# Patient Record
Sex: FEMALE | Race: WHITE | Employment: OTHER
[De-identification: names, ages, dates, MRNs, and addresses within clinical notes are randomized per-mention and may not be internally consistent; named-entity substitution may affect disease eponyms.]

---

## 2017-01-11 ENCOUNTER — OFFICE VISIT (OUTPATIENT)
Dept: OBGYN | Facility: CLINIC | Age: 62
End: 2017-01-11

## 2017-01-11 VITALS
HEIGHT: 64 IN | HEART RATE: 58 BPM | TEMPERATURE: 97.5 F | SYSTOLIC BLOOD PRESSURE: 92 MMHG | BODY MASS INDEX: 37.56 KG/M2 | WEIGHT: 220 LBS | DIASTOLIC BLOOD PRESSURE: 51 MMHG

## 2017-01-11 DIAGNOSIS — L90.0 LICHEN SCLEROSUS: ICD-10-CM

## 2017-01-11 DIAGNOSIS — L82.1 SEBORRHEIC KERATOSIS: Chronic | ICD-10-CM

## 2017-01-11 DIAGNOSIS — Z71.2 ENCOUNTER TO DISCUSS TEST RESULTS: Primary | ICD-10-CM

## 2017-01-11 PROCEDURE — 99212 OFFICE O/P EST SF 10 MIN: CPT | Performed by: OBSTETRICS & GYNECOLOGY

## 2017-01-11 RX ORDER — CLOBETASOL PROPIONATE 0.5 MG/G
OINTMENT TOPICAL
Qty: 15 G | Refills: 3 | Status: SHIPPED | OUTPATIENT
Start: 2017-01-11 | End: 2017-06-22 | Stop reason: SDUPTHER

## 2017-02-01 ENCOUNTER — TELEPHONE (OUTPATIENT)
Dept: INTERNAL MEDICINE | Facility: CLINIC | Age: 62
End: 2017-02-01

## 2017-02-02 DIAGNOSIS — R70.0 ESR RAISED: Primary | ICD-10-CM

## 2017-02-02 DIAGNOSIS — E27.8 ADRENAL MASS, LEFT (HCC): ICD-10-CM

## 2017-02-10 ENCOUNTER — HOSPITAL ENCOUNTER (OUTPATIENT)
Dept: MRI IMAGING | Age: 62
Discharge: HOME | End: 2017-02-10
Payer: COMMERCIAL

## 2017-02-10 DIAGNOSIS — E27.8 ADRENAL MASS, LEFT (HCC): ICD-10-CM

## 2017-02-10 PROCEDURE — A9579 GAD-BASE MR CONTRAST NOS,1ML: HCPCS | Performed by: INTERNAL MEDICINE

## 2017-02-10 PROCEDURE — 6360000004 HC RX CONTRAST MEDICATION: Performed by: INTERNAL MEDICINE

## 2017-02-10 PROCEDURE — 74181 MRI ABDOMEN W/O CONTRAST: CPT | Performed by: RADIOLOGY

## 2017-02-10 PROCEDURE — 74181 MRI ABDOMEN W/O CONTRAST: CPT

## 2017-02-10 RX ADMIN — GADOPENTETATE DIMEGLUMINE 20 ML: 469.01 INJECTION INTRAVENOUS at 09:26

## 2017-02-28 DIAGNOSIS — I10 ESSENTIAL HYPERTENSION: ICD-10-CM

## 2017-03-01 RX ORDER — METOPROLOL TARTRATE 50 MG/1
TABLET, FILM COATED ORAL
Qty: 180 TABLET | Refills: 3 | Status: SHIPPED | OUTPATIENT
Start: 2017-03-01 | End: 2017-07-25 | Stop reason: DRUGHIGH

## 2017-03-20 RX ORDER — LOSARTAN POTASSIUM 100 MG/1
TABLET ORAL
Qty: 90 TABLET | Refills: 3 | Status: SHIPPED | OUTPATIENT
Start: 2017-03-20 | End: 2017-03-24 | Stop reason: SDUPTHER

## 2017-03-20 RX ORDER — HYDROCHLOROTHIAZIDE 25 MG/1
TABLET ORAL
Qty: 90 TABLET | Refills: 3 | Status: SHIPPED | OUTPATIENT
Start: 2017-03-20 | End: 2018-04-06 | Stop reason: SDUPTHER

## 2017-03-24 ENCOUNTER — OFFICE VISIT (OUTPATIENT)
Dept: INTERNAL MEDICINE | Age: 62
End: 2017-03-24
Payer: COMMERCIAL

## 2017-03-24 VITALS
DIASTOLIC BLOOD PRESSURE: 68 MMHG | WEIGHT: 229 LBS | SYSTOLIC BLOOD PRESSURE: 156 MMHG | HEIGHT: 63 IN | BODY MASS INDEX: 40.57 KG/M2 | HEART RATE: 51 BPM

## 2017-03-24 DIAGNOSIS — E78.5 SERUM LIPIDS HIGH: ICD-10-CM

## 2017-03-24 DIAGNOSIS — I10 ESSENTIAL HYPERTENSION: Primary | ICD-10-CM

## 2017-03-24 PROCEDURE — 99213 OFFICE O/P EST LOW 20 MIN: CPT | Performed by: INTERNAL MEDICINE

## 2017-03-24 RX ORDER — PHENOL 1.4 %
1 AEROSOL, SPRAY (ML) MUCOUS MEMBRANE DAILY
COMMUNITY
End: 2018-02-07 | Stop reason: ALTCHOICE

## 2017-03-24 RX ORDER — TRAMADOL HYDROCHLORIDE 50 MG/1
TABLET ORAL
Qty: 40 TABLET | Refills: 2 | Status: SHIPPED | OUTPATIENT
Start: 2017-03-24 | End: 2018-02-07 | Stop reason: ALTCHOICE

## 2017-03-24 RX ORDER — LOSARTAN POTASSIUM 100 MG/1
TABLET ORAL
Qty: 90 TABLET | Refills: 3 | Status: SHIPPED | OUTPATIENT
Start: 2017-03-24 | End: 2018-04-17 | Stop reason: SDUPTHER

## 2017-03-24 ASSESSMENT — ENCOUNTER SYMPTOMS
EYES NEGATIVE: 1
GASTROINTESTINAL NEGATIVE: 1
RESPIRATORY NEGATIVE: 1
ALLERGIC/IMMUNOLOGIC NEGATIVE: 1

## 2017-04-12 DIAGNOSIS — B35.4 TINEA CORPORIS: ICD-10-CM

## 2017-04-12 RX ORDER — CLOTRIMAZOLE AND BETAMETHASONE DIPROPIONATE 10; .64 MG/G; MG/G
CREAM TOPICAL
Qty: 1 TUBE | Refills: 2 | Status: SHIPPED | OUTPATIENT
Start: 2017-04-12 | End: 2017-06-22 | Stop reason: SDUPTHER

## 2017-06-12 RX ORDER — OMEPRAZOLE 40 MG/1
CAPSULE, DELAYED RELEASE ORAL
Qty: 30 CAPSULE | Refills: 5 | Status: SHIPPED | OUTPATIENT
Start: 2017-06-12 | End: 2018-01-04 | Stop reason: SDUPTHER

## 2017-06-22 ENCOUNTER — OFFICE VISIT (OUTPATIENT)
Dept: INTERNAL MEDICINE | Age: 62
End: 2017-06-22
Payer: COMMERCIAL

## 2017-06-22 VITALS
SYSTOLIC BLOOD PRESSURE: 112 MMHG | OXYGEN SATURATION: 91 % | BODY MASS INDEX: 41.1 KG/M2 | WEIGHT: 232 LBS | HEART RATE: 78 BPM | DIASTOLIC BLOOD PRESSURE: 86 MMHG | RESPIRATION RATE: 18 BRPM

## 2017-06-22 DIAGNOSIS — B35.4 TINEA CORPORIS: ICD-10-CM

## 2017-06-22 DIAGNOSIS — E78.5 SERUM LIPIDS HIGH: ICD-10-CM

## 2017-06-22 DIAGNOSIS — I25.10 CORONARY ARTERY DISEASE INVOLVING NATIVE CORONARY ARTERY OF NATIVE HEART WITHOUT ANGINA PECTORIS: ICD-10-CM

## 2017-06-22 DIAGNOSIS — R09.02 HYPOXEMIA: ICD-10-CM

## 2017-06-22 DIAGNOSIS — I10 ESSENTIAL HYPERTENSION: Primary | ICD-10-CM

## 2017-06-22 DIAGNOSIS — L90.0 LICHEN SCLEROSUS: ICD-10-CM

## 2017-06-22 PROCEDURE — 99213 OFFICE O/P EST LOW 20 MIN: CPT | Performed by: INTERNAL MEDICINE

## 2017-06-22 RX ORDER — CLOTRIMAZOLE AND BETAMETHASONE DIPROPIONATE 10; .64 MG/G; MG/G
CREAM TOPICAL
Qty: 1 TUBE | Refills: 2 | Status: SHIPPED | OUTPATIENT
Start: 2017-06-22 | End: 2018-04-17 | Stop reason: SDUPTHER

## 2017-06-22 RX ORDER — CLOBETASOL PROPIONATE 0.5 MG/G
OINTMENT TOPICAL
Qty: 15 G | Refills: 3 | Status: SHIPPED | OUTPATIENT
Start: 2017-06-22 | End: 2018-07-03 | Stop reason: SDUPTHER

## 2017-06-22 ASSESSMENT — ENCOUNTER SYMPTOMS
GASTROINTESTINAL NEGATIVE: 1
ALLERGIC/IMMUNOLOGIC NEGATIVE: 1
RESPIRATORY NEGATIVE: 1
EYES NEGATIVE: 1

## 2017-06-22 ASSESSMENT — PATIENT HEALTH QUESTIONNAIRE - PHQ9
8. MOVING OR SPEAKING SO SLOWLY THAT OTHER PEOPLE COULD HAVE NOTICED. OR THE OPPOSITE, BEING SO FIGETY OR RESTLESS THAT YOU HAVE BEEN MOVING AROUND A LOT MORE THAN USUAL: 0
4. FEELING TIRED OR HAVING LITTLE ENERGY: 3
9. THOUGHTS THAT YOU WOULD BE BETTER OFF DEAD, OR OF HURTING YOURSELF: 0
SUM OF ALL RESPONSES TO PHQ9 QUESTIONS 1 & 2: 4
10. IF YOU CHECKED OFF ANY PROBLEMS, HOW DIFFICULT HAVE THESE PROBLEMS MADE IT FOR YOU TO DO YOUR WORK, TAKE CARE OF THINGS AT HOME, OR GET ALONG WITH OTHER PEOPLE: 1
7. TROUBLE CONCENTRATING ON THINGS, SUCH AS READING THE NEWSPAPER OR WATCHING TELEVISION: 3
SUM OF ALL RESPONSES TO PHQ QUESTIONS 1-9: 19
2. FEELING DOWN, DEPRESSED OR HOPELESS: 3
6. FEELING BAD ABOUT YOURSELF - OR THAT YOU ARE A FAILURE OR HAVE LET YOURSELF OR YOUR FAMILY DOWN: 3
1. LITTLE INTEREST OR PLEASURE IN DOING THINGS: 1
5. POOR APPETITE OR OVEREATING: 3
3. TROUBLE FALLING OR STAYING ASLEEP: 3

## 2017-07-11 ENCOUNTER — HOSPITAL ENCOUNTER (OUTPATIENT)
Dept: SLEEP CENTER | Age: 62
Discharge: HOME | End: 2017-07-11
Payer: COMMERCIAL

## 2017-07-11 VITALS
BODY MASS INDEX: 41.46 KG/M2 | WEIGHT: 234 LBS | DIASTOLIC BLOOD PRESSURE: 81 MMHG | HEIGHT: 63 IN | SYSTOLIC BLOOD PRESSURE: 130 MMHG

## 2017-07-11 DIAGNOSIS — G47.33 OSA (OBSTRUCTIVE SLEEP APNEA): Primary | ICD-10-CM

## 2017-07-11 PROCEDURE — 95810 POLYSOM 6/> YRS 4/> PARAM: CPT

## 2017-07-11 ASSESSMENT — SLEEP AND FATIGUE QUESTIONNAIRES
HOW LIKELY ARE YOU TO NOD OFF OR FALL ASLEEP WHILE SITTING INACTIVE IN A PUBLIC PLACE: 0
ESS TOTAL SCORE: 5
NECK CIRCUMFERENCE (INCHES): 50
HOW LIKELY ARE YOU TO NOD OFF OR FALL ASLEEP WHILE SITTING AND READING: 1
HOW LIKELY ARE YOU TO NOD OFF OR FALL ASLEEP IN A CAR, WHILE STOPPED FOR A FEW MINUTES IN TRAFFIC: 0
HOW LIKELY ARE YOU TO NOD OFF OR FALL ASLEEP WHILE LYING DOWN TO REST IN THE AFTERNOON WHEN CIRCUMSTANCES PERMIT: 2
HOW LIKELY ARE YOU TO NOD OFF OR FALL ASLEEP WHILE SITTING QUIETLY AFTER LUNCH WITHOUT ALCOHOL: 0
HOW LIKELY ARE YOU TO NOD OFF OR FALL ASLEEP WHILE SITTING AND TALKING TO SOMEONE: 0
HOW LIKELY ARE YOU TO NOD OFF OR FALL ASLEEP WHEN YOU ARE A PASSENGER IN A CAR FOR AN HOUR WITHOUT A BREAK: 1
HOW LIKELY ARE YOU TO NOD OFF OR FALL ASLEEP WHILE WATCHING TV: 1

## 2017-07-24 ENCOUNTER — HOSPITAL ENCOUNTER (OUTPATIENT)
Age: 62
Discharge: HOME | End: 2017-07-24
Payer: COMMERCIAL

## 2017-07-24 DIAGNOSIS — R09.02 HYPOXEMIA: ICD-10-CM

## 2017-07-24 DIAGNOSIS — E78.5 SERUM LIPIDS HIGH: ICD-10-CM

## 2017-07-24 DIAGNOSIS — I10 ESSENTIAL HYPERTENSION: ICD-10-CM

## 2017-07-24 DIAGNOSIS — I25.10 CORONARY ARTERY DISEASE INVOLVING NATIVE CORONARY ARTERY OF NATIVE HEART WITHOUT ANGINA PECTORIS: ICD-10-CM

## 2017-07-24 LAB
CHOLESTEROL/HDL RATIO: 4.8
CHOLESTEROL: 219 MG/DL
HDLC SERPL-MCNC: 46 MG/DL
LDL CHOLESTEROL: 132 MG/DL (ref 0–130)
TRIGL SERPL-MCNC: 203 MG/DL
VLDLC SERPL CALC-MCNC: ABNORMAL MG/DL (ref 1–30)

## 2017-07-24 PROCEDURE — 80061 LIPID PANEL: CPT

## 2017-07-24 PROCEDURE — 36415 COLL VENOUS BLD VENIPUNCTURE: CPT

## 2017-07-24 RX ORDER — ATORVASTATIN CALCIUM 80 MG/1
80 TABLET, FILM COATED ORAL DAILY
Qty: 90 TABLET | Refills: 3 | Status: SHIPPED | OUTPATIENT
Start: 2017-07-24 | End: 2017-07-27 | Stop reason: SDUPTHER

## 2017-07-25 ENCOUNTER — HOSPITAL ENCOUNTER (OUTPATIENT)
Dept: SLEEP CENTER | Age: 62
Discharge: HOME | End: 2017-07-25
Payer: COMMERCIAL

## 2017-07-25 ENCOUNTER — OFFICE VISIT (OUTPATIENT)
Dept: INTERNAL MEDICINE | Age: 62
End: 2017-07-25
Payer: COMMERCIAL

## 2017-07-25 VITALS
HEART RATE: 56 BPM | OXYGEN SATURATION: 96 % | BODY MASS INDEX: 40.75 KG/M2 | WEIGHT: 230 LBS | HEIGHT: 63 IN | SYSTOLIC BLOOD PRESSURE: 160 MMHG | DIASTOLIC BLOOD PRESSURE: 89 MMHG

## 2017-07-25 VITALS
DIASTOLIC BLOOD PRESSURE: 81 MMHG | SYSTOLIC BLOOD PRESSURE: 130 MMHG | WEIGHT: 230 LBS | BODY MASS INDEX: 40.75 KG/M2 | HEIGHT: 63 IN

## 2017-07-25 DIAGNOSIS — G47.33 OBSTRUCTIVE SLEEP APNEA HYPOPNEA, MILD: Primary | ICD-10-CM

## 2017-07-25 DIAGNOSIS — I10 ESSENTIAL HYPERTENSION: ICD-10-CM

## 2017-07-25 DIAGNOSIS — G47.33 OSA (OBSTRUCTIVE SLEEP APNEA): Primary | ICD-10-CM

## 2017-07-25 DIAGNOSIS — F17.200 SMOKER: ICD-10-CM

## 2017-07-25 DIAGNOSIS — E78.5 SERUM LIPIDS HIGH: ICD-10-CM

## 2017-07-25 PROCEDURE — 99214 OFFICE O/P EST MOD 30 MIN: CPT | Performed by: STUDENT IN AN ORGANIZED HEALTH CARE EDUCATION/TRAINING PROGRAM

## 2017-07-25 PROCEDURE — 95811 POLYSOM 6/>YRS CPAP 4/> PARM: CPT

## 2017-07-25 RX ORDER — METOPROLOL SUCCINATE 100 MG/1
75 TABLET, EXTENDED RELEASE ORAL DAILY
COMMUNITY
End: 2018-04-17 | Stop reason: DRUGHIGH

## 2017-07-27 RX ORDER — ATORVASTATIN CALCIUM 40 MG/1
40 TABLET, FILM COATED ORAL DAILY
Qty: 30 TABLET | Refills: 2 | Status: SHIPPED | OUTPATIENT
Start: 2017-07-27 | End: 2018-01-11 | Stop reason: SDUPTHER

## 2017-09-11 ENCOUNTER — HOSPITAL ENCOUNTER (OUTPATIENT)
Dept: CT IMAGING | Age: 62
Discharge: HOME | End: 2017-09-11
Payer: COMMERCIAL

## 2017-09-11 LAB
BUN BLDV-MCNC: 15 MG/DL (ref 8–23)
CREAT SERPL-MCNC: 0.72 MG/DL (ref 0.5–0.9)
GFR AFRICAN AMERICAN: >60 ML/MIN
GFR NON-AFRICAN AMERICAN: >60 ML/MIN
GFR SERPL CREATININE-BSD FRML MDRD: NORMAL ML/MIN/{1.73_M2}
GFR SERPL CREATININE-BSD FRML MDRD: NORMAL ML/MIN/{1.73_M2}

## 2017-09-11 PROCEDURE — 82565 ASSAY OF CREATININE: CPT

## 2017-09-11 PROCEDURE — 84520 ASSAY OF UREA NITROGEN: CPT

## 2017-09-11 PROCEDURE — 36415 COLL VENOUS BLD VENIPUNCTURE: CPT

## 2017-09-18 ENCOUNTER — HOSPITAL ENCOUNTER (OUTPATIENT)
Dept: CT IMAGING | Age: 62
Discharge: HOME | End: 2017-09-18
Payer: COMMERCIAL

## 2017-09-18 VITALS
RESPIRATION RATE: 15 BRPM | SYSTOLIC BLOOD PRESSURE: 173 MMHG | HEART RATE: 54 BPM | OXYGEN SATURATION: 91 % | DIASTOLIC BLOOD PRESSURE: 66 MMHG

## 2017-09-18 DIAGNOSIS — R07.89 OTHER CHEST PAIN: ICD-10-CM

## 2017-09-18 LAB
BUN BLDV-MCNC: 27 MG/DL (ref 8–23)
CREAT SERPL-MCNC: 0.85 MG/DL (ref 0.5–0.9)
GFR AFRICAN AMERICAN: >60 ML/MIN
GFR NON-AFRICAN AMERICAN: >60 ML/MIN
GFR SERPL CREATININE-BSD FRML MDRD: ABNORMAL ML/MIN/{1.73_M2}
GFR SERPL CREATININE-BSD FRML MDRD: ABNORMAL ML/MIN/{1.73_M2}

## 2017-09-18 PROCEDURE — 6360000004 HC RX CONTRAST MEDICATION: Performed by: INTERNAL MEDICINE

## 2017-09-18 PROCEDURE — 2580000003 HC RX 258: Performed by: INTERNAL MEDICINE

## 2017-09-18 PROCEDURE — 75574 CT ANGIO HRT W/3D IMAGE: CPT

## 2017-09-18 PROCEDURE — 36415 COLL VENOUS BLD VENIPUNCTURE: CPT

## 2017-09-18 PROCEDURE — 84520 ASSAY OF UREA NITROGEN: CPT

## 2017-09-18 PROCEDURE — 6370000000 HC RX 637 (ALT 250 FOR IP): Performed by: RADIOLOGY

## 2017-09-18 PROCEDURE — 82565 ASSAY OF CREATININE: CPT

## 2017-09-18 RX ORDER — SODIUM CHLORIDE 0.9 % (FLUSH) 0.9 %
10 SYRINGE (ML) INJECTION PRN
Status: DISCONTINUED | OUTPATIENT
Start: 2017-09-18 | End: 2017-09-21 | Stop reason: HOSPADM

## 2017-09-18 RX ORDER — 0.9 % SODIUM CHLORIDE 0.9 %
100 INTRAVENOUS SOLUTION INTRAVENOUS ONCE
Status: COMPLETED | OUTPATIENT
Start: 2017-09-18 | End: 2017-09-18

## 2017-09-18 RX ORDER — NITROGLYCERIN 0.4 MG/1
0.4 TABLET SUBLINGUAL ONCE
Status: COMPLETED | OUTPATIENT
Start: 2017-09-18 | End: 2017-09-18

## 2017-09-18 RX ADMIN — NITROGLYCERIN 0.4 MG: 0.4 TABLET SUBLINGUAL at 10:15

## 2017-09-18 RX ADMIN — SODIUM CHLORIDE 100 ML: 9 INJECTION, SOLUTION INTRAVENOUS at 10:27

## 2017-09-18 RX ADMIN — IOPAMIDOL 100 ML: 755 INJECTION, SOLUTION INTRAVENOUS at 10:27

## 2017-09-18 RX ADMIN — Medication 10 ML: at 10:26

## 2017-09-28 ENCOUNTER — TELEPHONE (OUTPATIENT)
Dept: CASE MANAGEMENT | Age: 62
End: 2017-09-28

## 2017-09-28 ENCOUNTER — OFFICE VISIT (OUTPATIENT)
Dept: INTERNAL MEDICINE | Age: 62
End: 2017-09-28
Payer: COMMERCIAL

## 2017-09-28 VITALS
WEIGHT: 233.8 LBS | BODY MASS INDEX: 41.43 KG/M2 | DIASTOLIC BLOOD PRESSURE: 84 MMHG | OXYGEN SATURATION: 95 % | SYSTOLIC BLOOD PRESSURE: 143 MMHG | HEIGHT: 63 IN | HEART RATE: 78 BPM

## 2017-09-28 DIAGNOSIS — I10 ESSENTIAL HYPERTENSION: Primary | ICD-10-CM

## 2017-09-28 DIAGNOSIS — J98.01 BRONCHOSPASM: ICD-10-CM

## 2017-09-28 DIAGNOSIS — F17.219 NICOTINE DEPENDENCE, CIGARETTES, WITH UNSPECIFIED NICOTINE-INDUCED DISORDERS: ICD-10-CM

## 2017-09-28 PROCEDURE — 99213 OFFICE O/P EST LOW 20 MIN: CPT | Performed by: INTERNAL MEDICINE

## 2017-09-28 PROCEDURE — G0296 VISIT TO DETERM LDCT ELIG: HCPCS | Performed by: INTERNAL MEDICINE

## 2017-09-28 RX ORDER — ALBUTEROL SULFATE 90 UG/1
2 AEROSOL, METERED RESPIRATORY (INHALATION) EVERY 6 HOURS PRN
Qty: 1 INHALER | Refills: 3 | Status: SHIPPED | OUTPATIENT
Start: 2017-09-28 | End: 2020-01-29 | Stop reason: SDUPTHER

## 2017-09-28 ASSESSMENT — ENCOUNTER SYMPTOMS
WHEEZING: 1
GASTROINTESTINAL NEGATIVE: 1
EYES NEGATIVE: 1
ALLERGIC/IMMUNOLOGIC NEGATIVE: 1
COUGH: 1

## 2017-10-09 ENCOUNTER — TELEPHONE (OUTPATIENT)
Dept: CASE MANAGEMENT | Age: 62
End: 2017-10-09

## 2017-10-17 ENCOUNTER — HOSPITAL ENCOUNTER (OUTPATIENT)
Dept: CT IMAGING | Age: 62
Discharge: HOME | End: 2017-10-17
Payer: COMMERCIAL

## 2017-10-17 DIAGNOSIS — F17.219 NICOTINE DEPENDENCE, CIGARETTES, WITH UNSPECIFIED NICOTINE-INDUCED DISORDERS: ICD-10-CM

## 2017-10-17 PROCEDURE — G0297 LDCT FOR LUNG CA SCREEN: HCPCS

## 2017-10-18 ENCOUNTER — TELEPHONE (OUTPATIENT)
Dept: CASE MANAGEMENT | Age: 62
End: 2017-10-18

## 2017-10-18 NOTE — TELEPHONE ENCOUNTER
Ct lung screening complete. Recommend continued annual screening.   Letter to patient cc to provider

## 2017-11-20 ENCOUNTER — HOSPITAL ENCOUNTER (OUTPATIENT)
Dept: CARDIAC CATH/INVASIVE PROCEDURES | Age: 62
Discharge: HOME | End: 2017-11-20
Payer: COMMERCIAL

## 2017-11-20 VITALS
OXYGEN SATURATION: 94 % | RESPIRATION RATE: 14 BRPM | TEMPERATURE: 98.6 F | WEIGHT: 225 LBS | SYSTOLIC BLOOD PRESSURE: 126 MMHG | DIASTOLIC BLOOD PRESSURE: 37 MMHG | HEIGHT: 63 IN | HEART RATE: 54 BPM | BODY MASS INDEX: 39.87 KG/M2

## 2017-11-20 LAB
GFR NON-AFRICAN AMERICAN: ABNORMAL ML/MIN
GFR SERPL CREATININE-BSD FRML MDRD: ABNORMAL ML/MIN
GFR SERPL CREATININE-BSD FRML MDRD: ABNORMAL ML/MIN/{1.73_M2}
GLUCOSE BLD-MCNC: 153 MG/DL (ref 74–100)
PLATELET # BLD: 272 K/UL (ref 138–453)
POC CHLORIDE: 102 MMOL/L (ref 98–107)
POC CREATININE: <0.3 MG/DL (ref 0.51–1.19)
POC HEMATOCRIT: 41 % (ref 36–46)
POC HEMOGLOBIN: 13.8 G/DL (ref 12–16)
POC POTASSIUM: 4 MMOL/L (ref 3.5–4.5)
POC SODIUM: 140 MMOL/L (ref 138–146)

## 2017-11-20 PROCEDURE — C1725 CATH, TRANSLUMIN NON-LASER: HCPCS

## 2017-11-20 PROCEDURE — 7100000011 HC PHASE II RECOVERY - ADDTL 15 MIN

## 2017-11-20 PROCEDURE — 6360000002 HC RX W HCPCS

## 2017-11-20 PROCEDURE — 93458 L HRT ARTERY/VENTRICLE ANGIO: CPT | Performed by: INTERNAL MEDICINE

## 2017-11-20 PROCEDURE — 84132 ASSAY OF SERUM POTASSIUM: CPT

## 2017-11-20 PROCEDURE — 84295 ASSAY OF SERUM SODIUM: CPT

## 2017-11-20 PROCEDURE — 82435 ASSAY OF BLOOD CHLORIDE: CPT

## 2017-11-20 PROCEDURE — 7100000010 HC PHASE II RECOVERY - FIRST 15 MIN

## 2017-11-20 PROCEDURE — 82565 ASSAY OF CREATININE: CPT

## 2017-11-20 PROCEDURE — 2709999900 HC NON-CHARGEABLE SUPPLY

## 2017-11-20 PROCEDURE — 85049 AUTOMATED PLATELET COUNT: CPT

## 2017-11-20 PROCEDURE — C1769 GUIDE WIRE: HCPCS

## 2017-11-20 PROCEDURE — C1894 INTRO/SHEATH, NON-LASER: HCPCS

## 2017-11-20 PROCEDURE — 85014 HEMATOCRIT: CPT

## 2017-11-20 PROCEDURE — 2500000003 HC RX 250 WO HCPCS

## 2017-11-20 PROCEDURE — 82947 ASSAY GLUCOSE BLOOD QUANT: CPT

## 2017-11-20 RX ORDER — SODIUM CHLORIDE 9 MG/ML
INJECTION, SOLUTION INTRAVENOUS CONTINUOUS
Status: DISCONTINUED | OUTPATIENT
Start: 2017-11-20 | End: 2017-11-21 | Stop reason: HOSPADM

## 2017-11-20 RX ORDER — SODIUM CHLORIDE 9 MG/ML
INJECTION, SOLUTION INTRAVENOUS CONTINUOUS
Status: CANCELLED | OUTPATIENT
Start: 2017-11-20 | End: 2017-11-23

## 2017-11-20 RX ORDER — SODIUM CHLORIDE 0.9 % (FLUSH) 0.9 %
10 SYRINGE (ML) INJECTION EVERY 12 HOURS SCHEDULED
Status: CANCELLED | OUTPATIENT
Start: 2017-11-20

## 2017-11-20 RX ORDER — ONDANSETRON 2 MG/ML
4 INJECTION INTRAMUSCULAR; INTRAVENOUS EVERY 6 HOURS PRN
Status: CANCELLED | OUTPATIENT
Start: 2017-11-20

## 2017-11-20 RX ORDER — SODIUM CHLORIDE 0.9 % (FLUSH) 0.9 %
10 SYRINGE (ML) INJECTION PRN
Status: CANCELLED | OUTPATIENT
Start: 2017-11-20

## 2017-11-20 RX ORDER — ACETAMINOPHEN 325 MG/1
650 TABLET ORAL EVERY 4 HOURS PRN
Status: CANCELLED | OUTPATIENT
Start: 2017-11-20

## 2017-11-20 RX ADMIN — SODIUM CHLORIDE: 9 INJECTION, SOLUTION INTRAVENOUS at 14:22

## 2017-11-20 NOTE — PROGRESS NOTES
Patient returned to room. Post cath recovery iniitiated. Family at bedside. Patient denies any pain at this time. Right wrist with TR band intact. No hematoma or drainage noted. Hand and fingers are warm, pink with good capillary refill. Cardiac monitor shows NSR without ectopy. Hand elevated on pillow .

## 2017-11-20 NOTE — H&P
[facility]  H and P note                  Date:   2017  Patient name: Farhat Maharaj  Date of admission:  2017  1:04 PM  MRN:   [id number]  YOB: 1955    Reason for Admission: elective heart cath     CHIEF COMPLAINT:   Dyspnea, abnormal coronary CTA     History Obtained From:  Patient and chart review     HISTORY OF PRESENT ILLNESS:    57 yr old female with previous hx of CAD s/p PCI-RCA and LCX evaluated in office for dyspnea and decline in fucntional capacity due to fatigue is here for left  Heart cath after CTA coronary showed moderate obstructive disease. She also reports intermittent substernal chest pain which is sharp and lasts for few seconds. She reports feeling much better with improvement in her Dyspnea and fatigue after she has started using CPAP two weeks ago. Past Medical History:   has a past medical history of Angina pectoris ([address] Arthritis; Bipolar disorder ([address] CAD (coronary artery disease); Carotid stenosis, asymptomatic; Chronic back pain; Depression; Family history of colon cancer; Family history of liver cancer; Family history of ovarian cancer; GERD (gastroesophageal reflux disease); History of colon polyps; Hyperlipidemia; Hypertension; Obesity; Peripheral vascular disease ([address] Pre-diabetes; and Snoring. Past Surgical History:   has a past surgical history that includes  section; Nose surgery; Tonsillectomy; Ankle surgery; Induced ; Coronary angioplasty with stent (); Cardiac catheterization (10/11/2013); Colonoscopy; and Endoscopy, colon, diagnostic. Home Medications:    Prior to Admission medications    Medication Sig Start Date End Date Taking?  Authorizing Provider   sertraline (ZOLOFT) 50 MG tablet take 1 tablet by mouth once daily 10/27/17   Dee Dee Sierra MD   albuterol sulfate HFA (PROVENTIL HFA) 108 (90 Base) MCG/ACT inhaler Inhale 2 puffs into the lungs every 6 hours as needed for Wheezing 17 11/20/2017 at 1:17 PM.  Fellow, [facility], [address] [facility] Cardiology Physician:      Attending Physician Statement:    I have discussed the care of  Alissa Vázquez , including pertinent history and exam findings, with the Cardiology fellow/resident. I have seen and examined the patient and the key elements of all parts of the encounter have been performed by me. I agree with the assessment, plan and orders as documented by the fellow/resident, after I modified exam findings and plan of treatments, and the final version is my approved version of the assessment.      Additional Comments:       Attending Name:

## 2017-12-15 ENCOUNTER — TELEPHONE (OUTPATIENT)
Dept: INTERNAL MEDICINE | Age: 62
End: 2017-12-15

## 2017-12-15 DIAGNOSIS — G47.33 OSA ON CPAP: Primary | ICD-10-CM

## 2017-12-15 DIAGNOSIS — Z99.89 OSA ON CPAP: Primary | ICD-10-CM

## 2017-12-15 NOTE — TELEPHONE ENCOUNTER
Pt called about sleep lab and needing her machine to be downloaded, she said she got her sleep study done at [facility]. Pt states also orders needs to be faxed to [employer] at [phone] and for oxygen to be faxed to [phone]. Numbers were given to pt by the pharmacy counter.

## 2017-12-28 NOTE — TELEPHONE ENCOUNTER
Writer able to get a hold to [employer] study. They said either PCP or pulmonologist will be in charge of downloading machine. PC to pt she does not see a pulmonologist only a cardiologist who is the one that recommend her to get a sleep study done then Dr Fredo Leary approved it. She says it is all very confusing to her on what needs to be done. Writer advise pt that she will probably be referred to a pulmonologist but unsure if she will do so before her appt on 1/11/18. Pt also states that her mask is only fitting 95% of the time as she bumps it in the night and is woken up with the air blowing on her face. Pt just wants to make sure that is okay, she says she is trying to learn how to sleep with it on still. DO you want to refer pt to pulmonologist now or wait till appt? Health Maintenance   Topic Date Due    Hepatitis C screen  1955    HIV screen  01/20/1968    DTaP/Tdap/Td vaccine (1 - Tdap) 01/20/1972    Flu vaccine (1) 09/01/2017    Cervical cancer screen  05/29/2018    Smoker: low dose lung CT screening  10/17/2018    Breast cancer screen  01/31/2019    Diabetes screen  06/11/2019    Lipid screen  07/24/2022    Colon cancer screen colonoscopy  07/29/2024    Zostavax vaccine  Addressed             (applicable per patient's age: Cancer Screenings, Depression Screening, Fall Risk Screening, Immunizations)    Hemoglobin A1C (%)   Date Value   06/11/2016 5.9   09/02/2015 6.5 (H)   08/04/2014 6.1 (H)     Microalb/Crt.  Ratio (mcg/mg creat)   Date Value   06/11/2016 12     LDL Cholesterol (mg/dL)   Date Value   07/24/2017 132 (H)     ALT (U/L)   Date Value   07/12/2013 21     BUN (mg/dL)   Date Value   09/18/2017 27 (H)      (goal A1C is < 7)   (goal LDL is <100) need 30-50% reduction from baseline     BP Readings from Last 3 Encounters:   11/20/17 (!) 126/37   09/28/17 (!) 143/84   09/18/17 (!) 173/66    (goal /80)      All Future Testing planned in [facility]:  Lab Frequency Next Occurrence   Home O2 eval (desaturation screen) Once 06/29/2017       Next Visit Date:  Future Appointments  Date Time Provider Zohreh Hopper   1/11/2018 11:00 AM Donovan Mitchell MD [id number]            Patient Active Problem List:     Smoker     Angina pectoris ([facility]     HTN (hypertension)     CAD/Stents drug-eluting      Serum lipids high     Subtalar Joint Arthritis     Prediabetes     Carotid stenosis, asymptomatic     Claudication in peripheral vascular disease ([facility]

## 2018-01-04 RX ORDER — OMEPRAZOLE 40 MG/1
CAPSULE, DELAYED RELEASE ORAL
Qty: 30 CAPSULE | Refills: 5 | Status: SHIPPED | OUTPATIENT
Start: 2018-01-04 | End: 2018-07-07 | Stop reason: SDUPTHER

## 2018-01-11 ENCOUNTER — OFFICE VISIT (OUTPATIENT)
Dept: INTERNAL MEDICINE | Age: 63
End: 2018-01-11
Payer: MEDICARE

## 2018-01-11 VITALS
HEIGHT: 64 IN | SYSTOLIC BLOOD PRESSURE: 114 MMHG | BODY MASS INDEX: 39.09 KG/M2 | HEART RATE: 49 BPM | DIASTOLIC BLOOD PRESSURE: 80 MMHG | WEIGHT: 229 LBS | OXYGEN SATURATION: 90 %

## 2018-01-11 DIAGNOSIS — I10 ESSENTIAL HYPERTENSION: ICD-10-CM

## 2018-01-11 DIAGNOSIS — E66.9 OBESITY (BMI 30-39.9): ICD-10-CM

## 2018-01-11 DIAGNOSIS — Z99.89 OSA ON CPAP: ICD-10-CM

## 2018-01-11 DIAGNOSIS — E78.5 DYSLIPIDEMIA: ICD-10-CM

## 2018-01-11 DIAGNOSIS — M54.50 CHRONIC BILATERAL LOW BACK PAIN WITHOUT SCIATICA: ICD-10-CM

## 2018-01-11 DIAGNOSIS — Z87.891 EX-SMOKER: ICD-10-CM

## 2018-01-11 DIAGNOSIS — G47.34 NOCTURNAL HYPOXIA: ICD-10-CM

## 2018-01-11 DIAGNOSIS — I65.23 ASYMPTOMATIC BILATERAL CAROTID ARTERY STENOSIS: ICD-10-CM

## 2018-01-11 DIAGNOSIS — G47.33 OSA ON CPAP: ICD-10-CM

## 2018-01-11 DIAGNOSIS — G89.29 CHRONIC BILATERAL LOW BACK PAIN WITHOUT SCIATICA: ICD-10-CM

## 2018-01-11 DIAGNOSIS — E11.9 NEW ONSET TYPE 2 DIABETES MELLITUS (HCC): ICD-10-CM

## 2018-01-11 DIAGNOSIS — I25.10 CORONARY ARTERY DISEASE INVOLVING NATIVE CORONARY ARTERY OF NATIVE HEART WITHOUT ANGINA PECTORIS: ICD-10-CM

## 2018-01-11 LAB — HBA1C MFR BLD: 6.6 %

## 2018-01-11 PROCEDURE — 3017F COLORECTAL CA SCREEN DOC REV: CPT | Performed by: INTERNAL MEDICINE

## 2018-01-11 PROCEDURE — G8484 FLU IMMUNIZE NO ADMIN: HCPCS | Performed by: INTERNAL MEDICINE

## 2018-01-11 PROCEDURE — 3014F SCREEN MAMMO DOC REV: CPT | Performed by: INTERNAL MEDICINE

## 2018-01-11 PROCEDURE — 1036F TOBACCO NON-USER: CPT | Performed by: INTERNAL MEDICINE

## 2018-01-11 PROCEDURE — G8417 CALC BMI ABV UP PARAM F/U: HCPCS | Performed by: INTERNAL MEDICINE

## 2018-01-11 PROCEDURE — G8598 ASA/ANTIPLAT THER USED: HCPCS | Performed by: INTERNAL MEDICINE

## 2018-01-11 PROCEDURE — 99213 OFFICE O/P EST LOW 20 MIN: CPT

## 2018-01-11 PROCEDURE — 83036 HEMOGLOBIN GLYCOSYLATED A1C: CPT | Performed by: INTERNAL MEDICINE

## 2018-01-11 PROCEDURE — 99214 OFFICE O/P EST MOD 30 MIN: CPT | Performed by: INTERNAL MEDICINE

## 2018-01-11 PROCEDURE — G8427 DOCREV CUR MEDS BY ELIG CLIN: HCPCS | Performed by: INTERNAL MEDICINE

## 2018-01-11 PROCEDURE — 3044F HG A1C LEVEL LT 7.0%: CPT | Performed by: INTERNAL MEDICINE

## 2018-01-11 RX ORDER — ATORVASTATIN CALCIUM 40 MG/1
40 TABLET, FILM COATED ORAL DAILY
Qty: 30 TABLET | Refills: 2 | Status: SHIPPED | OUTPATIENT
Start: 2018-01-11 | End: 2018-09-06 | Stop reason: SDUPTHER

## 2018-01-11 NOTE — PROGRESS NOTES
Visit Information    Have you changed or started any medications since your last visit including any over-the-counter medicines, vitamins, or herbal medicines? no   Are you having any side effects from any of your medications? -  no  Have you stopped taking any of your medications? Is so, why? -  no    Have you seen any other physician or provider since your last visit? Yes - Records Obtained  Have you had any other diagnostic tests since your last visit? Yes - Records Obtained  Have you been seen in the emergency room and/or had an admission to a hospital since we last saw you? No  Have you had your routine dental cleaning in the past 6 months? no    Have you activated your [employer] account? If not, what are your barriers?  Yes     Patient Care Team:  Jared Carter MD as PCP - General (Internal Medicine)  Candida Frey RN as Nurse Navigator    Medical History Review  Past Medical, Family, and Social History reviewed and does contribute to the patient presenting condition    Health Maintenance   Topic Date Due    Hepatitis C screen  1955    HIV screen  01/20/1968    DTaP/Tdap/Td vaccine (1 - Tdap) 01/20/1972    A1C test (Diabetic or Prediabetic)  06/11/2017    Flu vaccine (1) 09/01/2017    Cervical cancer screen  05/29/2018    Smoker: low dose lung CT screening  10/17/2018    Potassium monitoring  11/20/2018    Creatinine monitoring  11/20/2018    Breast cancer screen  01/31/2019    Lipid screen  07/24/2022    Colon cancer screen colonoscopy  07/29/2024    Zostavax vaccine  Addressed

## 2018-01-11 NOTE — PATIENT INSTRUCTIONS
You have been given a lab order no need to schedule please fast 12 hours     You have been given an order for a x-ray. Please have x-ray performed at [facility], there is no need to schedule test.    Your referral for pulmonology was sent to Dr Link Re you will be contacted with an appt if you do not hear from them within 2 weeks call [phone]    You were given a referral for physical therapy please call [phone] within 48 hours to schedule     Your medications for this visit were escribed to your preferred pharmacy. Avs was given and reviewed appt card given with next appt.  MM

## 2018-01-11 NOTE — PROGRESS NOTES
[facility]    Progress Note    Date of patient's visit: 1/11/2018    Patient's Name:  Dhruv Novoa    YOB: 1955            Patient Care Team:  Janis Angulo MD as PCP - General (Internal Medicine)  Brielle Carrizales, RN as Nurse Navigator    REASON FOR VISIT: Routine outpatient follow     Chief Complaint   Patient presents with    Hypertension     pt states no concerns    Discuss Labs     pt wants to talk about CT from [address] Maintenance     pt refused all vaccines,pt refuse HIV screen     Discuss Labs     pt states shes not taking tramadol because she dont think its working, not taking calcium carbonate         HISTORY OF PRESENT ILLNESS:    History was obtained from the patient. Dhruv Novoa is a 57 y.o. is here for Follow-up. She was seeing  in the past.  She has had a history of hypertension, CAD and morbid obesity. Recently she had another coronary angiogram which revealed patent stents. She's been having dyspnea on exertion. She had a sleep study done which did reveal severe sleep apnea. She is on CPAP and requiring 2 L of oxygen at night. She feels better since she has started using the CPAP. She has chronic low back pain. She wants to lose weight. She would like referral to physical therapy. She has no radiculopathy. She quit smoking 2 years ago but still using e-cigarette. She is trying to decrease her vaping habit. She had a recent CAT scan done for lung screening as she has history of smoking. A small 5 mm nodule was seen. She was referred to pulmonologist by her PCP who months ago. She has a follow-up. She complains of shortness of breath on exertion. She has not had PFTs done. She has a history of impaired glucose tolerance. It seems to be worsening. Today her A1c is 6.6. She is obese and is advised to lose weight.     Results for POC orders placed in visit on 01/11/18   POCT change from previous record. Sejal Santana  reports that she quit smoking about 2 years ago. Her smoking use included Cigarettes. She has a 60.00 pack-year smoking history. She has never used smokeless tobacco.    FAMILY HISTORY:    Reviewed and No change from previous visit    HEALTH MAINTENANCE DUE:      Health Maintenance Due   Topic Date Due    Hepatitis C screen  1955    HIV screen  01/20/1968    DTaP/Tdap/Td vaccine (1 - Tdap) 01/20/1972    A1C test (Diabetic or Prediabetic)  06/11/2017    Flu vaccine (1) 09/01/2017       REVIEW OF SYSTEMS:    12 point review of symptoms completed and found to be normal except noted in the HPI    Review of Systems   Constitutional: Positive for malaise/fatigue. Negative for fever and weight loss. Eyes: Negative for blurred vision and redness. Respiratory: Positive for cough and shortness of breath. Negative for sputum production. Cardiovascular: Negative for chest pain, palpitations and leg swelling. Gastrointestinal: Negative for abdominal pain, constipation and nausea. Musculoskeletal: Positive for back pain. Skin: Negative for itching and rash. Neurological: Negative for dizziness, sensory change and loss of consciousness. Endo/Heme/Allergies: Negative for polydipsia. Does not bruise/bleed easily. Psychiatric/Behavioral: Negative for depression and memory loss. The patient does not have insomnia. PHYSICAL EXAM:      Vitals:    01/11/18 1119   BP: 114/80   Site: Right Arm   Position: Sitting   Cuff Size: Large Adult   Pulse: (!) 49   SpO2: 90%   Weight: 229 lb (103.9 kg)   Height: 5' 3.5\" (1.613 m)     Body mass index is 39.93 kg/m².     BP Readings from Last 3 Encounters:   01/11/18 114/80   11/20/17 (!) 126/37   09/28/17 (!) 143/84        Wt Readings from Last 3 Encounters:   01/11/18 229 lb (103.9 kg)   11/20/17 225 lb (102.1 kg)   09/28/17 233 lb 12.8 oz (106.1 kg)       Physical Exam      HENT:  Normocephalic, Atraumatic, Bilateral external medication compliance addressed. All patient questions answered. Pt voiced understanding.        Rell Pziarro  Attending Physician, [address] [place] [address], Internal Medicine Residency Program  [address] [place] [address]  1/11/2018, 11:49 AM

## 2018-01-14 ASSESSMENT — ENCOUNTER SYMPTOMS
BLURRED VISION: 0
SPUTUM PRODUCTION: 0
CONSTIPATION: 0
NAUSEA: 0
BACK PAIN: 1
ABDOMINAL PAIN: 0
COUGH: 1
SHORTNESS OF BREATH: 1
EYE REDNESS: 0

## 2018-02-07 ENCOUNTER — OFFICE VISIT (OUTPATIENT)
Dept: PULMONOLOGY | Age: 63
End: 2018-02-07
Payer: MEDICARE

## 2018-02-07 VITALS
HEIGHT: 64 IN | WEIGHT: 232 LBS | BODY MASS INDEX: 39.61 KG/M2 | HEART RATE: 48 BPM | OXYGEN SATURATION: 92 % | TEMPERATURE: 97.4 F | SYSTOLIC BLOOD PRESSURE: 158 MMHG | DIASTOLIC BLOOD PRESSURE: 80 MMHG | RESPIRATION RATE: 14 BRPM

## 2018-02-07 VITALS — WEIGHT: 232 LBS | OXYGEN SATURATION: 97 % | HEART RATE: 48 BPM | HEIGHT: 64 IN | BODY MASS INDEX: 39.61 KG/M2

## 2018-02-07 DIAGNOSIS — R91.1 LUNG NODULE: ICD-10-CM

## 2018-02-07 DIAGNOSIS — Z99.89 OSA ON CPAP: Primary | ICD-10-CM

## 2018-02-07 DIAGNOSIS — G47.33 OSA ON CPAP: ICD-10-CM

## 2018-02-07 DIAGNOSIS — R91.1 LUNG NODULE: Primary | ICD-10-CM

## 2018-02-07 DIAGNOSIS — Z99.89 OSA ON CPAP: ICD-10-CM

## 2018-02-07 DIAGNOSIS — R06.02 SHORTNESS OF BREATH: ICD-10-CM

## 2018-02-07 DIAGNOSIS — G47.34 NOCTURNAL HYPOXIA: ICD-10-CM

## 2018-02-07 DIAGNOSIS — G47.33 OSA ON CPAP: Primary | ICD-10-CM

## 2018-02-07 DIAGNOSIS — J44.9 CHRONIC OBSTRUCTIVE PULMONARY DISEASE, UNSPECIFIED COPD TYPE (HCC): ICD-10-CM

## 2018-02-07 PROCEDURE — 3017F COLORECTAL CA SCREEN DOC REV: CPT | Performed by: INTERNAL MEDICINE

## 2018-02-07 PROCEDURE — 94726 PLETHYSMOGRAPHY LUNG VOLUMES: CPT | Performed by: INTERNAL MEDICINE

## 2018-02-07 PROCEDURE — G8484 FLU IMMUNIZE NO ADMIN: HCPCS | Performed by: INTERNAL MEDICINE

## 2018-02-07 PROCEDURE — G8417 CALC BMI ABV UP PARAM F/U: HCPCS | Performed by: INTERNAL MEDICINE

## 2018-02-07 PROCEDURE — 99205 OFFICE O/P NEW HI 60 MIN: CPT | Performed by: INTERNAL MEDICINE

## 2018-02-07 PROCEDURE — 3023F SPIROM DOC REV: CPT | Performed by: INTERNAL MEDICINE

## 2018-02-07 PROCEDURE — G8598 ASA/ANTIPLAT THER USED: HCPCS | Performed by: INTERNAL MEDICINE

## 2018-02-07 PROCEDURE — 1036F TOBACCO NON-USER: CPT | Performed by: INTERNAL MEDICINE

## 2018-02-07 PROCEDURE — 1123F ACP DISCUSS/DSCN MKR DOCD: CPT | Performed by: INTERNAL MEDICINE

## 2018-02-07 PROCEDURE — 1090F PRES/ABSN URINE INCON ASSESS: CPT | Performed by: INTERNAL MEDICINE

## 2018-02-07 PROCEDURE — 94729 DIFFUSING CAPACITY: CPT | Performed by: INTERNAL MEDICINE

## 2018-02-07 PROCEDURE — G8428 CUR MEDS NOT DOCUMENT: HCPCS | Performed by: INTERNAL MEDICINE

## 2018-02-07 PROCEDURE — 94060 EVALUATION OF WHEEZING: CPT | Performed by: INTERNAL MEDICINE

## 2018-02-07 PROCEDURE — 4040F PNEUMOC VAC/ADMIN/RCVD: CPT | Performed by: INTERNAL MEDICINE

## 2018-02-07 PROCEDURE — 3014F SCREEN MAMMO DOC REV: CPT | Performed by: INTERNAL MEDICINE

## 2018-02-07 PROCEDURE — G8926 SPIRO NO PERF OR DOC: HCPCS | Performed by: INTERNAL MEDICINE

## 2018-02-07 PROCEDURE — G8399 PT W/DXA RESULTS DOCUMENT: HCPCS | Performed by: INTERNAL MEDICINE

## 2018-02-07 ASSESSMENT — SLEEP AND FATIGUE QUESTIONNAIRES
HOW LIKELY ARE YOU TO NOD OFF OR FALL ASLEEP WHEN YOU ARE A PASSENGER IN A CAR FOR AN HOUR WITHOUT A BREAK: 0
HOW LIKELY ARE YOU TO NOD OFF OR FALL ASLEEP WHILE LYING DOWN TO REST IN THE AFTERNOON WHEN CIRCUMSTANCES PERMIT: 3
HOW LIKELY ARE YOU TO NOD OFF OR FALL ASLEEP WHILE SITTING AND TALKING TO SOMEONE: 0
HOW LIKELY ARE YOU TO NOD OFF OR FALL ASLEEP WHILE SITTING QUIETLY AFTER LUNCH WITHOUT ALCOHOL: 0
ESS TOTAL SCORE: 3
HOW LIKELY ARE YOU TO NOD OFF OR FALL ASLEEP IN A CAR, WHILE STOPPED FOR A FEW MINUTES IN TRAFFIC: 0
HOW LIKELY ARE YOU TO NOD OFF OR FALL ASLEEP WHILE WATCHING TV: 0
HOW LIKELY ARE YOU TO NOD OFF OR FALL ASLEEP WHILE SITTING AND READING: 0
HOW LIKELY ARE YOU TO NOD OFF OR FALL ASLEEP WHILE SITTING INACTIVE IN A PUBLIC PLACE: 0

## 2018-02-07 NOTE — PROGRESS NOTES
OUTPATIENT PULMONARY CONSULT NOTE      Patient:  Jaky Hannon  MRN: [id number]    Consulting Physician: Dr. Rowan Coreas  Reason for Consult: COPD, lung nodule, dyspnea, obstructive sleep apnea  [facility] Physician: Donovan Mitchell MD    HISTORY OF PRESENT ILLNESS:   The patient is a 70 y.o. female   She is a very pleasant female who is referred here for the above issues, she has long history of his smoking, she was told in the past that she has COPD and she had a spirometry done before, she is using albuterol as needed only and not on medication on a regular basis, she does have history of shortness of breath on exertion and sometimes on regular activities, and she had limited her activities because of her shortness of breath especially last few years, she denies chronic or persistent cough but she does have occasional cough with sputum production as sometimes a whitish sputum, she denies nocturnal symptoms of wheezing chest tightness orthopnea or proximal nocturnal dyspnea. She usually take albuterol mostly at night, she also had wheezing intermittently and denies persistent or daily wheezing. She also has a CT chest done for screening which showed 5 mm left upper lobe lung nodule    She does have weight gain for about 30 pounds in last few years and she denies weight loss or fever night sweats or loss of appetite. She was diagnosed with sleep apnea for about a year ago when she had a sleep study done and she was also placed on oxygen at night with CPAP at 2 L which she is using it, she is very compliant with the CPAP she claimed that before she started using the CPAP she was tired and fatigued she did not have good sleep and sleep was not refreshing and she will was taking naps almost every day since she started CPAP she is feeling much better she's more restful and her sleep is refreshing and she feels more energetic during the daytime, she states take naps at time when she doesn't sleep good at night.   She denies postnasal dripping nasal congestion or epistaxis. She does have history of GERD and she is on medication to control the symptoms    Sleep  + dry mouth upon awakening. No Fatigue and tiredness during the day. No history of sleep apnea. Goes to sleep at 3 AM, wakes up 11 am. It takes 1 hour to fall asleep. Wakes up one times at night to go to bathroom. Takes sometime nap during the day ( 1.5 hours). + headache in am. No car wrecks or near wrecks because of the sleepiness. No nodding off while driving. No weight gain. No forgetfulness or decreased concentration. No nasal congestion or obstruction at night. Using BIAPA/CPAP 6-8 hr/night. + leg jerks during sleep. No restless feelings in legs at night. No numbness or burning in leg or feet. No leg aches or cramps .        Sleep Medicine 2/7/2018 7/11/2017   Sitting and reading 0 1   Watching TV 0 1   Sitting, inactive in a public place (e.g. a theatre or a meeting) 0 0   As a passenger in a car for an hour without a break 0 1   Lying down to rest in the afternoon when circumstances permit 3 2   Sitting and talking to someone 0 0   Sitting quietly after a lunch without alcohol 0 0   In a car, while stopped for a few minutes in traffic 0 0   Total score 3 5   Neck circumference - 50     Past Medical History:        Diagnosis Date    Angina p[facility]is ([facility]     Arthritis     Bipolar disorder ([facility]     CAD (coronary artery disease)     stents x2    Carotid stenosis, asymptomatic 2/26/2015    Chronic back pain     Depression     Family history of colon cancer     Family history of liver cancer     Family history of ovarian cancer     GERD (gastroesophageal reflux disease)     History of colon polyps     Hyperlipidemia     Hypertension     New onset type 2 diabetes mellitus ([address] 1/11/2018    Obesity     Peripheral vascular disease ([facility]     Pre-diabetes     Snoring        Past Surgical History:        Procedure Laterality Date    ANKLE SURGERY      CARDIAC CATHETERIZATION  10/11/2013     SECTION      x2    COLONOSCOPY      CORONARY ANGIOPLASTY WITH STENT PLACEMENT  2011    x2    ENDOSCOPY, COLON, DIAGNOSTIC      INDUCED       NOSE SURGERY      as a child    TONSILLECTOMY         Allergies: Allergies   Allergen Reactions    Contrast [Iodides] Itching     Mild itchiness experienced with administration of IV contrast media. Home Meds:   Outpatient Encounter Prescriptions as of 2018   Medication Sig Dispense Refill    atorvastatin (LIPITOR) 40 MG tablet Take 1 tablet by mouth daily 30 tablet 2    metFORMIN (GLUCOPHAGE) 500 MG tablet Take 1 tablet by mouth 2 times daily (with meals) 60 tablet 5    omeprazole (PRILOSEC) 40 MG delayed release capsule take 1 capsule by mouth once daily 30 capsule 5    sertraline (ZOLOFT) 50 MG tablet take 1 tablet by mouth once daily 30 tablet 3    albuterol sulfate HFA (PROVENTIL HFA) 108 (90 Base) MCG/ACT inhaler Inhale 2 puffs into the lungs every 6 hours as needed for Wheezing 1 Inhaler 3    metoprolol succinate (TOPROL XL) 100 MG extended release tablet Take 75 mg by mouth daily      clotrimazole-betamethasone (LOTRISONE) 1-0.05 % cream Apply to affected area bid externally x 4 days then daily for 3 days 1 Tube 2    clobetasol (TEMOVATE) 0.05 % ointment Apply topically 2 times weekly. 15 g 3    losartan (COZAAR) 100 MG tablet take 1 tablet by mouth once daily 90 tablet 3    hydrochlorothiazide (HYDRODIURIL) 25 MG tablet take 1 tablet by mouth once daily 90 tablet 3    Multiple Vitamin (MULTI-VITAMIN PO) Take 1 tablet by mouth daily.  aspirin 325 MG tablet Take 325 mg by mouth daily.         [DISCONTINUED] calcium carbonate 600 MG TABS tablet Take 1 tablet by mouth daily      [DISCONTINUED] traMADol (ULTRAM) 50 MG tablet take 1 tablet by mouth every 8 hours if needed for pain MAY CAUSE DROWSINESS 40 tablet 2    [DISCONTINUED] diphenhydrAMINE-APAP  MG TABS Take 1

## 2018-02-08 ENCOUNTER — TELEPHONE (OUTPATIENT)
Dept: PULMONOLOGY | Age: 63
End: 2018-02-08

## 2018-02-12 ENCOUNTER — HOSPITAL ENCOUNTER (OUTPATIENT)
Dept: PHYSICAL THERAPY | Facility: CLINIC | Age: 63
Setting detail: THERAPIES SERIES
Discharge: HOME | End: 2018-02-12
Payer: MEDICARE

## 2018-02-12 PROCEDURE — 97110 THERAPEUTIC EXERCISES: CPT

## 2018-02-12 PROCEDURE — G8978 MOBILITY CURRENT STATUS: HCPCS

## 2018-02-12 PROCEDURE — 97161 PT EVAL LOW COMPLEX 20 MIN: CPT

## 2018-02-12 PROCEDURE — G0283 ELEC STIM OTHER THAN WOUND: HCPCS

## 2018-02-12 PROCEDURE — G8979 MOBILITY GOAL STATUS: HCPCS

## 2018-02-12 NOTE — CONSULTS
treatments)  1. ? Pain: 3/10  2. ? ROM: Improve Lumbar ROM  3. ? Strength: LE grossly 5/5  4. ? Function: Able to perform all basic ADL's without difficulty  5. Independent with Home Exercise Programs  LTG: (to be met in 12 treatments)  1. <1/10 pain  2. Oswestry score improved to <10% disability    Patient goals: To relieve the pain and have more flexibility. G-CODE  Functional Limitation: Mobility  Functional Assessment Used: Oswestry  Current Status Modifier: CJ  Goal Status Modifier: CI    Rehab Potential:  [x] Good  [] Fair  [] Poor   Suggested Professional Referral:  [x] No  [] Yes:  Barriers to Goal Achievement[de-identified]  [x] No  [] Yes:  Domestic Concerns:  [x] No  [] Yes:    Pt. Education:  [x] Plans/Goals, Risks/Benefits discussed  [] Home exercise program  Method of Education: [x] Verbal  [] Demo  [x] Written(aquatic forms)  Comprehension of Education:  [x] Verbalizes understanding. [x] Demonstrates understanding. [] Needs Review. [] Demonstrates/verbalizes understanding of HEP/Ed previously given.     Treatment Plan:  [x] Therapeutic Exercise    [x] Modalities:  [] Therapeutic Activity    [] Ultrasound  [x] Electrical Stimulation  [] Gait Training     [] Massage       [] Lumbar/Cervical Traction  [] Neuromuscular Re-education [x] Cold/hotpack [] Iontophoresis: 4 mg/mL  [x] Instruction in HEP             Dexamethasone Sodium  [] Manual Therapy             Phosphate 40-80 mAmin  [x] Aquatic Therapy    [] Dry Needling [] Other:     Frequency:  3 x/week for 12 visits    Todays Treatment:  Modalities:  Treatment Location  Left      Right                          Position   lumbar [x]          [x]  [x] Supine    [] Prone   [] Side lying  [] Sitting          Treatment Modality   1 Hot Pack:    [x] Large   [] Medium    [] Cervical     __20___ min    Cold Pack   [] Large   [] Medium    [] Cervical     _____ min    Vasocompression    __° temp    ____pressure     _____ min   1 Electrical Stim:    [x] IFC     [] MFAC

## 2018-02-14 ENCOUNTER — HOSPITAL ENCOUNTER (OUTPATIENT)
Dept: PHYSICAL THERAPY | Facility: CLINIC | Age: 63
Setting detail: THERAPIES SERIES
Discharge: HOME | End: 2018-02-14
Payer: MEDICARE

## 2018-02-14 PROCEDURE — 97110 THERAPEUTIC EXERCISES: CPT

## 2018-02-14 PROCEDURE — G0283 ELEC STIM OTHER THAN WOUND: HCPCS

## 2018-02-14 PROCEDURE — 97113 AQUATIC THERAPY/EXERCISES: CPT

## 2018-02-14 NOTE — PRE-CERTIFICATION NOTE
Medicare Cap   [x] Physical Therapy  [] Speech Therapy  [] Occupational therapy  *PT and Speech caps combine      $2010 Cap limit < kx modifier needed < $3629 requires pre-cert        Patient Name: Karol Flores  YOB: 1955     [facility] Name # units/ charge $$$ charge Daily Total Charge Ongoing Total $$$   2/12/18 [place], [place] 18.89+8.70+51.35  111.52 111.52   2/14 Ex+Es 31.19+9.95

## 2018-02-14 NOTE — FLOWSHEET NOTE
shrugs/rolls    Hip Circles/Fig 8  Scap squeezes    Ankle ROM  Diagonals 1/2    Lunges   Elbow flex/ext      Pron/Sup    Functional Exercise  Wrist AROM    Step      Wall Push-ups  Deep H20/    SLS  Bike 3m   Breast Stroke on Noodle  Hip abd/add    Noodle Twist  Hip flex/ext    Noodle Push down  Hip IR/ER    Kickboard push/pull  Knee flex/ext      Push/pull on [employer] 5m   Other:    Specific Instructions for next treatment:dynamic ex, stretching calf/HS    Treatment Charges: Mins Units   []  Modalities     []  Ther Exercise     []  Manual Therapy     []  Ther Activities     [x]  Aquatics 30 2   []  Other       Assessment: [x] Progressing toward goals. Initiated light aquatic ex as pt reports increased pain today. Pt feels less LBP during and after ex, but notes her LE are sore and feels a cramp in her foot with deep water biking. Plan to progress ex as nevaeh and add stretching next session. [] No change. [] Other:  STG/LTG    Pt. Education:  [x] Yes  [] No  [] Reviewed Prior HEP/Ed  Method of Education: [x] Verbal  [] Demo  [] Written  Comprehension of Education:  [x] Verbalizes understanding. [] Demonstrates understanding. [] Needs review. [] Demonstrates/verbalizes HEP/Ed previously given. Plan: [x] Continue per plan of care.    [] Other:      Time In:1540            Time Out: 1335    Electronically signed by:  Hugh Barron PTA

## 2018-02-14 NOTE — PRE-CERTIFICATION NOTE
Medicare Cap   [x] Physical Therapy  [] Speech Therapy  [] Occupational therapy  *PT and Speech caps combine      $2010 Cap limit < kx modifier needed < $2997 requires pre-cert        Patient Name: Usama Hardy  YOB: 1955     Date of [facility] Name # units/ charge $$$ charge Daily Total Charge Ongoing Total $$$   2/12/18 [place], [place] 17.60+3.18+50.93  111.52 111.52   2/14 Ex+Es+2 aqua 23.89+9.95+40.85+34.33  108.99 220.51

## 2018-02-14 NOTE — FLOWSHEET NOTE
[x] Ciara. [facility] [facility] [employer] Promotion  [address] [place] [address]   Phone: [phone]   Fax:  [phone]     Physical Therapy Daily Treatment Note    Date:  2018  Patient Name:  Jaky Hannon    :  1955  MRN: [id number]  Physician: Dr. 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      Objective:  Modalities:  Treatment Location  Left      Right                          Position   lumbar [x]          [x]  [x] Supine    [] Prone   [] Side lying  [] Sitting                                            Treatment Modality   1 Hot Pack:    [x] Large   [] Medium    [] Cervical     __20___ min     Cold Pack   [] Large   [] Medium    [] Cervical     _____ min     Vasocompression    __° temp    ____pressure     _____ min   1 Electrical Stim:    [x] IFC     [] MFAC      [] HVPC                          Protocol:_analgesic_____  _____X__20___min                          #Channels:  [] 1        [] 2       [] Other:     Ultrasound: ______W/cm2 x  ______min              [] 1MHz   [] 3Mhz                      Duty Factor: [] 100%  [] 50%   [] 20%                  Add: [] Lidex   [] Stim    [] Gel pad        Massage:    [] Soft Tissue   [] Cross Friction                      [] Ice ____min   2 Other:  Aquatic Pt, EX, stretches                                Exercises:  Exercise Reps/ Time Weight/ Level Comments   SKTC 10 10 sec     Supine PRC ex 20x       bridges 10x        Clam shell 20x

## 2018-02-16 ENCOUNTER — HOSPITAL ENCOUNTER (OUTPATIENT)
Dept: PHYSICAL THERAPY | Facility: CLINIC | Age: 63
Setting detail: THERAPIES SERIES
Discharge: HOME | End: 2018-02-16
Payer: MEDICARE

## 2018-02-16 PROCEDURE — G0283 ELEC STIM OTHER THAN WOUND: HCPCS

## 2018-02-16 PROCEDURE — 97113 AQUATIC THERAPY/EXERCISES: CPT

## 2018-02-16 PROCEDURE — 97110 THERAPEUTIC EXERCISES: CPT

## 2018-02-16 NOTE — FLOWSHEET NOTE
[] [facility]       Physical Therapy MOB2       [facility] [address]        [address]       Phone: [phone]       Fax: [phone] [] [facility]       Promotion at [address]       Phone: [phone]       Fax: [phone] [] Ciara. [facility] Health Promotion  [address] [place] [address]   Phone: [phone]   Fax:  [phone]     Physical Therapy Daily  Aquatic Treatment Note    Date:  2018  Patient Name:  Robinson Bazzi    :  1955  MRN: [id number]  Physician: Dr. Washington Penaloza: Medicare/medicaid (odes visit 10)  Medical Diagnosis: Chronic bilateral low back pain without sciatica                        Rehab Codes: M54.5, M62.81  Onset Date: 18-script                                  Next 's appt: 2018    Visit# / total visits: 3/ 12 Cancels/No Shows:     Subjective:    Pain:  [x] Yes  [] No Location: LB Pain Rating: (0-10 scale) 4/10   Pain altered Tx:  [x] No  [] Yes  Action:  Comments:Pt reports feeling sore today post LE and UE from ex on land prior to aquatics. Less LBP from doing less amb today.     Objective:     KEY  B = Belt G = Gloves N = Noodle   C = Cuffs K = Kickboard P = Paddles   CC = Cervical Collar L = Laps T = Theratube   DB = Dumbells M = Minutes W = Weights     Exercises/Activities  Warm-up/Amb  Dynamic Exercises    Forward 3L 3L March  3L   Sideways 3L 3L Squat     Backwards 3L 3L Retro HS curls        Retro SLR     Stretches   Braiding     Gastroc/Soleus  20\"x3 Heel to Toe amb     Hamstring   Toe amb     Hip flexor   Heel amb     Piriformis        SKTC        Pec Stretch        Post Deltoid   Static Exercises UE        Shoulder flex/ext 10 15   Static Exercises LE   Shoulder abd/add 10 15   Heel/toe raises 10 15 Shoulder H.  abd/add 10 15   Marches 10 15 Shoulder IR/ER     Mini-squats 10 15 Rowing 10 15

## 2018-02-16 NOTE — PRE-CERTIFICATION NOTE
Medicare Cap   [x] Physical Therapy  [] Speech Therapy  [] Occupational therapy  *PT and Speech caps combine      $2010 Cap limit < kx modifier needed < $1743 requires pre-cert        Patient Name: Hector Wooten  YOB: 1955     Date of [employer] [address] Name # units/ charge $$$ charge Daily Total Charge Ongoing Total $$$   2/12/18 [place], [place] 04.62+4.52+45.73  111.52 111.52   2/14 Ex+Es+2 aqua 23.89+9.95+40.85+34.33  108.99 220.51   2/16 Ex+ES 23.89+9.95

## 2018-02-19 ENCOUNTER — HOSPITAL ENCOUNTER (OUTPATIENT)
Dept: PHYSICAL THERAPY | Facility: CLINIC | Age: 63
Setting detail: THERAPIES SERIES
Discharge: HOME | End: 2018-02-19
Payer: MEDICARE

## 2018-02-19 PROCEDURE — G0283 ELEC STIM OTHER THAN WOUND: HCPCS

## 2018-02-19 PROCEDURE — 97113 AQUATIC THERAPY/EXERCISES: CPT

## 2018-02-19 NOTE — PRE-CERTIFICATION NOTE
Medicare Cap   [x] Physical Therapy  [] Speech Therapy  [] Occupational therapy  *PT and Speech caps combine      $2010 Cap limit < kx modifier needed < $3100 requires pre-cert        Patient Name: Bekah Orozco  YOB: 1955     [facility] Name # units/ charge $$$ charge Daily Total Charge Ongoing Total $$$   2/12/18 [place], [place] 26.60+8.17+86.92  111.52 111.52   2/14 Ex+Es+2 aqua 23.89+9.95+40.85+34.33  108.99 220.51   2/16 Ex+ES+2aqua 23.89+9.95+40.85+34.33  108.99 329.50   2/19 ES+2aqua 40.85+34.33+9.95  85.13 414.63

## 2018-02-21 ENCOUNTER — APPOINTMENT (OUTPATIENT)
Dept: PHYSICAL THERAPY | Facility: CLINIC | Age: 63
End: 2018-02-21
Payer: MEDICARE

## 2018-02-21 ENCOUNTER — HOSPITAL ENCOUNTER (OUTPATIENT)
Dept: PHYSICAL THERAPY | Facility: CLINIC | Age: 63
Setting detail: THERAPIES SERIES
Discharge: HOME | End: 2018-02-21
Payer: MEDICARE

## 2018-02-22 ENCOUNTER — HOSPITAL ENCOUNTER (OUTPATIENT)
Dept: PHYSICAL THERAPY | Facility: CLINIC | Age: 63
Setting detail: THERAPIES SERIES
Discharge: HOME | End: 2018-02-22
Payer: MEDICARE

## 2018-02-22 PROCEDURE — G0283 ELEC STIM OTHER THAN WOUND: HCPCS

## 2018-02-22 PROCEDURE — 97113 AQUATIC THERAPY/EXERCISES: CPT

## 2018-02-22 NOTE — PRE-CERTIFICATION NOTE
Medicare Cap   [x] Physical Therapy  [] Speech Therapy  [] Occupational therapy  *PT and Speech caps combine      $2010 Cap limit < kx modifier needed < $1630 requires pre-cert        Patient Name: Hector Wooten  YOB: 1955     Date of [facility] [address] Name # units/ charge $$$ charge Daily Total Charge Ongoing Total $$$   2/12/18 [place], [place] 90.27+7.33+36.09  111.52 111.52   2/14 Ex+Es+2 aqua 23.89+9.95+40.85+34.33  108.99 220.51   2/16 Ex+ES+2aqua 23.89+9.95+40.85+34.33  108.99 329.50   2/19 ES+2aqua 40.85+34.33+9.95  85.13 414.63   2/22 Es+aqua 40.85+34.33+9.95  85.13 499.76

## 2018-02-22 NOTE — FLOWSHEET NOTE
[] Tana Stone Outpt       Physical Therapy MOB2       [facility] [address]        [address]       Phone: [phone]       Fax: [phone] [] [facility]       Promotion at [address]       Phone: [phone]       Fax: [phone] [x] [facility]. [address] [place] [address] Promotion  [address] [facility]   Phone: [phone]   Fax:  [phone]     Physical Therapy Daily  Aquatic Treatment Note    Date:  2018  Patient Name:  Jesika Hernandez    :  1955  MRN: [id number]  Physician: Dr. Adria Mccoy: Medicare/medicaid (odes visit 10)  Medical Diagnosis: Chronic bilateral low back pain without sciatica                      Rehab Codes: M54.5, M62.81  Onset Date: 18-script                                  Next 's appt: 2018    Visit# / total visits:  Cancels/No Shows:     Subjective:    Pain:  [x] Yes  [] No Location: LB Pain Rating: (0-10 scale) 2-3/10   Pain altered Tx:  [x] No  [] Yes  Action:  Comments:Pt reports mild pain in LB and states that she felt the deep water traction really helped decrease the pain last visit. Pt arrived to aquatic therapy 15 minutes late-coming from land therapy.      Objective:     KEY  B = Belt G = Gloves N = Noodle   C = Cuffs K = Kickboard P = Paddles   CC = Cervical Collar L = Laps T = Theratube   DB = Dumbells M = Minutes W = Weights     Exercises/Activities  Warm-up/Amb    Dynamic Exercises      Forward 3L   March 3L     Sideways 3L   Squat 3L     Backwards 3L   Retro HS curls          Retro SLR      Stretches    Braiding      Gastroc/Soleus 3x30\"   Heel to Toe amb      Hamstring    Toe amb      Hip flexor    Heel amb      Piriformis          SKTC          Pec Stretch          Post Deltoid    Static Exercises UE          Shoulder flex/ext 15     Static Exercises LE    Shoulder abd/add 15     Heel/toe raises 15 Shoulder H.  abd/add 15     Marches 15   Shoulder IR/ER      Mini-squats 15   Rowing 15     3-way hip  15   Arm Circles      Hamstring curls 15   UT shrugs/rolls      Hip Circles/Fig 8    Scap squeezes      Ankle ROM    Diagonals 1/2      Lunges     Elbow flex/ext          Pron/Sup      Functional Exercise    Wrist AROM      Step          Wall Push-ups    Deep H20/      SLS    Bike 3m     Breast Stroke on Noodle    Hip abd/add 3m     Noodle Twist    Hip flex/ext      Noodle Push down    Hip IR/ER 20     Kickboard push/pull    Knee flex/ext          Push/pull on [employer] 5m LW     Other:    Specific Instructions for next treatment: Increase reps, step-ups, HS stretch    Treatment Charges: Mins Units   []  Modalities     []  Ther Exercise     []  Manual Therapy     []  Ther Activities     [x]  Aquatics 30 2   []  Other       Assessment: [x] Progressing toward goals. [] No change. [x] Other: Continued with current stretches and exercises per log with good pt tolerance. Min v/c for increased core engagement throughout exercises and to avoid trunk flexion and trunk lateral flexion for compensation during 3-way hip with good carryover to pt. Min v/c and demonstration required for deep water exercises with pt able to demonstrate understanding after cueing Will monitor symptoms and plan to progress as noted above as pt tolerates. STG: (to be met in 6 treatments)  1. ? Pain: 3/10  2. ? ROM: Improve Lumbar ROM  3. ? Strength: LE grossly 5/5  4. ? Function: Able to perform all basic ADL's without difficulty  5. Independent with Home Exercise Programs  LTG: (to be met in 12 treatments)  1. <1/10 pain  2. Oswestry score improved to <10% disability     Patient goals: To relieve the pain and have more flexibility. Pt. Education:  [x] Yes  [] No  [] Reviewed Prior HEP/Ed  Method of Education: [x] Verbal  [x] Demo  [] Written  Comprehension of Education:   [x] Verbalizes understanding.   [x] Demonstrates

## 2018-02-22 NOTE — PRE-CERTIFICATION NOTE
Medicare Cap   [x] Physical Therapy  [] Speech Therapy  [] Occupational therapy  *PT and Speech caps combine      $2010 Cap limit < kx modifier needed < $4156 requires pre-cert        Patient Name: Usama Hardy  YOB: 1955     Date of [facility] 63 Name # units/ charge $$$ charge Daily Total Charge Ongoing Total $$$   2/12/18 [place]qu[place], [place] 68.05+8.89+10.43  111.52 111.52   2/14 Ex+Es+2 aqua 23.89+9.95+40.85+34.33  108.99 220.51   2/16 Ex+ES+2aqua 23.89+9.95+40.85+34.33  108.99 329.50   2/19 ES+2aqua 40.85+34.33+9.95  85.13 414.63   2/22 Es+2aqua 37.36+27.45+10.40  75.21 489.84

## 2018-02-23 ENCOUNTER — APPOINTMENT (OUTPATIENT)
Dept: PHYSICAL THERAPY | Facility: CLINIC | Age: 63
End: 2018-02-23
Payer: MEDICARE

## 2018-02-26 ENCOUNTER — HOSPITAL ENCOUNTER (OUTPATIENT)
Dept: PHYSICAL THERAPY | Facility: CLINIC | Age: 63
Setting detail: THERAPIES SERIES
Discharge: HOME | End: 2018-02-26
Payer: MEDICARE

## 2018-02-26 PROCEDURE — G0283 ELEC STIM OTHER THAN WOUND: HCPCS

## 2018-02-26 PROCEDURE — 97113 AQUATIC THERAPY/EXERCISES: CPT

## 2018-02-26 NOTE — FLOWSHEET NOTE
[] [facility]       Physical Therapy MOB2       [place] [address]        [address]       Phone: [phone]       Fax: [phone] [] [facility]       Promotion at [address] [facility]       Phone: [phone]       Fax: [phone] [x] Ciara. [facility] Promotion  [address] [facility]   Phone: [phone]   Fax:  [phone]     Physical Therapy Daily  Aquatic Treatment Note    Date:  2018  Patient Name:  Xiomara Levy    :  1955  MRN: [id number]  Physician: Dr. Betty Vega: Medicare/medicaid (odes visit 10)  Medical Diagnosis: Chronic bilateral low back pain without sciatica                      Rehab Codes: M54.5, M62.81  Onset Date: 18-script                                  Next 's appt: 2018    Visit# / total visits:  Cancels/No Shows:     Subjective:    Pain:  [x] Yes  [] No Location: LB Pain Rating: (0-10 scale) 3/10   Pain altered Tx:  [x] No  [] Yes  Action:  Comments:Pt reports she feels slight improvement in pain and able to nevaeh more activity over the weekend with less pain.     Objective:     KEY  B = Belt G = Gloves N = Noodle   C = Cuffs K = Kickboard P = Paddles   CC = Cervical Collar L = Laps T = Theratube   DB = Dumbells M = Minutes W = Weights     Exercises/Activities  Warm-up/Amb   Dynamic Exercises     Forward 3L 3L  March 3L 3L    Sideways 3L 3L  Squat 3L 3L    Backwards 3L 3L  Retro HS curls          Retro SLR      Stretches    Braiding      Gastroc/Soleus 3x30\" 3x30\"  Heel to Toe amb      Hamstring  3x30\"  Toe amb      Hip flexor    Heel amb      Piriformis          SKTC          Pec Stretch          Post Deltoid    Static Exercises UE          Shoulder flex/ext 15 20    Static Exercises LE    Shoulder abd/add 15 20    Heel/toe raises 15 20  Shoulder H.  abd/add 15 20    Marches 15 20  Shoulder

## 2018-02-26 NOTE — FLOWSHEET NOTE
[x] Ciara. 15[address] [facility] [employer]  [address] [place] [address]   Phone: [phone]   Fax:  [phone]     Physical Therapy Daily Treatment Note    Date:  2018  Patient Name:  Alfonzo Will    :  1955  MRN: [id number]  Physician: Dr. Shaq Bro: Medicare/medicaid  Medical Diagnosis: Chronic bilateral low back pain without sciatica                        Rehab Codes: M54.5, M62.81  Onset Date: 18-script                                  Next 's appt: 2018    Visit# / total visits:   Cancels/No Shows:     Subjective:    Pain:  [x] Yes  [] No Location: LB Pain Rating: (0-10 scale) 3/10  Pain altered Tx:  [x] No  [] Yes  Action:  Comments: Pt states her LB pain is improving. Pn was worse when standing for 15-20 minutes, but has improved since therapy.       Objective:  Modalities:  Treatment Location  Left      Right                          Position   lumbar [x]          [x]  [x] Supine    [] Prone   [] Side lying  [] Sitting                                            Treatment Modality   1 Hot Pack:    [x] Large   [] Medium    [] Cervical     __20___ min     Cold Pack   [] Large   [] Medium    [] Cervical     _____ min     Vasocompression    __° temp    ____pressure     _____ min   1 Electrical Stim:    [x] IFC     [] MFAC      [] HVPC                          Protocol:_analgesic_____  _____X__20___min                          #Channels:  [] 1        [] 2       [] Other:     Ultrasound: ______W/cm2 x  ______min              [] 1MHz   [] 3Mhz                      Duty Factor: [] 100%  [] 50%   [] 20%                  Add: [] Lidex   [] Stim    [] Gel pad        Massage:    [] Soft Tissue   [] Cross Friction                      [] Ice ____min   2 Other:  Aquatic Pt, EX, stretches                             Exercises:  Exercise Reps/ Time Weight/ Level Comments   SKTC 10 10 sec     Supine PRC ex 20x       bridges 10x        Clam

## 2018-02-28 ENCOUNTER — HOSPITAL ENCOUNTER (OUTPATIENT)
Dept: PHYSICAL THERAPY | Facility: CLINIC | Age: 63
Setting detail: THERAPIES SERIES
Discharge: HOME | End: 2018-02-28
Payer: MEDICARE

## 2018-02-28 PROCEDURE — 97113 AQUATIC THERAPY/EXERCISES: CPT

## 2018-02-28 PROCEDURE — 97110 THERAPEUTIC EXERCISES: CPT

## 2018-02-28 PROCEDURE — G0283 ELEC STIM OTHER THAN WOUND: HCPCS

## 2018-02-28 NOTE — PRE-CERTIFICATION NOTE
Medicare Cap   [x] Physical Therapy  [] Speech Therapy  [] Occupational therapy  *PT and Speech caps combine      $2010 Cap limit < kx modifier needed < $3405 requires pre-cert        Patient Name: Anu Wagner  YOB: 1955     [facility] Name # units/ charge $$$ charge Daily Total Charge Ongoing Total $$$   2/12/18 [place], [place] 53.05+8.79+32.90  111.52 111.52   2/14 Ex+Es+2 aqua 23.89+9.95+40.85+34.33  108.99 220.51   2/16 Ex+ES+2aqua 23.89+9.95+40.85+34.33  108.99 329.50   2/19 ES+2aqua 40.85+34.33+9.95  85.13 414.63   2/22 Es+2aqua 37.36+27.45+10.40  75.21 489.84   2/26 Es+2aqua 37.36+27.45+10.40  75.21 565.05   2/28 EX+ES 23.89+9.95

## 2018-03-02 ENCOUNTER — HOSPITAL ENCOUNTER (OUTPATIENT)
Dept: PHYSICAL THERAPY | Facility: CLINIC | Age: 63
Setting detail: THERAPIES SERIES
Discharge: HOME | End: 2018-03-02
Payer: MEDICARE

## 2018-03-02 PROCEDURE — G0283 ELEC STIM OTHER THAN WOUND: HCPCS

## 2018-03-02 PROCEDURE — 97113 AQUATIC THERAPY/EXERCISES: CPT

## 2018-03-02 NOTE — FLOWSHEET NOTE
[] [facility]       Physical Therapy MOB2       [facility] [address]        [address]       Phone: [phone]       Fax: [phone] [] [facility]       Promotion at [address] [facility]       Phone: [phone]       Fax: [phone] [x] [facility] Promotion  [address]   Phone: [phone]   Fax:  [phone]     Physical Therapy Daily  Aquatic Treatment Note    Date:  3/2/2018  Patient Name:  Ammie Boxer    :  1955  MRN: [id number]  Physician: Dr. Sherwin Clemente: Medicare/medicaid (odes visit 10)  Medical Diagnosis: Chronic bilateral low back pain without sciatica                      Rehab Codes: M54.5, M62.81  Onset Date: 18-script                                  Next 's appt: 2018    Visit# / total visits:  Cancels/No Shows:     Subjective:    Pain:  [x] Yes  [] No Location: LB Pain Rating: (0-10 scale) 3/10   Pain altered Tx:  [x] No  [] Yes  Action:  Comments:Pt reports she was sore after last session and feels from increased ex.     Objective:     KEY  B = Belt G = Gloves N = Noodle   C = Cuffs K = Kickboard P = Paddles   CC = Cervical Collar L = Laps T = Theratube   DB = Dumbells M = Minutes W = Weights     Exercises/Activities  Warm-up/Amb 2/28 3/2 Dynamic Exercises 2/28 3/2   Forward 3L 3L March 3L 3L   Sideways 3L 3L Squat 3L 3L   Backwards 3L 3L Retro HS curls        Retro SLR     Stretches   Braiding     Gastroc/Soleus 3x30\" 3x30\" Heel to Toe amb     Hamstring 3x30\" 3x30\" Toe amb     Hip flexor   Heel amb     Piriformis        SKTC        Pec Stretch        Post Deltoid   Static Exercises UE  G      Shoulder flex/ext 20 20   Static Exercises LE   Shoulder abd/add 20 20   Heel/toe raises 20 30 Shoulder H.  abd/add 20 20    20 30 Shoulder IR/ER     Mini-squats 20 30 Rowing 20 20   3-way hip  20 30 Arm Circles Hamstring curls 20 30 UT shrugs/rolls     Hip Circles/Fig 8   Scap squeezes     Ankle ROM   Diagonals 1/2     Lunges    Elbow flex/ext        Pron/Sup     Functional Exercise   Wrist AROM     Step        Wall Push-ups   Deep H20/     SLS   Bike 4m 4m   Breast Stroke on Noodle   Hip abd/add 4m 4m   Noodle Twist   Hip flex/ext     Noodle Push down   Hip IR/ER 20 20   Kickboard push/pull   Knee flex/ext        Push/pull on Rail        Hang 5m LW 5mLW   Other:    Specific Instructions for next treatment:  step-ups    Treatment Charges: Mins Units   []  Modalities     []  Ther Exercise     []  Manual Therapy     []  Ther Activities     [x]  Aquatics 30 2   []  Other       Assessment: [x] Progressing toward goals. [] No change. [x] Other: Continued with aquatic ex and pt increased reps and added gloves for UE ex. Pt feels good after aquatic ex with less pain. STG: (to be met in 6 treatments)  1. ? Pain: 3/10  2. ? ROM: Improve Lumbar ROM  3. ? Strength: LE grossly 5/5  4. ? Function: Able to perform all basic ADL's without difficulty  5. Independent with Home Exercise Programs  LTG: (to be met in 12 treatments)  1. <1/10 pain  2. Oswestry score improved to <10% disability     Patient goals: To relieve the pain and have more flexibility. Pt. Education:  [x] Yes  [] No  [] Reviewed Prior HEP/Ed  Method of Education: [x] Verbal  [x] Demo  [] Written  Comprehension of Education:   [x] Verbalizes understanding. [x] Demonstrates understanding. [] Needs review. [] Demonstrates/verbalizes HEP/Ed previously given. Plan: [x] Continue per plan of care. [x] Other:Pt will need G-codes week of 3/5/18.       Time In: 4053           Time Out: 9797    Electronically signed by:  Seth Poe PTA

## 2018-03-02 NOTE — FLOWSHEET NOTE
Other:     Specific Instructions for next treatment:    Treatment Charges: Mins Units   [x]  Modalities HP/ES 20 1   [x]  Ther Exercise held    []  Manual Therapy     []  Ther Activities     []  Aquatics     []  Vasocompression     []  Other     Total Treatment time 30 2       Assessment: [x] Progressing toward goals. [] No change. [x] Other: Estim/HP were provided only this date d/t time restraint. Pt had a slight  in tension post modalities and is now off to aquatics for further ex. STG: (to be met in 6 treatments)  1. ? Pain: 3/10  2. ? ROM: Improve Lumbar ROM  3. ? Strength: LE grossly   4. ? Function: Able to perform all basic ADL's without difficulty  5. Independent with Home Exercise Programs  LTG: (to be met in 12 treatments)  1. <1/10 pain  2. Oswestry score improved to <10% disability      Patient goals: To relieve the pain and have more flexibility. Pt. Education:  [x] Yes  [] No  [x] Reviewed Prior HEP/Ed  Method of Education: [x] Verbal  [] Demo  [] Written  Comprehension of Education:  [x] Verbalizes understanding. [] Demonstrates understanding. [] Needs review. [] Demonstrates/verbalizes HEP/Ed previously given. Plan: [x] Continue per plan of care.    [] Other:      Time In: 2:05           Time Out: 2:35    Electronically signed by:  Marjorie Tapia PTA

## 2018-03-06 ENCOUNTER — HOSPITAL ENCOUNTER (OUTPATIENT)
Dept: PHYSICAL THERAPY | Facility: CLINIC | Age: 63
Setting detail: THERAPIES SERIES
Discharge: HOME | End: 2018-03-06
Payer: MEDICARE

## 2018-03-06 ENCOUNTER — APPOINTMENT (OUTPATIENT)
Dept: PHYSICAL THERAPY | Facility: CLINIC | Age: 63
End: 2018-03-06
Payer: MEDICARE

## 2018-03-06 NOTE — FLOWSHEET NOTE
[] Lima Anderson        Outpatient Physical                Therapy       [address]       Phone: [phone]       Fax: [phone] [] [facility] at [address] [facility]       Phone: [phone]       Fax: [phone] [x] [facility].  [address] [place] [address]      Phone: [phone]      Fax:  [phone]     Physical Therapy Cancel/No Show note    Date: 3/6/2018  Patient: Jaky Hannon  : 1955  MRN: [id number]    Cancels/No Shows to date:     For today's appointment patient:  [x]  Cancelled  []  Rescheduled appointment  []  No-show     Reason given by patient:  []  Patient ill  []  Conflicting appointment  []  No transportation    []  Conflict with work  []  No reason given  []  Weather related  [x]  Other:   Family emergency   Comments:      []  Next appointment was confirmed    Electronically signed by: Lorin Coleman PTA

## 2018-03-08 ENCOUNTER — HOSPITAL ENCOUNTER (OUTPATIENT)
Dept: PHYSICAL THERAPY | Facility: CLINIC | Age: 63
Setting detail: THERAPIES SERIES
Discharge: HOME | End: 2018-03-08
Payer: MEDICARE

## 2018-03-08 PROCEDURE — 97113 AQUATIC THERAPY/EXERCISES: CPT

## 2018-03-08 PROCEDURE — G0283 ELEC STIM OTHER THAN WOUND: HCPCS

## 2018-03-08 NOTE — FLOWSHEET NOTE
[x] Ciara. [facility] [facility] [employer] Promotion  [address] [place] [address]   Phone: [phone]   Fax:  [phone]     Physical Therapy Daily Treatment Note    Date:  3/8/2018  Patient Name:  Ventura Keyes    :  1955  MRN: [id number]  Physician: Dr. Tim Garcia: Medicare/medicaid  Medical Diagnosis: Chronic bilateral low back pain without sciatica                        Rehab Codes: M54.5, M62.81  Onset Date: 18-script                                  Next 's appt: 2018    Visit# / total visits:   Cancels/No Shows:     Subjective:    Pain:  [x] Yes  [] No Location: LB Pain Rating: (0-10 scale) 3/10  Pain altered Tx:  [x] No  [] Yes  Action:  Comments: pt reporting today's pn is a little worse than usual. Stating LB keeps bothering her during normal things she does throughout the day. Dishes, cleaning house, making bed, ect causing increase in pn.  Sitting and heat only way to decrease pn.     Objective:  Modalities:  Treatment Location  Left      Right                          Position   lumbar [x]          [x]  [x] Supine    [] Prone   [] Side lying  [] Sitting                                            Treatment Modality   1 Hot Pack:    [x] Large   [] Medium    [] Cervical     __20___ min     Cold Pack   [] Large   [] Medium    [] Cervical     _____ min     Vasocompression    __° temp    ____pressure     _____ min   1 Electrical Stim:    [x] IFC     [] MFAC      [] HVPC                          Protocol:_analgesic_____  _____X__20___min                          #Channels:  [] 1        [] 2       [] Other:     Ultrasound: ______W/cm2 x  ______min              [] 1MHz   [] 3Mhz                      Duty Factor: [] 100%  [] 50%   [] 20%                  Add: [] Lidex   [] Stim    [] Gel pad        Massage:    [] Soft Tissue   [] Cross Friction                      [] Ice ____min   2 Other:  Aquatic Pt, EX, stretches Exercises:  Exercise Reps/ Time Weight/ Level Comments   SKTC 10 10 sec     Supine PRC ex 20x       bridges 2x10        Clam shell 20x red                Other:     Specific Instructions for next treatment:    Treatment Charges: Mins Units   [x]  Modalities HP/ES 20 1   [x]  Ther Exercise held    []  Manual Therapy     []  Ther Activities     []  Aquatics     []  Vasocompression     []  Other     Total Treatment time 20 1       Assessment: [x] Progressing toward goals. [] No change. [x] Other: Estim and heat applied this treatment, with pt stating pn 1/10 after. Exercises and stretches held due to pt needing to be done at a certain time. STG: (to be met in 6 treatments)  1. ? Pain: 3/10  2. ? ROM: Improve Lumbar ROM  3. ? Strength: LE grossly 5/5  4. ? Function: Able to perform all basic ADL's without difficulty  5. Independent with Home Exercise Programs  LTG: (to be met in 12 treatments)  1. <1/10 pain  2. Oswestry score improved to <10% disability      Patient goals: To relieve the pain and have more flexibility. Pt. Education:  [x] Yes  [] No  [x] Reviewed Prior HEP/Ed  Method of Education: [x] Verbal  [] Demo  [] Written  Comprehension of Education:  [x] Verbalizes understanding. [] Demonstrates understanding. [] Needs review. [] Demonstrates/verbalizes HEP/Ed previously given. Plan: [x] Continue per plan of care.    [] Other:      Time In: 1:55           Time Out: 2:25    Electronically signed by:  Theodora Alanis PTA

## 2018-03-08 NOTE — PRE-CERTIFICATION NOTE
Medicare Cap   [x] Physical Therapy  [] Speech Therapy  [] Occupational therapy  *PT and Speech caps combine      $2010 Cap limit < kx modifier needed < $7490 requires pre-cert        Patient Name: Mary Zurita  YOB: 1955     Date of [address] Name # units/ charge $$$ charge Daily Total Charge Ongoing Total $$$   2/12/18 [place], [place] 33.89+9.41+98.27  111.52 111.52   2/14 Ex+Es+2 aqua 23.89+9.95+40.85+34.33  108.99 220.51   2/16 Ex+ES+2aqua 23.89+9.95+40.85+34.33  108.99 329.50   2/19 ES+2aqua 40.85+34.33+9.95  85.13 414.63   2/22 Es+2aqua 37.36+27.45+10.40  75.21 489.84   2/26 Es+2aqua 37.36+27.45+10.40  75.21 565.05   2/28 EX+ES+  22.99+10.40+37.36+27.45  98.20 663.25   3/2 ES+ 2 aqua 40.85+34.33+9.95  85.13 748.38   3/8 ES 9.95

## 2018-03-08 NOTE — FLOWSHEET NOTE
of Education:   [x] Verbalizes understanding. [x] Demonstrates understanding. [] Needs review. [] Demonstrates/verbalizes HEP/Ed previously given. Plan: [x] Continue per plan of care. [x] Other:Pt will need G-codes week of 3/5/18.       Time In: 1435          Time Out: 1550    Electronically signed by:  Hattie Goodell, PTA

## 2018-03-08 NOTE — PRE-CERTIFICATION NOTE
Medicare Cap   [x] Physical Therapy  [] Speech Therapy  [] Occupational therapy  *PT and Speech caps combine      $2010 Cap limit < kx modifier needed < $1943 requires pre-cert        Patient Name: Alfonzo Will  YOB: 1955     Date of M[address] Name # units/ charge $$$ charge Daily Total Charge Ongoing Total $$$   2/12/18 [place], [place] 80.43+3.65+17.03  111.52 111.52   2/14 Ex+Es+2 aqua 23.89+9.95+40.85+34.33  108.99 220.51   2/16 Ex+ES+2aqua 23.89+9.95+40.85+34.33  108.99 329.50   2/19 ES+2aqua 40.85+34.33+9.95  85.13 414.63   2/22 Es+2aqua 37.36+27.45+10.40  75.21 489.84   2/26 Es+2aqua 37.36+27.45+10.40  75.21 565.05   2/28 EX+ES+  22.99+10.40+37.36+27.45  98.20 663.25   3/2 ES+ 2 aqua 40.85+34.33+9.95  85.13 748.38   3/8 ES 40.85+34.33+9.95  85.13 833.51

## 2018-03-23 ENCOUNTER — HOSPITAL ENCOUNTER (OUTPATIENT)
Dept: PHYSICAL THERAPY | Facility: CLINIC | Age: 63
Setting detail: THERAPIES SERIES
Discharge: HOME | End: 2018-03-23
Payer: MEDICARE

## 2018-03-23 NOTE — FLOWSHEET NOTE
[] [facility]        Outpatient Physical                Therapy       [address]       Phone: [phone]       Fax: [phone] [] [facility]       Promotion at [address] [place] [address]       Phone: [phone]       Fax: [phone] [x] Jonah Hansen      for Health Promotion     [address] [facility]      Phone: [phone]      Fax:  [phone]     Physical Therapy Cancel/No Show note    Date: 3/23/2018  Patient: Tayla Lopez  : 1955  MRN: [id number]    Cancels/No Shows to date:     For today's appointment patient:  [x]  Cancelled  []  Rescheduled appointment  []  No-show     Reason given by patient:  []  Patient ill  []  Conflicting appointment  []  No transportation    []  Conflict with work  []  No reason given  []  Weather related  [x]  Other:      Comments:      [x]  Next appointment was confirmed    Electronically signed by: Yesy Gonzalez PTA

## 2018-03-26 ENCOUNTER — HOSPITAL ENCOUNTER (OUTPATIENT)
Dept: PHYSICAL THERAPY | Facility: CLINIC | Age: 63
Setting detail: THERAPIES SERIES
Discharge: HOME | End: 2018-03-26
Payer: MEDICARE

## 2018-03-26 ENCOUNTER — APPOINTMENT (OUTPATIENT)
Dept: PHYSICAL THERAPY | Facility: CLINIC | Age: 63
End: 2018-03-26
Payer: MEDICARE

## 2018-03-28 ENCOUNTER — HOSPITAL ENCOUNTER (OUTPATIENT)
Dept: PHYSICAL THERAPY | Facility: CLINIC | Age: 63
Setting detail: THERAPIES SERIES
Discharge: HOME | End: 2018-03-28
Payer: MEDICARE

## 2018-04-04 ENCOUNTER — HOSPITAL ENCOUNTER (OUTPATIENT)
Dept: PHYSICAL THERAPY | Facility: CLINIC | Age: 63
Setting detail: THERAPIES SERIES
Discharge: HOME | End: 2018-04-04
Payer: MEDICARE

## 2018-04-04 PROCEDURE — 97530 THERAPEUTIC ACTIVITIES: CPT

## 2018-04-04 PROCEDURE — G8979 MOBILITY GOAL STATUS: HCPCS

## 2018-04-04 PROCEDURE — G8978 MOBILITY CURRENT STATUS: HCPCS

## 2018-04-04 PROCEDURE — 97113 AQUATIC THERAPY/EXERCISES: CPT

## 2018-04-06 ENCOUNTER — HOSPITAL ENCOUNTER (OUTPATIENT)
Age: 63
Discharge: HOME | End: 2018-04-08
Payer: MEDICARE

## 2018-04-06 ENCOUNTER — HOSPITAL ENCOUNTER (OUTPATIENT)
Age: 63
Discharge: HOME | End: 2018-04-06
Payer: MEDICARE

## 2018-04-06 ENCOUNTER — HOSPITAL ENCOUNTER (OUTPATIENT)
Dept: GENERAL RADIOLOGY | Age: 63
Discharge: HOME | End: 2018-04-08
Payer: MEDICARE

## 2018-04-06 DIAGNOSIS — M54.50 CHRONIC BILATERAL LOW BACK PAIN WITHOUT SCIATICA: ICD-10-CM

## 2018-04-06 DIAGNOSIS — I25.10 CORONARY ARTERY DISEASE INVOLVING NATIVE CORONARY ARTERY OF NATIVE HEART WITHOUT ANGINA PECTORIS: ICD-10-CM

## 2018-04-06 DIAGNOSIS — E11.9 NEW ONSET TYPE 2 DIABETES MELLITUS (HCC): ICD-10-CM

## 2018-04-06 DIAGNOSIS — G89.29 CHRONIC BILATERAL LOW BACK PAIN WITHOUT SCIATICA: ICD-10-CM

## 2018-04-06 DIAGNOSIS — E78.5 DYSLIPIDEMIA: ICD-10-CM

## 2018-04-06 DIAGNOSIS — E66.9 OBESITY (BMI 30-39.9): ICD-10-CM

## 2018-04-06 DIAGNOSIS — I10 ESSENTIAL HYPERTENSION: ICD-10-CM

## 2018-04-06 LAB
ALBUMIN SERPL-MCNC: 3.8 G/DL (ref 3.5–5.2)
ALBUMIN/GLOBULIN RATIO: 0.9 (ref 1–2.5)
ALP BLD-CCNC: 79 U/L (ref 35–104)
ALT SERPL-CCNC: 20 U/L (ref 5–33)
ANION GAP SERPL CALCULATED.3IONS-SCNC: 10 MMOL/L (ref 9–17)
AST SERPL-CCNC: 21 U/L
BILIRUB SERPL-MCNC: 0.42 MG/DL (ref 0.3–1.2)
BILIRUBIN DIRECT: 0.1 MG/DL
BILIRUBIN, INDIRECT: 0.32 MG/DL (ref 0–1)
BUN BLDV-MCNC: 18 MG/DL (ref 8–23)
BUN/CREAT BLD: ABNORMAL (ref 9–20)
CALCIUM SERPL-MCNC: 8.8 MG/DL (ref 8.6–10.4)
CHLORIDE BLD-SCNC: 98 MMOL/L (ref 98–107)
CHOLESTEROL/HDL RATIO: 3.3
CHOLESTEROL: 143 MG/DL
CO2: 30 MMOL/L (ref 20–31)
CREAT SERPL-MCNC: 0.76 MG/DL (ref 0.5–0.9)
CREATININE URINE: 108.7 MG/DL (ref 28–217)
GFR AFRICAN AMERICAN: >60 ML/MIN
GFR NON-AFRICAN AMERICAN: >60 ML/MIN
GFR SERPL CREATININE-BSD FRML MDRD: ABNORMAL ML/MIN/{1.73_M2}
GFR SERPL CREATININE-BSD FRML MDRD: ABNORMAL ML/MIN/{1.73_M2}
GLOBULIN: ABNORMAL G/DL (ref 1.5–3.8)
GLUCOSE BLD-MCNC: 108 MG/DL (ref 70–99)
HDLC SERPL-MCNC: 44 MG/DL
LDL CHOLESTEROL: 73 MG/DL (ref 0–130)
MICROALBUMIN/CREAT 24H UR: 12 MG/L
MICROALBUMIN/CREAT UR-RTO: 11 MCG/MG CREAT
POTASSIUM SERPL-SCNC: 4.4 MMOL/L (ref 3.7–5.3)
SODIUM BLD-SCNC: 138 MMOL/L (ref 135–144)
TOTAL PROTEIN: 7.9 G/DL (ref 6.4–8.3)
TRIGL SERPL-MCNC: 128 MG/DL
TSH SERPL DL<=0.05 MIU/L-ACNC: 4.61 MIU/L (ref 0.3–5)
VLDLC SERPL CALC-MCNC: NORMAL MG/DL (ref 1–30)

## 2018-04-06 PROCEDURE — 82570 ASSAY OF URINE CREATININE: CPT

## 2018-04-06 PROCEDURE — 82043 UR ALBUMIN QUANTITATIVE: CPT

## 2018-04-06 PROCEDURE — 80048 BASIC METABOLIC PNL TOTAL CA: CPT

## 2018-04-06 PROCEDURE — 80061 LIPID PANEL: CPT

## 2018-04-06 PROCEDURE — 80076 HEPATIC FUNCTION PANEL: CPT

## 2018-04-06 PROCEDURE — 36415 COLL VENOUS BLD VENIPUNCTURE: CPT

## 2018-04-06 PROCEDURE — 84443 ASSAY THYROID STIM HORMONE: CPT

## 2018-04-06 PROCEDURE — 72100 X-RAY EXAM L-S SPINE 2/3 VWS: CPT

## 2018-04-09 RX ORDER — HYDROCHLOROTHIAZIDE 25 MG/1
TABLET ORAL
Qty: 90 TABLET | Refills: 0 | Status: SHIPPED | OUTPATIENT
Start: 2018-04-09 | End: 2018-04-17 | Stop reason: SDUPTHER

## 2018-04-17 ENCOUNTER — OFFICE VISIT (OUTPATIENT)
Dept: INTERNAL MEDICINE | Age: 63
End: 2018-04-17
Payer: MEDICARE

## 2018-04-17 VITALS
SYSTOLIC BLOOD PRESSURE: 138 MMHG | DIASTOLIC BLOOD PRESSURE: 58 MMHG | HEART RATE: 45 BPM | BODY MASS INDEX: 40.12 KG/M2 | HEIGHT: 64 IN | WEIGHT: 235 LBS | OXYGEN SATURATION: 95 %

## 2018-04-17 DIAGNOSIS — G89.29 CHRONIC BILATERAL LOW BACK PAIN WITHOUT SCIATICA: ICD-10-CM

## 2018-04-17 DIAGNOSIS — Z99.89 OSA ON CPAP: ICD-10-CM

## 2018-04-17 DIAGNOSIS — I65.23 ASYMPTOMATIC BILATERAL CAROTID ARTERY STENOSIS: ICD-10-CM

## 2018-04-17 DIAGNOSIS — Z23 NEED FOR SHINGLES VACCINE: ICD-10-CM

## 2018-04-17 DIAGNOSIS — Z23 NEED FOR STREPTOCOCCUS PNEUMONIAE VACCINATION: ICD-10-CM

## 2018-04-17 DIAGNOSIS — E11.9 TYPE 2 DIABETES MELLITUS WITHOUT COMPLICATION, WITHOUT LONG-TERM CURRENT USE OF INSULIN (HCC): Primary | ICD-10-CM

## 2018-04-17 DIAGNOSIS — I25.10 CORONARY ARTERY DISEASE INVOLVING NATIVE CORONARY ARTERY OF NATIVE HEART WITHOUT ANGINA PECTORIS: ICD-10-CM

## 2018-04-17 DIAGNOSIS — E66.01 MORBID OBESITY (HCC): ICD-10-CM

## 2018-04-17 DIAGNOSIS — M54.50 CHRONIC BILATERAL LOW BACK PAIN WITHOUT SCIATICA: ICD-10-CM

## 2018-04-17 DIAGNOSIS — I10 ESSENTIAL HYPERTENSION: ICD-10-CM

## 2018-04-17 DIAGNOSIS — G47.33 OSA ON CPAP: ICD-10-CM

## 2018-04-17 DIAGNOSIS — E78.5 DYSLIPIDEMIA: ICD-10-CM

## 2018-04-17 DIAGNOSIS — Z23 NEED FOR TDAP VACCINATION: ICD-10-CM

## 2018-04-17 PROCEDURE — 3044F HG A1C LEVEL LT 7.0%: CPT | Performed by: INTERNAL MEDICINE

## 2018-04-17 PROCEDURE — 1090F PRES/ABSN URINE INCON ASSESS: CPT | Performed by: INTERNAL MEDICINE

## 2018-04-17 PROCEDURE — 1123F ACP DISCUSS/DSCN MKR DOCD: CPT | Performed by: INTERNAL MEDICINE

## 2018-04-17 PROCEDURE — 99214 OFFICE O/P EST MOD 30 MIN: CPT | Performed by: INTERNAL MEDICINE

## 2018-04-17 PROCEDURE — 99213 OFFICE O/P EST LOW 20 MIN: CPT

## 2018-04-17 PROCEDURE — G8399 PT W/DXA RESULTS DOCUMENT: HCPCS | Performed by: INTERNAL MEDICINE

## 2018-04-17 PROCEDURE — 3017F COLORECTAL CA SCREEN DOC REV: CPT | Performed by: INTERNAL MEDICINE

## 2018-04-17 PROCEDURE — 90670 PCV13 VACCINE IM: CPT

## 2018-04-17 PROCEDURE — 1036F TOBACCO NON-USER: CPT | Performed by: INTERNAL MEDICINE

## 2018-04-17 PROCEDURE — 2022F DILAT RTA XM EVC RTNOPTHY: CPT | Performed by: INTERNAL MEDICINE

## 2018-04-17 PROCEDURE — G8598 ASA/ANTIPLAT THER USED: HCPCS | Performed by: INTERNAL MEDICINE

## 2018-04-17 PROCEDURE — G8427 DOCREV CUR MEDS BY ELIG CLIN: HCPCS | Performed by: INTERNAL MEDICINE

## 2018-04-17 PROCEDURE — G8417 CALC BMI ABV UP PARAM F/U: HCPCS | Performed by: INTERNAL MEDICINE

## 2018-04-17 PROCEDURE — 4040F PNEUMOC VAC/ADMIN/RCVD: CPT | Performed by: INTERNAL MEDICINE

## 2018-04-17 RX ORDER — LOSARTAN POTASSIUM 100 MG/1
TABLET ORAL
Qty: 90 TABLET | Refills: 3 | Status: SHIPPED | OUTPATIENT
Start: 2018-04-17 | End: 2018-10-24 | Stop reason: DRUGHIGH

## 2018-04-17 RX ORDER — CLOBETASOL PROPIONATE 0.5 MG/G
OINTMENT TOPICAL
Qty: 15 G | Refills: 3 | Status: CANCELLED | OUTPATIENT
Start: 2018-04-17

## 2018-04-17 RX ORDER — HYDROCHLOROTHIAZIDE 25 MG/1
TABLET ORAL
Qty: 90 TABLET | Refills: 2 | Status: SHIPPED | OUTPATIENT
Start: 2018-04-17 | End: 2018-11-06 | Stop reason: SDUPTHER

## 2018-04-17 RX ORDER — METOPROLOL TARTRATE 50 MG/1
75 TABLET, FILM COATED ORAL 2 TIMES DAILY
Refills: 0 | COMMUNITY
Start: 2018-02-02 | End: 2018-10-24 | Stop reason: DRUGHIGH

## 2018-04-17 RX ORDER — CLOTRIMAZOLE AND BETAMETHASONE DIPROPIONATE 10; .64 MG/G; MG/G
CREAM TOPICAL
Qty: 1 TUBE | Refills: 2 | Status: SHIPPED | OUTPATIENT
Start: 2018-04-17 | End: 2018-11-06

## 2018-04-17 ASSESSMENT — ENCOUNTER SYMPTOMS
SHORTNESS OF BREATH: 1
CONSTIPATION: 0
BACK PAIN: 1
SPUTUM PRODUCTION: 0
ABDOMINAL PAIN: 0
NAUSEA: 0
COUGH: 1
BLURRED VISION: 0
EYE REDNESS: 0

## 2018-04-26 ENCOUNTER — HOSPITAL ENCOUNTER (OUTPATIENT)
Dept: PAIN MANAGEMENT | Age: 63
Discharge: HOME | End: 2018-04-26
Payer: MEDICARE

## 2018-04-26 VITALS
RESPIRATION RATE: 18 BRPM | SYSTOLIC BLOOD PRESSURE: 127 MMHG | DIASTOLIC BLOOD PRESSURE: 60 MMHG | TEMPERATURE: 97.2 F | BODY MASS INDEX: 41.64 KG/M2 | WEIGHT: 235 LBS | HEIGHT: 63 IN | HEART RATE: 60 BPM

## 2018-04-26 PROCEDURE — 99204 OFFICE O/P NEW MOD 45 MIN: CPT | Performed by: PAIN MEDICINE

## 2018-04-26 PROCEDURE — 99203 OFFICE O/P NEW LOW 30 MIN: CPT

## 2018-04-26 ASSESSMENT — ENCOUNTER SYMPTOMS
BACK PAIN: 1
SHORTNESS OF BREATH: 1
EYES NEGATIVE: 1
NAUSEA: 1
ABDOMINAL PAIN: 1
WHEEZING: 1

## 2018-05-02 ENCOUNTER — OFFICE VISIT (OUTPATIENT)
Dept: PULMONOLOGY | Age: 63
End: 2018-05-02
Payer: MEDICARE

## 2018-05-02 VITALS
DIASTOLIC BLOOD PRESSURE: 51 MMHG | SYSTOLIC BLOOD PRESSURE: 121 MMHG | HEART RATE: 60 BPM | WEIGHT: 232 LBS | RESPIRATION RATE: 16 BRPM | HEIGHT: 64 IN | OXYGEN SATURATION: 95 % | BODY MASS INDEX: 39.61 KG/M2 | TEMPERATURE: 98.2 F

## 2018-05-02 DIAGNOSIS — R91.1 LUNG NODULE: ICD-10-CM

## 2018-05-02 DIAGNOSIS — G47.33 OSA ON CPAP: Primary | ICD-10-CM

## 2018-05-02 DIAGNOSIS — Z99.89 OSA ON CPAP: Primary | ICD-10-CM

## 2018-05-02 DIAGNOSIS — R06.09 DYSPNEA ON EXERTION: ICD-10-CM

## 2018-05-02 DIAGNOSIS — J44.9 CHRONIC OBSTRUCTIVE PULMONARY DISEASE, UNSPECIFIED COPD TYPE (HCC): ICD-10-CM

## 2018-05-02 PROCEDURE — 1090F PRES/ABSN URINE INCON ASSESS: CPT | Performed by: INTERNAL MEDICINE

## 2018-05-02 PROCEDURE — 3017F COLORECTAL CA SCREEN DOC REV: CPT | Performed by: INTERNAL MEDICINE

## 2018-05-02 PROCEDURE — G8598 ASA/ANTIPLAT THER USED: HCPCS | Performed by: INTERNAL MEDICINE

## 2018-05-02 PROCEDURE — G8427 DOCREV CUR MEDS BY ELIG CLIN: HCPCS | Performed by: INTERNAL MEDICINE

## 2018-05-02 PROCEDURE — 3023F SPIROM DOC REV: CPT | Performed by: INTERNAL MEDICINE

## 2018-05-02 PROCEDURE — G8417 CALC BMI ABV UP PARAM F/U: HCPCS | Performed by: INTERNAL MEDICINE

## 2018-05-02 PROCEDURE — G8399 PT W/DXA RESULTS DOCUMENT: HCPCS | Performed by: INTERNAL MEDICINE

## 2018-05-02 PROCEDURE — G8926 SPIRO NO PERF OR DOC: HCPCS | Performed by: INTERNAL MEDICINE

## 2018-05-02 PROCEDURE — 99214 OFFICE O/P EST MOD 30 MIN: CPT | Performed by: INTERNAL MEDICINE

## 2018-05-02 PROCEDURE — 1036F TOBACCO NON-USER: CPT | Performed by: INTERNAL MEDICINE

## 2018-05-02 PROCEDURE — 4040F PNEUMOC VAC/ADMIN/RCVD: CPT | Performed by: INTERNAL MEDICINE

## 2018-05-02 PROCEDURE — 1123F ACP DISCUSS/DSCN MKR DOCD: CPT | Performed by: INTERNAL MEDICINE

## 2018-05-02 ASSESSMENT — SLEEP AND FATIGUE QUESTIONNAIRES
ESS TOTAL SCORE: 5
HOW LIKELY ARE YOU TO NOD OFF OR FALL ASLEEP WHILE SITTING AND TALKING TO SOMEONE: 0
HOW LIKELY ARE YOU TO NOD OFF OR FALL ASLEEP WHILE SITTING INACTIVE IN A PUBLIC PLACE: 0
HOW LIKELY ARE YOU TO NOD OFF OR FALL ASLEEP WHILE LYING DOWN TO REST IN THE AFTERNOON WHEN CIRCUMSTANCES PERMIT: 2
HOW LIKELY ARE YOU TO NOD OFF OR FALL ASLEEP WHILE SITTING AND READING: 1
HOW LIKELY ARE YOU TO NOD OFF OR FALL ASLEEP WHILE WATCHING TV: 1
HOW LIKELY ARE YOU TO NOD OFF OR FALL ASLEEP IN A CAR, WHILE STOPPED FOR A FEW MINUTES IN TRAFFIC: 0
HOW LIKELY ARE YOU TO NOD OFF OR FALL ASLEEP WHILE SITTING QUIETLY AFTER LUNCH WITHOUT ALCOHOL: 1
HOW LIKELY ARE YOU TO NOD OFF OR FALL ASLEEP WHEN YOU ARE A PASSENGER IN A CAR FOR AN HOUR WITHOUT A BREAK: 0

## 2018-05-07 ENCOUNTER — HOSPITAL ENCOUNTER (OUTPATIENT)
Dept: CT IMAGING | Age: 63
Discharge: HOME | End: 2018-05-09
Payer: MEDICARE

## 2018-05-07 DIAGNOSIS — R91.1 LUNG NODULE: ICD-10-CM

## 2018-05-07 PROCEDURE — 71250 CT THORAX DX C-: CPT

## 2018-05-11 ENCOUNTER — HOSPITAL ENCOUNTER (OUTPATIENT)
Dept: PAIN MANAGEMENT | Age: 63
Discharge: HOME | End: 2018-05-11
Payer: MEDICARE

## 2018-05-11 VITALS
WEIGHT: 232 LBS | SYSTOLIC BLOOD PRESSURE: 114 MMHG | HEART RATE: 52 BPM | OXYGEN SATURATION: 94 % | RESPIRATION RATE: 16 BRPM | TEMPERATURE: 97.8 F | DIASTOLIC BLOOD PRESSURE: 48 MMHG | BODY MASS INDEX: 39.61 KG/M2 | HEIGHT: 64 IN

## 2018-05-11 LAB — GLUCOSE BLD-MCNC: 125 MG/DL (ref 65–105)

## 2018-05-11 PROCEDURE — 64494 INJ PARAVERT F JNT L/S 2 LEV: CPT | Performed by: PAIN MEDICINE

## 2018-05-11 PROCEDURE — 82947 ASSAY GLUCOSE BLOOD QUANT: CPT

## 2018-05-11 PROCEDURE — 64493 INJ PARAVERT F JNT L/S 1 LEV: CPT | Performed by: PAIN MEDICINE

## 2018-05-11 PROCEDURE — 64493 INJ PARAVERT F JNT L/S 1 LEV: CPT

## 2018-05-11 PROCEDURE — 6360000002 HC RX W HCPCS: Performed by: PAIN MEDICINE

## 2018-05-11 PROCEDURE — 2500000003 HC RX 250 WO HCPCS

## 2018-05-11 PROCEDURE — 64494 INJ PARAVERT F JNT L/S 2 LEV: CPT

## 2018-05-11 PROCEDURE — 2580000003 HC RX 258: Performed by: PAIN MEDICINE

## 2018-05-11 RX ORDER — SODIUM CHLORIDE, SODIUM LACTATE, POTASSIUM CHLORIDE, CALCIUM CHLORIDE 600; 310; 30; 20 MG/100ML; MG/100ML; MG/100ML; MG/100ML
INJECTION, SOLUTION INTRAVENOUS CONTINUOUS
Status: DISCONTINUED | OUTPATIENT
Start: 2018-05-11 | End: 2018-05-12 | Stop reason: HOSPADM

## 2018-05-11 RX ORDER — FENTANYL CITRATE 50 UG/ML
100 INJECTION, SOLUTION INTRAMUSCULAR; INTRAVENOUS
Status: ACTIVE | OUTPATIENT
Start: 2018-05-11 | End: 2018-05-11

## 2018-05-11 RX ORDER — BUPIVACAINE HYDROCHLORIDE 2.5 MG/ML
30 INJECTION, SOLUTION EPIDURAL; INFILTRATION; INTRACAUDAL
Status: ACTIVE | OUTPATIENT
Start: 2018-05-11 | End: 2018-05-11

## 2018-05-11 RX ORDER — MIDAZOLAM HYDROCHLORIDE 1 MG/ML
2 INJECTION INTRAMUSCULAR; INTRAVENOUS
Status: COMPLETED | OUTPATIENT
Start: 2018-05-11 | End: 2018-05-11

## 2018-05-11 RX ADMIN — SODIUM CHLORIDE, POTASSIUM CHLORIDE, SODIUM LACTATE AND CALCIUM CHLORIDE 500 ML: 600; 310; 30; 20 INJECTION, SOLUTION INTRAVENOUS at 09:23

## 2018-05-11 RX ADMIN — MIDAZOLAM HYDROCHLORIDE 1 MG: 1 INJECTION, SOLUTION INTRAMUSCULAR; INTRAVENOUS at 09:33

## 2018-05-11 ASSESSMENT — PAIN DESCRIPTION - DESCRIPTORS: DESCRIPTORS: ACHING;CONSTANT

## 2018-05-11 ASSESSMENT — PAIN - FUNCTIONAL ASSESSMENT: PAIN_FUNCTIONAL_ASSESSMENT: 0-10

## 2018-05-15 ENCOUNTER — TELEPHONE (OUTPATIENT)
Dept: PULMONOLOGY | Age: 63
End: 2018-05-15

## 2018-05-17 ENCOUNTER — HOSPITAL ENCOUNTER (OUTPATIENT)
Dept: PAIN MANAGEMENT | Age: 63
Discharge: HOME | End: 2018-05-17
Payer: MEDICARE

## 2018-05-17 VITALS
SYSTOLIC BLOOD PRESSURE: 137 MMHG | TEMPERATURE: 98.1 F | DIASTOLIC BLOOD PRESSURE: 64 MMHG | HEART RATE: 50 BPM | RESPIRATION RATE: 16 BRPM

## 2018-05-17 DIAGNOSIS — M54.17 LUMBOSACRAL RADICULOPATHY: Primary | ICD-10-CM

## 2018-05-17 PROCEDURE — 99213 OFFICE O/P EST LOW 20 MIN: CPT | Performed by: PAIN MEDICINE

## 2018-05-17 PROCEDURE — 99213 OFFICE O/P EST LOW 20 MIN: CPT

## 2018-05-17 RX ORDER — KETAMINE HCL 100 %
POWDER (GRAM) MISCELLANEOUS
COMMUNITY
End: 2018-10-24 | Stop reason: ALTCHOICE

## 2018-05-17 ASSESSMENT — ENCOUNTER SYMPTOMS
BLOATING: 1
BLURRED VISION: 1
BACK PAIN: 1
BOWEL INCONTINENCE: 0

## 2018-05-22 ENCOUNTER — TELEPHONE (OUTPATIENT)
Dept: INTERNAL MEDICINE | Age: 63
End: 2018-05-22

## 2018-05-23 ENCOUNTER — HOSPITAL ENCOUNTER (OUTPATIENT)
Dept: MRI IMAGING | Age: 63
Discharge: HOME | End: 2018-05-25
Payer: MEDICARE

## 2018-05-23 DIAGNOSIS — M54.17 LUMBOSACRAL RADICULOPATHY: ICD-10-CM

## 2018-05-23 PROCEDURE — 72148 MRI LUMBAR SPINE W/O DYE: CPT

## 2018-06-07 ENCOUNTER — HOSPITAL ENCOUNTER (OUTPATIENT)
Dept: PAIN MANAGEMENT | Age: 63
Discharge: HOME | End: 2018-06-07
Payer: MEDICARE

## 2018-06-07 VITALS
SYSTOLIC BLOOD PRESSURE: 174 MMHG | HEART RATE: 68 BPM | TEMPERATURE: 97.4 F | DIASTOLIC BLOOD PRESSURE: 87 MMHG | RESPIRATION RATE: 14 BRPM

## 2018-06-07 PROCEDURE — 99214 OFFICE O/P EST MOD 30 MIN: CPT | Performed by: PAIN MEDICINE

## 2018-06-07 PROCEDURE — 99214 OFFICE O/P EST MOD 30 MIN: CPT

## 2018-06-07 ASSESSMENT — ENCOUNTER SYMPTOMS
BACK PAIN: 1
BOWEL INCONTINENCE: 0

## 2018-07-03 DIAGNOSIS — L90.0 LICHEN SCLEROSUS: ICD-10-CM

## 2018-07-03 RX ORDER — CLOBETASOL PROPIONATE 0.5 MG/G
OINTMENT TOPICAL
Qty: 15 G | Refills: 3 | Status: SHIPPED | OUTPATIENT
Start: 2018-07-03 | End: 2020-01-31 | Stop reason: SDUPTHER

## 2018-07-03 NOTE — TELEPHONE ENCOUNTER
Escribe request for Clobetason ointment . Please escribe if appropriate      Next Visit Date:  8/29/18     Health Maintenance   Topic Date Due    Hepatitis C screen  1955    Diabetic foot exam  01/20/1963    HIV screen  01/20/1968    DTaP/Tdap/Td vaccine (1 - Tdap) 01/20/1972    Shingles Vaccine (1 of 2 - 2 Dose Series) 01/20/2003    Diabetic retinal exam  07/25/2015    Cervical cancer screen  05/29/2018    Flu vaccine (1) 09/01/2018    A1C test (Diabetic or Prediabetic)  01/11/2019    Breast cancer screen  01/31/2019    Diabetic microalbuminuria test  04/06/2019    Lipid screen  04/06/2019    Potassium monitoring  04/06/2019    Creatinine monitoring  04/06/2019    Pneumococcal low/med risk (2 of 2 - PPSV23) 04/17/2019    Low dose CT lung screening  05/07/2019    Colon cancer screen colonoscopy  11/08/2019    DEXA (modify frequency per FRAX score)  Completed       Hemoglobin A1C (%)   Date Value   01/11/2018 6.6   06/11/2016 5.9   09/02/2015 6.5 (H)             ( goal A1C is < 7)   Microalb/Crt.  Ratio (mcg/mg creat)   Date Value   04/06/2018 11     LDL Cholesterol (mg/dL)   Date Value   04/06/2018 73       (goal LDL is <100)   AST (U/L)   Date Value   04/06/2018 21     ALT (U/L)   Date Value   04/06/2018 20     BUN (mg/dL)   Date Value   04/06/2018 18     BP Readings from Last 3 Encounters:   06/07/18 (!) 174/87   05/17/18 137/64   05/11/18 (!) 114/48          (goal 120/80)          Patient Active Problem List:     Smoker     Angina pectoris (HCC)     HTN (hypertension)     CAD/Stents drug-eluting      Serum lipids high     Subtalar Joint Arthritis     Prediabetes     Carotid stenosis, asymptomatic     Claudication in peripheral vascular disease (HCC)     Nocturnal hypoxia     CRISTINA on CPAP     New onset type 2 diabetes mellitus ([facility] [address]

## 2018-07-09 RX ORDER — OMEPRAZOLE 40 MG/1
CAPSULE, DELAYED RELEASE ORAL
Qty: 30 CAPSULE | Refills: 5 | Status: SHIPPED | OUTPATIENT
Start: 2018-07-09 | End: 2019-01-29 | Stop reason: SDUPTHER

## 2018-07-09 NOTE — TELEPHONE ENCOUNTER
drug-eluting      Serum lipids high     Subtalar Joint Arthritis     Prediabetes     Carotid stenosis, asymptomatic     Claudication in peripheral vascular disease (HCC)     Nocturnal hypoxia     CRISTINA on CPAP     New onset type 2 diabetes mellitus ([facility] [address]

## 2018-07-20 ENCOUNTER — TELEPHONE (OUTPATIENT)
Dept: PULMONOLOGY | Age: 63
End: 2018-07-20

## 2018-08-16 DIAGNOSIS — E11.9 NEW ONSET TYPE 2 DIABETES MELLITUS (HCC): ICD-10-CM

## 2018-09-06 DIAGNOSIS — I65.23 ASYMPTOMATIC BILATERAL CAROTID ARTERY STENOSIS: ICD-10-CM

## 2018-09-06 DIAGNOSIS — E78.5 DYSLIPIDEMIA: ICD-10-CM

## 2018-09-06 DIAGNOSIS — I25.10 CORONARY ARTERY DISEASE INVOLVING NATIVE CORONARY ARTERY OF NATIVE HEART WITHOUT ANGINA PECTORIS: ICD-10-CM

## 2018-09-06 DIAGNOSIS — E11.9 NEW ONSET TYPE 2 DIABETES MELLITUS (HCC): ICD-10-CM

## 2018-09-06 RX ORDER — ATORVASTATIN CALCIUM 40 MG/1
TABLET, FILM COATED ORAL
Qty: 30 TABLET | Refills: 2 | Status: SHIPPED | OUTPATIENT
Start: 2018-09-06 | End: 2018-12-06 | Stop reason: SDUPTHER

## 2018-09-18 ENCOUNTER — HOSPITAL ENCOUNTER (OUTPATIENT)
Age: 63
Discharge: HOME | End: 2018-09-18
Payer: MEDICARE

## 2018-09-18 LAB
ALBUMIN SERPL-MCNC: 4 G/DL (ref 3.5–5.2)
ALBUMIN/GLOBULIN RATIO: 0.9 (ref 1–2.5)
ALP BLD-CCNC: 89 U/L (ref 35–104)
ALT SERPL-CCNC: 22 U/L (ref 5–33)
AST SERPL-CCNC: 22 U/L
BILIRUB SERPL-MCNC: 0.48 MG/DL (ref 0.3–1.2)
BILIRUBIN DIRECT: 0.1 MG/DL
BILIRUBIN, INDIRECT: 0.38 MG/DL (ref 0–1)
CHOLESTEROL, FASTING: 127 MG/DL
CHOLESTEROL/HDL RATIO: 3
GLOBULIN: ABNORMAL G/DL (ref 1.5–3.8)
HDLC SERPL-MCNC: 42 MG/DL
LDL CHOLESTEROL: 61 MG/DL (ref 0–130)
TOTAL PROTEIN: 8.3 G/DL (ref 6.4–8.3)
TRIGLYCERIDE, FASTING: 118 MG/DL
VLDLC SERPL CALC-MCNC: NORMAL MG/DL (ref 1–30)

## 2018-09-18 PROCEDURE — 82550 ASSAY OF CK (CPK): CPT

## 2018-09-18 PROCEDURE — 80061 LIPID PANEL: CPT

## 2018-09-18 PROCEDURE — 80076 HEPATIC FUNCTION PANEL: CPT

## 2018-09-18 PROCEDURE — 36415 COLL VENOUS BLD VENIPUNCTURE: CPT

## 2018-09-19 LAB — TOTAL CK: 70 U/L (ref 26–192)

## 2018-10-01 ENCOUNTER — TELEPHONE (OUTPATIENT)
Dept: ONCOLOGY | Age: 63
End: 2018-10-01

## 2018-10-03 ENCOUNTER — TELEPHONE (OUTPATIENT)
Dept: INTERNAL MEDICINE | Age: 63
End: 2018-10-03

## 2018-10-24 ENCOUNTER — TELEPHONE (OUTPATIENT)
Dept: INTERNAL MEDICINE | Age: 63
End: 2018-10-24

## 2018-10-24 ENCOUNTER — OFFICE VISIT (OUTPATIENT)
Dept: INTERNAL MEDICINE | Age: 63
End: 2018-10-24
Payer: MEDICARE

## 2018-10-24 VITALS
HEART RATE: 58 BPM | SYSTOLIC BLOOD PRESSURE: 128 MMHG | WEIGHT: 228 LBS | BODY MASS INDEX: 39.75 KG/M2 | DIASTOLIC BLOOD PRESSURE: 67 MMHG

## 2018-10-24 DIAGNOSIS — M67.919 ROTATOR CUFF DISORDER, UNSPECIFIED LATERALITY: ICD-10-CM

## 2018-10-24 DIAGNOSIS — E11.9 TYPE 2 DIABETES MELLITUS WITHOUT COMPLICATION, WITHOUT LONG-TERM CURRENT USE OF INSULIN (HCC): ICD-10-CM
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
## 2018-10-24 DIAGNOSIS — I25.10 CORONARY ARTERY DISEASE INVOLVING NATIVE CORONARY ARTERY OF NATIVE HEART WITHOUT ANGINA PECTORIS: ICD-10-CM

## 2018-10-24 DIAGNOSIS — S40.811A ABRASION OF RIGHT UPPER EXTREMITY, INITIAL ENCOUNTER: ICD-10-CM

## 2018-10-24 DIAGNOSIS — I10 ESSENTIAL HYPERTENSION: ICD-10-CM

## 2018-10-24 LAB — HBA1C MFR BLD: 6.3 %

## 2018-10-24 PROCEDURE — 3017F COLORECTAL CA SCREEN DOC REV: CPT | Performed by: INTERNAL MEDICINE

## 2018-10-24 PROCEDURE — 1090F PRES/ABSN URINE INCON ASSESS: CPT | Performed by: INTERNAL MEDICINE

## 2018-10-24 PROCEDURE — 1101F PT FALLS ASSESS-DOCD LE1/YR: CPT | Performed by: INTERNAL MEDICINE

## 2018-10-24 PROCEDURE — 2022F DILAT RTA XM EVC RTNOPTHY: CPT | Performed by: INTERNAL MEDICINE

## 2018-10-24 PROCEDURE — 83036 HEMOGLOBIN GLYCOSYLATED A1C: CPT | Performed by: INTERNAL MEDICINE

## 2018-10-24 PROCEDURE — 99213 OFFICE O/P EST LOW 20 MIN: CPT | Performed by: INTERNAL MEDICINE

## 2018-10-24 PROCEDURE — 90715 TDAP VACCINE 7 YRS/> IM: CPT | Performed by: INTERNAL MEDICINE

## 2018-10-24 PROCEDURE — 3044F HG A1C LEVEL LT 7.0%: CPT | Performed by: INTERNAL MEDICINE

## 2018-10-24 PROCEDURE — G8598 ASA/ANTIPLAT THER USED: HCPCS | Performed by: INTERNAL MEDICINE

## 2018-10-24 PROCEDURE — 1036F TOBACCO NON-USER: CPT | Performed by: INTERNAL MEDICINE

## 2018-10-24 PROCEDURE — G8484 FLU IMMUNIZE NO ADMIN: HCPCS | Performed by: INTERNAL MEDICINE

## 2018-10-24 PROCEDURE — G8417 CALC BMI ABV UP PARAM F/U: HCPCS | Performed by: INTERNAL MEDICINE

## 2018-10-24 PROCEDURE — 1123F ACP DISCUSS/DSCN MKR DOCD: CPT | Performed by: INTERNAL MEDICINE

## 2018-10-24 PROCEDURE — G8427 DOCREV CUR MEDS BY ELIG CLIN: HCPCS | Performed by: INTERNAL MEDICINE

## 2018-10-24 PROCEDURE — 99211 OFF/OP EST MAY X REQ PHY/QHP: CPT | Performed by: INTERNAL MEDICINE

## 2018-10-24 PROCEDURE — 4040F PNEUMOC VAC/ADMIN/RCVD: CPT | Performed by: INTERNAL MEDICINE

## 2018-10-24 PROCEDURE — G8399 PT W/DXA RESULTS DOCUMENT: HCPCS | Performed by: INTERNAL MEDICINE

## 2018-10-24 RX ORDER — MAGNESIUM 30 MG
30 TABLET ORAL 2 TIMES DAILY
COMMUNITY

## 2018-10-24 RX ORDER — LOSARTAN POTASSIUM 50 MG/1
TABLET ORAL
Qty: 30 TABLET | Refills: 0 | Status: SHIPPED | OUTPATIENT
Start: 2018-10-24 | End: 2018-11-06 | Stop reason: SDUPTHER

## 2018-10-24 RX ORDER — METOPROLOL TARTRATE 50 MG/1
25 TABLET, FILM COATED ORAL 2 TIMES DAILY
Qty: 60 TABLET | Refills: 0 | Status: SHIPPED | COMMUNITY
Start: 2018-10-24 | End: 2018-11-06 | Stop reason: SDUPTHER

## 2018-10-24 ASSESSMENT — ENCOUNTER SYMPTOMS
SHORTNESS OF BREATH: 0
COUGH: 0
WHEEZING: 0
BACK PAIN: 1

## 2018-10-24 ASSESSMENT — PATIENT HEALTH QUESTIONNAIRE - PHQ9
SUM OF ALL RESPONSES TO PHQ9 QUESTIONS 1 & 2: 0
1. LITTLE INTEREST OR PLEASURE IN DOING THINGS: 0
SUM OF ALL RESPONSES TO PHQ QUESTIONS 1-9: 0
2. FEELING DOWN, DEPRESSED OR HOPELESS: 0
SUM OF ALL RESPONSES TO PHQ QUESTIONS 1-9: 0

## 2018-10-24 NOTE — PATIENT INSTRUCTIONS
Your medications for this visit were escribed to your preferred pharmacy. You have been given an order for Physical Therapy. Please schedule therapy at the [facility] please call with in 48 hours to schedule appointment at [phone]    Avs was given and reviewed appt card given with next appt. It is very important that you keep this appointment, if you need to cancel please call [phone] with any questions.  MM     Records requested from cardiology

## 2018-10-28 ASSESSMENT — ENCOUNTER SYMPTOMS
ABDOMINAL PAIN: 0
CONSTIPATION: 0

## 2018-10-31 ENCOUNTER — HOSPITAL ENCOUNTER (OUTPATIENT)
Dept: PHYSICAL THERAPY | Facility: CLINIC | Age: 63
Setting detail: THERAPIES SERIES
Discharge: HOME | End: 2018-10-31
Payer: MEDICARE

## 2018-10-31 PROCEDURE — 97110 THERAPEUTIC EXERCISES: CPT

## 2018-10-31 PROCEDURE — G8985 CARRY GOAL STATUS: HCPCS

## 2018-10-31 PROCEDURE — G8984 CARRY CURRENT STATUS: HCPCS

## 2018-10-31 PROCEDURE — 97161 PT EVAL LOW COMPLEX 20 MIN: CPT

## 2018-11-02 ENCOUNTER — HOSPITAL ENCOUNTER (OUTPATIENT)
Dept: PHYSICAL THERAPY | Facility: CLINIC | Age: 63
Setting detail: THERAPIES SERIES
Discharge: HOME | End: 2018-11-02
Payer: MEDICARE

## 2018-11-02 NOTE — CONSULTS
Pt with moderate tissue tension and tenderness throughout BUE musculature. Assessment:  Problems:    [x] ? Pain:  [x] ? ROM:  [x] ? Strength:  [x] ? Function:  [] Other:    STG: (to be met in 6 treatments)  1. ? Pain: 3/10  2. ? ROM: full ROM taryn shlds, elbows and wrists  3. ? Strength: 5/5 grossly taryn UE  4. ? Function: Able to perform all basic ADL's without difficulty  5. Independent with Home Exercise Programs  LTG: (to be met in 12 treatments)  1. 0/10 pain  2. Quick Dash score improved 20 points                 Patient goals:Pain relief and fall mobility    G-CODE  Functional Limitation: Carrying  Functional Assessment Used: Aditi Nava  Current Status Modifier: CL  Goal Status Modifier: CI    Rehab Potential:  [x] Good  [] Fair  [] Poor   Suggested Professional Referral:  [] No  [x] Yes: x-ray to right hand/wrist to r/o fx. Barriers to Goal Achievement[de-identified]  [x] No  [] Yes:  Domestic Concerns:  [x] No  [] Yes:    Pt. Education:  [x] Plans/Goals, Risks/Benefits discussed  [x] Home exercise program  Method of Education: [] Verbal  [] Demo  [] Written  Comprehension of Education:  [x] Verbalizes understanding. [] Demonstrates understanding. [] Needs Review. [] Demonstrates/verbalizes understanding of HEP/Ed previously given.     Treatment Plan:  [x] Therapeutic Exercise  [x] Modalities:  [] Dry Needling  [] Therapeutic Activity  [] Ultrasound  [] Electrical Stimulation  [] Gait Training   [] Massage       [] Lumbar/Cervical Traction  [] Neuromuscular Re-education [x] Cold/hotpack [] Iontophoresis: 4 mg/mL  [x] Instruction in HEP             Dexamethasone Sodium  [] Manual Therapy             Phosphate 40-80 mAmin  [] Aquatic Therapy                   [] Vasocompression/    [] Other:            Game Ready   Frequency: 2 x/week for 12 visits    Todays Treatment:  Modalities:   Treatment Location  Left      Right                          Position   Upper extremities [x]          [x]  [x] Supine    [] Prone   []

## 2018-11-05 ENCOUNTER — HOSPITAL ENCOUNTER (OUTPATIENT)
Dept: PHYSICAL THERAPY | Facility: CLINIC | Age: 63
Setting detail: THERAPIES SERIES
Discharge: HOME | End: 2018-11-05
Payer: MEDICARE

## 2018-11-05 PROCEDURE — 97110 THERAPEUTIC EXERCISES: CPT

## 2018-11-06 ENCOUNTER — OFFICE VISIT (OUTPATIENT)
Dept: INTERNAL MEDICINE | Age: 63
End: 2018-11-06
Payer: MEDICARE

## 2018-11-06 VITALS
OXYGEN SATURATION: 98 % | WEIGHT: 227.6 LBS | BODY MASS INDEX: 38.86 KG/M2 | HEART RATE: 65 BPM | HEIGHT: 64 IN | SYSTOLIC BLOOD PRESSURE: 136 MMHG | DIASTOLIC BLOOD PRESSURE: 78 MMHG

## 2018-11-06 DIAGNOSIS — E11.8 TYPE 2 DIABETES MELLITUS WITH COMPLICATION, WITHOUT LONG-TERM CURRENT USE OF INSULIN (HCC): ICD-10-CM

## 2018-11-06 DIAGNOSIS — R41.3 MEMORY DIFFICULTIES: Primary | ICD-10-CM

## 2018-11-06 DIAGNOSIS — G47.33 OSA ON CPAP: ICD-10-CM

## 2018-11-06 DIAGNOSIS — Z99.89 OSA ON CPAP: ICD-10-CM

## 2018-11-06 DIAGNOSIS — I10 ESSENTIAL HYPERTENSION: ICD-10-CM

## 2018-11-06 DIAGNOSIS — F41.9 ANXIETY: ICD-10-CM

## 2018-11-06 DIAGNOSIS — I25.10 CORONARY ARTERY DISEASE INVOLVING NATIVE CORONARY ARTERY OF NATIVE HEART WITHOUT ANGINA PECTORIS: ICD-10-CM

## 2018-11-06 DIAGNOSIS — R21 RASH: ICD-10-CM

## 2018-11-06 PROCEDURE — 3044F HG A1C LEVEL LT 7.0%: CPT | Performed by: INTERNAL MEDICINE

## 2018-11-06 PROCEDURE — G8427 DOCREV CUR MEDS BY ELIG CLIN: HCPCS | Performed by: INTERNAL MEDICINE

## 2018-11-06 PROCEDURE — 1123F ACP DISCUSS/DSCN MKR DOCD: CPT | Performed by: INTERNAL MEDICINE

## 2018-11-06 PROCEDURE — 99211 OFF/OP EST MAY X REQ PHY/QHP: CPT | Performed by: INTERNAL MEDICINE

## 2018-11-06 PROCEDURE — 4040F PNEUMOC VAC/ADMIN/RCVD: CPT | Performed by: INTERNAL MEDICINE

## 2018-11-06 PROCEDURE — 1036F TOBACCO NON-USER: CPT | Performed by: INTERNAL MEDICINE

## 2018-11-06 PROCEDURE — 99214 OFFICE O/P EST MOD 30 MIN: CPT | Performed by: INTERNAL MEDICINE

## 2018-11-06 PROCEDURE — 1101F PT FALLS ASSESS-DOCD LE1/YR: CPT | Performed by: INTERNAL MEDICINE

## 2018-11-06 PROCEDURE — G8417 CALC BMI ABV UP PARAM F/U: HCPCS | Performed by: INTERNAL MEDICINE

## 2018-11-06 PROCEDURE — G8399 PT W/DXA RESULTS DOCUMENT: HCPCS | Performed by: INTERNAL MEDICINE

## 2018-11-06 PROCEDURE — G8484 FLU IMMUNIZE NO ADMIN: HCPCS | Performed by: INTERNAL MEDICINE

## 2018-11-06 PROCEDURE — G8598 ASA/ANTIPLAT THER USED: HCPCS | Performed by: INTERNAL MEDICINE

## 2018-11-06 PROCEDURE — 2022F DILAT RTA XM EVC RTNOPTHY: CPT | Performed by: INTERNAL MEDICINE

## 2018-11-06 PROCEDURE — 1090F PRES/ABSN URINE INCON ASSESS: CPT | Performed by: INTERNAL MEDICINE

## 2018-11-06 PROCEDURE — 3017F COLORECTAL CA SCREEN DOC REV: CPT | Performed by: INTERNAL MEDICINE

## 2018-11-06 RX ORDER — HYDROCHLOROTHIAZIDE 25 MG/1
TABLET ORAL
Qty: 90 TABLET | Refills: 2 | Status: SHIPPED | OUTPATIENT
Start: 2018-11-06 | End: 2019-10-10 | Stop reason: SDUPTHER

## 2018-11-06 RX ORDER — LOSARTAN POTASSIUM 50 MG/1
TABLET ORAL
Qty: 30 TABLET | Refills: 3 | Status: SHIPPED | OUTPATIENT
Start: 2018-11-06 | End: 2019-03-06 | Stop reason: SDUPTHER

## 2018-11-06 RX ORDER — CLOBETASOL PROPIONATE 0.5 MG/G
OINTMENT TOPICAL
Qty: 30 G | Refills: 1 | Status: SHIPPED | OUTPATIENT
Start: 2018-11-06 | End: 2019-02-27 | Stop reason: SDUPTHER

## 2018-11-06 RX ORDER — METOPROLOL TARTRATE 50 MG/1
25 TABLET, FILM COATED ORAL 2 TIMES DAILY
Qty: 60 TABLET | Refills: 3 | Status: SHIPPED | OUTPATIENT
Start: 2018-11-06 | End: 2019-02-01 | Stop reason: ALTCHOICE

## 2018-11-06 ASSESSMENT — ENCOUNTER SYMPTOMS
SHORTNESS OF BREATH: 0
BACK PAIN: 1
WHEEZING: 0
ABDOMINAL PAIN: 0
CONSTIPATION: 0

## 2018-11-06 NOTE — PROGRESS NOTES
Pt present for mini mental exam   3 words given (apple susie and watch) pt remembered susie and apple   Pt was able to draw clock with correct labels (scanned to media)

## 2018-11-06 NOTE — PROGRESS NOTES
seeing Pulmonologist. She has been started on Spiriva and Symbicort for stage 2 COPD. She had PFT's done.           She quit smoking 2 years ago but still using e-cigarette. She is trying to decrease her vaping habit. Coronary angiogram 11/2017  Findings:     Left main: Normal      LAD: Proximal 30-40% stenosis      LCX: Large and dominant with minimal disease   OM1: patent stent with < 20% stenosis    OM2: normal  OM3: proximal 30% stenosis   LPDA: Minimal disease      RCA: small with patent mid stent followed by 30-40% stenosis just distal to stent       LVgram: Ef 60%, normal wall movement. Normal left sided pressures. Conclusions:  1. Patent RCA and OM stents   2. Mild to moderate non-obstructive disease otherwise   3. Hyperdynamic LV wall motion with LVEF 60%        Recommendation:  1. Aggressive risk factor modification. 2. Continue current medical management. 3. Follow up with Dr Kim Bowen as scheduled     CT lung screening  Impression   5 mm nodule posterior left upper lobe.  No acute process with chronic   findings as described.       LUNG RADS:   Per ACR Lung-RADS Version 1.0       Category 2, Benign appearance or behavior.  Management:  Continue annual lung   screening with LDCT in 12 months. (probability of malignancy <1%).           MRI abdomen          Impression   Re- demonstration of the nodular left adrenal lesion as measured/ described   above which is most consistent with a benign lipid rich adenoma.              Past Medical History:   Diagnosis Date    Angina pectoris ([address]     Arthritis     Bipolar disorder ([address]     CAD (coronary artery disease)     stents x2    Carotid stenosis, asymptomatic 2/26/2015    Chronic back pain     COPD (chronic obstructive pulmonary disease) ([address]     Depression     Dyspnea     Family history of colon cancer     Family history of liver cancer     Family history of ovarian cancer     GERD (gastroesophageal reflux disease)     History of colon Memory issues  marijuana abuse        PHYSICAL EXAM:     Vitals:    11/06/18 1426   BP: 136/78   Site: Left Upper Arm   Position: Sitting   Cuff Size: Medium Adult   Pulse: 65   SpO2: 98%   Weight: 227 lb 9.6 oz (103.2 kg)   Height: 5' 3.5\" (1.613 m)     Body mass index is 39.69 kg/m². BP Readings from Last 3 Encounters:   11/06/18 136/78   10/24/18 128/67   06/07/18 (!) 174/87        Wt Readings from Last 3 Encounters:   11/06/18 227 lb 9.6 oz (103.2 kg)   10/24/18 228 lb (103.4 kg)   05/11/18 232 lb (105.2 kg)       Physical Exam      HENT: Normocephalic, Atraumatic, Bilateral external ears normal, Oropharynx moist, No oral exudates, Nose normal. Neck- Normal range of motion, No tenderness, Supple, No stridor. Eyes:  PERRL, EOMI, Conjunctiva normal, No discharge. Respiratory:  Normalbreath sounds, No respiratory distress, No wheezing, No chest tenderness. Cardiovascular:  Normal heart rate, Normal rhythm, No murmur  GI:  Bowel sounds normal, Soft, No tenderness, No masses  Musculoskeletal:  Intact distal pulses, No edema, No tenderness, ack- Notenderness. Integument:  Warm, Dry, No erythema, circular plaque on lower abdomen and back of thigh which is erythematous. No warmth. scaly  Lymphatic:  No lymphadenopathy noted. Neurologic:  Alert & oriented x 3, Normal motor function, Normal sensory function, No focal deficits noted.    Psychiatric:  Affect normal    LABORATORY FINDINGS:    CBC:  Lab Results   Component Value Date    WBC 8.7 08/04/2014    HGB 15.3 08/04/2014     11/20/2017     BMP:    Lab Results   Component Value Date     04/06/2018    K 4.4 04/06/2018    CL 98 04/06/2018    CO2 30 04/06/2018    BUN 18 04/06/2018    CREATININE 0.76 04/06/2018    GLUCOSE 108 04/06/2018    GLUCOSE 87 10/28/2011     HEMOGLOBIN A1C:   Lab Results   Component Value Date    LABA1C 6.3 10/24/2018     MICROALBUMIN URINE:   Lab Results   Component Value Date    MICROALBUR 12 04/06/2018     FASTING LIPID Quin@[employer]  Lab Results   Component Value Date    LDLCHOLESTEROL 61 09/18/2018       LIVER PROFILE:  Lab Results   Component Value Date    ALT 22 09/18/2018    AST 22 09/18/2018    PROT 8.3 09/18/2018    BILITOT 0.48 09/18/2018    BILIDIR 0.10 09/18/2018    LABALBU 4.0 09/18/2018      THYROID FUNCTION:   Lab Results   Component Value Date    TSH 4.61 04/06/2018      URINEANALYSIS: No results found for: LABURIN  ASSESSMENT AND PLAN:    1. Memory difficulties    - MRI Brain WO Contrast; Future  - TSH with Reflex; Future  - Vitamin B12 & Folate; Future  - RPR with FTA Relex; Future  - CBC With Auto Differential; Future    2. Essential hypertension    - TSH with Reflex; Future  - losartan (COZAAR) 50 MG tablet; take 1 tablet by mouth once daily  Dispense: 30 tablet; Refill: 3  - metoprolol tartrate (LOPRESSOR) 50 MG tablet; Take 0.5 tablets by mouth 2 times daily  Dispense: 60 tablet; Refill: 3  - hydrochlorothiazide (HYDRODIURIL) 25 MG tablet; take 1 tablet by mouth once daily  Dispense: 90 tablet; Refill: 2    3. Coronary artery disease involving native coronary artery of native heart without angina pectoris  Asa  Statin    - metoprolol tartrate (LOPRESSOR) 50 MG tablet; Take 0.5 tablets by mouth 2 times daily  Dispense: 60 tablet; Refill: 3    4. Type 2 diabetes mellitus with complication, without long-term current use of insulin (HCC)  Metformin    - TSH with Reflex; Future    5. Rash    - CBC With Auto Differential; Future  - clobetasol (TEMOVATE) 0.05 % ointment; Apply topically 2 times daily. Dispense: 30 g; Refill: 1  - Sofi Valladares MD, [facility]    6. CRISTINA on CPAP  Use daily    7. Anxiety  Using marijuana  Continues zoloft          FOLLOW UP AND INSTRUCTIONS:   Return in about 3 months (around 2/6/2019). 1. Romain Silva received counseling on the following healthy behaviors: nutrition, exercise and medication adherence    2.  Reviewed prior labs and health

## 2018-11-06 NOTE — PATIENT INSTRUCTIONS
You have been given an order and instructions for mri. The order was faxed to the scheduling department and they will contact you with an appointment. Please bring order with you to that appointment. The scheduling number is [phone] if you have scheduling problems    You have been given a lab order no need to schedule      Your medications for this visit were escribed to your preferred pharmacy. Your referral for Dermatology was sent to [facility] dermatology, they will contact you with an appointment, if any scheduling problems you can contact their office at [phone]. Avs was given and reviewed appt card given with next appt. It is very important that you keep this appointment, if you need to cancel please call [phone] with any questions.  SHARI

## 2018-11-07 ENCOUNTER — OFFICE VISIT (OUTPATIENT)
Dept: PULMONOLOGY | Age: 63
End: 2018-11-07
Payer: MEDICARE

## 2018-11-07 VITALS
SYSTOLIC BLOOD PRESSURE: 138 MMHG | DIASTOLIC BLOOD PRESSURE: 72 MMHG | HEIGHT: 64 IN | BODY MASS INDEX: 38.93 KG/M2 | WEIGHT: 228 LBS | OXYGEN SATURATION: 92 %

## 2018-11-07 DIAGNOSIS — E66.9 OBESITY (BMI 35.0-39.9 WITHOUT COMORBIDITY): ICD-10-CM

## 2018-11-07 DIAGNOSIS — Z99.89 OSA ON CPAP: ICD-10-CM

## 2018-11-07 DIAGNOSIS — J44.9 CHRONIC OBSTRUCTIVE PULMONARY DISEASE, UNSPECIFIED COPD TYPE (HCC): Primary | ICD-10-CM

## 2018-11-07 DIAGNOSIS — G47.33 OSA ON CPAP: ICD-10-CM

## 2018-11-07 DIAGNOSIS — R91.1 LUNG NODULE: ICD-10-CM

## 2018-11-07 DIAGNOSIS — Z87.891 PERSONAL HISTORY OF TOBACCO USE: ICD-10-CM

## 2018-11-07 PROCEDURE — G8399 PT W/DXA RESULTS DOCUMENT: HCPCS | Performed by: INTERNAL MEDICINE

## 2018-11-07 PROCEDURE — G8598 ASA/ANTIPLAT THER USED: HCPCS | Performed by: INTERNAL MEDICINE

## 2018-11-07 PROCEDURE — 99214 OFFICE O/P EST MOD 30 MIN: CPT | Performed by: INTERNAL MEDICINE

## 2018-11-07 PROCEDURE — 4040F PNEUMOC VAC/ADMIN/RCVD: CPT | Performed by: INTERNAL MEDICINE

## 2018-11-07 PROCEDURE — 1101F PT FALLS ASSESS-DOCD LE1/YR: CPT | Performed by: INTERNAL MEDICINE

## 2018-11-07 PROCEDURE — G8484 FLU IMMUNIZE NO ADMIN: HCPCS | Performed by: INTERNAL MEDICINE

## 2018-11-07 PROCEDURE — G8417 CALC BMI ABV UP PARAM F/U: HCPCS | Performed by: INTERNAL MEDICINE

## 2018-11-07 PROCEDURE — G8427 DOCREV CUR MEDS BY ELIG CLIN: HCPCS | Performed by: INTERNAL MEDICINE

## 2018-11-07 PROCEDURE — 3017F COLORECTAL CA SCREEN DOC REV: CPT | Performed by: INTERNAL MEDICINE

## 2018-11-07 PROCEDURE — G8926 SPIRO NO PERF OR DOC: HCPCS | Performed by: INTERNAL MEDICINE

## 2018-11-07 PROCEDURE — 1036F TOBACCO NON-USER: CPT | Performed by: INTERNAL MEDICINE

## 2018-11-07 PROCEDURE — 1090F PRES/ABSN URINE INCON ASSESS: CPT | Performed by: INTERNAL MEDICINE

## 2018-11-07 PROCEDURE — G0296 VISIT TO DETERM LDCT ELIG: HCPCS | Performed by: INTERNAL MEDICINE

## 2018-11-07 PROCEDURE — 1123F ACP DISCUSS/DSCN MKR DOCD: CPT | Performed by: INTERNAL MEDICINE

## 2018-11-07 PROCEDURE — 3023F SPIROM DOC REV: CPT | Performed by: INTERNAL MEDICINE

## 2018-11-07 ASSESSMENT — SLEEP AND FATIGUE QUESTIONNAIRES
HOW LIKELY ARE YOU TO NOD OFF OR FALL ASLEEP WHILE LYING DOWN TO REST IN THE AFTERNOON WHEN CIRCUMSTANCES PERMIT: 2
HOW LIKELY ARE YOU TO NOD OFF OR FALL ASLEEP WHILE SITTING AND TALKING TO SOMEONE: 0
HOW LIKELY ARE YOU TO NOD OFF OR FALL ASLEEP WHILE WATCHING TV: 1
HOW LIKELY ARE YOU TO NOD OFF OR FALL ASLEEP WHILE SITTING QUIETLY AFTER LUNCH WITHOUT ALCOHOL: 0
HOW LIKELY ARE YOU TO NOD OFF OR FALL ASLEEP WHEN YOU ARE A PASSENGER IN A CAR FOR AN HOUR WITHOUT A BREAK: 0
HOW LIKELY ARE YOU TO NOD OFF OR FALL ASLEEP WHILE SITTING INACTIVE IN A PUBLIC PLACE: 0
ESS TOTAL SCORE: 4
HOW LIKELY ARE YOU TO NOD OFF OR FALL ASLEEP WHILE SITTING AND READING: 1
HOW LIKELY ARE YOU TO NOD OFF OR FALL ASLEEP IN A CAR, WHILE STOPPED FOR A FEW MINUTES IN TRAFFIC: 0

## 2018-11-07 NOTE — PROGRESS NOTES
adenopathy, short neck  Chest - no tachypnea, retractions or cyanosis, decreased air entry noted bilaterally with distant breath sounds no expiratory wheezing and no rhonchi and no crackles  Heart - normal rate, regular rhythm, normal S1, S2, no murmurs, rubs, clicks or gallops  Abdomen - soft, nontender, nondistended, no masses or organomegaly  Neurological - alert, oriented, normal speech, no focal findings or movement disorder noted  Extremities - peripheral pulses normal, no pedal edema, no clubbing or cyanosis  Skin - normal coloration and turgor, no rashes, no suspicious skin lesions noted       LABS:    CBC:   WBC   Date Value Ref Range Status   08/04/2014 8.7 3.5 - 11.0 k/uL Final   10/11/2013 8.2 3.5 - 11.0 k/uL Final     Hemoglobin   Date Value Ref Range Status   08/04/2014 15.3 12.0 - 16.0 g/dL Final   10/11/2013 14.8 12.0 - 16.0 g/dL Final     Platelets   Date Value Ref Range Status   11/20/2017 272 138 - 453 k/uL Final     Comment:     [address] [phone]   08/04/2014 253 140 - 450 k/uL Final   10/11/2013 246 140 - 450 k/uL Final     Comment:     [address] [id number] [phone]   10/11/2013 235 140 - 450 k/uL Final     BMP:   Sodium   Date Value Ref Range Status   04/06/2018 138 135 - 144 mmol/L Final   06/11/2016 136 135 - 144 mmol/L Final   08/04/2014 135 133 - 142 mmol/L Final     Potassium   Date Value Ref Range Status   04/06/2018 4.4 3.7 - 5.3 mmol/L Final   06/11/2016 4.4 3.7 - 5.3 mmol/L Final   08/04/2014 4.1 3.5 - 5.1 mmol/L Final     Chloride   Date Value Ref Range Status   04/06/2018 98 98 - 107 mmol/L Final   06/11/2016 97 (L) 98 - 107 mmol/L Final   08/04/2014 97 (L) 98 - 107 mmol/L Final     CO2   Date Value Ref Range Status   04/06/2018 30 20 - 31 mmol/L Final   06/11/2016 27 20 - 31 mmol/L Final   08/04/2014 26 20 - 31 mmol/L Final     BUN   Date Value Ref Range Status   04/06/2018 18 8 - 23 mg/dL Final

## 2018-11-09 ENCOUNTER — HOSPITAL ENCOUNTER (OUTPATIENT)
Dept: PHYSICAL THERAPY | Facility: CLINIC | Age: 63
Setting detail: THERAPIES SERIES
Discharge: HOME | End: 2018-11-09
Payer: MEDICARE

## 2018-11-09 ENCOUNTER — HOSPITAL ENCOUNTER (OUTPATIENT)
Age: 63
Discharge: HOME | End: 2018-11-09
Payer: MEDICARE

## 2018-11-09 DIAGNOSIS — R41.3 MEMORY DIFFICULTIES: ICD-10-CM

## 2018-11-09 LAB
ABSOLUTE EOS #: 0.3 K/UL (ref 0–0.44)
ABSOLUTE IMMATURE GRANULOCYTE: 0.03 K/UL (ref 0–0.3)
ABSOLUTE LYMPH #: 2.56 K/UL (ref 1.1–3.7)
ABSOLUTE MONO #: 0.67 K/UL (ref 0.1–1.2)
BASOPHILS # BLD: 1 % (ref 0–2)
BASOPHILS ABSOLUTE: 0.05 K/UL (ref 0–0.2)
DIFFERENTIAL TYPE: ABNORMAL
EOSINOPHILS RELATIVE PERCENT: 3 % (ref 1–4)
FOLATE: >20 NG/ML
HCT VFR BLD CALC: 38.9 % (ref 36.3–47.1)
HEMOGLOBIN: 11.8 G/DL (ref 11.9–15.1)
IMMATURE GRANULOCYTES: 0 %
LYMPHOCYTES # BLD: 28 % (ref 24–43)
MCH RBC QN AUTO: 26.8 PG (ref 25.2–33.5)
MCHC RBC AUTO-ENTMCNC: 30.3 G/DL (ref 28.4–34.8)
MCV RBC AUTO: 88.2 FL (ref 82.6–102.9)
MONOCYTES # BLD: 7 % (ref 3–12)
NRBC AUTOMATED: 0 PER 100 WBC
PDW BLD-RTO: 14.3 % (ref 11.8–14.4)
PLATELET # BLD: 337 K/UL (ref 138–453)
PLATELET ESTIMATE: ABNORMAL
PMV BLD AUTO: 10.6 FL (ref 8.1–13.5)
RBC # BLD: 4.41 M/UL (ref 3.95–5.11)
RBC # BLD: ABNORMAL 10*6/UL
SEG NEUTROPHILS: 61 % (ref 36–65)
SEGMENTED NEUTROPHILS ABSOLUTE COUNT: 5.51 K/UL (ref 1.5–8.1)
T. PALLIDUM, IGG: NONREACTIVE
TSH SERPL DL<=0.05 MIU/L-ACNC: 5.48 MIU/L (ref 0.3–5)
VITAMIN B-12: 527 PG/ML (ref 232–1245)
WBC # BLD: 9.1 K/UL (ref 3.5–11.3)
WBC # BLD: ABNORMAL 10*3/UL

## 2018-11-09 PROCEDURE — 36415 COLL VENOUS BLD VENIPUNCTURE: CPT

## 2018-11-09 PROCEDURE — 97110 THERAPEUTIC EXERCISES: CPT

## 2018-11-09 PROCEDURE — 82746 ASSAY OF FOLIC ACID SERUM: CPT

## 2018-11-09 PROCEDURE — 84439 ASSAY OF FREE THYROXINE: CPT

## 2018-11-09 PROCEDURE — 82607 VITAMIN B-12: CPT

## 2018-11-09 PROCEDURE — 84443 ASSAY THYROID STIM HORMONE: CPT

## 2018-11-09 PROCEDURE — 85025 COMPLETE CBC W/AUTO DIFF WBC: CPT

## 2018-11-09 PROCEDURE — 86780 TREPONEMA PALLIDUM: CPT

## 2018-11-09 NOTE — FLOWSHEET NOTE
Tissue   [] Cross Friction                      [] Ice ____min   2 Other:  Stretches, ROM, ex                             Precautions:  Exercises:  Exercise Reps/ Time Weight/ Level Comments   UBE 5 min  L4    Wand: 20x  Flex/abd   Wrist maize 4x  taryn   Pron/suination w/weight 20x  taryn   Wrist flex stretch 10  10 sec  taryn   Wrist flex stretch 10 10 sec taryn   Putty HEP      T-band: wrist flex  20  RED      wrist ext  20  RED     RD 20 RED    Bicep curl 20 RED          Lat pull downs      tricep      High row      shld ext      shld flex      shld abd            Other:    Specific Instructions for next treatment:Cont with treatment. Add Shld and UE t-band as listed. Treatment Charges: Mins Units   [x]  Modalities: HP 15 0   [x]  Ther Exercise 30 2   []  Manual Therapy     []  Ther Activities     []  Aquatics     []  Vasocompression     []  Other     Total Treatment time 45 2       Assessment: [x] Progressing toward goals. [] No change. [x] Other: Pt noted with less overall tension. No edema noted over right 4th lunate. Pain diminished. Cont with HP followed by ex as previous. Added wrist t-band ex per above today. Will plan for additional ex next session. Wean from HP next 1-2 sessions. Progress as able. STG: (to be met in 6 treatments)  1. ? Pain: 3/10  2. ? ROM: full ROM taryn shlds, elbows and wrists  3. ? Strength: 5/5 grossly taryn UE  4. ? Function: Able to perform all basic ADL's without difficulty  5. Independent with Home Exercise Programs  LTG: (to be met in 12 treatments)  1. 0/10 pain  2. Quick Dash score improved 20 points                 Patient goals:Pain relief and fall mobility     G-CODE(Due by 10th visit)  Functional Limitation: Carrying  Functional Assessment Used: Aditi Nava  Current Status Modifier: CL  Goal Status Modifier: CI    Pt.  Education:  [x] Yes  [] No  [x] Reviewed Prior HEP/Ed  Method of Education: [x] Verbal  [x] Demo  [x] Written (wrist t-band ex)  Comprehension of

## 2018-11-10 LAB — THYROXINE, FREE: 1.02 NG/DL (ref 0.93–1.7)

## 2018-11-12 ENCOUNTER — HOSPITAL ENCOUNTER (OUTPATIENT)
Dept: PHYSICAL THERAPY | Facility: CLINIC | Age: 63
Setting detail: THERAPIES SERIES
Discharge: HOME | End: 2018-11-12
Payer: MEDICARE

## 2018-11-12 PROCEDURE — 97110 THERAPEUTIC EXERCISES: CPT

## 2018-11-12 ASSESSMENT — ENCOUNTER SYMPTOMS
EYE REDNESS: 0
SINUS PRESSURE: 0
RHINORRHEA: 0
COUGH: 1
SINUS PAIN: 0

## 2018-11-14 ENCOUNTER — HOSPITAL ENCOUNTER (OUTPATIENT)
Dept: PHYSICAL THERAPY | Facility: CLINIC | Age: 63
Setting detail: THERAPIES SERIES
Discharge: HOME | End: 2018-11-14
Payer: MEDICARE

## 2018-11-14 PROCEDURE — 97110 THERAPEUTIC EXERCISES: CPT

## 2018-11-19 ENCOUNTER — HOSPITAL ENCOUNTER (OUTPATIENT)
Dept: MRI IMAGING | Age: 63
Discharge: HOME | End: 2018-11-21
Payer: MEDICARE

## 2018-11-19 DIAGNOSIS — R41.3 MEMORY DIFFICULTIES: ICD-10-CM

## 2018-11-19 PROCEDURE — 70551 MRI BRAIN STEM W/O DYE: CPT

## 2018-11-29 ENCOUNTER — HOSPITAL ENCOUNTER (OUTPATIENT)
Age: 63
Setting detail: SPECIMEN
Discharge: HOME | End: 2018-11-29
Payer: MEDICARE

## 2018-11-29 ENCOUNTER — OFFICE VISIT (OUTPATIENT)
Dept: DERMATOLOGY | Age: 63
End: 2018-11-29
Payer: MEDICARE

## 2018-11-29 VITALS
DIASTOLIC BLOOD PRESSURE: 80 MMHG | HEIGHT: 63 IN | OXYGEN SATURATION: 95 % | SYSTOLIC BLOOD PRESSURE: 191 MMHG | WEIGHT: 228.2 LBS | HEART RATE: 55 BPM | BODY MASS INDEX: 40.43 KG/M2

## 2018-11-29 DIAGNOSIS — L98.9 SCLEROSIS OF THE SKIN: Primary | ICD-10-CM

## 2018-11-29 DIAGNOSIS — L82.1 SEBORRHEIC KERATOSES: ICD-10-CM

## 2018-11-29 PROCEDURE — 99202 OFFICE O/P NEW SF 15 MIN: CPT | Performed by: DERMATOLOGY

## 2018-11-29 PROCEDURE — G8417 CALC BMI ABV UP PARAM F/U: HCPCS | Performed by: DERMATOLOGY

## 2018-11-29 PROCEDURE — 1090F PRES/ABSN URINE INCON ASSESS: CPT | Performed by: DERMATOLOGY

## 2018-11-29 PROCEDURE — 11100 PR BIOPSY OF SKIN LESION: CPT | Performed by: DERMATOLOGY

## 2018-11-29 PROCEDURE — 3017F COLORECTAL CA SCREEN DOC REV: CPT | Performed by: DERMATOLOGY

## 2018-11-29 PROCEDURE — 11101 PR BIOPSY, EACH ADDED LESION: CPT | Performed by: DERMATOLOGY

## 2018-11-29 PROCEDURE — G8484 FLU IMMUNIZE NO ADMIN: HCPCS | Performed by: DERMATOLOGY

## 2018-11-29 PROCEDURE — G8399 PT W/DXA RESULTS DOCUMENT: HCPCS | Performed by: DERMATOLOGY

## 2018-11-29 PROCEDURE — 1036F TOBACCO NON-USER: CPT | Performed by: DERMATOLOGY

## 2018-11-29 PROCEDURE — G8598 ASA/ANTIPLAT THER USED: HCPCS | Performed by: DERMATOLOGY

## 2018-11-29 PROCEDURE — G8427 DOCREV CUR MEDS BY ELIG CLIN: HCPCS | Performed by: DERMATOLOGY

## 2018-11-29 PROCEDURE — 1123F ACP DISCUSS/DSCN MKR DOCD: CPT | Performed by: DERMATOLOGY

## 2018-11-29 PROCEDURE — 4040F PNEUMOC VAC/ADMIN/RCVD: CPT | Performed by: DERMATOLOGY

## 2018-11-29 PROCEDURE — 1101F PT FALLS ASSESS-DOCD LE1/YR: CPT | Performed by: DERMATOLOGY

## 2018-11-29 RX ORDER — LIDOCAINE HYDROCHLORIDE AND EPINEPHRINE 10; 10 MG/ML; UG/ML
1.5 INJECTION, SOLUTION INFILTRATION; PERINEURAL ONCE
Status: COMPLETED | OUTPATIENT
Start: 2018-11-29 | End: 2018-11-29

## 2018-11-29 RX ADMIN — LIDOCAINE HYDROCHLORIDE AND EPINEPHRINE 1.5 ML: 10; 10 INJECTION, SOLUTION INFILTRATION; PERINEURAL at 16:45

## 2018-12-04 ENCOUNTER — TELEPHONE (OUTPATIENT)
Dept: PULMONOLOGY | Age: 63
End: 2018-12-04

## 2018-12-04 LAB — DERMATOLOGY PATHOLOGY REPORT: NORMAL

## 2018-12-05 ENCOUNTER — OFFICE VISIT (OUTPATIENT)
Dept: PULMONOLOGY | Age: 63
End: 2018-12-05
Payer: MEDICARE

## 2018-12-05 VITALS
BODY MASS INDEX: 40.4 KG/M2 | WEIGHT: 228 LBS | DIASTOLIC BLOOD PRESSURE: 60 MMHG | HEART RATE: 56 BPM | OXYGEN SATURATION: 95 % | HEIGHT: 63 IN | SYSTOLIC BLOOD PRESSURE: 139 MMHG | RESPIRATION RATE: 14 BRPM

## 2018-12-05 DIAGNOSIS — E66.01 MORBID OBESITY WITH BMI OF 40.0-44.9, ADULT (HCC): ICD-10-CM

## 2018-12-05 DIAGNOSIS — R91.1 LUNG NODULE: ICD-10-CM

## 2018-12-05 DIAGNOSIS — Z99.89 OSA ON CPAP: Primary | ICD-10-CM

## 2018-12-05 DIAGNOSIS — R06.09 DYSPNEA ON EXERTION: ICD-10-CM

## 2018-12-05 DIAGNOSIS — G47.33 OSA ON CPAP: Primary | ICD-10-CM

## 2018-12-05 DIAGNOSIS — J44.9 CHRONIC OBSTRUCTIVE PULMONARY DISEASE, UNSPECIFIED COPD TYPE (HCC): ICD-10-CM

## 2018-12-05 PROCEDURE — 1101F PT FALLS ASSESS-DOCD LE1/YR: CPT | Performed by: INTERNAL MEDICINE

## 2018-12-05 PROCEDURE — G8427 DOCREV CUR MEDS BY ELIG CLIN: HCPCS | Performed by: INTERNAL MEDICINE

## 2018-12-05 PROCEDURE — 1123F ACP DISCUSS/DSCN MKR DOCD: CPT | Performed by: INTERNAL MEDICINE

## 2018-12-05 PROCEDURE — G8926 SPIRO NO PERF OR DOC: HCPCS | Performed by: INTERNAL MEDICINE

## 2018-12-05 PROCEDURE — 1090F PRES/ABSN URINE INCON ASSESS: CPT | Performed by: INTERNAL MEDICINE

## 2018-12-05 PROCEDURE — G8399 PT W/DXA RESULTS DOCUMENT: HCPCS | Performed by: INTERNAL MEDICINE

## 2018-12-05 PROCEDURE — G8598 ASA/ANTIPLAT THER USED: HCPCS | Performed by: INTERNAL MEDICINE

## 2018-12-05 PROCEDURE — G8484 FLU IMMUNIZE NO ADMIN: HCPCS | Performed by: INTERNAL MEDICINE

## 2018-12-05 PROCEDURE — 4040F PNEUMOC VAC/ADMIN/RCVD: CPT | Performed by: INTERNAL MEDICINE

## 2018-12-05 PROCEDURE — 3017F COLORECTAL CA SCREEN DOC REV: CPT | Performed by: INTERNAL MEDICINE

## 2018-12-05 PROCEDURE — G8417 CALC BMI ABV UP PARAM F/U: HCPCS | Performed by: INTERNAL MEDICINE

## 2018-12-05 PROCEDURE — 99214 OFFICE O/P EST MOD 30 MIN: CPT | Performed by: INTERNAL MEDICINE

## 2018-12-05 PROCEDURE — 3023F SPIROM DOC REV: CPT | Performed by: INTERNAL MEDICINE

## 2018-12-05 PROCEDURE — 1036F TOBACCO NON-USER: CPT | Performed by: INTERNAL MEDICINE

## 2018-12-05 ASSESSMENT — SLEEP AND FATIGUE QUESTIONNAIRES
HOW LIKELY ARE YOU TO NOD OFF OR FALL ASLEEP WHILE SITTING QUIETLY AFTER LUNCH WITHOUT ALCOHOL: 0
ESS TOTAL SCORE: 4
HOW LIKELY ARE YOU TO NOD OFF OR FALL ASLEEP WHEN YOU ARE A PASSENGER IN A CAR FOR AN HOUR WITHOUT A BREAK: 0
HOW LIKELY ARE YOU TO NOD OFF OR FALL ASLEEP WHILE SITTING AND TALKING TO SOMEONE: 0
HOW LIKELY ARE YOU TO NOD OFF OR FALL ASLEEP IN A CAR, WHILE STOPPED FOR A FEW MINUTES IN TRAFFIC: 0
HOW LIKELY ARE YOU TO NOD OFF OR FALL ASLEEP WHILE WATCHING TV: 1
HOW LIKELY ARE YOU TO NOD OFF OR FALL ASLEEP WHILE LYING DOWN TO REST IN THE AFTERNOON WHEN CIRCUMSTANCES PERMIT: 2
HOW LIKELY ARE YOU TO NOD OFF OR FALL ASLEEP WHILE SITTING INACTIVE IN A PUBLIC PLACE: 0
HOW LIKELY ARE YOU TO NOD OFF OR FALL ASLEEP WHILE SITTING AND READING: 1

## 2018-12-05 NOTE — PROGRESS NOTES
OUTPATIENT PULMONARY PROGRESS NOTE      Patient:  Oren Syed  MRN: [id number]    Consulting Physician: Dr. Delio Mcleod  Reason for Consult: COPD, lung nodule, dyspnea, obstructive sleep apnea  Primacy Care Physician: Charley Quinn MD    HISTORY OF PRESENT ILLNESS:   The patient is a 70 y.o. female   She is here for follow-up of obstructive sleep apnea and COPD, lung nodule. She was seen on November 7 less than 4 weeks ago and compliance data was documented and patient face-to-face with documented in the chart about her CPAP. She had received a call from DME company[employer] and they have called our office that she has Medicare and she need face-to-face and titration study within 30 days. Patient had sleep study done in July and she had received CPAP in November, she was seen for the first time in the office in February and she was compliant with CPAP and it was documented, but apparently she had received Medicare in January this year. Since she was seen last time she denies any new problem and she denies any problem with CPAP, she is very compliant with the CPAP she is feeling better while she is on CPAP she feels more energetic during the daytime and less tired and fatigue and sleep is refreshing. Since she was seen last time she denies any significant symptoms although she does complain of dyspnea on exertion which is chronic but had been gradually getting worse and she feels that she is more short of breath than in the past.  She has cough which is intermittent sometime with whitish sputum and denies any change in the color of the sputum or increased sputum production denies chest pain or hemoptysis. She also claimed that she is not as active as she was before because of the back issues. She has occasional wheezing when she has episodes of cough and at that time she usually uses albuterol and she use albuterol couple times a week  when she is coughing and wheezing.   No symptoms of cough wheezing or chest tightness. She is using incruse once daily, although she does not like this as it has powder form  She has history of left upper lobe 4 mm nodule on previous screening CT scan in October 2017 and she had a follow-up CT scan done in May 2018 which shows stable left upper lobe nodule. She denies postnasal dripping nasal congestion or epistaxis     On previous pulmonary function test she had a stage II COPD with FEV1 of 58%. .  Sleep  + Dry mouth upon awakening. Mild Fatigue and tiredness during the day. Goes to sleep at 1 AM, wakes up 9 am. It takes 1 hour to fall asleep. Wakes up one times at night to go to bathroom. Takes sometime nap during the day ( 1.5 hours). + headache in am. No car wrecks or near wrecks because of the sleepiness. No nodding off while driving. No weight gain. No forgetfulness or decreased concentration. No nasal congestion or obstruction at night. Using BIAPA/CPAP 6-8 hr/night. + leg jerks during sleep. No restless feelings in legs at night. No numbness or burning in leg or feet. No leg aches or cramps. Initial history and course    She was referred here for dyspnea, she has long history of his smoking, she was told in the past that she has COPD and she had a spirometry done before, she does have history of shortness of breath on exertion and sometimes on regular activities, and she had limited her activities because of her shortness of breath especially last few years. She also has a CT chest done for screening which showed 5 mm left upper lobe lung nodule  She does have weight gain for about 30 pounds in last few years   She was diagnosed with sleep apnea for about a year ago when she had a sleep study done and she was also placed on oxygen at night with CPAP at 2 L.   She does have history of GERD and she is on medication to control the symptoms          Sleep Medicine 12/5/2018 11/7/2018 5/2/2018 2/7/2018 7/11/2017   Sitting and reading 1 1 1 0 1   Watching TV 1 1 1 0 1

## 2018-12-06 ENCOUNTER — HOSPITAL ENCOUNTER (OUTPATIENT)
Dept: PAIN MANAGEMENT | Age: 63
Discharge: HOME | End: 2018-12-06
Payer: MEDICARE

## 2018-12-06 ENCOUNTER — TELEPHONE (OUTPATIENT)
Dept: PULMONOLOGY | Age: 63
End: 2018-12-06

## 2018-12-06 VITALS
BODY MASS INDEX: 40.4 KG/M2 | RESPIRATION RATE: 16 BRPM | HEART RATE: 55 BPM | SYSTOLIC BLOOD PRESSURE: 163 MMHG | WEIGHT: 228 LBS | TEMPERATURE: 97.6 F | HEIGHT: 63 IN | DIASTOLIC BLOOD PRESSURE: 67 MMHG

## 2018-12-06 DIAGNOSIS — E78.5 DYSLIPIDEMIA: ICD-10-CM

## 2018-12-06 DIAGNOSIS — I25.10 CORONARY ARTERY DISEASE INVOLVING NATIVE CORONARY ARTERY OF NATIVE HEART WITHOUT ANGINA PECTORIS: ICD-10-CM

## 2018-12-06 DIAGNOSIS — Z99.89 OSA ON CPAP: Primary | ICD-10-CM

## 2018-12-06 DIAGNOSIS — I65.23 ASYMPTOMATIC BILATERAL CAROTID ARTERY STENOSIS: ICD-10-CM

## 2018-12-06 DIAGNOSIS — G47.33 OSA ON CPAP: Primary | ICD-10-CM

## 2018-12-06 DIAGNOSIS — E11.9 NEW ONSET TYPE 2 DIABETES MELLITUS (HCC): ICD-10-CM

## 2018-12-06 PROCEDURE — 99214 OFFICE O/P EST MOD 30 MIN: CPT

## 2018-12-06 PROCEDURE — 99213 OFFICE O/P EST LOW 20 MIN: CPT | Performed by: PAIN MEDICINE

## 2018-12-06 RX ORDER — ATORVASTATIN CALCIUM 40 MG/1
TABLET, FILM COATED ORAL
Qty: 30 TABLET | Refills: 2 | Status: SHIPPED | OUTPATIENT
Start: 2018-12-06 | End: 2019-03-06 | Stop reason: SDUPTHER

## 2018-12-06 RX ORDER — POT CHLORIDE/POT BICARB/CIT AC 25 MEQ
TABLET, EFFERVESCENT ORAL DAILY
COMMUNITY
End: 2020-04-14 | Stop reason: SDUPTHER

## 2018-12-06 ASSESSMENT — PAIN DESCRIPTION - PAIN TYPE: TYPE: CHRONIC PAIN

## 2018-12-06 ASSESSMENT — ENCOUNTER SYMPTOMS
BACK PAIN: 1
BOWEL INCONTINENCE: 0

## 2018-12-06 NOTE — TELEPHONE ENCOUNTER
Talked to [place] from [facility] home health. Per the guidelines of Medicare the pt has to have a face to face with the doctor and sleep study done within 30 days. [place] stated she was notified in May of this. So the pt needs to go ahead and get the study done in order to keep getting her supplies. I will call pt and notify her.

## 2018-12-06 NOTE — PROGRESS NOTES
HPI:     Back Pain   This is a chronic problem. The current episode started more than 1 year ago. The problem occurs 2 to 4 times per day. The problem has been waxing and waning since onset. The pain is present in the lumbar spine. The quality of the pain is described as aching. The pain radiates to the right foot and left foot. The pain is at a severity of 3/10 (8 after activity). The pain is moderate. The pain is worse during the day. The symptoms are aggravated by bending, standing and twisting. Stiffness is present in the morning. Associated symptoms include leg pain, numbness, tingling and weakness. Pertinent negatives include no bladder incontinence or bowel incontinence. Risk factors include recent trauma and obesity. She has tried chiropractic manipulation, home exercises and heat for the symptoms. The treatment provided mild relief. MRI lumbar spine with degenerative changes disc bulging worse at L4 5. She's had diagnostic facet blocks with no benefit. We had discussed epidural several months ago however she was lost to follow-up. Presents today to discuss further treatment. Physical therapy without benefit. NSAIDs without benefit. She is on aspirin for coronary artery disease. Patient denies any new neurological symptoms. Nobowel or bladder incontinence, no weakness, and no falling. Review of OARRS does not show any aberrant prescription behavior.      Past Medical History:   Diagnosis Date    Angina pectoris ([address]     Arthritis     Bipolar disorder ([address]     CAD (coronary artery disease)     stents x2    Carotid stenosis, asymptomatic 2/26/2015    Chronic back pain     COPD (chronic obstructive pulmonary disease) ([address]     Depression     Dyspnea     Family history of colon cancer     Family history of liver cancer     Family history of ovarian cancer     GERD (gastroesophageal reflux disease)     History of colon polyps     Hyperlipidemia     Hypertension     Lung nodule Multiple Vitamin (MULTI-VITAMIN PO), Take 1 tablet by mouth daily. , Disp: , Rfl:     aspirin 325 MG tablet, Take 325 mg by mouth daily. , Disp: , Rfl:     metoprolol tartrate (LOPRESSOR) 50 MG tablet, Take 0.5 tablets by mouth 2 times daily, Disp: 60 tablet, Rfl: 3    clobetasol (TEMOVATE) 0.05 % ointment, Apply topically 2 times daily. , Disp: 30 g, Rfl: 1    Family History   Problem Relation Age of Onset    Hypertension Mother     Macular Degen Mother     Other Mother         vascular    Colon Polyps Brother     Cancer Maternal Aunt        Social History     Social History    Marital status:      Spouse name: N/A    Number of children: N/A    Years of education: N/A     Occupational History    Not on file. Social History Main Topics    Smoking status: Former Smoker     Packs/day: 1.50     Years: 40.00     Types: Cigarettes     Start date: 1968     Quit date: 7/10/2015    Smokeless tobacco: Never Used      Comment: using e-cig    Alcohol use 0.0 oz/week      Comment: occasional    Drug use: Yes     Types: Marijuana      Comment: marijuana weekly    Sexual activity: No     Other Topics Concern    Not on file     Social History Narrative    No narrative on file       Review of Systems:  Review of Systems   Constitution: Positive for weakness. Hematologic/Lymphatic: Does not bruise/bleed easily. Skin: Positive for rash. Musculoskeletal: Positive for back pain. Gastrointestinal: Negative for bowel incontinence. Genitourinary: Negative for bladder incontinence. Neurological: Positive for numbness and tingling. Physical Exam:  BP (!) 163/67   Pulse 55   Temp 97.6 °F (36.4 °C)   Resp 16   Ht 5' 3\" (1.6 m)   Wt 228 lb (103.4 kg)   BMI 40.39 kg/m²     Physical Exam   Constitutional: She is oriented to person, place, and time. Musculoskeletal:        Lumbar back: She exhibits tenderness. She exhibits no edema and no deformity.    Neurological: She is alert and oriented to person, place, and time. She has normal strength. Gait normal.   Vitals reviewed. Record/Diagnostics Review:    As above, I did review the imaging    Assessment:  Lumbar spondylosis  Lumbar radiculopathy  Low Back pain      Treatment Plan:  DISCUSSION: Treatment options discussed with patient and allquestions answered to patient's satisfaction. OARRS Review: Reviewed and acceptable for medications prescribed. TREATMENT OPTIONS:     Discussed the importance of continued physical therapy and exercise program as well. Pain in the low back and legs continues despite conservative measures, may benefit from epidural steroid injections, we'll try the caudal approach.         Fortino Mckeon M.D.

## 2018-12-06 NOTE — TELEPHONE ENCOUNTER
[place]   12/10/2018 1:30 PM SCHEDULE, [facility]  DERMATOLOGY  derm [facility]   1/9/2019 2:15 PM Erich Lambert MD  derm [facility]   3/6/2019 2:00 PM Aidee Jackson MD [facility] IM [facility]   5/7/2019 1:15 PM [facility] CT RM 1 STCZ CT SCAN [facility] Radiolog   5/15/2019 1:30 PM Viktor Geiger MD Resp Spec [facility]            Patient Active Problem List:     Smoker     HTN (hypertension)     CAD/Stents drug-eluting      Serum lipids high     Carotid stenosis, asymptomatic     Nocturnal hypoxia     CRISTINA on CPAP     New onset type 2 diabetes mellitus ([address]

## 2018-12-07 ENCOUNTER — TELEPHONE (OUTPATIENT)
Dept: DERMATOLOGY | Age: 63
End: 2018-12-07

## 2018-12-07 DIAGNOSIS — L92.1 NECROBIOSIS LIPOIDICA: Primary | ICD-10-CM

## 2018-12-07 RX ORDER — BETAMETHASONE DIPROPIONATE 0.05 %
OINTMENT (GRAM) TOPICAL
Qty: 50 G | Refills: 2 | Status: SHIPPED | OUTPATIENT
Start: 2018-12-07 | End: 2020-02-20 | Stop reason: SDUPTHER

## 2018-12-10 ENCOUNTER — NURSE ONLY (OUTPATIENT)
Dept: DERMATOLOGY | Age: 63
End: 2018-12-10
Payer: MEDICARE

## 2018-12-10 DIAGNOSIS — L20.89 FLEXURAL ATOPIC DERMATITIS: Primary | ICD-10-CM

## 2018-12-10 PROCEDURE — 99212 OFFICE O/P EST SF 10 MIN: CPT | Performed by: DERMATOLOGY

## 2018-12-14 ENCOUNTER — HOSPITAL ENCOUNTER (OUTPATIENT)
Dept: SLEEP CENTER | Age: 63
Discharge: HOME | End: 2018-12-16
Payer: MEDICARE

## 2018-12-14 DIAGNOSIS — G47.33 OSA ON CPAP: ICD-10-CM

## 2018-12-14 DIAGNOSIS — Z99.89 OSA ON CPAP: ICD-10-CM

## 2018-12-14 PROCEDURE — 95811 POLYSOM 6/>YRS CPAP 4/> PARM: CPT

## 2018-12-15 VITALS
DIASTOLIC BLOOD PRESSURE: 60 MMHG | BODY MASS INDEX: 40.4 KG/M2 | HEIGHT: 63 IN | SYSTOLIC BLOOD PRESSURE: 140 MMHG | WEIGHT: 228 LBS | HEART RATE: 53 BPM | RESPIRATION RATE: 12 BRPM

## 2018-12-15 ASSESSMENT — SLEEP AND FATIGUE QUESTIONNAIRES
HOW LIKELY ARE YOU TO NOD OFF OR FALL ASLEEP WHILE SITTING AND TALKING TO SOMEONE: 0
HOW LIKELY ARE YOU TO NOD OFF OR FALL ASLEEP WHEN YOU ARE A PASSENGER IN A CAR FOR AN HOUR WITHOUT A BREAK: 0
HOW LIKELY ARE YOU TO NOD OFF OR FALL ASLEEP WHILE SITTING QUIETLY AFTER LUNCH WITHOUT ALCOHOL: 0
ESS TOTAL SCORE: 3
HOW LIKELY ARE YOU TO NOD OFF OR FALL ASLEEP WHILE SITTING AND READING: 0
HOW LIKELY ARE YOU TO NOD OFF OR FALL ASLEEP WHILE WATCHING TV: 1
HOW LIKELY ARE YOU TO NOD OFF OR FALL ASLEEP WHILE SITTING INACTIVE IN A PUBLIC PLACE: 0
HOW LIKELY ARE YOU TO NOD OFF OR FALL ASLEEP WHILE LYING DOWN TO REST IN THE AFTERNOON WHEN CIRCUMSTANCES PERMIT: 2
HOW LIKELY ARE YOU TO NOD OFF OR FALL ASLEEP IN A CAR, WHILE STOPPED FOR A FEW MINUTES IN TRAFFIC: 0

## 2019-01-09 LAB — STATUS: NORMAL

## 2019-01-29 RX ORDER — OMEPRAZOLE 40 MG/1
CAPSULE, DELAYED RELEASE ORAL
Qty: 30 CAPSULE | Refills: 5 | Status: SHIPPED | OUTPATIENT
Start: 2019-01-29 | End: 2019-07-25 | Stop reason: SDUPTHER

## 2019-02-01 ENCOUNTER — HOSPITAL ENCOUNTER (OUTPATIENT)
Dept: PAIN MANAGEMENT | Age: 64
Discharge: HOME | End: 2019-02-01
Payer: MEDICARE

## 2019-02-01 VITALS
HEIGHT: 63 IN | TEMPERATURE: 97.2 F | HEART RATE: 68 BPM | WEIGHT: 228 LBS | SYSTOLIC BLOOD PRESSURE: 125 MMHG | OXYGEN SATURATION: 93 % | RESPIRATION RATE: 16 BRPM | BODY MASS INDEX: 40.4 KG/M2 | DIASTOLIC BLOOD PRESSURE: 81 MMHG

## 2019-02-01 LAB — GLUCOSE BLD-MCNC: 115 MG/DL (ref 65–105)

## 2019-02-01 PROCEDURE — 62323 NJX INTERLAMINAR LMBR/SAC: CPT

## 2019-02-01 PROCEDURE — 82947 ASSAY GLUCOSE BLOOD QUANT: CPT

## 2019-02-01 PROCEDURE — 62323 NJX INTERLAMINAR LMBR/SAC: CPT | Performed by: PAIN MEDICINE

## 2019-02-01 PROCEDURE — 2580000003 HC RX 258: Performed by: PAIN MEDICINE

## 2019-02-01 RX ORDER — DEXAMETHASONE SODIUM PHOSPHATE 10 MG/ML
10 INJECTION, SOLUTION INTRAMUSCULAR; INTRAVENOUS
Status: ACTIVE | OUTPATIENT
Start: 2019-02-01 | End: 2019-02-01

## 2019-02-01 RX ORDER — MIDAZOLAM HYDROCHLORIDE 1 MG/ML
2 INJECTION INTRAMUSCULAR; INTRAVENOUS
Status: ACTIVE | OUTPATIENT
Start: 2019-02-01 | End: 2019-02-01

## 2019-02-01 RX ORDER — FENTANYL CITRATE 50 UG/ML
100 INJECTION, SOLUTION INTRAMUSCULAR; INTRAVENOUS
Status: ACTIVE | OUTPATIENT
Start: 2019-02-01 | End: 2019-02-01

## 2019-02-01 RX ORDER — NEBIVOLOL HYDROCHLORIDE 5 MG/1
TABLET ORAL
Refills: 0 | COMMUNITY
Start: 2019-01-02 | End: 2020-02-20 | Stop reason: ALTCHOICE

## 2019-02-01 RX ORDER — BUPIVACAINE HYDROCHLORIDE 2.5 MG/ML
30 INJECTION, SOLUTION EPIDURAL; INFILTRATION; INTRACAUDAL
Status: ACTIVE | OUTPATIENT
Start: 2019-02-01 | End: 2019-02-01

## 2019-02-01 RX ORDER — SODIUM CHLORIDE, SODIUM LACTATE, POTASSIUM CHLORIDE, CALCIUM CHLORIDE 600; 310; 30; 20 MG/100ML; MG/100ML; MG/100ML; MG/100ML
INJECTION, SOLUTION INTRAVENOUS CONTINUOUS
Status: DISCONTINUED | OUTPATIENT
Start: 2019-02-01 | End: 2019-02-02 | Stop reason: HOSPADM

## 2019-02-01 RX ADMIN — SODIUM CHLORIDE, POTASSIUM CHLORIDE, SODIUM LACTATE AND CALCIUM CHLORIDE: 600; 310; 30; 20 INJECTION, SOLUTION INTRAVENOUS at 09:44

## 2019-02-01 ASSESSMENT — PAIN - FUNCTIONAL ASSESSMENT
PAIN_FUNCTIONAL_ASSESSMENT: 0-10
PAIN_FUNCTIONAL_ASSESSMENT: 0-10

## 2019-02-01 ASSESSMENT — PAIN DESCRIPTION - DESCRIPTORS: DESCRIPTORS: CONSTANT;ACHING

## 2019-02-14 ENCOUNTER — TELEPHONE (OUTPATIENT)
Dept: PULMONOLOGY | Age: 64
End: 2019-02-14

## 2019-02-14 DIAGNOSIS — G47.33 OSA ON CPAP: Primary | ICD-10-CM

## 2019-02-14 DIAGNOSIS — Z99.89 OSA ON CPAP: Primary | ICD-10-CM

## 2019-02-15 ENCOUNTER — HOSPITAL ENCOUNTER (OUTPATIENT)
Dept: PAIN MANAGEMENT | Age: 64
Discharge: HOME | End: 2019-02-15
Payer: MEDICARE

## 2019-02-15 VITALS
OXYGEN SATURATION: 93 % | WEIGHT: 228 LBS | RESPIRATION RATE: 16 BRPM | SYSTOLIC BLOOD PRESSURE: 123 MMHG | TEMPERATURE: 97.7 F | HEART RATE: 64 BPM | DIASTOLIC BLOOD PRESSURE: 51 MMHG | BODY MASS INDEX: 40.4 KG/M2 | HEIGHT: 63 IN

## 2019-02-15 LAB — GLUCOSE BLD-MCNC: 91 MG/DL (ref 65–105)

## 2019-02-15 PROCEDURE — 2500000003 HC RX 250 WO HCPCS

## 2019-02-15 PROCEDURE — 6360000004 HC RX CONTRAST MEDICATION

## 2019-02-15 PROCEDURE — 6360000002 HC RX W HCPCS

## 2019-02-15 PROCEDURE — 82947 ASSAY GLUCOSE BLOOD QUANT: CPT

## 2019-02-15 PROCEDURE — 6360000002 HC RX W HCPCS: Performed by: PAIN MEDICINE

## 2019-02-15 PROCEDURE — 2580000003 HC RX 258: Performed by: PAIN MEDICINE

## 2019-02-15 PROCEDURE — 64483 NJX AA&/STRD TFRM EPI L/S 1: CPT | Performed by: PAIN MEDICINE

## 2019-02-15 PROCEDURE — A9579 GAD-BASE MR CONTRAST NOS,1ML: HCPCS

## 2019-02-15 PROCEDURE — 64483 NJX AA&/STRD TFRM EPI L/S 1: CPT

## 2019-02-15 RX ORDER — MIDAZOLAM HYDROCHLORIDE 1 MG/ML
INJECTION INTRAMUSCULAR; INTRAVENOUS
Status: COMPLETED | OUTPATIENT
Start: 2019-02-15 | End: 2019-02-15

## 2019-02-15 RX ORDER — DEXAMETHASONE SODIUM PHOSPHATE 10 MG/ML
10 INJECTION, SOLUTION INTRAMUSCULAR; INTRAVENOUS
Status: ACTIVE | OUTPATIENT
Start: 2019-02-15 | End: 2019-02-15

## 2019-02-15 RX ORDER — MIDAZOLAM HYDROCHLORIDE 1 MG/ML
2 INJECTION INTRAMUSCULAR; INTRAVENOUS
Status: ACTIVE | OUTPATIENT
Start: 2019-02-15 | End: 2019-02-15

## 2019-02-15 RX ORDER — SODIUM CHLORIDE, SODIUM LACTATE, POTASSIUM CHLORIDE, CALCIUM CHLORIDE 600; 310; 30; 20 MG/100ML; MG/100ML; MG/100ML; MG/100ML
INJECTION, SOLUTION INTRAVENOUS CONTINUOUS
Status: DISCONTINUED | OUTPATIENT
Start: 2019-02-15 | End: 2019-02-16 | Stop reason: HOSPADM

## 2019-02-15 RX ORDER — FENTANYL CITRATE 50 UG/ML
100 INJECTION, SOLUTION INTRAMUSCULAR; INTRAVENOUS
Status: ACTIVE | OUTPATIENT
Start: 2019-02-15 | End: 2019-02-15

## 2019-02-15 RX ORDER — BUPIVACAINE HYDROCHLORIDE 2.5 MG/ML
30 INJECTION, SOLUTION EPIDURAL; INFILTRATION; INTRACAUDAL
Status: ACTIVE | OUTPATIENT
Start: 2019-02-15 | End: 2019-02-15

## 2019-02-15 RX ADMIN — SODIUM CHLORIDE, POTASSIUM CHLORIDE, SODIUM LACTATE AND CALCIUM CHLORIDE 500 ML: 600; 310; 30; 20 INJECTION, SOLUTION INTRAVENOUS at 09:24

## 2019-02-15 RX ADMIN — MIDAZOLAM HYDROCHLORIDE 2 MG: 1 INJECTION, SOLUTION INTRAMUSCULAR; INTRAVENOUS at 09:34

## 2019-02-15 ASSESSMENT — PAIN - FUNCTIONAL ASSESSMENT
PAIN_FUNCTIONAL_ASSESSMENT: 0-10
PAIN_FUNCTIONAL_ASSESSMENT: 0-10
PAIN_FUNCTIONAL_ASSESSMENT: PREVENTS OR INTERFERES SOME ACTIVE ACTIVITIES AND ADLS
PAIN_FUNCTIONAL_ASSESSMENT: 0-10

## 2019-02-15 ASSESSMENT — PAIN DESCRIPTION - DESCRIPTORS: DESCRIPTORS: ACHING;CONSTANT

## 2019-02-24 DIAGNOSIS — E11.9 NEW ONSET TYPE 2 DIABETES MELLITUS (HCC): ICD-10-CM

## 2019-02-27 ENCOUNTER — OFFICE VISIT (OUTPATIENT)
Dept: PULMONOLOGY | Age: 64
End: 2019-02-27
Payer: MEDICARE

## 2019-02-27 VITALS
HEIGHT: 63 IN | DIASTOLIC BLOOD PRESSURE: 54 MMHG | BODY MASS INDEX: 41.11 KG/M2 | OXYGEN SATURATION: 95 % | WEIGHT: 232 LBS | HEART RATE: 54 BPM | RESPIRATION RATE: 16 BRPM | TEMPERATURE: 97.6 F | SYSTOLIC BLOOD PRESSURE: 134 MMHG

## 2019-02-27 DIAGNOSIS — R91.1 LUNG NODULE: ICD-10-CM

## 2019-02-27 DIAGNOSIS — R06.09 DYSPNEA ON EXERTION: ICD-10-CM

## 2019-02-27 DIAGNOSIS — J44.9 CHRONIC OBSTRUCTIVE PULMONARY DISEASE, UNSPECIFIED COPD TYPE (HCC): ICD-10-CM

## 2019-02-27 DIAGNOSIS — E66.01 MORBID OBESITY WITH BMI OF 40.0-44.9, ADULT (HCC): ICD-10-CM

## 2019-02-27 DIAGNOSIS — G47.33 OBSTRUCTIVE SLEEP APNEA: Primary | ICD-10-CM

## 2019-02-27 PROCEDURE — 3017F COLORECTAL CA SCREEN DOC REV: CPT | Performed by: INTERNAL MEDICINE

## 2019-02-27 PROCEDURE — 4040F PNEUMOC VAC/ADMIN/RCVD: CPT | Performed by: INTERNAL MEDICINE

## 2019-02-27 PROCEDURE — G8484 FLU IMMUNIZE NO ADMIN: HCPCS | Performed by: INTERNAL MEDICINE

## 2019-02-27 PROCEDURE — G8598 ASA/ANTIPLAT THER USED: HCPCS | Performed by: INTERNAL MEDICINE

## 2019-02-27 PROCEDURE — 3023F SPIROM DOC REV: CPT | Performed by: INTERNAL MEDICINE

## 2019-02-27 PROCEDURE — 99214 OFFICE O/P EST MOD 30 MIN: CPT | Performed by: INTERNAL MEDICINE

## 2019-02-27 PROCEDURE — G8417 CALC BMI ABV UP PARAM F/U: HCPCS | Performed by: INTERNAL MEDICINE

## 2019-02-27 PROCEDURE — G8926 SPIRO NO PERF OR DOC: HCPCS | Performed by: INTERNAL MEDICINE

## 2019-02-27 PROCEDURE — G8399 PT W/DXA RESULTS DOCUMENT: HCPCS | Performed by: INTERNAL MEDICINE

## 2019-02-27 PROCEDURE — G8427 DOCREV CUR MEDS BY ELIG CLIN: HCPCS | Performed by: INTERNAL MEDICINE

## 2019-02-27 PROCEDURE — 1090F PRES/ABSN URINE INCON ASSESS: CPT | Performed by: INTERNAL MEDICINE

## 2019-02-27 PROCEDURE — 1123F ACP DISCUSS/DSCN MKR DOCD: CPT | Performed by: INTERNAL MEDICINE

## 2019-02-27 PROCEDURE — 1036F TOBACCO NON-USER: CPT | Performed by: INTERNAL MEDICINE

## 2019-02-27 PROCEDURE — 1101F PT FALLS ASSESS-DOCD LE1/YR: CPT | Performed by: INTERNAL MEDICINE

## 2019-02-27 ASSESSMENT — SLEEP AND FATIGUE QUESTIONNAIRES
HOW LIKELY ARE YOU TO NOD OFF OR FALL ASLEEP WHILE SITTING QUIETLY AFTER LUNCH WITHOUT ALCOHOL: 0
HOW LIKELY ARE YOU TO NOD OFF OR FALL ASLEEP WHILE SITTING AND READING: 1
HOW LIKELY ARE YOU TO NOD OFF OR FALL ASLEEP WHEN YOU ARE A PASSENGER IN A CAR FOR AN HOUR WITHOUT A BREAK: 0
HOW LIKELY ARE YOU TO NOD OFF OR FALL ASLEEP IN A CAR, WHILE STOPPED FOR A FEW MINUTES IN TRAFFIC: 0
HOW LIKELY ARE YOU TO NOD OFF OR FALL ASLEEP WHILE WATCHING TV: 1
HOW LIKELY ARE YOU TO NOD OFF OR FALL ASLEEP WHILE SITTING AND TALKING TO SOMEONE: 0
HOW LIKELY ARE YOU TO NOD OFF OR FALL ASLEEP WHILE SITTING INACTIVE IN A PUBLIC PLACE: 0
HOW LIKELY ARE YOU TO NOD OFF OR FALL ASLEEP WHILE LYING DOWN TO REST IN THE AFTERNOON WHEN CIRCUMSTANCES PERMIT: 1
ESS TOTAL SCORE: 3

## 2019-03-06 ENCOUNTER — OFFICE VISIT (OUTPATIENT)
Dept: INTERNAL MEDICINE | Age: 64
End: 2019-03-06
Payer: MEDICARE

## 2019-03-06 VITALS
WEIGHT: 232 LBS | SYSTOLIC BLOOD PRESSURE: 140 MMHG | HEART RATE: 57 BPM | HEIGHT: 63 IN | DIASTOLIC BLOOD PRESSURE: 62 MMHG | BODY MASS INDEX: 41.11 KG/M2

## 2019-03-06 DIAGNOSIS — I73.9 PVD (PERIPHERAL VASCULAR DISEASE) WITH CLAUDICATION (HCC): ICD-10-CM

## 2019-03-06 DIAGNOSIS — I10 ESSENTIAL HYPERTENSION: Primary | ICD-10-CM

## 2019-03-06 DIAGNOSIS — E78.5 DYSLIPIDEMIA: ICD-10-CM

## 2019-03-06 DIAGNOSIS — Z87.891 EX-SMOKER: ICD-10-CM

## 2019-03-06 DIAGNOSIS — Z99.89 OSA ON CPAP: ICD-10-CM

## 2019-03-06 DIAGNOSIS — E11.8 TYPE 2 DIABETES MELLITUS WITH COMPLICATION, WITHOUT LONG-TERM CURRENT USE OF INSULIN (HCC): ICD-10-CM

## 2019-03-06 DIAGNOSIS — F41.9 ANXIETY: ICD-10-CM

## 2019-03-06 DIAGNOSIS — I65.23 BILATERAL CAROTID ARTERY OCCLUSION: ICD-10-CM

## 2019-03-06 DIAGNOSIS — G47.33 OSA ON CPAP: ICD-10-CM

## 2019-03-06 DIAGNOSIS — I25.10 CORONARY ARTERY DISEASE INVOLVING NATIVE CORONARY ARTERY OF NATIVE HEART WITHOUT ANGINA PECTORIS: ICD-10-CM

## 2019-03-06 PROCEDURE — 3017F COLORECTAL CA SCREEN DOC REV: CPT | Performed by: INTERNAL MEDICINE

## 2019-03-06 PROCEDURE — G8484 FLU IMMUNIZE NO ADMIN: HCPCS | Performed by: INTERNAL MEDICINE

## 2019-03-06 PROCEDURE — 99214 OFFICE O/P EST MOD 30 MIN: CPT | Performed by: INTERNAL MEDICINE

## 2019-03-06 PROCEDURE — G8427 DOCREV CUR MEDS BY ELIG CLIN: HCPCS | Performed by: INTERNAL MEDICINE

## 2019-03-06 PROCEDURE — 3046F HEMOGLOBIN A1C LEVEL >9.0%: CPT | Performed by: INTERNAL MEDICINE

## 2019-03-06 PROCEDURE — G8510 SCR DEP NEG, NO PLAN REQD: HCPCS | Performed by: INTERNAL MEDICINE

## 2019-03-06 PROCEDURE — G8598 ASA/ANTIPLAT THER USED: HCPCS | Performed by: INTERNAL MEDICINE

## 2019-03-06 PROCEDURE — 99211 OFF/OP EST MAY X REQ PHY/QHP: CPT | Performed by: INTERNAL MEDICINE

## 2019-03-06 PROCEDURE — 1123F ACP DISCUSS/DSCN MKR DOCD: CPT | Performed by: INTERNAL MEDICINE

## 2019-03-06 PROCEDURE — 1101F PT FALLS ASSESS-DOCD LE1/YR: CPT | Performed by: INTERNAL MEDICINE

## 2019-03-06 PROCEDURE — G8399 PT W/DXA RESULTS DOCUMENT: HCPCS | Performed by: INTERNAL MEDICINE

## 2019-03-06 PROCEDURE — 1090F PRES/ABSN URINE INCON ASSESS: CPT | Performed by: INTERNAL MEDICINE

## 2019-03-06 PROCEDURE — 1036F TOBACCO NON-USER: CPT | Performed by: INTERNAL MEDICINE

## 2019-03-06 PROCEDURE — 2022F DILAT RTA XM EVC RTNOPTHY: CPT | Performed by: INTERNAL MEDICINE

## 2019-03-06 PROCEDURE — G8417 CALC BMI ABV UP PARAM F/U: HCPCS | Performed by: INTERNAL MEDICINE

## 2019-03-06 PROCEDURE — 4040F PNEUMOC VAC/ADMIN/RCVD: CPT | Performed by: INTERNAL MEDICINE

## 2019-03-06 RX ORDER — ATORVASTATIN CALCIUM 40 MG/1
TABLET, FILM COATED ORAL
Qty: 90 TABLET | Refills: 1 | Status: SHIPPED | OUTPATIENT
Start: 2019-03-06 | End: 2019-10-10 | Stop reason: SDUPTHER

## 2019-03-06 RX ORDER — LOSARTAN POTASSIUM 50 MG/1
TABLET ORAL
Qty: 90 TABLET | Refills: 1 | Status: SHIPPED | OUTPATIENT
Start: 2019-03-06 | End: 2019-10-10 | Stop reason: SDUPTHER

## 2019-03-06 ASSESSMENT — PATIENT HEALTH QUESTIONNAIRE - PHQ9
SUM OF ALL RESPONSES TO PHQ9 QUESTIONS 1 & 2: 2
SUM OF ALL RESPONSES TO PHQ QUESTIONS 1-9: 2
SUM OF ALL RESPONSES TO PHQ QUESTIONS 1-9: 2
2. FEELING DOWN, DEPRESSED OR HOPELESS: 1
1. LITTLE INTEREST OR PLEASURE IN DOING THINGS: 1

## 2019-03-25 ENCOUNTER — TELEPHONE (OUTPATIENT)
Dept: PULMONOLOGY | Age: 64
End: 2019-03-25

## 2019-03-25 DIAGNOSIS — G47.33 OSA ON CPAP: Primary | ICD-10-CM

## 2019-03-25 DIAGNOSIS — J44.9 CHRONIC OBSTRUCTIVE PULMONARY DISEASE, UNSPECIFIED COPD TYPE (HCC): ICD-10-CM

## 2019-03-25 DIAGNOSIS — Z99.89 OSA ON CPAP: Primary | ICD-10-CM

## 2019-05-01 ENCOUNTER — TELEPHONE (OUTPATIENT)
Dept: ONCOLOGY | Age: 64
End: 2019-05-01

## 2019-05-01 NOTE — LETTER
5/1/2019 [address] [address]    Dear Ana Olivo:    Your healthcare provider has ordered a low dose CT scan of the chest for lung cancer screening. You will find enclosed, information about CT lung screening. Please review the statement of understanding, you will be asked to sign a copy of this at the time of your CT scan    If you have not already been contacted to make the appointment for your scan, please call our scheduling department at [phone]    Keep in mind that CT lung screening does not take the place of smoking cessation. If you are a current smoker, you will find enclosed smoking cessation resources. Please do not hesitate to contact me if you have any questions or concerns.     [address] [place] [address],      [facility] Lung Screening Program  [phone]

## 2019-05-07 ENCOUNTER — HOSPITAL ENCOUNTER (OUTPATIENT)
Dept: CT IMAGING | Age: 64
Discharge: HOME | End: 2019-05-09
Payer: MEDICARE

## 2019-05-07 DIAGNOSIS — Z87.891 PERSONAL HISTORY OF TOBACCO USE: ICD-10-CM

## 2019-05-07 PROCEDURE — G0297 LDCT FOR LUNG CA SCREEN: HCPCS

## 2019-05-22 ENCOUNTER — OFFICE VISIT (OUTPATIENT)
Dept: PULMONOLOGY | Age: 64
End: 2019-05-22
Payer: MEDICARE

## 2019-05-22 VITALS
OXYGEN SATURATION: 96 % | RESPIRATION RATE: 16 BRPM | DIASTOLIC BLOOD PRESSURE: 66 MMHG | BODY MASS INDEX: 37.92 KG/M2 | SYSTOLIC BLOOD PRESSURE: 128 MMHG | HEIGHT: 63 IN | WEIGHT: 214 LBS | HEART RATE: 53 BPM

## 2019-05-22 VITALS — HEIGHT: 63 IN | OXYGEN SATURATION: 98 % | WEIGHT: 214 LBS | BODY MASS INDEX: 37.92 KG/M2 | HEART RATE: 53 BPM

## 2019-05-22 DIAGNOSIS — Z99.89 OSA ON CPAP: ICD-10-CM

## 2019-05-22 DIAGNOSIS — G47.33 OSA ON CPAP: ICD-10-CM

## 2019-05-22 DIAGNOSIS — R06.09 DYSPNEA ON EXERTION: ICD-10-CM

## 2019-05-22 DIAGNOSIS — R91.1 LUNG NODULE: ICD-10-CM

## 2019-05-22 DIAGNOSIS — E66.9 OBESITY (BMI 35.0-39.9 WITHOUT COMORBIDITY): ICD-10-CM

## 2019-05-22 DIAGNOSIS — J44.9 CHRONIC OBSTRUCTIVE PULMONARY DISEASE, UNSPECIFIED COPD TYPE (HCC): Primary | ICD-10-CM

## 2019-05-22 DIAGNOSIS — G47.33 OSA ON CPAP: Primary | ICD-10-CM

## 2019-05-22 DIAGNOSIS — J44.9 CHRONIC OBSTRUCTIVE PULMONARY DISEASE, UNSPECIFIED COPD TYPE (HCC): ICD-10-CM

## 2019-05-22 DIAGNOSIS — Z99.89 OSA ON CPAP: Primary | ICD-10-CM

## 2019-05-22 PROCEDURE — G8926 SPIRO NO PERF OR DOC: HCPCS | Performed by: INTERNAL MEDICINE

## 2019-05-22 PROCEDURE — 99999 PR OFFICE/OUTPT VISIT,PROCEDURE ONLY: CPT | Performed by: INTERNAL MEDICINE

## 2019-05-22 PROCEDURE — 1036F TOBACCO NON-USER: CPT | Performed by: INTERNAL MEDICINE

## 2019-05-22 PROCEDURE — 3023F SPIROM DOC REV: CPT | Performed by: INTERNAL MEDICINE

## 2019-05-22 PROCEDURE — 1090F PRES/ABSN URINE INCON ASSESS: CPT | Performed by: INTERNAL MEDICINE

## 2019-05-22 PROCEDURE — 4040F PNEUMOC VAC/ADMIN/RCVD: CPT | Performed by: INTERNAL MEDICINE

## 2019-05-22 PROCEDURE — G8598 ASA/ANTIPLAT THER USED: HCPCS | Performed by: INTERNAL MEDICINE

## 2019-05-22 PROCEDURE — 94726 PLETHYSMOGRAPHY LUNG VOLUMES: CPT | Performed by: INTERNAL MEDICINE

## 2019-05-22 PROCEDURE — 1123F ACP DISCUSS/DSCN MKR DOCD: CPT | Performed by: INTERNAL MEDICINE

## 2019-05-22 PROCEDURE — G8399 PT W/DXA RESULTS DOCUMENT: HCPCS | Performed by: INTERNAL MEDICINE

## 2019-05-22 PROCEDURE — 3017F COLORECTAL CA SCREEN DOC REV: CPT | Performed by: INTERNAL MEDICINE

## 2019-05-22 PROCEDURE — G8417 CALC BMI ABV UP PARAM F/U: HCPCS | Performed by: INTERNAL MEDICINE

## 2019-05-22 PROCEDURE — 94729 DIFFUSING CAPACITY: CPT | Performed by: INTERNAL MEDICINE

## 2019-05-22 PROCEDURE — 99214 OFFICE O/P EST MOD 30 MIN: CPT | Performed by: INTERNAL MEDICINE

## 2019-05-22 PROCEDURE — 94375 RESPIRATORY FLOW VOLUME LOOP: CPT | Performed by: INTERNAL MEDICINE

## 2019-05-22 PROCEDURE — G8427 DOCREV CUR MEDS BY ELIG CLIN: HCPCS | Performed by: INTERNAL MEDICINE

## 2019-05-22 ASSESSMENT — SLEEP AND FATIGUE QUESTIONNAIRES
ESS TOTAL SCORE: 3
HOW LIKELY ARE YOU TO NOD OFF OR FALL ASLEEP WHILE SITTING INACTIVE IN A PUBLIC PLACE: 0
HOW LIKELY ARE YOU TO NOD OFF OR FALL ASLEEP IN A CAR, WHILE STOPPED FOR A FEW MINUTES IN TRAFFIC: 0
HOW LIKELY ARE YOU TO NOD OFF OR FALL ASLEEP WHEN YOU ARE A PASSENGER IN A CAR FOR AN HOUR WITHOUT A BREAK: 0
HOW LIKELY ARE YOU TO NOD OFF OR FALL ASLEEP WHILE SITTING AND TALKING TO SOMEONE: 0
HOW LIKELY ARE YOU TO NOD OFF OR FALL ASLEEP WHILE SITTING QUIETLY AFTER LUNCH WITHOUT ALCOHOL: 0
HOW LIKELY ARE YOU TO NOD OFF OR FALL ASLEEP WHILE SITTING AND READING: 0
HOW LIKELY ARE YOU TO NOD OFF OR FALL ASLEEP WHILE LYING DOWN TO REST IN THE AFTERNOON WHEN CIRCUMSTANCES PERMIT: 2
HOW LIKELY ARE YOU TO NOD OFF OR FALL ASLEEP WHILE WATCHING TV: 1

## 2019-05-22 NOTE — PROGRESS NOTES
energetic during the daytime and less tired and fatigue and sleep is refreshing. She has history of left upper lobe 4 mm nodule on previous screening CT scan in October 2017 and she had a follow-up CT scan done in May 2018 which shows stable left upper lobe nodule. .  Sleep  Positive dry mouth upon awakening. Mild Fatigue and tiredness during the day. Goes to sleep at 12 midnight to 1 AM, wakes up 9 am. It takes 30 minutes to fall asleep. Wakes up one times at night to go to bathroom. Takes nap sometime during the day. no headache in am. No car wrecks or near wrecks because of the sleepiness. No nodding off while driving. No weight gain. No forgetfulness or decreased concentration. No nasal congestion or obstruction at night. Using CPAP 6-8 hr/night. Occasional leg jerks during sleep. No restless feelings in legs at night. No numbness or burning in leg or feet. No leg aches or cramps. Initial history and course    She was referred here for dyspnea, she has long history of his smoking, she was told in the past that she has COPD and she had a spirometry done before, she does have history of shortness of breath on exertion and sometimes on regular activities, and she had limited her activities because of her shortness of breath especially last few years. She also has a CT chest done for screening which showed 5 mm left upper lobe lung nodule  She does have weight gain for about 30 pounds in last few years   She was diagnosed with sleep apnea for about a year ago when she had a sleep study done and she was also placed on oxygen at night with CPAP at 2 L.   She does have history of GERD and she is on medication to control the symptoms          Sleep Medicine 5/22/2019 2/27/2019 12/15/2018 12/5/2018 11/7/2018 5/2/2018 2/7/2018   Sitting and reading 0 1 0 1 1 1 0   Watching TV 1 1 1 1 1 1 0   Sitting, inactive in a public place (e.g. a theatre or a meeting) 0 0 0 0 0 0 0   As a passenger in a car for an hour once daily 90 tablet 1    losartan (COZAAR) 50 MG tablet take 1 tablet by mouth once daily 90 tablet 1    tiotropium (SPIRIVA RESPIMAT) 2.5 MCG/ACT AERS inhaler Inhale 2 puffs into the lungs daily 1 Inhaler 11    sertraline (ZOLOFT) 50 MG tablet take 1 tablet by mouth once daily 30 tablet 3    metFORMIN (GLUCOPHAGE) 500 MG tablet take 1 tablet by mouth twice a day with meals 60 tablet 5    BYSTOLIC 5 MG tablet take 1 tablet by mouth once daily  0    omeprazole (PRILOSEC) 40 MG delayed release capsule take 1 capsule by mouth once daily 30 capsule 5    betamethasone dipropionate (DIPROLENE) 0.05 % ointment Apply topically daily. 50 g 2    potassium bicarbonate-potassium chloride (K-LYTE CL) 25 MEQ TBEF Take by mouth daily      hydrochlorothiazide (HYDRODIURIL) 25 MG tablet take 1 tablet by mouth once daily 90 tablet 2    magnesium 30 MG tablet Take 30 mg by mouth 2 times daily      clobetasol (TEMOVATE) 0.05 % ointment Apply topically 2 times weekly. 15 g 3    albuterol sulfate HFA (PROVENTIL HFA) 108 (90 Base) MCG/ACT inhaler Inhale 2 puffs into the lungs every 6 hours as needed for Wheezing 1 Inhaler 3    Multiple Vitamin (MULTI-VITAMIN PO) Take 1 tablet by mouth daily.  aspirin 325 MG tablet Take 325 mg by mouth daily.  umeclidinium-vilanterol (ANORO ELLIPTA) 62.5-25 MCG/INH AEPB inhaler Inhale 1 puff into the lungs daily 1 each 11     No facility-administered encounter medications on file as of 5/22/2019. Social History:   TOBACCO:   reports that she quit smoking about 2 years ago. 1.5 PPD for 48 yeras. She has a 60.00 pack-year smoking history. She has never used smokeless tobacco.  ETOH:   reports that she drinks alcohol.   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      Family History:       Problem Relation Age of Onset    Hypertension Mother     Macular Degen Mother     Other Mother         vascular    Colon Polyps Brother     Cancer Maternal Aunt        Immunizations:    Immunization History   Administered Date(s) Administered    Pneumococcal 13-valent Conjugate (Lmhbzld15) 04/17/2018    Tdap (Boostrix, Adacel) 10/24/2018         REVIEW OF SYSTEMS:  CONSTITUTIONAL:  negative for  fevers, chills, sweats, fatigue, malaise, anorexia and weight loss  EYES:  negative for  double vision, blurred vision, dry eyes, eye discharge, visual disturbance, irritation, redness and icterus  HEENT:   negative for hoarseness and voice changes, negative for  hearing loss, tinnitus, nasal congestion, epistaxis, snoring, sore mouth and sore throat  RESPIRATORY:  positive for  dyspnea, dry cough, cough with sputum, negative for wheezing, hemoptysis, chest pain, pleuritic pain and cyanosis  CARDIOVASCULAR:  positive for  dyspnea on exertion, positive for chest heaviness/pressure, negative for palpitations, orthopnea, PND, exertional chest pressure/discomfort, fatigue, early saiety, edema, syncope  GASTROINTESTINAL:  negative for change in bowel habits, diarrhea, constipation, abdominal pain, pruritus, abdominal mass, abdominal distention, jaundice, hematemesis and hemtochezia, dysphagia, reflux and regurgitation  GENITOURINARY:  negative for frequency, dysuria, nocturia, urinary incontinence, hesitancy, decreased stream and hematuria  HEMATOLOGIC/LYMPHATIC:  negative for easy bruising, bleeding, lymphadenopathy and petechiae  ALLERGIC/IMMUNOLOGIC:  negative for recurrent infections, urticaria, hay fever, angioedema, anaphylaxis and drug reactions  ENDOCRINE:  negative for heat intolerance, cold intolerance, tremor, weight changes and change in bowel habits  MUSCULOSKELETAL:  negative for  myalgias, arthralgias, joint swelling, stiff joints and decreased range of motion  NEUROLOGICAL:  negative for headaches, dizziness, seizures, memory problems, speech problems, visual disturbance, coordination problems, gait problems, weakness, numbness, syncope and FEV1 %, TLC 3.48 75%, DLCO 15.70 88%  02/07/2018: FEV1 1.30 58%, FVC 1.62 58%, BPG4DWP 100%, TLC 3.35 72%, DLCO 13.62 76%    CXR      CT Scans   05/07/2019  1. Stable 4 mm posterior left upper lobe pulmonary nodule, unchanged from   05/07/2018.  Mild emphysema. 2. Cardiomegaly.  Coronary artery disease.  Atherosclerotic calcification of   the aorta. 3. Small hiatal hernia. 05/07/2018  Stable 4 mm nodule left upper lobe. 10/17/2017  5 mm nodule posterior left upper lobe.  No acute process with chronic   findings as described. Compliance data from CPAP. From 10/1/2018 to 10/30/2018  Auto CPAP with pressure maximum of 20 and pressure minimum of 12  Average daily usage 7 hours 11 minutes  Compliance 87 %  Average mean pressure 12.3 cm. AHI 1.2    Assessment and Plan         1. Chronic obstructive pulmonary disease, unspecified COPD type ([address]   2. Lung nodule   3. CRISTINA on CPAP   4. Obesity (BMI 35.0-39.9 without comorbidity)   5. Dyspnea on exertion     HTN (hypertension)    CAD/Stents drug-eluting     Claudication in peripheral vascular disease (HCC)     CT scan of the chest which shows left upper lobe 4-5 mm nodule is stable from October 2017 to May 2018 and now in May 2019. Plan and Recommendations:  I've advised her that she need to follow-up with cardiology as the symptoms of chest heaviness is not related to her COPD she has history of coronary artery disease and previous history of stent, she think that she has an appointment coming with cardiology in June. Patient will call cardiology office. If she has further chest pain or increase duration or frequent chest heaviness she is advised to go to emergency room  Continue with Spiriva Respimat 2 spray once daily.   She will likely need a combination of Lama/Laba  She will need yearly CT scan for screening and next one should be in May 2020 Albuterol as needed    Maintain an active lifestyle   Refused Flu vaccine   She had Prevnar 13 in April 2018    Continue with CPAP at current setting   CPAP at least 4 hrs qhs  She may need nocturnal oximetry on CPAP to determine whether she will continue to need O2 with CPAP   Wt loss is recommended and discussed  Follow good sleep hygeine instructions  Use humidifier   Questions answered pertaining to diagnosis and management explained importance of compliance with therapy   Last available compliance data reviewed as above and pt is compliant per insurance requirements, need compliance data from CPAP before next visit    RTC 6 months. It was my pleasure to evaluate Adelfoelise Juarez today. Please call with questions. Renuka Deutsch MD             5/22/2019, 3:56 PM    Please note that this chart was generated using voice recognition Dragon dictation software. Although every effort was made to ensure the accuracy of this automated transcription, some errors in transcription may have occurred.

## 2019-05-22 NOTE — PROGRESS NOTES
Pulmonary function test  05/22/2019    Spirometry shows FVC is 1.85 66% predicted, FEV1 is 1.52 69% predicted, there is mild reduction in FEV1 consistent with mild obstructive ventilatory impairment. Lung volume shows RV is 1.57 86% predicted, total lung capacity is 3.48 75% predicted consistent with mild extraparenchymal restriction. Diffusion capacity is 15.70 88% predicted which is within normal limit. Impression:     This pulmonary function test is consistent with mild obstructive ventilatory impairment, there is concomitant mild extraparenchymal restriction is present, diffusion capacity is normal.  Clinical correlation is recommended

## 2019-06-26 NOTE — TELEPHONE ENCOUNTER
Refill request for Zoloft. If appropriate please send medication(s) to patients pharmacy. Next appt: None. Letter mailed to patient to reschedule appt on 07/09 due to provider schedule change. Last seen : 03/06/19      Health Maintenance   Topic Date Due    Hepatitis C screen  1955    Diabetic foot exam  01/20/1963    Shingles Vaccine (1 of 2) 01/20/2003    Annual Wellness Visit (AWV)  01/20/2016    Breast cancer screen  01/31/2019    Diabetic microalbuminuria test  04/06/2019    Potassium monitoring  04/06/2019    Creatinine monitoring  04/06/2019    Pneumococcal 65+ years Vaccine (2 of 2 - PPSV23) 04/17/2019    Diabetic retinal exam  07/10/2019    Flu vaccine (Season Ended) 09/01/2019    Lipid screen  09/18/2019    A1C test (Diabetic or Prediabetic)  10/24/2019    Colon cancer screen colonoscopy  11/08/2019    Low dose CT lung screening  05/07/2020    DTaP/Tdap/Td vaccine (2 - Td) 10/24/2028    DEXA (modify frequency per FRAX score)  Completed       Hemoglobin A1C (%)   Date Value   10/24/2018 6.3   01/11/2018 6.6   06/11/2016 5.9             ( goal A1C is < 7)   Microalb/Crt.  Ratio (mcg/mg creat)   Date Value   04/06/2018 11     LDL Cholesterol (mg/dL)   Date Value   09/18/2018 61       (goal LDL is <100)   AST (U/L)   Date Value   09/18/2018 22     ALT (U/L)   Date Value   09/18/2018 22     BUN (mg/dL)   Date Value   04/06/2018 18     BP Readings from Last 3 Encounters:   05/22/19 128/66   03/06/19 (!) 140/62   02/27/19 (!) 134/54          (goal 120/80)          Patient Active Problem List:     Smoker     HTN (hypertension)     CAD/Stents drug-eluting      Serum lipids high     Carotid stenosis, asymptomatic     Nocturnal hypoxia     CRISTINA on CPAP     Type 2 diabetes mellitus with complication, without long-term current use of insulin ([address]

## 2019-07-26 RX ORDER — OMEPRAZOLE 40 MG/1
CAPSULE, DELAYED RELEASE ORAL
Qty: 30 CAPSULE | Refills: 5 | Status: SHIPPED | OUTPATIENT
Start: 2019-07-26 | End: 2020-02-04

## 2019-10-10 ENCOUNTER — OFFICE VISIT (OUTPATIENT)
Dept: INTERNAL MEDICINE | Age: 64
End: 2019-10-10
Payer: MEDICARE

## 2019-10-10 VITALS
HEIGHT: 63 IN | DIASTOLIC BLOOD PRESSURE: 72 MMHG | SYSTOLIC BLOOD PRESSURE: 148 MMHG | WEIGHT: 201.4 LBS | BODY MASS INDEX: 35.68 KG/M2

## 2019-10-10 DIAGNOSIS — E11.8 TYPE 2 DIABETES MELLITUS WITH COMPLICATION, WITHOUT LONG-TERM CURRENT USE OF INSULIN (HCC): ICD-10-CM

## 2019-10-10 DIAGNOSIS — I25.10 CORONARY ARTERY DISEASE INVOLVING NATIVE CORONARY ARTERY OF NATIVE HEART WITHOUT ANGINA PECTORIS: ICD-10-CM

## 2019-10-10 DIAGNOSIS — F41.9 ANXIETY: ICD-10-CM

## 2019-10-10 DIAGNOSIS — E78.5 DYSLIPIDEMIA: ICD-10-CM

## 2019-10-10 DIAGNOSIS — Z99.89 OSA ON CPAP: ICD-10-CM

## 2019-10-10 DIAGNOSIS — I65.23 BILATERAL CAROTID ARTERY OCCLUSION: ICD-10-CM

## 2019-10-10 DIAGNOSIS — Z12.11 SCREENING FOR COLON CANCER: ICD-10-CM

## 2019-10-10 DIAGNOSIS — Z12.31 ENCOUNTER FOR SCREENING MAMMOGRAM FOR BREAST CANCER: ICD-10-CM

## 2019-10-10 DIAGNOSIS — Z23 NEED FOR PROPHYLACTIC VACCINATION AND INOCULATION AGAINST VARICELLA: ICD-10-CM

## 2019-10-10 DIAGNOSIS — I10 ESSENTIAL HYPERTENSION: Primary | ICD-10-CM

## 2019-10-10 DIAGNOSIS — I73.9 PVD (PERIPHERAL VASCULAR DISEASE) WITH CLAUDICATION (HCC): ICD-10-CM

## 2019-10-10 DIAGNOSIS — G47.33 OSA ON CPAP: ICD-10-CM

## 2019-10-10 DIAGNOSIS — J44.9 STAGE 2 MODERATE COPD BY GOLD CLASSIFICATION (HCC): ICD-10-CM

## 2019-10-10 DIAGNOSIS — K63.5 POLYP OF COLON, UNSPECIFIED PART OF COLON, UNSPECIFIED TYPE: ICD-10-CM

## 2019-10-10 LAB — HBA1C MFR BLD: 6.4 %

## 2019-10-10 PROCEDURE — 1123F ACP DISCUSS/DSCN MKR DOCD: CPT | Performed by: INTERNAL MEDICINE

## 2019-10-10 PROCEDURE — 3023F SPIROM DOC REV: CPT | Performed by: INTERNAL MEDICINE

## 2019-10-10 PROCEDURE — 83036 HEMOGLOBIN GLYCOSYLATED A1C: CPT | Performed by: INTERNAL MEDICINE

## 2019-10-10 PROCEDURE — 3044F HG A1C LEVEL LT 7.0%: CPT | Performed by: INTERNAL MEDICINE

## 2019-10-10 PROCEDURE — 4040F PNEUMOC VAC/ADMIN/RCVD: CPT | Performed by: INTERNAL MEDICINE

## 2019-10-10 PROCEDURE — 1090F PRES/ABSN URINE INCON ASSESS: CPT | Performed by: INTERNAL MEDICINE

## 2019-10-10 PROCEDURE — 2022F DILAT RTA XM EVC RTNOPTHY: CPT | Performed by: INTERNAL MEDICINE

## 2019-10-10 PROCEDURE — G8399 PT W/DXA RESULTS DOCUMENT: HCPCS | Performed by: INTERNAL MEDICINE

## 2019-10-10 PROCEDURE — G8417 CALC BMI ABV UP PARAM F/U: HCPCS | Performed by: INTERNAL MEDICINE

## 2019-10-10 PROCEDURE — G8484 FLU IMMUNIZE NO ADMIN: HCPCS | Performed by: INTERNAL MEDICINE

## 2019-10-10 PROCEDURE — 1036F TOBACCO NON-USER: CPT | Performed by: INTERNAL MEDICINE

## 2019-10-10 PROCEDURE — G8926 SPIRO NO PERF OR DOC: HCPCS | Performed by: INTERNAL MEDICINE

## 2019-10-10 PROCEDURE — G8598 ASA/ANTIPLAT THER USED: HCPCS | Performed by: INTERNAL MEDICINE

## 2019-10-10 PROCEDURE — 99214 OFFICE O/P EST MOD 30 MIN: CPT | Performed by: INTERNAL MEDICINE

## 2019-10-10 PROCEDURE — G8427 DOCREV CUR MEDS BY ELIG CLIN: HCPCS | Performed by: INTERNAL MEDICINE

## 2019-10-10 PROCEDURE — 3017F COLORECTAL CA SCREEN DOC REV: CPT | Performed by: INTERNAL MEDICINE

## 2019-10-10 PROCEDURE — 99211 OFF/OP EST MAY X REQ PHY/QHP: CPT | Performed by: INTERNAL MEDICINE

## 2019-10-10 RX ORDER — LOSARTAN POTASSIUM 50 MG/1
TABLET ORAL
Qty: 90 TABLET | Refills: 1 | Status: SHIPPED | OUTPATIENT
Start: 2019-10-10 | End: 2020-02-12

## 2019-10-10 RX ORDER — HYDROCHLOROTHIAZIDE 25 MG/1
TABLET ORAL
Qty: 90 TABLET | Refills: 2 | Status: SHIPPED | OUTPATIENT
Start: 2019-10-10 | End: 2020-04-14 | Stop reason: SDUPTHER

## 2019-10-10 RX ORDER — ATORVASTATIN CALCIUM 40 MG/1
TABLET, FILM COATED ORAL
Qty: 90 TABLET | Refills: 1 | Status: SHIPPED | OUTPATIENT
Start: 2019-10-10 | End: 2020-03-13

## 2019-10-24 ENCOUNTER — HOSPITAL ENCOUNTER (OUTPATIENT)
Age: 64
Discharge: HOME | End: 2019-10-24
Payer: MEDICARE

## 2019-10-24 ENCOUNTER — HOSPITAL ENCOUNTER (OUTPATIENT)
Dept: MAMMOGRAPHY | Age: 64
Discharge: HOME | End: 2019-10-26
Payer: MEDICARE

## 2019-10-24 DIAGNOSIS — Z12.31 ENCOUNTER FOR SCREENING MAMMOGRAM FOR BREAST CANCER: ICD-10-CM

## 2019-10-24 DIAGNOSIS — I10 ESSENTIAL HYPERTENSION: ICD-10-CM

## 2019-10-24 DIAGNOSIS — E78.5 DYSLIPIDEMIA: ICD-10-CM

## 2019-10-24 DIAGNOSIS — F41.9 ANXIETY: ICD-10-CM

## 2019-10-24 DIAGNOSIS — E11.8 TYPE 2 DIABETES MELLITUS WITH COMPLICATION, WITHOUT LONG-TERM CURRENT USE OF INSULIN (HCC): ICD-10-CM

## 2019-10-24 LAB
ALBUMIN SERPL-MCNC: 4.1 G/DL (ref 3.5–5.2)
ALBUMIN/GLOBULIN RATIO: 1 (ref 1–2.5)
ALP BLD-CCNC: 75 U/L (ref 35–104)
ALT SERPL-CCNC: 11 U/L (ref 5–33)
ANION GAP SERPL CALCULATED.3IONS-SCNC: 12 MMOL/L (ref 9–17)
AST SERPL-CCNC: 15 U/L
BILIRUB SERPL-MCNC: 0.38 MG/DL (ref 0.3–1.2)
BUN BLDV-MCNC: 24 MG/DL (ref 8–23)
BUN/CREAT BLD: ABNORMAL (ref 9–20)
CALCIUM SERPL-MCNC: 9.4 MG/DL (ref 8.6–10.4)
CHLORIDE BLD-SCNC: 103 MMOL/L (ref 98–107)
CHOLESTEROL/HDL RATIO: 2.6
CHOLESTEROL: 108 MG/DL
CO2: 28 MMOL/L (ref 20–31)
CREAT SERPL-MCNC: 0.62 MG/DL (ref 0.5–0.9)
CREATININE URINE: 69.7 MG/DL (ref 28–217)
GFR AFRICAN AMERICAN: >60 ML/MIN
GFR NON-AFRICAN AMERICAN: >60 ML/MIN
GFR SERPL CREATININE-BSD FRML MDRD: ABNORMAL ML/MIN/{1.73_M2}
GFR SERPL CREATININE-BSD FRML MDRD: ABNORMAL ML/MIN/{1.73_M2}
GLUCOSE BLD-MCNC: 112 MG/DL (ref 70–99)
HDLC SERPL-MCNC: 41 MG/DL
LDL CHOLESTEROL: 51 MG/DL (ref 0–130)
MICROALBUMIN/CREAT 24H UR: <12 MG/L
MICROALBUMIN/CREAT UR-RTO: NORMAL MCG/MG CREAT
POTASSIUM SERPL-SCNC: 4.3 MMOL/L (ref 3.7–5.3)
SODIUM BLD-SCNC: 143 MMOL/L (ref 135–144)
THYROXINE, FREE: 0.97 NG/DL (ref 0.93–1.7)
TOTAL PROTEIN: 8.3 G/DL (ref 6.4–8.3)
TRIGL SERPL-MCNC: 81 MG/DL
TSH SERPL DL<=0.05 MIU/L-ACNC: 6.24 MIU/L (ref 0.3–5)
VLDLC SERPL CALC-MCNC: NORMAL MG/DL (ref 1–30)

## 2019-10-24 PROCEDURE — 80053 COMPREHEN METABOLIC PANEL: CPT

## 2019-10-24 PROCEDURE — 84443 ASSAY THYROID STIM HORMONE: CPT

## 2019-10-24 PROCEDURE — 36415 COLL VENOUS BLD VENIPUNCTURE: CPT

## 2019-10-24 PROCEDURE — 80061 LIPID PANEL: CPT

## 2019-10-24 PROCEDURE — 84439 ASSAY OF FREE THYROXINE: CPT

## 2019-10-24 PROCEDURE — 82043 UR ALBUMIN QUANTITATIVE: CPT

## 2019-10-24 PROCEDURE — 82570 ASSAY OF URINE CREATININE: CPT

## 2019-10-24 PROCEDURE — 77063 BREAST TOMOSYNTHESIS BI: CPT

## 2019-11-15 ENCOUNTER — TELEPHONE (OUTPATIENT)
Dept: VASCULAR SURGERY | Age: 64
End: 2019-11-15

## 2019-12-17 ENCOUNTER — TELEPHONE (OUTPATIENT)
Dept: INTERNAL MEDICINE | Age: 64
End: 2019-12-17

## 2019-12-23 ENCOUNTER — INITIAL CONSULT (OUTPATIENT)
Dept: VASCULAR SURGERY | Age: 64
End: 2019-12-23
Payer: MEDICARE

## 2019-12-23 VITALS
TEMPERATURE: 97.1 F | OXYGEN SATURATION: 97 % | WEIGHT: 199 LBS | DIASTOLIC BLOOD PRESSURE: 74 MMHG | HEIGHT: 64 IN | SYSTOLIC BLOOD PRESSURE: 138 MMHG | HEART RATE: 62 BPM | BODY MASS INDEX: 33.97 KG/M2

## 2019-12-23 DIAGNOSIS — I73.9 PAD (PERIPHERAL ARTERY DISEASE) (HCC): ICD-10-CM

## 2019-12-23 DIAGNOSIS — I65.23 ASYMPTOMATIC BILATERAL CAROTID ARTERY STENOSIS: Primary | ICD-10-CM

## 2019-12-23 PROCEDURE — G8484 FLU IMMUNIZE NO ADMIN: HCPCS | Performed by: SURGERY

## 2019-12-23 PROCEDURE — 1090F PRES/ABSN URINE INCON ASSESS: CPT | Performed by: SURGERY

## 2019-12-23 PROCEDURE — 99203 OFFICE O/P NEW LOW 30 MIN: CPT | Performed by: SURGERY

## 2019-12-23 PROCEDURE — G8427 DOCREV CUR MEDS BY ELIG CLIN: HCPCS | Performed by: SURGERY

## 2019-12-23 PROCEDURE — G8417 CALC BMI ABV UP PARAM F/U: HCPCS | Performed by: SURGERY

## 2019-12-25 ASSESSMENT — ENCOUNTER SYMPTOMS
CHEST TIGHTNESS: 0
GASTROINTESTINAL NEGATIVE: 1
COLOR CHANGE: 0
BACK PAIN: 1
SHORTNESS OF BREATH: 0
ALLERGIC/IMMUNOLOGIC NEGATIVE: 1

## 2020-01-07 NOTE — TELEPHONE ENCOUNTER
PC to specialty [facility] office spoke with Billing,she said she doesn't see in insurance notes that it will require a PA. She said to contact the pt to let her know to contact her insurance company to see who needs the PA.
no

## 2020-01-29 ENCOUNTER — OFFICE VISIT (OUTPATIENT)
Dept: PULMONOLOGY | Age: 65
End: 2020-01-29
Payer: MEDICARE

## 2020-01-29 VITALS
DIASTOLIC BLOOD PRESSURE: 56 MMHG | BODY MASS INDEX: 34.49 KG/M2 | TEMPERATURE: 97.7 F | HEART RATE: 48 BPM | SYSTOLIC BLOOD PRESSURE: 130 MMHG | WEIGHT: 197.8 LBS | OXYGEN SATURATION: 97 %

## 2020-01-29 PROCEDURE — 1090F PRES/ABSN URINE INCON ASSESS: CPT | Performed by: INTERNAL MEDICINE

## 2020-01-29 PROCEDURE — G8427 DOCREV CUR MEDS BY ELIG CLIN: HCPCS | Performed by: INTERNAL MEDICINE

## 2020-01-29 PROCEDURE — 1123F ACP DISCUSS/DSCN MKR DOCD: CPT | Performed by: INTERNAL MEDICINE

## 2020-01-29 PROCEDURE — G8926 SPIRO NO PERF OR DOC: HCPCS | Performed by: INTERNAL MEDICINE

## 2020-01-29 PROCEDURE — 3023F SPIROM DOC REV: CPT | Performed by: INTERNAL MEDICINE

## 2020-01-29 PROCEDURE — 1036F TOBACCO NON-USER: CPT | Performed by: INTERNAL MEDICINE

## 2020-01-29 PROCEDURE — 99214 OFFICE O/P EST MOD 30 MIN: CPT | Performed by: INTERNAL MEDICINE

## 2020-01-29 PROCEDURE — G8417 CALC BMI ABV UP PARAM F/U: HCPCS | Performed by: INTERNAL MEDICINE

## 2020-01-29 PROCEDURE — G8484 FLU IMMUNIZE NO ADMIN: HCPCS | Performed by: INTERNAL MEDICINE

## 2020-01-29 PROCEDURE — G0296 VISIT TO DETERM LDCT ELIG: HCPCS | Performed by: INTERNAL MEDICINE

## 2020-01-29 PROCEDURE — G8399 PT W/DXA RESULTS DOCUMENT: HCPCS | Performed by: INTERNAL MEDICINE

## 2020-01-29 PROCEDURE — 4040F PNEUMOC VAC/ADMIN/RCVD: CPT | Performed by: INTERNAL MEDICINE

## 2020-01-29 PROCEDURE — 3017F COLORECTAL CA SCREEN DOC REV: CPT | Performed by: INTERNAL MEDICINE

## 2020-01-29 RX ORDER — ALBUTEROL SULFATE 90 UG/1
2 AEROSOL, METERED RESPIRATORY (INHALATION) EVERY 6 HOURS PRN
Qty: 1 INHALER | Refills: 5 | Status: SHIPPED | OUTPATIENT
Start: 2020-01-29 | End: 2021-03-04 | Stop reason: SDUPTHER

## 2020-01-29 RX ORDER — VITAMIN B COMPLEX
1 CAPSULE ORAL DAILY
COMMUNITY
End: 2022-07-19 | Stop reason: ALTCHOICE

## 2020-01-29 ASSESSMENT — SLEEP AND FATIGUE QUESTIONNAIRES
HOW LIKELY ARE YOU TO NOD OFF OR FALL ASLEEP WHILE LYING DOWN TO REST IN THE AFTERNOON WHEN CIRCUMSTANCES PERMIT: 2
HOW LIKELY ARE YOU TO NOD OFF OR FALL ASLEEP WHILE WATCHING TV: 1
HOW LIKELY ARE YOU TO NOD OFF OR FALL ASLEEP WHILE SITTING QUIETLY AFTER LUNCH WITHOUT ALCOHOL: 1
ESS TOTAL SCORE: 6
HOW LIKELY ARE YOU TO NOD OFF OR FALL ASLEEP WHILE SITTING INACTIVE IN A PUBLIC PLACE: 0
HOW LIKELY ARE YOU TO NOD OFF OR FALL ASLEEP WHILE SITTING AND READING: 1
HOW LIKELY ARE YOU TO NOD OFF OR FALL ASLEEP WHILE SITTING AND TALKING TO SOMEONE: 0
HOW LIKELY ARE YOU TO NOD OFF OR FALL ASLEEP IN A CAR, WHILE STOPPED FOR A FEW MINUTES IN TRAFFIC: 0
HOW LIKELY ARE YOU TO NOD OFF OR FALL ASLEEP WHEN YOU ARE A PASSENGER IN A CAR FOR AN HOUR WITHOUT A BREAK: 1

## 2020-01-29 NOTE — PROGRESS NOTES
OUTPATIENT PULMONARY PROGRESS NOTE      Patient:  John Mccray  MRN: [id number]    Consulting Physician: Dr. Nelida Wang  Reason for Consult: COPD, lung nodule, dyspnea, obstructive sleep apnea  Primacy Care Physician: Yayo Jerez MD    HISTORY OF PRESENT ILLNESS:   The patient is a 77 y.o. female   She is here for follow-up of obstructive sleep apnea and COPD, lung nodule. Lately she was having problem with CPAP, when she was waking up in the morning she was having gurgling in her throat, she thought that she was having wheezing in her chest, she continued to have that problem she called DME as her humidifier water was running out when she was waking up in the morning and she was told that she is likely have a leak that she remove the water and may be getting excessive moisture causing her to have gurgling in her throat. She does also complain of chest tightness in the morning, she is also complaining of tiredness and fatigue lately because of the problem with the CPAP she did not use CPAP for about a week and felt more tired and fatigued she was falling asleep on chair lately and dozing off during the daytime which she usually does not do when she was using CPAP. No compliance data is available from CPAP this time  Although she had difficulty adjusting the CPAP for long time and she still does not like CPAP because of the tightness in the mask but when she use it she feels that she sleeps much better, she her sleep is more refreshing and restorative, she is more energetic during the daytime and she does not doze off and take naps when she use CPAP. When she was seen last time she was instructed to follow-up with cardiologist as she was having chest tightness and she still complain of that also she is complaining of her heart rate being in 45-47 and she feels more tired and fatigued lately.   She does have dyspnea on exertion she does not think that she has more shortness of breath than her usual although  Multiple Vitamin (MULTI-VITAMIN PO) Take 1 tablet by mouth daily.  aspirin 325 MG tablet Take 325 mg by mouth daily.  [DISCONTINUED] umeclidinium-vilanterol (ANORO ELLIPTA) 62.5-25 MCG/INH AEPB inhaler Inhale 1 puff into the lungs daily (Patient not taking: Reported on 1/29/2020) 1 each 11     No facility-administered encounter medications on file as of 1/29/2020. Social History:   TOBACCO:   reports that she quit smoking about 2 years ago. 1.5 PPD for 48 yeras. She has a 60.00 pack-year smoking history. She has never used smokeless tobacco.  ETOH:   reports current alcohol use.   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      Family History:       Problem Relation Age of Onset    Hypertension Mother     Macular Degen Mother     Other Mother         vascular    Colon Polyps Brother     Cancer Maternal Aunt        Immunizations:    Immunization History   Administered Date(s) Administered    Pneumococcal Conjugate 13-valent (Mario Alberto Adam) 04/17/2018    Tdap (Boostrix, Adacel) 10/24/2018         REVIEW OF SYSTEMS:  CONSTITUTIONAL:  negative for  fevers, chills, sweats, fatigue, malaise, anorexia and weight loss  EYES:  negative for  double vision, blurred vision, dry eyes, eye discharge, visual disturbance, irritation, redness and icterus  HEENT:   negative for hoarseness and voice changes, negative for  hearing loss, tinnitus, nasal congestion, epistaxis, snoring, sore mouth and sore throat  RESPIRATORY:  positive for  dyspnea,  negative for dry cough, cough with sputum, negative for wheezing, hemoptysis, chest pain, pleuritic pain and cyanosis  CARDIOVASCULAR:  positive for  dyspnea on exertion, positive for chest heaviness/pressure, negative for palpitations, orthopnea, PND, exertional chest pressure/discomfort, fatigue, early saiety, edema, syncope  GASTROINTESTINAL:  negative for change in bowel habits, diarrhea, constipation, abdominal pain, pruritus, abdominal mass, abdominal distention, jaundice, hematemesis and hemtochezia, dysphagia, reflux and regurgitation  GENITOURINARY:  negative for frequency, dysuria, nocturia, urinary incontinence, hesitancy, decreased stream and hematuria  HEMATOLOGIC/LYMPHATIC:  negative for easy bruising, bleeding, lymphadenopathy and petechiae  ALLERGIC/IMMUNOLOGIC:  negative for recurrent infections, urticaria, hay fever, angioedema, anaphylaxis and drug reactions  ENDOCRINE:  negative for heat intolerance, cold intolerance, tremor, weight changes and change in bowel habits  MUSCULOSKELETAL:  negative for  myalgias, arthralgias, joint swelling, stiff joints and decreased range of motion  NEUROLOGICAL:  negative for headaches, dizziness, seizures, memory problems, speech problems, visual disturbance, coordination problems, gait problems, weakness, numbness, syncope and tingling  BEHAVIOR/PSYCH:  negative          Physical Exam:    Vitals: BP (!) 130/56   Pulse (!) 48   Temp 97.7 °F (36.5 °C) (Oral)   Wt 197 lb 12.8 oz (89.7 kg)   SpO2 97% Comment: room air  BMI 34.49 kg/m²   Last 3 weights: Wt Readings from Last 3 Encounters:   01/29/20 197 lb 12.8 oz (89.7 kg)   12/23/19 199 lb (90.3 kg)   10/10/19 201 lb 6.4 oz (91.4 kg)     Body mass index is 34.49 kg/m².     Physical Examination:   General appearance - alert, well appearing, and in no distress, overweight and acyanotic, in no respiratory distress  Mental status - alert, oriented to person, place, and time  Eyes - pupils equal and reactive, extraocular eye movements intact, sclera anicteric  Ears - right ear normal, left ear normal  Nose - normal and patent, no erythema, discharge or polyps  Mouth - mucous membranes moist, pharynx normal without lesions and small oropharynx, moderate size tongue, Mallampati 2  Neck - supple, no significant adenopathy, short neck  Chest - no tachypnea, retractions or cyanosis, decreased air entry noted bilaterally with distant breath sounds no expiratory wheezing and no rhonchi and no crackles.   Heart - normal rate, regular rhythm, normal S1, S2, no murmurs, rubs, clicks or gallops  Abdomen - soft, nontender, nondistended, no masses or organomegaly  Neurological - alert, oriented, normal speech, no focal findings or movement disorder noted  Extremities - peripheral pulses normal, no pedal edema, no clubbing or cyanosis  Skin - normal coloration and turgor, no rashes, no suspicious skin lesions noted       LABS:    CBC:   WBC   Date Value Ref Range Status   11/09/2018 9.1 3.5 - 11.3 k/uL Final   08/04/2014 8.7 3.5 - 11.0 k/uL Final   10/11/2013 8.2 3.5 - 11.0 k/uL Final     Hemoglobin   Date Value Ref Range Status   11/09/2018 11.8 (L) 11.9 - 15.1 g/dL Final   08/04/2014 15.3 12.0 - 16.0 g/dL Final   10/11/2013 14.8 12.0 - 16.0 g/dL Final     Platelets   Date Value Ref Range Status   11/09/2018 337 138 - 453 k/uL Final   11/20/2017 272 138 - 453 k/uL Final     Comment:     [facility] [address] [facility], [address] [place] [address] [phone]   08/04/2014 253 140 - 450 k/uL Final     BMP:   Sodium   Date Value Ref Range Status   10/24/2019 143 135 - 144 mmol/L Final   04/06/2018 138 135 - 144 mmol/L Final   06/11/2016 136 135 - 144 mmol/L Final     Potassium   Date Value Ref Range Status   10/24/2019 4.3 3.7 - 5.3 mmol/L Final   04/06/2018 4.4 3.7 - 5.3 mmol/L Final   06/11/2016 4.4 3.7 - 5.3 mmol/L Final     Chloride   Date Value Ref Range Status   10/24/2019 103 98 - 107 mmol/L Final   04/06/2018 98 98 - 107 mmol/L Final   06/11/2016 97 (L) 98 - 107 mmol/L Final     CO2   Date Value Ref Range Status   10/24/2019 28 20 - 31 mmol/L Final   04/06/2018 30 20 - 31 mmol/L Final   06/11/2016 27 20 - 31 mmol/L Final     BUN   Date Value Ref Range Status   10/24/2019 24 (H) 8 - 23 mg/dL Final   04/06/2018 18 8 - 23 mg/dL Final   09/18/2017 27 (H) 8 - 23 mg/dL Final     CREATININE   Date Value Ref Range Status   10/24/2019 0.62 0.50 - 0.90 mg/dL Final   04/06/2018 0. 76 0.50 - 0.90 mg/dL Final   09/18/2017 0.85 0.50 - 0.90 mg/dL Final     POC Creatinine   Date Value Ref Range Status   11/20/2017 <0.30 (L) 0.51 - 1.19 mg/dL Final     Glucose   Date Value Ref Range Status   10/24/2019 112 (H) 70 - 99 mg/dL Final   04/06/2018 108 (H) 70 - 99 mg/dL Final   06/11/2016 108 (H) 70 - 99 mg/dL Final   10/28/2011 87 74 - 106 mg/dL Final     Hepatic:     Amylase: No results found for: AMYLASE  Lipase: No results found for: LIPASE  CARDIAC ENZYMES:     BNP: No results found for: BNP  Lipids:       INR: No results found for: INR  Thyroid:   TSH   Date Value Ref Range Status   10/24/2019 6.24 (H) 0.30 - 5.00 mIU/L Final     Urinalysis:       Cultures:-  -----------------------------------------------------------------    ABGs: No results found for: PHART, PO2ART, TEV9AAX    Pulmonary Functions Testing Results:    05/22/2019: FEV1 1.52 69%, FVC 1.85 66%, FEV1 %, TLC 3.48 75%, DLCO 15.70 88%  02/07/2018: FEV1 1.30 58%, FVC 1.62 58%, TRP3VFE 100%, TLC 3.35 72%, DLCO 13.62 76%    CXR      CT Scans   05/07/2019  1. Stable 4 mm posterior left upper lobe pulmonary nodule, unchanged from   05/07/2018.  Mild emphysema. 2. Cardiomegaly.  Coronary artery disease.  Atherosclerotic calcification of   the aorta. 3. Small hiatal hernia. 05/07/2018  Stable 4 mm nodule left upper lobe. 10/17/2017  5 mm nodule posterior left upper lobe.  No acute process with chronic   findings as described. Compliance data from CPAP. From 10/1/2018 to 10/30/2018  Auto CPAP with pressure maximum of 20 and pressure minimum of 12  Average daily usage 7 hours 11 minutes  Compliance 87 %  Average mean pressure 12.3 cm. AHI 1.2    Assessment and Plan         1. Chronic obstructive pulmonary disease, unspecified COPD type ([address]   2. Lung nodule   3. CRISTINA on CPAP   4. Obesity (BMI 35.0-39.9 without comorbidity)   5.       Dyspnea on exertion     HTN (hypertension)    CAD/Stents drug-eluting     Claudication in peripheral vascular disease ([facility] [address]     CT scan of the chest which shows left upper lobe 4-5 mm nodule is stable from October 2017 to May 2018 and now in May 2019. Plan and Recommendations:    No symptoms of as she has fatigue and tiredness and more daytime sleepiness although no CPAP data is available, since she was having problem with the medication and leak she is going to go to DME today and had it fixed, will monitor for the symptoms and will get data from CPAP now and in few months and if her symptoms improve then advised to continue with the CPAP with the same setting otherwise depending on compliance data determine if she need re-titration  I've advised her again today that she need to follow-up with cardiology as the symptoms of chest heaviness is not related to her COPD she has history of coronary artery disease and previous history of stent. Patient will call cardiology office to make appointment. Continue with Spiriva Respimat 2 spray once daily. She will likely need a combination of Lama/Laba  Albuterol as needed  She will need yearly CT scan for screening and next one should be in May 2020     Maintain an active lifestyle   Refused Flu vaccine   She had Prevnar 13 in April 2018    Continue with CPAP at current setting   CPAP at least 4 hrs qhs  She may need nocturnal oximetry on CPAP to determine whether she will continue to need O2 with CPAP   Wt loss is recommended and discussed  Follow good sleep hygeine instructions  Use humidifier   Questions answered pertaining to diagnosis and management explained importance of compliance with therapy   Need compliance data from CPAP now and in few months    RTC 4 months. It was my pleasure to evaluate Lucina Gallagher today. Please call with questions. Ángel Michael MD             1/29/2020, 2:10 PM    Please note that this chart was generated using voice recognition Dragon dictation software.  Although every effort was made to

## 2020-01-31 RX ORDER — TRIAMCINOLONE ACETONIDE 0.25 MG/G
CREAM TOPICAL
Qty: 30 G | Refills: 1 | Status: SHIPPED | OUTPATIENT
Start: 2020-01-31 | End: 2020-09-08 | Stop reason: SDUPTHER

## 2020-01-31 RX ORDER — CLOBETASOL PROPIONATE 0.5 MG/G
OINTMENT TOPICAL
Qty: 30 G | Status: CANCELLED | OUTPATIENT
Start: 2020-01-31

## 2020-01-31 NOTE — TELEPHONE ENCOUNTER
[facility]:  Lab Frequency Next Occurrence   Full PFT Study With Bronchodilator Once 06/11/2019   VL DUP CAROTID BILATERAL Once 03/25/2020   VL ARTERIAL PVR LOWER WO EXERCISE Once 03/25/2020   CT lung screen [Initial/Annual] Once 05/07/2020            Patient Active Problem List:     Smoker     HTN (hypertension)     CAD/Stents drug-eluting      Serum lipids high     Carotid stenosis, asymptomatic     Nocturnal hypoxia     CRISTINA on CPAP     Type 2 diabetes mellitus with complication, without long-term current use of insulin ([facility] [address]

## 2020-02-04 RX ORDER — OMEPRAZOLE 40 MG/1
CAPSULE, DELAYED RELEASE ORAL
Qty: 30 CAPSULE | Refills: 5 | Status: SHIPPED | OUTPATIENT
Start: 2020-02-04 | End: 2020-08-14

## 2020-02-04 NOTE — TELEPHONE ENCOUNTER
E-scribe request for [facility] . Please review and e-scribe if applicable. Next Visit Date:  Future Appointments   Date Time Provider Zohreh Avilai   2/20/2020 10:45 AM Salena Carranza MD [facility]   3/16/2020  1:30 PM Griselda Freedman MD heartvasc [facility]PP   5/7/2020  1:15 PM [facility] CT RM 1 STCZ CT SCAN [facility] Radiolog   5/13/2020  1:45 PM Alen Villalta MD [address] [facility] Maintenance   Topic Date Due    Diabetic foot exam  01/20/1963    Shingles Vaccine (1 of 2) 01/20/2003    Pneumococcal 65+ years Vaccine (2 of 2 - PPSV23) 04/17/2019    Annual Wellness Visit (AWV)  05/29/2019    Diabetic retinal exam  07/10/2019    Colon cancer screen colonoscopy  11/08/2019    Flu vaccine (1) 10/10/2020 (Originally 9/1/2019)    Hepatitis C screen  10/10/2020 (Originally 1955)    Low dose CT lung screening  05/07/2020    A1C test (Diabetic or Prediabetic)  10/10/2020    Diabetic microalbuminuria test  10/24/2020    Lipid screen  10/24/2020    Potassium monitoring  10/24/2020    Creatinine monitoring  10/24/2020    Breast cancer screen  10/24/2021    DTaP/Tdap/Td vaccine (2 - Td) 10/24/2028    DEXA (modify frequency per FRAX score)  Completed               (applicable per patient's age: Cancer Screenings, Depression Screening, Fall Risk Screening, Immunizations)    Hemoglobin A1C (%)   Date Value   10/10/2019 6.4   10/24/2018 6.3   01/11/2018 6.6     Microalb/Crt.  Ratio (mcg/mg creat)   Date Value   10/24/2019 CANNOT BE CALCULATED     LDL Cholesterol (mg/dL)   Date Value   10/24/2019 51     AST (U/L)   Date Value   10/24/2019 15     ALT (U/L)   Date Value   10/24/2019 11     BUN (mg/dL)   Date Value   10/24/2019 24 (H)      (goal A1C is < 7)   (goal LDL is <100) need 30-50% reduction from baseline     BP Readings from Last 3 Encounters:   01/29/20 (!) 130/56   12/23/19 138/74   10/10/19 (!) 148/72    (goal /80)      All Future Testing planned in [facility]:  Lab Frequency Next

## 2020-02-06 ENCOUNTER — HOSPITAL ENCOUNTER (OUTPATIENT)
Age: 65
Setting detail: OUTPATIENT SURGERY
Discharge: HOME | End: 2020-02-06
Attending: INTERNAL MEDICINE | Admitting: INTERNAL MEDICINE
Payer: MEDICARE

## 2020-02-06 ENCOUNTER — ANESTHESIA EVENT (OUTPATIENT)
Dept: ENDOSCOPY | Age: 65
End: 2020-02-06
Payer: MEDICARE

## 2020-02-06 ENCOUNTER — ANESTHESIA (OUTPATIENT)
Dept: ENDOSCOPY | Age: 65
End: 2020-02-06
Payer: MEDICARE

## 2020-02-06 VITALS
BODY MASS INDEX: 34.55 KG/M2 | HEIGHT: 63 IN | OXYGEN SATURATION: 98 % | TEMPERATURE: 96.6 F | DIASTOLIC BLOOD PRESSURE: 58 MMHG | SYSTOLIC BLOOD PRESSURE: 164 MMHG | HEART RATE: 53 BPM | WEIGHT: 195 LBS | RESPIRATION RATE: 16 BRPM

## 2020-02-06 VITALS
RESPIRATION RATE: 12 BRPM | DIASTOLIC BLOOD PRESSURE: 64 MMHG | SYSTOLIC BLOOD PRESSURE: 141 MMHG | OXYGEN SATURATION: 98 %

## 2020-02-06 PROCEDURE — 7100000010 HC PHASE II RECOVERY - FIRST 15 MIN: Performed by: INTERNAL MEDICINE

## 2020-02-06 PROCEDURE — 3700000001 HC ADD 15 MINUTES (ANESTHESIA): Performed by: INTERNAL MEDICINE

## 2020-02-06 PROCEDURE — 2709999900 HC NON-CHARGEABLE SUPPLY: Performed by: INTERNAL MEDICINE

## 2020-02-06 PROCEDURE — 7100000011 HC PHASE II RECOVERY - ADDTL 15 MIN: Performed by: INTERNAL MEDICINE

## 2020-02-06 PROCEDURE — 2580000003 HC RX 258: Performed by: INTERNAL MEDICINE

## 2020-02-06 PROCEDURE — 3609010400 HC COLONOSCOPY POLYPECTOMY HOT BIOPSY: Performed by: INTERNAL MEDICINE

## 2020-02-06 PROCEDURE — 3700000000 HC ANESTHESIA ATTENDED CARE: Performed by: INTERNAL MEDICINE

## 2020-02-06 PROCEDURE — 2500000003 HC RX 250 WO HCPCS: Performed by: NURSE ANESTHETIST, CERTIFIED REGISTERED

## 2020-02-06 PROCEDURE — 93005 ELECTROCARDIOGRAM TRACING: CPT | Performed by: ANESTHESIOLOGY

## 2020-02-06 PROCEDURE — 88305 TISSUE EXAM BY PATHOLOGIST: CPT

## 2020-02-06 PROCEDURE — 6360000002 HC RX W HCPCS: Performed by: NURSE ANESTHETIST, CERTIFIED REGISTERED

## 2020-02-06 RX ORDER — LIDOCAINE HYDROCHLORIDE 10 MG/ML
INJECTION, SOLUTION EPIDURAL; INFILTRATION; INTRACAUDAL; PERINEURAL PRN
Status: DISCONTINUED | OUTPATIENT
Start: 2020-02-06 | End: 2020-02-06 | Stop reason: SDUPTHER

## 2020-02-06 RX ORDER — SODIUM CHLORIDE 9 MG/ML
INJECTION, SOLUTION INTRAVENOUS ONCE
Status: COMPLETED | OUTPATIENT
Start: 2020-02-06 | End: 2020-02-06

## 2020-02-06 RX ORDER — PROPOFOL 10 MG/ML
INJECTION, EMULSION INTRAVENOUS PRN
Status: DISCONTINUED | OUTPATIENT
Start: 2020-02-06 | End: 2020-02-06 | Stop reason: SDUPTHER

## 2020-02-06 RX ADMIN — PROPOFOL 530 MG: 10 INJECTION, EMULSION INTRAVENOUS at 08:29

## 2020-02-06 RX ADMIN — SODIUM CHLORIDE: 9 INJECTION, SOLUTION INTRAVENOUS at 07:50

## 2020-02-06 RX ADMIN — LIDOCAINE HYDROCHLORIDE 50 MG: 10 INJECTION, SOLUTION EPIDURAL; INFILTRATION; INTRACAUDAL at 08:29

## 2020-02-06 ASSESSMENT — ENCOUNTER SYMPTOMS: SHORTNESS OF BREATH: 0

## 2020-02-06 ASSESSMENT — PAIN SCALES - GENERAL
PAINLEVEL_OUTOF10: 0

## 2020-02-06 ASSESSMENT — PAIN - FUNCTIONAL ASSESSMENT: PAIN_FUNCTIONAL_ASSESSMENT: 0-10

## 2020-02-06 NOTE — ANESTHESIA PRE PROCEDURE
Department of Anesthesiology  Preprocedure Note       Name:  Memo Rascon   Age:  77 y.o.  :  1955                                          MRN:  [id number]         Date:  2020      Surgeon: Debi Skinner):  Rudy Khan MD    Procedure: COLONOSCOPY DIAGNOSTIC (N/A )    Medications prior to admission:   Prior to Admission medications    Medication Sig Start Date End Date Taking? Authorizing Provider   omeprazole (PRILOSEC) 40 MG delayed release capsule take 1 capsule by mouth once daily 20   Mckayla Lopez MD   triamcinolone (KENALOG) 0.025 % cream Apply Topically 20   Mckayla Lopez MD   b complex vitamins capsule Take 1 capsule by mouth daily    Historical Provider, MD   albuterol sulfate HFA (PROVENTIL HFA) 108 (90 Base) MCG/ACT inhaler Inhale 2 puffs into the lungs every 6 hours as needed for Wheezing 20   Elena Alejandro MD   tiotropium (SPIRIVA RESPIMAT) 2.5 MCG/ACT AERS inhaler Inhale 2 puffs into the lungs daily 20   Elena Alejandro MD   Coenzyme Q10-Fish Oil-Vit E (CO-Q 10 OMEGA-3 FISH OIL) CAPS Take 1,000 mg by mouth daily    Historical Provider, MD   metFORMIN (GLUCOPHAGE) 500 MG tablet take 1 tablet by mouth twice a day with meals 10/10/19   Mckayla Lopez MD   hydrochlorothiazide (HYDRODIURIL) 25 MG tablet take 1 tablet by mouth once daily 10/10/19   Mckayla Lopez MD   sertraline (ZOLOFT) 50 MG tablet take 1 tablet by mouth once daily 10/10/19   Mckayla Lopez MD   atorvastatin (LIPITOR) 40 MG tablet take 1 tablet by mouth once daily 10/10/19   Mckayla Lopez MD   losartan (COZAAR) 50 MG tablet take 1 tablet by mouth once daily 10/10/19   Mckayla Lopez MD   BYSTOLIC 5 MG tablet take 1 tablet by mouth once daily 19   Historical Provider, MD   betamethasone dipropionate (DIPROLENE) 0.05 % ointment Apply topically daily.  18   Alisha Gordon MD   potassium bicarbonate-potassium chloride (K-LYTE CL) 25 MEQ TBEF Take by mouth daily    Historical Provider, 10/28/2011    PROT 8.3 10/24/2019    CALCIUM 9.4 10/24/2019    BILITOT 0.38 10/24/2019    ALKPHOS 75 10/24/2019    AST 15 10/24/2019    ALT 11 10/24/2019       POC Tests: No results for input(s): POCGLU, POCNA, POCK, POCCL, POCBUN, POCHEMO, POCHCT in the last 72 hours. Coags: No results found for: PROTIME, INR, APTT    HCG (If Applicable): No results found for: PREGTESTUR, PREGSERUM, HCG, HCGQUANT     ABGs: No results found for: PHART, PO2ART, YWQ3UYQ, PCG9PBC, BEART, X2ZCCIBX     Type & Screen (If Applicable):  No results found for: LABABO, [address]    Anesthesia Evaluation  Patient summary reviewed and Nursing notes reviewed no history of anesthetic complications:   Airway: Mallampati: II  TM distance: >3 FB   Neck ROM: full  Mouth opening: > = 3 FB Dental: normal exam         Pulmonary:normal exam  breath sounds clear to auscultation  (+) COPD:  sleep apnea: on CPAP,      (-) asthma, shortness of breath and recent URI                          ROS comment: Former smoker; 60 pack years   Cardiovascular:  Exercise tolerance: good (>4 METS),   (+) hypertension:, CAD:, CABG/stent (Stents x 2):, hyperlipidemia    (-) past MI,  angina,  CHF, orthopnea and  RAHMAN    ECG reviewed  Rhythm: regular  Rate: normal           Beta Blocker:  Not on Beta Blocker         Neuro/Psych:   (+) psychiatric history:depression/anxiety    (-) seizures, TIA and CVA           GI/Hepatic/Renal:   (+) GERD:,           Endo/Other:    (+) DiabetesType II DM, , .                 Abdominal:           Vascular:   + PVD, aortic or cerebral (Carotid stenosis), . Anesthesia Plan      MAC     ASA 3       Induction: intravenous. Anesthetic plan and risks discussed with patient.       Plan discussed with CRNA and surgical team.                  Ricardo Sullivan MD   2/6/2020

## 2020-02-06 NOTE — H&P
History and Physical    Pt Name: Apple Kahn  MRN: [id number]  YOB: 1955  Date of evaluation: 2020    SUBJECTIVE:   History of Chief Complaint:    Patient complains of history of colon polyps. She has had these in the past, has had three colonoscopies that have all found polyps. Patient says that her last colonoscopy was approximately three years ago. She has been scheduled for colonoscopy today. Past Medical History    has a past medical history of Angina pectoris ([address] Arthritis, Bipolar disorder ([address] CAD (coronary artery disease), Carotid stenosis, asymptomatic, Chronic back pain, COPD (chronic obstructive pulmonary disease) ([address] Depression, Dyspnea, Family history of colon cancer, Family history of liver cancer, Family history of ovarian cancer, GERD (gastroesophageal reflux disease), History of colon polyps, Hyperlipidemia, Hypertension, Lung nodule, New onset type 2 diabetes mellitus ([address] Obesity, Peripheral vascular disease ([address] Poor historian, Sleep apnea, and Smoking greater than 40 pack years. Patient is a bit of a poor historian. Past Surgical History   has a past surgical history that includes  section; Nose surgery; Tonsillectomy; Ankle surgery; Induced ; Coronary angioplasty with stent (); Colonoscopy; Endoscopy, colon, diagnostic; Nerve Block (Bilateral, 2018); Nerve Block (2019); Nerve Block (Bilateral, 02/15/2019); Cardiac catheterization (10/11/2013); and Cataract removal.  Medications  Prior to Admission medications    Medication Sig Start Date End Date Taking?  Authorizing Provider   omeprazole (PRILOSEC) 40 MG delayed release capsule take 1 capsule by mouth once daily 20  Yes Alexandria Sykes MD   b complex vitamins capsule Take 1 capsule by mouth daily   Yes Historical Provider, MD   albuterol sulfate HFA (PROVENTIL HFA) 108 (90 Base) MCG/ACT inhaler Inhale 2 puffs into the lungs every 6 hours as needed for Wheezing 20 for 40 years, quit 2015. reports current alcohol use. Occasionally. reports current drug use. Drug: Marijuana. Usage is weekly. Marital Status   Occupation retired    OBJECTIVE:   VITALS:  height is 5' 3\" (1.6 m) and weight is 195 lb (88.5 kg). Her temperature is 97.7 °F (36.5 °C). Her blood pressure is 149/69 (abnormal) and her pulse is 51. Her respiration is 12 and oxygen saturation is 94%. CONSTITUTIONAL:alert & oriented x 3, no acute distress. SKIN:  Warm and dry, no rashes   HEAD:  Normocephalic, atraumatic   EYES: PERRL. EOMs intact. EARS:  Hearing grossly WNL. NOSE:  Nares patent. No rhinorrhea   MOUTH/THROAT:  benign  NECK:supple, no lymphadenopathy  LUNGS: Clear to auscultation bilaterally, no wheezes. CARDIOVASCULAR: Heart sounds are normal.  Regular rate and rhythm. ABDOMEN: soft, non tender, non distended. Obese. EXTREMITIES: no edema bilateral lower extremities. IMPRESSIONS:   1. History of colon polyps  2.  has a past medical history of Angina pectoris ([address] Arthritis, Bipolar disorder ([address] CAD (coronary artery disease), Carotid stenosis, asymptomatic (2/26/2015), Chronic back pain, COPD (chronic obstructive pulmonary disease) ([address] Depression, Dyspnea, Family history of colon cancer, Family history of liver cancer, Family history of ovarian cancer, GERD (gastroesophageal reflux disease), History of colon polyps, Hyperlipidemia, Hypertension, Lung nodule, New onset type 2 diabetes mellitus ([address] (1/11/2018), Obesity, Peripheral vascular disease ([address] Poor historian, Sleep apnea, and Smoking greater than 40 pack years.    PLANS:   1. colonoscopy    BILL IZQUIERDO PA-C  Electronically signed 2/6/2020 at 7:51 AM

## 2020-02-07 LAB
EKG ATRIAL RATE: 43 BPM
EKG Q-T INTERVAL: 528 MS
EKG QRS DURATION: 100 MS
EKG QTC CALCULATION (BAZETT): 446 MS
EKG R AXIS: -23 DEGREES
EKG T AXIS: 60 DEGREES
EKG VENTRICULAR RATE: 43 BPM
SURGICAL PATHOLOGY REPORT: NORMAL

## 2020-02-07 PROCEDURE — 93010 ELECTROCARDIOGRAM REPORT: CPT | Performed by: INTERNAL MEDICINE

## 2020-02-12 RX ORDER — LOSARTAN POTASSIUM 50 MG/1
TABLET ORAL
Qty: 90 TABLET | Refills: 1 | Status: SHIPPED | OUTPATIENT
Start: 2020-02-12 | End: 2020-02-20 | Stop reason: SDUPTHER

## 2020-02-12 NOTE — TELEPHONE ENCOUNTER
Request for losartan, sertraline - medications pended. Patient has appt 2/20. Please fill if appropriate. Next Visit Date:  Future Appointments   Date Time Provider Zohreh Avilai   2/20/2020 10:45 AM Ar Holden MD [facility] IM [facility]   3/16/2020  1:30 PM Mello Salazar MD heartvasc [facility]   5/7/2020  1:15 PM [facility] CT RM 1 STCZ CT SCAN [facility] Radiolog   5/13/2020  1:45 PM Nik Baez  [address] Maintenance   Topic Date Due    Diabetic foot exam  01/20/1963    Hepatitis B vaccine (1 of 3 - Risk 3-dose series) 01/20/1972    Shingles Vaccine (1 of 2) 01/20/2003    Pneumococcal 65+ years Vaccine (2 of 2 - PPSV23) 04/17/2019    Annual Wellness Visit (AWV)  05/29/2019    Diabetic retinal exam  07/10/2019    Flu vaccine (1) 10/10/2020 (Originally 9/1/2019)    Hepatitis C screen  10/10/2020 (Originally 1955)    Low dose CT lung screening  05/07/2020    A1C test (Diabetic or Prediabetic)  10/10/2020    Diabetic microalbuminuria test  10/24/2020    Lipid screen  10/24/2020    Potassium monitoring  10/24/2020    Creatinine monitoring  10/24/2020    Breast cancer screen  10/24/2021    Colon cancer screen colonoscopy  02/06/2023    DTaP/Tdap/Td vaccine (2 - Td) 10/24/2028    DEXA (modify frequency per FRAX score)  Completed    Hepatitis A vaccine  Aged Out    Hib vaccine  Aged Out    Meningococcal (ACWY) vaccine  Aged Out       Hemoglobin A1C (%)   Date Value   10/10/2019 6.4   10/24/2018 6.3   01/11/2018 6.6             ( goal A1C is < 7)   Microalb/Crt.  Ratio (mcg/mg creat)   Date Value   10/24/2019 CANNOT BE CALCULATED     LDL Cholesterol (mg/dL)   Date Value   10/24/2019 51       (goal LDL is <100)   AST (U/L)   Date Value   10/24/2019 15     ALT (U/L)   Date Value   10/24/2019 11     BUN (mg/dL)   Date Value   10/24/2019 24 (H)     BP Readings from Last 3 Encounters:   02/06/20 (!) 164/58   02/06/20 (!) 141/64   01/29/20 (!) 130/56          (goal

## 2020-02-20 ENCOUNTER — OFFICE VISIT (OUTPATIENT)
Dept: INTERNAL MEDICINE | Age: 65
End: 2020-02-20
Payer: MEDICARE

## 2020-02-20 VITALS
BODY MASS INDEX: 35.07 KG/M2 | HEART RATE: 45 BPM | SYSTOLIC BLOOD PRESSURE: 151 MMHG | WEIGHT: 198 LBS | DIASTOLIC BLOOD PRESSURE: 80 MMHG

## 2020-02-20 PROCEDURE — G8427 DOCREV CUR MEDS BY ELIG CLIN: HCPCS | Performed by: INTERNAL MEDICINE

## 2020-02-20 PROCEDURE — 3046F HEMOGLOBIN A1C LEVEL >9.0%: CPT | Performed by: INTERNAL MEDICINE

## 2020-02-20 PROCEDURE — 99211 OFF/OP EST MAY X REQ PHY/QHP: CPT | Performed by: INTERNAL MEDICINE

## 2020-02-20 PROCEDURE — G0009 ADMIN PNEUMOCOCCAL VACCINE: HCPCS | Performed by: INTERNAL MEDICINE

## 2020-02-20 PROCEDURE — 1123F ACP DISCUSS/DSCN MKR DOCD: CPT | Performed by: INTERNAL MEDICINE

## 2020-02-20 PROCEDURE — G8417 CALC BMI ABV UP PARAM F/U: HCPCS | Performed by: INTERNAL MEDICINE

## 2020-02-20 PROCEDURE — G8926 SPIRO NO PERF OR DOC: HCPCS | Performed by: INTERNAL MEDICINE

## 2020-02-20 PROCEDURE — 99214 OFFICE O/P EST MOD 30 MIN: CPT | Performed by: INTERNAL MEDICINE

## 2020-02-20 PROCEDURE — G8399 PT W/DXA RESULTS DOCUMENT: HCPCS | Performed by: INTERNAL MEDICINE

## 2020-02-20 PROCEDURE — 3017F COLORECTAL CA SCREEN DOC REV: CPT | Performed by: INTERNAL MEDICINE

## 2020-02-20 PROCEDURE — 3023F SPIROM DOC REV: CPT | Performed by: INTERNAL MEDICINE

## 2020-02-20 PROCEDURE — G0444 DEPRESSION SCREEN ANNUAL: HCPCS | Performed by: INTERNAL MEDICINE

## 2020-02-20 PROCEDURE — 1090F PRES/ABSN URINE INCON ASSESS: CPT | Performed by: INTERNAL MEDICINE

## 2020-02-20 PROCEDURE — 2022F DILAT RTA XM EVC RTNOPTHY: CPT | Performed by: INTERNAL MEDICINE

## 2020-02-20 PROCEDURE — 1036F TOBACCO NON-USER: CPT | Performed by: INTERNAL MEDICINE

## 2020-02-20 PROCEDURE — G8484 FLU IMMUNIZE NO ADMIN: HCPCS | Performed by: INTERNAL MEDICINE

## 2020-02-20 PROCEDURE — 4040F PNEUMOC VAC/ADMIN/RCVD: CPT | Performed by: INTERNAL MEDICINE

## 2020-02-20 RX ORDER — BETAMETHASONE DIPROPIONATE 0.05 %
OINTMENT (GRAM) TOPICAL
Qty: 50 G | Refills: 2 | Status: SHIPPED | OUTPATIENT
Start: 2020-02-20 | End: 2021-06-04

## 2020-02-20 RX ORDER — CARVEDILOL 3.12 MG/1
3.12 TABLET ORAL 2 TIMES DAILY
Qty: 60 TABLET | Refills: 3 | Status: SHIPPED | OUTPATIENT
Start: 2020-02-20 | End: 2020-04-29 | Stop reason: SDUPTHER

## 2020-02-20 RX ORDER — LOSARTAN POTASSIUM 100 MG/1
TABLET ORAL
Qty: 90 TABLET | Refills: 1 | Status: SHIPPED | OUTPATIENT
Start: 2020-02-20 | End: 2020-09-01

## 2020-02-20 ASSESSMENT — ENCOUNTER SYMPTOMS
BLOOD IN STOOL: 0
EYE REDNESS: 0
SHORTNESS OF BREATH: 0
TROUBLE SWALLOWING: 0
RHINORRHEA: 0
PHOTOPHOBIA: 0
ABDOMINAL PAIN: 0
WHEEZING: 0
CONSTIPATION: 0
COUGH: 0

## 2020-02-20 ASSESSMENT — PATIENT HEALTH QUESTIONNAIRE - PHQ9
9. THOUGHTS THAT YOU WOULD BE BETTER OFF DEAD, OR OF HURTING YOURSELF: 0
SUM OF ALL RESPONSES TO PHQ QUESTIONS 1-9: 5
3. TROUBLE FALLING OR STAYING ASLEEP: 0
4. FEELING TIRED OR HAVING LITTLE ENERGY: 1
10. IF YOU CHECKED OFF ANY PROBLEMS, HOW DIFFICULT HAVE THESE PROBLEMS MADE IT FOR YOU TO DO YOUR WORK, TAKE CARE OF THINGS AT HOME, OR GET ALONG WITH OTHER PEOPLE: 1
SUM OF ALL RESPONSES TO PHQ9 QUESTIONS 1 & 2: 2
5. POOR APPETITE OR OVEREATING: 0
2. FEELING DOWN, DEPRESSED OR HOPELESS: 1
8. MOVING OR SPEAKING SO SLOWLY THAT OTHER PEOPLE COULD HAVE NOTICED. OR THE OPPOSITE, BEING SO FIGETY OR RESTLESS THAT YOU HAVE BEEN MOVING AROUND A LOT MORE THAN USUAL: 0
7. TROUBLE CONCENTRATING ON THINGS, SUCH AS READING THE NEWSPAPER OR WATCHING TELEVISION: 1
1. LITTLE INTEREST OR PLEASURE IN DOING THINGS: 1
SUM OF ALL RESPONSES TO PHQ QUESTIONS 1-9: 5
6. FEELING BAD ABOUT YOURSELF - OR THAT YOU ARE A FAILURE OR HAVE LET YOURSELF OR YOUR FAMILY DOWN: 1

## 2020-02-20 NOTE — PROGRESS NOTES
Visit Information    Have you changed or started any medications since your last visit including any over-the-counter medicines, vitamins, or herbal medicines? no   Are you having any side effects from any of your medications? -  no  Have you stopped taking any of your medications? Is so, why? -  no    Have you seen any other physician or provider since your last visit? No  Have you had any other diagnostic tests since your last visit? No  Have you been seen in the emergency room and/or had an admission to a hospital since we last saw you? No  Have you had your routine dental cleaning in the past 6 months? no    Have you activated your [employer] account? If not, what are your barriers?  Yes     Patient Care Team:  Mckayla Lopez MD as PCP - General (Internal Medicine)  Mckayla Lopez MD as PCP - [facility]  Elena Alejandro MD as Consulting Physician (Pulmonology)  Maryanne Avendaño RN as Nurse Navigator    Medical History Review  Past Medical, Family, and Social History reviewed and does contribute to the patient presenting condition    Health Maintenance   Topic Date Due    Diabetic foot exam  01/20/1963    Shingles Vaccine (1 of 2) 01/20/2003    Pneumococcal 65+ years Vaccine (2 of 2 - PPSV23) 04/17/2019    Annual Wellness Visit (AWV)  05/29/2019    Diabetic retinal exam  07/10/2019    Flu vaccine (1) 10/10/2020 (Originally 9/1/2019)    Hepatitis C screen  10/10/2020 (Originally 1955)    Hepatitis B vaccine (1 of 3 - Risk 3-dose series) 02/20/2021 (Originally 1/20/1972)    Low dose CT lung screening  05/07/2020    A1C test (Diabetic or Prediabetic)  10/10/2020    Diabetic microalbuminuria test  10/24/2020    Lipid screen  10/24/2020    Potassium monitoring  10/24/2020    Creatinine monitoring  10/24/2020    Breast cancer screen  10/24/2021    Colon cancer screen colonoscopy  02/06/2023    DTaP/Tdap/Td vaccine (2 - Td) 10/24/2028    DEXA (modify frequency per FRAX score)  Completed

## 2020-02-20 NOTE — PATIENT INSTRUCTIONS
Return To Clinic 3/19/2020 . After Visit Summary  given and reviewed. It is very important for your care that you keep your appointment. If for some reason you are unable to keep your appointment it is equally important that you call our office at [phone] to cancel your appointment and reschedule. Failure to do so may result in your termination from our practice.     -Bloodwork orders given to patient, they will have them done before their next visit.      Linda,CMA

## 2020-02-20 NOTE — PROGRESS NOTES
[facility]    Progress Note    Date of patient's visit: 2/20/2020    Patient's Name:  Stan Rhodes    YOB: 1955            Patient Care Team:  Silvia Ness MD as PCP - General (Internal Medicine)  Silvia Ness MD as PCP [place] [facility]  Carito Salazar MD as Consulting Physician (Pulmonology)  Lelo Law RN as Nurse Navigator    REASON FOR VISIT: Routine outpatient follow     Chief Complaint   Patient presents with    Hypertension     Pt c/o having some chest heaviness when she breathes is has cardiology appt     Health Maintenance     eye records requested ([facility] Dr.Mark Pranay Lugo)         HISTORY OF PRESENT ILLNESS:    History was obtained from the patient. Stan Rhodes is a 77 y.o. is here for follow-up. She has hypertension, diabetes type 2, obesity, CAD. Also COPD and peripheral vascular disease. Follows up with several specialists including cardiology pulmonology and vascular surgeon. She has had some fatigue. She has been noted to be bradycardic several times. She is on Bystolic 5 mg. Blood pressures also been running high the last few months. She denies dizziness or headaches. She denies any shortness of breath. She is using all her inhalers. Colonoscopy 2020  Date:  2/6/2020                Procedure:  Colonoscopy with Hot Snare Polypectomy     Indication:  History of polyps     Findings   1. Sigmoid polyp, sessile, small; resected with hot snare. 2. Multiple diminutive/small sessile polyps in the proximal ascending colon and cecum. 3. Hemorrhoids  4. Diverticulosis L > R     Recommendations  1. Await pathology. 2. Repeat colonoscopy in 3 years. TWO DAY PREP       Pathology   -- Diagnosis --   1.  POLYP, COLORECTAL (ASCENDING):        - BENIGN ADENOMATOUS POLYP (TUBULAR ADENOMA).      - SUBMUCOSAL ADIPOSE METAPLASIA (LIPOMA).      2.  POLYP, COLORECTAL (RECTUM):        - BENIGN Refill    losartan (COZAAR) 50 MG tablet take 1 tablet by mouth once daily 90 tablet 1    sertraline (ZOLOFT) 50 MG tablet take 1 tablet by mouth once daily 30 tablet 3    omeprazole (PRILOSEC) 40 MG delayed release capsule take 1 capsule by mouth once daily 30 capsule 5    triamcinolone (KENALOG) 0.025 % cream Apply Topically 30 g 1    b complex vitamins capsule Take 1 capsule by mouth daily      albuterol sulfate HFA (PROVENTIL HFA) 108 (90 Base) MCG/ACT inhaler Inhale 2 puffs into the lungs every 6 hours as needed for Wheezing 1 Inhaler 5    tiotropium (SPIRIVA RESPIMAT) 2.5 MCG/ACT AERS inhaler Inhale 2 puffs into the lungs daily 1 Inhaler 11    Coenzyme Q10-Fish Oil-Vit E (CO-Q 10 OMEGA-3 FISH OIL) CAPS Take 1,000 mg by mouth daily      metFORMIN (GLUCOPHAGE) 500 MG tablet take 1 tablet by mouth twice a day with meals 60 tablet 3    hydrochlorothiazide (HYDRODIURIL) 25 MG tablet take 1 tablet by mouth once daily 90 tablet 2    atorvastatin (LIPITOR) 40 MG tablet take 1 tablet by mouth once daily 90 tablet 1    BYSTOLIC 5 MG tablet take 1 tablet by mouth once daily  0    betamethasone dipropionate (DIPROLENE) 0.05 % ointment Apply topically daily. 50 g 2    potassium bicarbonate-potassium chloride (K-LYTE CL) 25 MEQ TBEF Take by mouth daily      magnesium 30 MG tablet Take 30 mg by mouth 2 times daily      Multiple Vitamin (MULTI-VITAMIN PO) Take 1 tablet by mouth daily.  aspirin 325 MG tablet Take 325 mg by mouth daily. No current facility-administered medications on file prior to visit. SOCIAL HISTORY    Reviewed and no change from previous record. Colleen Apple  reports that she quit smoking about 4 years ago. Her smoking use included cigarettes. She started smoking about 52 years ago. She has a 60.00 pack-year smoking history.  She has never used smokeless tobacco.    FAMILY HISTORY:    Reviewed and No change from previous visit    HEALTH MAINTENANCE DUE:      Health Maintenance Due   Topic Date Due    Diabetic foot exam  01/20/1963    Pneumococcal 65+ years Vaccine (2 of 2 - PPSV23) 04/17/2019    Annual Wellness Visit (AWV)  05/29/2019    Diabetic retinal exam  07/10/2019       REVIEW OF SYSTEMS:    12 point review of symptoms completed and found to be normal except noted in the HPI    Review of Systems   Constitutional: Positive for fatigue and fever. Negative for unexpected weight change. HENT: Positive for congestion. Negative for postnasal drip, rhinorrhea and trouble swallowing. Eyes: Negative for photophobia, redness and visual disturbance. Respiratory: Negative for cough, shortness of breath and wheezing. Cardiovascular: Negative for chest pain, palpitations and leg swelling. Gastrointestinal: Negative for abdominal pain, blood in stool and constipation. Endocrine: Negative for polydipsia and polyuria. Genitourinary: Positive for urgency. Negative for dysuria and frequency. Neurological: Negative for dizziness, weakness, numbness and headaches. Hematological: Negative for adenopathy. Does not bruise/bleed easily. Psychiatric/Behavioral: Positive for sleep disturbance. Negative for dysphoric mood. The patient is nervous/anxious. PHYSICAL EXAM:     Vitals:    02/20/20 1109 02/20/20 1123   BP: (!) 158/69 (!) 151/80   Site: Right Upper Arm Right Upper Arm   Position: Sitting Sitting   Cuff Size: Large Adult Large Adult   Pulse: (!) 49 (!) 45   Weight: 198 lb (89.8 kg)      Body mass index is 35.07 kg/m².     BP Readings from Last 3 Encounters:   02/20/20 (!) 151/80   02/06/20 (!) 164/58   02/06/20 (!) 141/64        Wt Readings from Last 3 Encounters:   02/20/20 198 lb (89.8 kg)   02/06/20 195 lb (88.5 kg)   01/29/20 197 lb 12.8 oz (89.7 kg)       Physical Exam      HENT: Normocephalic, Atraumatic, Bilateral external ears normal, Oropharynx moist, No oral exudates, Nose normal. Neck- Normal range of motion, No tenderness, Supple, No 3.125 MG tablet; Take 1 tablet by mouth 2 times daily  Dispense: 60 tablet; Refill: 3    2. Type 2 diabetes mellitus with complication, without long-term current use of insulin (HCC)  Asa  Statin    - Hemoglobin A1C; Future  - metFORMIN (GLUCOPHAGE) 500 MG tablet; take 1 tablet by mouth twice a day with meals  Dispense: 60 tablet; Refill: 3    3. Stage 2 moderate COPD by GOLD classification ([facility])  Stable  Continue inhalers      4. Anxiety  On sertraline    - TSH with Reflex; Future    5. Coronary artery disease involving native coronary artery of native heart without angina pectoris  Asa  Statin    - carvedilol (COREG) 3.125 MG tablet; Take 1 tablet by mouth 2 times daily  Dispense: 60 tablet; Refill: 3    6. Dyslipidemia  statins    7. Bilateral carotid artery occlusion  Follow up with vascular surgeon     8. CRISTINA on CPAP      9. Necrobiosis lipoidica    - betamethasone dipropionate (DIPROLENE) 0.05 % ointment; Apply topically daily. Dispense: 50 g; Refill: 2    10. Need for prophylactic vaccination against Streptococcus pneumoniae (pneumococcus)    - Pneumococcal polysaccharide vaccine 23-valent greater than or equal to 3yo subcutaneous/IM  - DC IMMUNIZ ADMIN,1 SINGLE/COMB VAC/TOXOID    11. Class 2 severe obesity due to excess calories with serious comorbidity and body mass index (BMI) of 35.0 to 35.9 in adult ([facility]            FOLLOW UP AND INSTRUCTIONS:   Return in about 4 weeks (around 3/19/2020). 1. [place] received counseling on the following healthy behaviors: nutrition, exercise and medication adherence    2. Reviewed prior labs and health maintenance. 3. Discussed use, benefit, and side effects of prescribed medications. Barriers to medication compliance addressed. All patient questions answered. Pt voiced understanding.        Danie Jensen  Attending Physician, [address] [place] [address], Internal Medicine Residency Program  [address] [place] [address]  2/20/2020, 11:30 AM

## 2020-02-22 ENCOUNTER — HOSPITAL ENCOUNTER (OUTPATIENT)
Age: 65
Discharge: HOME | End: 2020-02-22
Payer: MEDICARE

## 2020-02-22 LAB — TSH SERPL DL<=0.05 MIU/L-ACNC: 3.61 MIU/L (ref 0.3–5)

## 2020-02-22 PROCEDURE — 36415 COLL VENOUS BLD VENIPUNCTURE: CPT

## 2020-02-22 PROCEDURE — 83036 HEMOGLOBIN GLYCOSYLATED A1C: CPT

## 2020-02-22 PROCEDURE — 84443 ASSAY THYROID STIM HORMONE: CPT

## 2020-02-23 LAB
ESTIMATED AVERAGE GLUCOSE: 126 MG/DL
HBA1C MFR BLD: 6 % (ref 4–6)

## 2020-03-12 NOTE — TELEPHONE ENCOUNTER
Readings from Last 3 Encounters:   02/20/20 (!) 151/80   02/06/20 (!) 164/58   02/06/20 (!) 141/64          (goal 120/80)    All Future Testing planned in [facility]  Lab Frequency Next Occurrence   VL DUP CAROTID BILATERAL Once 03/25/2020   VL ARTERIAL PVR LOWER WO EXERCISE Once 03/25/2020   CT lung screen [Initial/Annual] Once 05/07/2020         Patient Active Problem List:     Smoker     HTN (hypertension)     CAD/Stents drug-eluting      Serum lipids high     Carotid stenosis, asymptomatic     Nocturnal hypoxia     CRISTINA on CPAP     Type 2 diabetes mellitus with complication, without long-term current use of insulin ([facility]

## 2020-03-13 RX ORDER — ATORVASTATIN CALCIUM 40 MG/1
TABLET, FILM COATED ORAL
Qty: 90 TABLET | Refills: 1 | Status: SHIPPED | OUTPATIENT
Start: 2020-03-13 | End: 2020-09-08 | Stop reason: SDUPTHER

## 2020-03-17 ENCOUNTER — TELEPHONE (OUTPATIENT)
Dept: VASCULAR SURGERY | Age: 65
End: 2020-03-17

## 2020-03-17 NOTE — TELEPHONE ENCOUNTER
PT calls stating she does not want to come to her appointment 3/30 d/t the CVOID-19. Explained to pt that at this time we are not cancelling any appointments but if she would like to cancel she can. Pt verbalized understanding and states she wants to reschedule her appointment for the end of April.

## 2020-03-27 ENCOUNTER — TELEPHONE (OUTPATIENT)
Dept: VASCULAR SURGERY | Age: 65
End: 2020-03-27

## 2020-03-27 NOTE — TELEPHONE ENCOUNTER
Dr. Murali Mckeon stated he would like to reschedule the patient's appointment due to the current COVID-19 situation. He would like to minimize the risk to our patients and staff. I cancelled the appointment and contacted the patient. I explained that we would call them back once we are able to reschedule their appointment. Patient verbalized understanding. Patient also states she canceled her testing and would need it rescheduled at [address] [place] [address].

## 2020-04-10 ENCOUNTER — TELEPHONE (OUTPATIENT)
Dept: INTERNAL MEDICINE | Age: 65
End: 2020-04-10

## 2020-04-10 NOTE — TELEPHONE ENCOUNTER
Medication Management Service    Patient's Name: Laureen Leger YOB: 1955            ______________________________________________________________________    Date of patient's visit: 4/14/2020    Subjective/Objective: Laureen Leger reviewed by pharmacy regarding the targeted intervention(s) listed below as identified by OutcomesMTM. Targeted Interventions:  [] Medication adherence   [] Patient education  [x] Suboptimal drug   [] Medication assessment  [] Cost-effective alternative  [] Needs drug therapy  [] Needs laboratory monitoring  [] Drug interaction  [] Unnecessary therapy  [] Adverse drug reaction  [] Inappropriate administration  ______________________________________________________________________    Assessment/Plan:  Patient with intervention significant diagnoses of CAD and COPD. Strong indication for beta-blocker therapy due to CAD. BBs reduce mortality in patients with COPD and coexisting CAD and should be used whenever possible. Cardioselective BBs are safe in patients with COPD who have an indication for their use. Nonselective BBs are better to avoid in general due to possible increase risk of bronchospasm. Patient previously on cardioselective BB therapy (nebivolol) but recently changed to non-selective BB (carvedilol); recommend change in therapy to cardioselective BB without THOR (metoprolol; atenolol; or bisoprolol); if additional BP lowering is needed would recommend use of CCB (ie amlodipine) or spironolactone (with discontinuation of potassium supplement).      Christopher Fox, FranciscoD   Ambulatory Clinical Pharmacist   10:03 AM

## 2020-04-14 ENCOUNTER — VIRTUAL VISIT (OUTPATIENT)
Dept: INTERNAL MEDICINE | Age: 65
End: 2020-04-14
Payer: MEDICARE

## 2020-04-14 VITALS — SYSTOLIC BLOOD PRESSURE: 146 MMHG | HEART RATE: 55 BPM | DIASTOLIC BLOOD PRESSURE: 62 MMHG

## 2020-04-14 PROCEDURE — 99213 OFFICE O/P EST LOW 20 MIN: CPT | Performed by: INTERNAL MEDICINE

## 2020-04-14 RX ORDER — AMLODIPINE BESYLATE 5 MG/1
5 TABLET ORAL DAILY
Qty: 30 TABLET | Refills: 3 | Status: SHIPPED | OUTPATIENT
Start: 2020-04-14 | End: 2020-08-14

## 2020-04-14 RX ORDER — POT CHLORIDE/POT BICARB/CIT AC 25 MEQ
25 TABLET, EFFERVESCENT ORAL DAILY
Qty: 30 TABLET | Refills: 1 | Status: SHIPPED | OUTPATIENT
Start: 2020-04-14 | End: 2021-01-05 | Stop reason: SDUPTHER

## 2020-04-14 RX ORDER — HYDROCHLOROTHIAZIDE 25 MG/1
TABLET ORAL
Qty: 90 TABLET | Refills: 2 | Status: SHIPPED | OUTPATIENT
Start: 2020-04-14 | End: 2021-03-04 | Stop reason: SDUPTHER

## 2020-04-14 ASSESSMENT — ENCOUNTER SYMPTOMS
CHEST TIGHTNESS: 0
WHEEZING: 0
COUGH: 1
SHORTNESS OF BREATH: 0
EYE REDNESS: 0
STRIDOR: 0

## 2020-04-14 NOTE — PROGRESS NOTES
2020    TELEHEALTH EVALUATION -- Audio/Visual (During PKUGG-90 public health emergency)    HPI:    Lizeth Powers (:  1955) has requested an audio/video evaluation for the following concern(s):    Patient initiated a visit on doxy. me to go over her chronic complaints. Overall she is doing well. She has a mild dry cough. No shortness of breath. No fever or chills. No wheezing. No expectoration. She has not tried any Mucinex or other medications. She has an appointment with a pulmonologist next month. Overall anxiety is controlled. She is feeling good. She is monitoring her blood pressure and her systolics are ranging between 1 40-1 60. Occasional 170. Losartan has helped a little as it was increased last visit. Her pulse is usually in the 50s and Coreg cannot be increased. She denies any leg edema. She denies orthopnea. She is on hydrochlorothiazide. Review of Systems   Constitutional: Negative for chills, fatigue, fever and unexpected weight change. Eyes: Negative for redness and visual disturbance. Respiratory: Positive for cough. Negative for chest tightness, shortness of breath, wheezing and stridor. Cardiovascular: Negative for chest pain, palpitations and leg swelling. Endocrine: Negative for polydipsia and polyuria. Musculoskeletal: Positive for arthralgias. Neurological: Negative for dizziness, seizures, weakness and headaches. Hematological: Negative for adenopathy. Does not bruise/bleed easily. Psychiatric/Behavioral: Negative for dysphoric mood and sleep disturbance. The patient is nervous/anxious. Prior to Visit Medications    Medication Sig Taking? Authorizing Provider   atorvastatin (LIPITOR) 40 MG tablet take 1 tablet by mouth once daily Yes Zach Davis MD   betamethasone dipropionate (DIPROLENE) 0.05 % ointment Apply topically daily.  Yes Yamilka Garrido MD   metFORMIN (GLUCOPHAGE) 500 MG tablet take 1 tablet by mouth twice a day with

## 2020-04-29 ENCOUNTER — VIRTUAL VISIT (OUTPATIENT)
Dept: INTERNAL MEDICINE | Age: 65
End: 2020-04-29
Payer: MEDICARE

## 2020-04-29 VITALS — SYSTOLIC BLOOD PRESSURE: 115 MMHG | HEART RATE: 62 BPM | DIASTOLIC BLOOD PRESSURE: 58 MMHG

## 2020-04-29 PROCEDURE — 99213 OFFICE O/P EST LOW 20 MIN: CPT | Performed by: INTERNAL MEDICINE

## 2020-04-29 RX ORDER — CARVEDILOL 3.12 MG/1
3.12 TABLET ORAL 2 TIMES DAILY
Qty: 60 TABLET | Refills: 3 | Status: SHIPPED | OUTPATIENT
Start: 2020-04-29 | End: 2021-01-05 | Stop reason: SDUPTHER

## 2020-04-29 ASSESSMENT — ENCOUNTER SYMPTOMS
SINUS PAIN: 0
BACK PAIN: 0
COUGH: 0
SORE THROAT: 0
SHORTNESS OF BREATH: 0
SINUS PRESSURE: 0
WHEEZING: 0

## 2020-04-29 NOTE — PROGRESS NOTES
rate-    Respiratory rate-    Temperature-  Pulse oximetry-     Constitutional: [x] Appears well-developed and well-nourished [] No apparent distress      [] Abnormal-   Mental status  [x] Alert and awake  [x] Oriented to person/place/time []Able to follow commands      Eyes:  EOM    [x]  Normal  [] Abnormal-  Sclera  [x]  Normal  [] Abnormal -         Discharge [x]  None visible  [] Abnormal -    HENT:   [x] Normocephalic, atraumatic. [] Abnormal   [] Mouth/Throat: Mucous membranes are moist.     External Ears [] Normal  [] Abnormal-     Neck: [] No visualized mass     Pulmonary/Chest: [x] Respiratory effort normal.  [] No visualized signs of difficulty breathing or respiratory distress        [] Abnormal-      Musculoskeletal:   [] Normal gait with no signs of ataxia         [x] Normal range of motion of neck        [] Abnormal-       Neurological:        [x] No Facial Asymmetry (Cranial nerve 7 motor function) (limited exam to video visit)          [] No gaze palsy        [] Abnormal-         Skin:        [] No significant exanthematous lesions or discoloration noted on facial skin         [] Abnormal-            Psychiatric:       [x] Normal Affect [] No Hallucinations        [] Abnormal-     Other pertinent observable physical exam findings-     ASSESSMENT/PLAN:  1. Essential hypertension  Continue current meds  Low salt diet  Continue to check BP at home    - carvedilol (COREG) 3.125 MG tablet; Take 1 tablet by mouth 2 times daily  Dispense: 60 tablet; Refill: 3    2. Coronary artery disease involving native coronary artery of native heart without angina pectoris  Asa  Statin    - carvedilol (COREG) 3.125 MG tablet; Take 1 tablet by mouth 2 times daily  Dispense: 60 tablet; Refill: 3    3. Type 2 diabetes mellitus with complication, without long-term current use of insulin ([facility]  Call in blood sugars        Return in about 2 months (around 6/29/2020).     Ada Fuller is a 77 y.o. female being evaluated by a Virtual Visit (video visit) encounter to address concerns as mentioned above. A caregiver was present when appropriate. Due to this being a TeleHealth encounter (During DOLSE-37 public health emergency), evaluation of the following organ systems was limited: Vitals/Constitutional/EENT/Resp/CV/GI//MS/Neuro/Skin/Heme-Lymph-Imm. Pursuant to the emergency declaration under the [address] [place] [address] and the [employer] Act, this Virtual Visit was conducted with patient's (and/or legal guardian's) consent, to reduce the patient's risk of exposure to COVID-19 and provide necessary medical care. The patient (and/or legal guardian) has also been advised to contact this office for worsening conditions or problems, and seek emergency medical treatment and/or call 911 if deemed necessary. Patient identification was verified at the start of the visit: Yes    Total time spent on this encounter: 15 minutes    Services were provided through a video synchronous discussion virtually to substitute for in-person clinic visit. Patient and provider were located at their individual homes. --Alyse Hernandez MD on 4/29/2020 at 1:27 PM    An electronic signature was used to authenticate this note.

## 2020-05-20 ENCOUNTER — TELEPHONE (OUTPATIENT)
Dept: ONCOLOGY | Age: 65
End: 2020-05-20

## 2020-05-20 NOTE — LETTER
5/20/2020 [address] [address]    Dear Laith Olivo:    Your healthcare provider has ordered a low dose CT scan of the chest for lung cancer screening. You will find enclosed, information about CT lung screening. Please review the statement of understanding, you will be asked to sign a copy of this at the time of your CT scan    If you have not already been contacted to make the appointment for your scan, please call our scheduling department at [phone]    Keep in mind that CT lung screening does not take the place of smoking cessation. If you are a current smoker, you will find enclosed smoking cessation resources. Please do not hesitate to contact me if you have any questions or concerns.     [facility],      [facility] Lung Screening Program  [phone]

## 2020-05-28 ENCOUNTER — HOSPITAL ENCOUNTER (OUTPATIENT)
Dept: CT IMAGING | Age: 65
Discharge: HOME | End: 2020-05-30
Payer: MEDICARE

## 2020-05-28 PROCEDURE — G0297 LDCT FOR LUNG CA SCREEN: HCPCS

## 2020-06-02 ENCOUNTER — VIRTUAL VISIT (OUTPATIENT)
Dept: PULMONOLOGY | Age: 65
End: 2020-06-02
Payer: MEDICARE

## 2020-06-02 VITALS
WEIGHT: 198 LBS | DIASTOLIC BLOOD PRESSURE: 69 MMHG | TEMPERATURE: 97.3 F | SYSTOLIC BLOOD PRESSURE: 168 MMHG | HEIGHT: 64 IN | BODY MASS INDEX: 33.8 KG/M2

## 2020-06-02 PROCEDURE — G8926 SPIRO NO PERF OR DOC: HCPCS | Performed by: INTERNAL MEDICINE

## 2020-06-02 PROCEDURE — G8427 DOCREV CUR MEDS BY ELIG CLIN: HCPCS | Performed by: INTERNAL MEDICINE

## 2020-06-02 PROCEDURE — 3017F COLORECTAL CA SCREEN DOC REV: CPT | Performed by: INTERNAL MEDICINE

## 2020-06-02 PROCEDURE — G8399 PT W/DXA RESULTS DOCUMENT: HCPCS | Performed by: INTERNAL MEDICINE

## 2020-06-02 PROCEDURE — G8417 CALC BMI ABV UP PARAM F/U: HCPCS | Performed by: INTERNAL MEDICINE

## 2020-06-02 PROCEDURE — 1123F ACP DISCUSS/DSCN MKR DOCD: CPT | Performed by: INTERNAL MEDICINE

## 2020-06-02 PROCEDURE — 4040F PNEUMOC VAC/ADMIN/RCVD: CPT | Performed by: INTERNAL MEDICINE

## 2020-06-02 PROCEDURE — 1090F PRES/ABSN URINE INCON ASSESS: CPT | Performed by: INTERNAL MEDICINE

## 2020-06-02 PROCEDURE — 3023F SPIROM DOC REV: CPT | Performed by: INTERNAL MEDICINE

## 2020-06-02 PROCEDURE — 1036F TOBACCO NON-USER: CPT | Performed by: INTERNAL MEDICINE

## 2020-06-02 PROCEDURE — 99214 OFFICE O/P EST MOD 30 MIN: CPT | Performed by: INTERNAL MEDICINE

## 2020-06-02 RX ORDER — TIOTROPIUM BROMIDE AND OLODATEROL 3.124; 2.736 UG/1; UG/1
2 SPRAY, METERED RESPIRATORY (INHALATION) DAILY
Qty: 1 INHALER | Refills: 5 | Status: SHIPPED | OUTPATIENT
Start: 2020-06-02 | End: 2021-01-05 | Stop reason: SDUPTHER

## 2020-06-02 ASSESSMENT — SLEEP AND FATIGUE QUESTIONNAIRES
HOW LIKELY ARE YOU TO NOD OFF OR FALL ASLEEP WHILE SITTING AND TALKING TO SOMEONE: 0
HOW LIKELY ARE YOU TO NOD OFF OR FALL ASLEEP WHILE SITTING AND READING: 0
HOW LIKELY ARE YOU TO NOD OFF OR FALL ASLEEP WHILE SITTING QUIETLY AFTER LUNCH WITHOUT ALCOHOL: 0
ESS TOTAL SCORE: 5
HOW LIKELY ARE YOU TO NOD OFF OR FALL ASLEEP WHILE SITTING INACTIVE IN A PUBLIC PLACE: 0
HOW LIKELY ARE YOU TO NOD OFF OR FALL ASLEEP WHEN YOU ARE A PASSENGER IN A CAR FOR AN HOUR WITHOUT A BREAK: 0
HOW LIKELY ARE YOU TO NOD OFF OR FALL ASLEEP IN A CAR, WHILE STOPPED FOR A FEW MINUTES IN TRAFFIC: 0
HOW LIKELY ARE YOU TO NOD OFF OR FALL ASLEEP WHILE LYING DOWN TO REST IN THE AFTERNOON WHEN CIRCUMSTANCES PERMIT: 3
HOW LIKELY ARE YOU TO NOD OFF OR FALL ASLEEP WHILE WATCHING TV: 2

## 2020-06-02 NOTE — PROGRESS NOTES
OUTPATIENT PULMONARY PROGRESS NOTE    TELEHEALTH VISIT  BELA. ME VISIT    Patient:  Pete Bertrand  MRN: [id number]    Consulting Physician: Dr. Ar Fletcher  Reason for initial consult: COPD, lung nodule, dyspnea, obstructive sleep apnea  Primacy Care Physician: Carlos Null MD    HISTORY OF PRESENT ILLNESS:   The patient is a 77 y.o. female   She is here for follow-up of obstructive sleep apnea and COPD, lung nodule. She is complaining of heaviness in her chest and chest tightness which is not there all the time or every day but evidently at  intervals, she is not sure whether she has those symptoms because of her COPD or any other issue although she did not have any more cough cough is very occasional very occasional wheezing does not use albuterol more frequently and uses Spiriva once daily. When she was here last time she was instructed that she need to see her cardiologist Dr. Kenn Ormond as she was having those symptoms of chest heaviness and tightness and she had a history of coronary artery disease and had stent placed 10 years ago but she had not appointment and then COVID- pandemic emergency. She does have dyspnea on exertion she does not think that she has any more shortness of breath than her usual.  She denies daily or persistent cough she denies sputum production except occasionally and sputum is white and clear, she denies wheezing except very occasional wheezing and very occasional cough. She denies waking up at night with cough wheezing chest tightness or shortness of breath. She is using Spiriva once daily  She is using albuterol 2 some time 3 times a week. Since she was seen last time she is able to use her CPAP better as the mask was changed although she had water coming out in her CPAP early in the morning and when she wake up she feels dryness of throat as she ran out of the water.   She sleeps much better with cpap, she her sleep is more refreshing and restorative, she is more b/l cataract extraction    INDUCED       NERVE BLOCK Bilateral 2018    bilat facets #1 marcaine    NERVE BLOCK  2019    caudal aborted- did not get in   [address] Bilateral 02/15/2019    bilateral l4 transforaminal. decadron 8mg/ProHance contrast    NOSE SURGERY      as a child    TONSILLECTOMY         Allergies: Allergies   Allergen Reactions    Contrast [Iodides] Itching     Mild itchiness experienced with administration of IV contrast media. Home Meds:   Outpatient Encounter Medications as of 2020   Medication Sig Dispense Refill    carvedilol (COREG) 3.125 MG tablet Take 1 tablet by mouth 2 times daily 60 tablet 3    hydroCHLOROthiazide (HYDRODIURIL) 25 MG tablet take 1 tablet by mouth once daily 90 tablet 2    potassium bicarbonate-potassium chloride (K-LYTE CL) 25 MEQ TBEF Take 1 tablet by mouth daily 30 tablet 1    amLODIPine (NORVASC) 5 MG tablet Take 1 tablet by mouth daily 30 tablet 3    atorvastatin (LIPITOR) 40 MG tablet take 1 tablet by mouth once daily 90 tablet 1    betamethasone dipropionate (DIPROLENE) 0.05 % ointment Apply topically daily.  50 g 2    metFORMIN (GLUCOPHAGE) 500 MG tablet take 1 tablet by mouth twice a day with meals 60 tablet 3    losartan (COZAAR) 100 MG tablet Cancel 50 mg prescription 90 tablet 1    sertraline (ZOLOFT) 50 MG tablet take 1 tablet by mouth once daily 30 tablet 3    omeprazole (PRILOSEC) 40 MG delayed release capsule take 1 capsule by mouth once daily 30 capsule 5    triamcinolone (KENALOG) 0.025 % cream Apply Topically 30 g 1    b complex vitamins capsule Take 1 capsule by mouth daily      albuterol sulfate HFA (PROVENTIL HFA) 108 (90 Base) MCG/ACT inhaler Inhale 2 puffs into the lungs every 6 hours as needed for Wheezing 1 Inhaler 5    tiotropium (SPIRIVA RESPIMAT) 2.5 MCG/ACT AERS inhaler Inhale 2 puffs into the lungs daily 1 Inhaler 11    Coenzyme Q10-Fish Oil-Vit E (CO-Q 10 OMEGA-3 FISH OIL) CAPS Take distention, jaundice, hematemesis and hemtochezia, dysphagia, reflux and regurgitation  GENITOURINARY:  negative for frequency, dysuria, nocturia, urinary incontinence, hesitancy, decreased stream and hematuria  HEMATOLOGIC/LYMPHATIC:  negative for easy bruising, bleeding, lymphadenopathy and petechiae  ALLERGIC/IMMUNOLOGIC:  negative for recurrent infections, urticaria, hay fever, angioedema, anaphylaxis and drug reactions  ENDOCRINE:  negative for heat intolerance, cold intolerance, tremor, weight changes and change in bowel habits  MUSCULOSKELETAL:  negative for  myalgias, arthralgias, joint swelling, stiff joints and decreased range of motion  NEUROLOGICAL:  negative for headaches, dizziness, seizures, memory problems, speech problems, visual disturbance, coordination problems, gait problems, weakness, numbness, syncope and tingling  BEHAVIOR/PSYCH:  negative          Physical Exam:    Vitals: BP (!) 168/69   Temp 97.3 °F (36.3 °C)   Ht 5' 3.5\" (1.613 m)   Wt 198 lb (89.8 kg)   BMI 34.52 kg/m²   Last 3 weights: Wt Readings from Last 3 Encounters:   06/02/20 198 lb (89.8 kg)   02/20/20 198 lb (89.8 kg)   02/06/20 195 lb (88.5 kg)     Body mass index is 34.52 kg/m².     Physical Examination:   General appearance - alert, well appearing, and in no distress, overweight and acyanotic, in no respiratory distress  Mental status - alert, oriented to person, place, and time  Eyes - pupils equal and reactive, extraocular eye movements intact, sclera anicteric  Ears - right ear normal, left ear normal  Nose - normal and patent  Mouth - Small oropharynx, moderate size tongue short and thick neck  Neck - supple, no significant adenopathy, short neck  Chest - no tachypnea, retractions or cyanosis noted on visual exam.  Heart -not examined   Abdomen -not examined  Neurological - alert, oriented, normal speech, no focal findings or movement disorder noted  Extremities -not examined  Skin -not examined      LABS:    CBC: WBC   Date Value Ref Range Status   11/09/2018 9.1 3.5 - 11.3 k/uL Final   08/04/2014 8.7 3.5 - 11.0 k/uL Final   10/11/2013 8.2 3.5 - 11.0 k/uL Final     Hemoglobin   Date Value Ref Range Status   11/09/2018 11.8 (L) 11.9 - 15.1 g/dL Final   08/04/2014 15.3 12.0 - 16.0 g/dL Final   10/11/2013 14.8 12.0 - 16.0 g/dL Final     Platelets   Date Value Ref Range Status   11/09/2018 337 138 - 453 k/uL Final   11/20/2017 272 138 - 453 k/uL Final     Comment:     [facility] [id number] [place] [address], [address] [place] [address] [phone]   08/04/2014 253 140 - 450 k/uL Final     BMP:   Sodium   Date Value Ref Range Status   10/24/2019 143 135 - 144 mmol/L Final   04/06/2018 138 135 - 144 mmol/L Final   06/11/2016 136 135 - 144 mmol/L Final     Potassium   Date Value Ref Range Status   10/24/2019 4.3 3.7 - 5.3 mmol/L Final   04/06/2018 4.4 3.7 - 5.3 mmol/L Final   06/11/2016 4.4 3.7 - 5.3 mmol/L Final     Chloride   Date Value Ref Range Status   10/24/2019 103 98 - 107 mmol/L Final   04/06/2018 98 98 - 107 mmol/L Final   06/11/2016 97 (L) 98 - 107 mmol/L Final     CO2   Date Value Ref Range Status   10/24/2019 28 20 - 31 mmol/L Final   04/06/2018 30 20 - 31 mmol/L Final   06/11/2016 27 20 - 31 mmol/L Final     BUN   Date Value Ref Range Status   10/24/2019 24 (H) 8 - 23 mg/dL Final   04/06/2018 18 8 - 23 mg/dL Final   09/18/2017 27 (H) 8 - 23 mg/dL Final     CREATININE   Date Value Ref Range Status   10/24/2019 0.62 0.50 - 0.90 mg/dL Final   04/06/2018 0.76 0.50 - 0.90 mg/dL Final   09/18/2017 0.85 0.50 - 0.90 mg/dL Final     POC Creatinine   Date Value Ref Range Status   11/20/2017 <0.30 (L) 0.51 - 1.19 mg/dL Final     Glucose   Date Value Ref Range Status   10/24/2019 112 (H) 70 - 99 mg/dL Final   04/06/2018 108 (H) 70 - 99 mg/dL Final   06/11/2016 108 (H) 70 - 99 mg/dL Final   10/28/2011 87 74 - 106 mg/dL Final     Hepatic:     Amylase: No results found for: AMYLASE  Lipase: No results found for: LIPASE  CARDIAC ENZYMES:     BNP: No results found for: BNP  Lipids:       INR: No results found for: INR  Thyroid:   TSH   Date Value Ref Range Status   02/22/2020 3.61 0.30 - 5.00 mIU/L Final     Urinalysis:       Cultures:-  -----------------------------------------------------------------  Immunization History   Administered Date(s) Administered    Pneumococcal Conjugate 13-valent ([id number]) 04/17/2018    Pneumococcal Polysaccharide ([id number]) 02/20/2020    Tdap (Boostrix, Adacel) 10/24/2018     ABGs: No results found for: PHART, PO2ART, [id number]    Pulmonary Functions Testing Results:    05/22/2019: FEV1 1.52 69%, FVC 1.85 66%, FEV1 %, TLC 3.48 75%, DLCO 15.70 88%  02/07/2018: FEV1 1.30 58%, FVC 1.62 58%, PMX2PCG 100%, TLC 3.35 72%, DLCO 13.62 76%    CXR      CT Scans     05/28/2020  Mediastinum:  Suboptimal evaluation due to low dose technique.  Thoracic   aorta demonstrates moderate calcification without aneurysm.  Pulmonary trunk   appears nondilated.  Heart appears normal size without pericardial effusion. No lymphadenopathy.  The esophagus is grossly unremarkable.       Lungs/Pleura:  No focal consolidation, pneumothorax or pleural effusion. Trachea and distal airways appear patent.  4 mm solid noncalcified pulmonary   nodule left upper lobe series 3, image 140. no new or enlarging pulmonary   nodule. 05/07/2019  1. Stable 4 mm posterior left upper lobe pulmonary nodule, unchanged from   05/07/2018.  Mild emphysema. 2. Cardiomegaly.  Coronary artery disease.  Atherosclerotic calcification of   the aorta. 3. Small hiatal hernia. 05/07/2018  Stable 4 mm nodule left upper lobe. 10/17/2017  5 mm nodule posterior left upper lobe.  No acute process with chronic   findings as described. Compliance data from CPAP. From 02/18/2020 to 05/17/2020  Auto CPAP with pressure maximum of 20 and pressure minimum of 12  Average daily usage 7 hours 23 minutes  Compliance 97 %  Average mean pressure 13.5 cm.   AHI 0. 8    Assessment and Plan         1. Chronic obstructive pulmonary disease, unspecified COPD type ([address]   2. Lung nodule   3. CRISTINA on CPAP   4. Obesity (BMI 35.0-39.9 without comorbidity)   5. Dyspnea on exertion     HTN (hypertension)    CAD/Stents drug-eluting     Claudication in peripheral vascular disease ([facility] [address]     CT scan of the chest done on 05/28/2020 which shows left upper lobe 4-5 mm nodule is stable since October 2017. Plan and Recommendations:    Call DME for CPAP humidification and to check for setting    I've advised her again today that she need to follow-up with cardiology as the symptoms of chest heaviness is not likely related to her COPD she has history of coronary artery disease and previous history of stent. Patient will call cardiology office to make appointment today. Start a Stiolto Respimat 2 puffs once daily  Discontinue Spiriva Respimat once she start using Stiolto. Albuterol as needed  She will need yearly CT scan for screening and next one should be in May 2021     Maintain an active lifestyle   Refused Flu vaccine   She had Prevnar 13 in April 2018  Uptodate on Pneumonia vaccine. Continue with AutoCPAP at current setting  20/12 cm  CPAP at least 4 hrs qhs  She may need nocturnal oximetry on CPAP to determine whether she will continue to need O2 with CPAP   Wt loss is recommended and discussed  Follow good sleep hygeine instructions  Use humidifier   Questions answered pertaining to diagnosis and management explained importance of compliance with therapy   Need compliance data from CPAP now and in few months    RTC 3-4 months. It was my pleasure to evaluate Manolo Marcum today. Please call with questions. Please note that this chart was generated using voice recognition Dragon dictation software. Although every effort was made to ensure the accuracy of this automated transcription, some errors in transcription may have occurred.     Kirstie Kirkpatrick MD

## 2020-06-03 ENCOUNTER — TELEPHONE (OUTPATIENT)
Dept: INTERNAL MEDICINE | Age: 65
End: 2020-06-03

## 2020-06-03 NOTE — TELEPHONE ENCOUNTER
Request for sertraline - medication pended. Please fill if appropriate. Next Visit Date:  Future Appointments   Date Time Provider Zohreh Avilai   6/5/2020  1:00 PM Gage Teran MD [facility]   10/6/2020 10:15 AM Alen Tam MD Resp Spec [address] Maintenance   Topic Date Due    Diabetic foot exam  01/20/1965    Annual Wellness Visit (AWV)  05/29/2019    Diabetic retinal exam  07/10/2019    Flu vaccine (Season Ended) 10/10/2020 (Originally 9/1/2020)    Hepatitis C screen  10/10/2020 (Originally 1955)    Shingles Vaccine (1 of 2) 02/20/2021 (Originally 1/20/2003)    Diabetic microalbuminuria test  10/24/2020    Lipid screen  10/24/2020    Potassium monitoring  10/24/2020    Creatinine monitoring  10/24/2020    A1C test (Diabetic or Prediabetic)  02/22/2021    Low dose CT lung screening  05/28/2021    Breast cancer screen  10/24/2021    Colon cancer screen colonoscopy  02/06/2023    DTaP/Tdap/Td vaccine (2 - Td) 10/24/2028    DEXA (modify frequency per FRAX score)  Completed    Pneumococcal 65+ years Vaccine  Completed    Hepatitis A vaccine  Aged Out    Hib vaccine  Aged Out    Meningococcal (ACWY) vaccine  Aged Out       Hemoglobin A1C (%)   Date Value   02/22/2020 6.0   10/10/2019 6.4   10/24/2018 6.3             ( goal A1C is < 7)   Microalb/Crt.  Ratio (mcg/mg creat)   Date Value   10/24/2019 CANNOT BE CALCULATED     LDL Cholesterol (mg/dL)   Date Value   10/24/2019 51       (goal LDL is <100)   AST (U/L)   Date Value   10/24/2019 15     ALT (U/L)   Date Value   10/24/2019 11     BUN (mg/dL)   Date Value   10/24/2019 24 (H)     BP Readings from Last 3 Encounters:   06/02/20 (!) 168/69   04/29/20 (!) 115/58   04/14/20 (!) 146/62          (goal 120/80)    All Future Testing planned in [facility]  Lab Frequency Next Occurrence   VL DUP CAROTID BILATERAL Once 06/30/2020   VL ARTERIAL PVR LOWER WO EXERCISE Once 06/30/2020         Patient Active Problem List:

## 2020-06-15 NOTE — TELEPHONE ENCOUNTER
Request for metformin - medication pended. Please fill if appropriate. Next Visit Date:  Future Appointments   Date Time Provider Zohreh Hopper   10/6/2020 10:15 AM Alen Love  [address] Maintenance   Topic Date Due    Diabetic foot exam  01/20/1965    Annual Wellness Visit (AWV)  05/29/2019    Diabetic retinal exam  07/10/2019    Flu vaccine (Season Ended) 10/10/2020 (Originally 9/1/2020)    Hepatitis C screen  10/10/2020 (Originally 1955)    Shingles Vaccine (1 of 2) 02/20/2021 (Originally 1/20/2003)    Diabetic microalbuminuria test  10/24/2020    Lipid screen  10/24/2020    Potassium monitoring  10/24/2020    Creatinine monitoring  10/24/2020    A1C test (Diabetic or Prediabetic)  02/22/2021    Low dose CT lung screening  05/28/2021    Breast cancer screen  10/24/2021    Colon cancer screen colonoscopy  02/06/2023    DTaP/Tdap/Td vaccine (2 - Td) 10/24/2028    DEXA (modify frequency per FRAX score)  Completed    Pneumococcal 65+ years Vaccine  Completed    Hepatitis A vaccine  Aged Out    Hib vaccine  Aged Out    Meningococcal (ACWY) vaccine  Aged Out       Hemoglobin A1C (%)   Date Value   02/22/2020 6.0   10/10/2019 6.4   10/24/2018 6.3             ( goal A1C is < 7)   Microalb/Crt.  Ratio (mcg/mg creat)   Date Value   10/24/2019 CANNOT BE CALCULATED     LDL Cholesterol (mg/dL)   Date Value   10/24/2019 51       (goal LDL is <100)   AST (U/L)   Date Value   10/24/2019 15     ALT (U/L)   Date Value   10/24/2019 11     BUN (mg/dL)   Date Value   10/24/2019 24 (H)     BP Readings from Last 3 Encounters:   06/02/20 (!) 168/69   04/29/20 (!) 115/58   04/14/20 (!) 146/62          (goal 120/80)    All Future Testing planned in [facility]  Lab Frequency Next Occurrence   VL DUP CAROTID BILATERAL Once 06/30/2020   VL ARTERIAL PVR LOWER WO EXERCISE Once 06/30/2020         Patient Active Problem List:     Smoker     HTN (hypertension)     CAD/Stents drug-eluting

## 2020-08-03 ENCOUNTER — TELEPHONE (OUTPATIENT)
Dept: PEDIATRICS | Age: 65
End: 2020-08-03

## 2020-08-11 NOTE — TELEPHONE ENCOUNTER
CAD/Stents drug-eluting      Serum lipids high     Carotid stenosis, asymptomatic     Nocturnal hypoxia     CRISTINA on CPAP     Type 2 diabetes mellitus with complication, without long-term current use of insulin (HCC)

## 2020-08-14 RX ORDER — OMEPRAZOLE 40 MG/1
CAPSULE, DELAYED RELEASE ORAL
Qty: 30 CAPSULE | Refills: 5 | Status: SHIPPED | OUTPATIENT
Start: 2020-08-14 | End: 2021-01-27

## 2020-08-14 RX ORDER — AMLODIPINE BESYLATE 5 MG/1
TABLET ORAL
Qty: 30 TABLET | Refills: 3 | Status: SHIPPED | OUTPATIENT
Start: 2020-08-14 | End: 2020-12-02

## 2020-09-01 RX ORDER — LOSARTAN POTASSIUM 100 MG/1
TABLET ORAL
Qty: 90 TABLET | Refills: 1 | Status: SHIPPED | OUTPATIENT
Start: 2020-09-01 | End: 2021-02-22

## 2020-09-01 NOTE — TELEPHONE ENCOUNTER
Request for Losartan. Next Visit Date:  Future Appointments   Date Time Provider Zohreh Avilai   9/8/2020  1:30 PM Alvin Topete MD [email or web address]   10/6/2020 10:15 AM Alen Bell  [address] Maintenance   Topic Date Due    Diabetic foot exam  01/20/1965    Annual Wellness Visit (AWV)  05/29/2019    Diabetic retinal exam  07/10/2019    Flu vaccine (1) 09/01/2020    Hepatitis C screen  10/10/2020 (Originally 1955)    Shingles Vaccine (1 of 2) 02/20/2021 (Originally 1/20/2003)    Diabetic microalbuminuria test  10/24/2020    Lipid screen  10/24/2020    Potassium monitoring  10/24/2020    Creatinine monitoring  10/24/2020    A1C test (Diabetic or Prediabetic)  02/22/2021    Low dose CT lung screening  05/28/2021    Breast cancer screen  10/24/2021    Colon cancer screen colonoscopy  02/06/2023    DTaP/Tdap/Td vaccine (2 - Td) 10/24/2028    DEXA (modify frequency per FRAX score)  Completed    Pneumococcal 65+ years Vaccine  Completed    Hepatitis A vaccine  Aged Out    Hib vaccine  Aged Out    Meningococcal (ACWY) vaccine  Aged Out       Hemoglobin A1C (%)   Date Value   02/22/2020 6.0   10/10/2019 6.4   10/24/2018 6.3             ( goal A1C is < 7)   Microalb/Crt.  Ratio (mcg/mg creat)   Date Value   10/24/2019 CANNOT BE CALCULATED     LDL Cholesterol (mg/dL)   Date Value   10/24/2019 51       (goal LDL is <100)   AST (U/L)   Date Value   10/24/2019 15     ALT (U/L)   Date Value   10/24/2019 11     BUN (mg/dL)   Date Value   10/24/2019 24 (H)     BP Readings from Last 3 Encounters:   06/02/20 (!) 168/69   04/29/20 (!) 115/58   04/14/20 (!) 146/62          (goal 120/80)    All Future Testing planned in [facility]  Lab Frequency Next Occurrence   VL DUP CAROTID BILATERAL Once 12/24/2020   VL ARTERIAL PVR LOWER WO EXERCISE Once 12/24/2020         Patient Active Problem List:     Smoker     HTN (hypertension)     CAD/Stents drug-eluting      Serum lipids high Carotid stenosis, asymptomatic     Nocturnal hypoxia     CRISTINA on CPAP     Type 2 diabetes mellitus with complication, without long-term current use of insulin (HCC)

## 2020-09-08 ENCOUNTER — HOSPITAL ENCOUNTER (OUTPATIENT)
Age: 65
Setting detail: SPECIMEN
Discharge: HOME | End: 2020-09-08
Payer: MEDICARE

## 2020-09-08 ENCOUNTER — OFFICE VISIT (OUTPATIENT)
Dept: INTERNAL MEDICINE | Age: 65
End: 2020-09-08
Payer: MEDICARE

## 2020-09-08 VITALS
BODY MASS INDEX: 36.27 KG/M2 | SYSTOLIC BLOOD PRESSURE: 141 MMHG | WEIGHT: 208 LBS | HEART RATE: 57 BPM | DIASTOLIC BLOOD PRESSURE: 51 MMHG

## 2020-09-08 PROBLEM — I73.9 PAD (PERIPHERAL ARTERY DISEASE) (HCC): Status: ACTIVE | Noted: 2020-09-08

## 2020-09-08 LAB
-: NORMAL
ALBUMIN SERPL-MCNC: 4.4 G/DL (ref 3.5–5.2)
ALBUMIN/GLOBULIN RATIO: 1 (ref 1–2.5)
ALP BLD-CCNC: 86 U/L (ref 35–104)
ALT SERPL-CCNC: 15 U/L (ref 5–33)
AMORPHOUS: NORMAL
ANION GAP SERPL CALCULATED.3IONS-SCNC: 15 MMOL/L (ref 9–17)
AST SERPL-CCNC: 20 U/L
BACTERIA: NORMAL
BILIRUB SERPL-MCNC: 0.3 MG/DL (ref 0.3–1.2)
BILIRUBIN URINE: NEGATIVE
BUN BLDV-MCNC: 19 MG/DL (ref 8–23)
BUN/CREAT BLD: ABNORMAL (ref 9–20)
CALCIUM SERPL-MCNC: 9.4 MG/DL (ref 8.6–10.4)
CASTS UA: NORMAL /LPF (ref 0–8)
CHLORIDE BLD-SCNC: 101 MMOL/L (ref 98–107)
CO2: 25 MMOL/L (ref 20–31)
COLOR: YELLOW
CREAT SERPL-MCNC: 0.69 MG/DL (ref 0.5–0.9)
CREATININE URINE: 22.8 MG/DL (ref 28–217)
CRYSTALS, UA: NORMAL /HPF
EPITHELIAL CELLS UA: NORMAL /HPF (ref 0–5)
ESTIMATED AVERAGE GLUCOSE: 134 MG/DL
GFR AFRICAN AMERICAN: >60 ML/MIN
GFR NON-AFRICAN AMERICAN: >60 ML/MIN
GFR SERPL CREATININE-BSD FRML MDRD: ABNORMAL ML/MIN/{1.73_M2}
GFR SERPL CREATININE-BSD FRML MDRD: ABNORMAL ML/MIN/{1.73_M2}
GLUCOSE BLD-MCNC: 84 MG/DL (ref 70–99)
GLUCOSE URINE: NEGATIVE
HBA1C MFR BLD: 6.3 % (ref 4–6)
HCT VFR BLD CALC: 37 % (ref 36.3–47.1)
HEMOGLOBIN: 11.3 G/DL (ref 11.9–15.1)
KETONES, URINE: NEGATIVE
LEUKOCYTE ESTERASE, URINE: NEGATIVE
MCH RBC QN AUTO: 26.8 PG (ref 25.2–33.5)
MCHC RBC AUTO-ENTMCNC: 30.5 G/DL (ref 28.4–34.8)
MCV RBC AUTO: 87.7 FL (ref 82.6–102.9)
MICROALBUMIN/CREAT 24H UR: <12 MG/L
MICROALBUMIN/CREAT UR-RTO: ABNORMAL MCG/MG CREAT
MUCUS: NORMAL
NITRITE, URINE: NEGATIVE
NRBC AUTOMATED: 0 PER 100 WBC
OTHER OBSERVATIONS UA: NORMAL
PDW BLD-RTO: 14.3 % (ref 11.8–14.4)
PH UA: 6.5 (ref 5–8)
PLATELET # BLD: 312 K/UL (ref 138–453)
PMV BLD AUTO: 10.8 FL (ref 8.1–13.5)
POTASSIUM SERPL-SCNC: 4 MMOL/L (ref 3.7–5.3)
PROTEIN UA: NEGATIVE
RBC # BLD: 4.22 M/UL (ref 3.95–5.11)
RBC UA: NORMAL /HPF (ref 0–4)
RENAL EPITHELIAL, UA: NORMAL /HPF
SODIUM BLD-SCNC: 141 MMOL/L (ref 135–144)
SPECIFIC GRAVITY UA: 1.01 (ref 1–1.03)
THYROXINE, FREE: 0.88 NG/DL (ref 0.93–1.7)
TOTAL PROTEIN: 8.7 G/DL (ref 6.4–8.3)
TRICHOMONAS: NORMAL
TSH SERPL DL<=0.05 MIU/L-ACNC: 6.3 MIU/L (ref 0.3–5)
TURBIDITY: CLEAR
URINE HGB: NEGATIVE
UROBILINOGEN, URINE: NORMAL
WBC # BLD: 9.8 K/UL (ref 3.5–11.3)
WBC UA: NORMAL /HPF (ref 0–5)
YEAST: NORMAL

## 2020-09-08 PROCEDURE — 3023F SPIROM DOC REV: CPT | Performed by: INTERNAL MEDICINE

## 2020-09-08 PROCEDURE — 99213 OFFICE O/P EST LOW 20 MIN: CPT | Performed by: INTERNAL MEDICINE

## 2020-09-08 PROCEDURE — 3044F HG A1C LEVEL LT 7.0%: CPT | Performed by: INTERNAL MEDICINE

## 2020-09-08 PROCEDURE — G8417 CALC BMI ABV UP PARAM F/U: HCPCS | Performed by: INTERNAL MEDICINE

## 2020-09-08 PROCEDURE — G8427 DOCREV CUR MEDS BY ELIG CLIN: HCPCS | Performed by: INTERNAL MEDICINE

## 2020-09-08 PROCEDURE — 1123F ACP DISCUSS/DSCN MKR DOCD: CPT | Performed by: INTERNAL MEDICINE

## 2020-09-08 PROCEDURE — 3017F COLORECTAL CA SCREEN DOC REV: CPT | Performed by: INTERNAL MEDICINE

## 2020-09-08 PROCEDURE — 2022F DILAT RTA XM EVC RTNOPTHY: CPT | Performed by: INTERNAL MEDICINE

## 2020-09-08 PROCEDURE — 4040F PNEUMOC VAC/ADMIN/RCVD: CPT | Performed by: INTERNAL MEDICINE

## 2020-09-08 PROCEDURE — 99211 OFF/OP EST MAY X REQ PHY/QHP: CPT | Performed by: INTERNAL MEDICINE

## 2020-09-08 PROCEDURE — G8399 PT W/DXA RESULTS DOCUMENT: HCPCS | Performed by: INTERNAL MEDICINE

## 2020-09-08 PROCEDURE — 1090F PRES/ABSN URINE INCON ASSESS: CPT | Performed by: INTERNAL MEDICINE

## 2020-09-08 PROCEDURE — 1036F TOBACCO NON-USER: CPT | Performed by: INTERNAL MEDICINE

## 2020-09-08 PROCEDURE — G8926 SPIRO NO PERF OR DOC: HCPCS | Performed by: INTERNAL MEDICINE

## 2020-09-08 RX ORDER — TRIAMCINOLONE ACETONIDE 0.25 MG/G
CREAM TOPICAL
Qty: 30 G | Refills: 1 | Status: SHIPPED | OUTPATIENT
Start: 2020-09-08 | End: 2021-04-14

## 2020-09-08 RX ORDER — ATORVASTATIN CALCIUM 40 MG/1
TABLET, FILM COATED ORAL
Qty: 90 TABLET | Refills: 1 | Status: SHIPPED | OUTPATIENT
Start: 2020-09-08 | End: 2021-03-04 | Stop reason: SDUPTHER

## 2020-09-08 ASSESSMENT — ENCOUNTER SYMPTOMS
ABDOMINAL PAIN: 0
BLOOD IN STOOL: 0
BACK PAIN: 1
SHORTNESS OF BREATH: 0
PHOTOPHOBIA: 0
VOMITING: 0
COUGH: 0
CONSTIPATION: 0
WHEEZING: 0
NAUSEA: 1

## 2020-09-08 NOTE — PROGRESS NOTES
[facility]    Progress Note    Date of patient's visit: 2020    Patient's Name:  Nubia Booth    YOB: 1955            Patient Care Team:  Ashleigh Cervantes MD as PCP - General (Internal Medicine)  Ashleigh Cervantes MD as PCP - [facility]  Minoo Reyna MD as Consulting Physician (Pulmonology)  Neeta Soares, RN as Nurse Navigator    REASON FOR VISIT: Routine outpatient follow     Chief Complaint   Patient presents with    Lower Back Pain     Pt c/o having bilateral lower back pain x 6 months, states that the pain has gradually became worse and more frequent. Has some nausea with it as well     Health Maintenance     AWV scheduled, will request podiatry notes([facility])          HISTORY OF PRESENT ILLNESS:    History was obtained from the patient. Nubia Booth is a 77 y.o. is here for lanes of bilateral flank pain that has been ongoing for 6 months accompanied by some nausea. Nausea is constant. No vomiting. No weight loss. No GERD. She is denying any constipation or diarrhea. She recently had a colonoscopy which showed diverticulosis. Small polyps were also noted and she was advised to come back in 3 years. She is denying dysuria though she feels her urine looks different. No hematuria. Pain is constant. She denies it is related to her low back pain which she has secondary to degenerative joint disease. She was seeing pain management in the past.  She says pain is in the flank and sometimes radiates to her abdomen. No fever or chills. Appetite is the same. Weight is stable. No blood in her stools. She continues to follow-up with all her specialists including cardiology and pulmonology.     Colonoscopy   Patient: Nubia Booth                  :                           1955          Date:  2020                Procedure:  Colonoscopy with Hot Snare Polypectomy     Indication: History of polyps     Findings   1. Sigmoid polyp, sessile, small; resected with hot snare. 2. Multiple diminutive/small sessile polyps in the proximal ascending colon and cecum. 3. Hemorrhoids  4. Diverticulosis L > R     Recommendations  1. Await pathology. 2. Repeat colonoscopy in 3 years. TWO DAY PREP             Past Medical History:   Diagnosis Date    Angina pectoris ([facility] [address]     Arthritis     Bipolar disorder ([facility] [address]     CAD (coronary artery disease)     stents x 2 (Dr. Alize Person)    Carotid stenosis, asymptomatic 2015    Chronic back pain     COPD (chronic obstructive pulmonary disease) ([facility] [address]     Depression     Dyspnea     Family history of colon cancer     Family history of liver cancer     Family history of ovarian cancer     GERD (gastroesophageal reflux disease)     History of colon polyps     Hyperlipidemia     Hypertension     Lung nodule     New onset type 2 diabetes mellitus ([facility] [address] 2018    Obesity     Obesity (BMI 35.0-39.9 without comorbidity)    Peripheral vascular disease (HCC)     Poor historian     Sleep apnea     CRISTINA on CPAP    Smoking greater than 40 pack years     reports that she quit smoking about 2 years ago. 1.5 PPD for 48 yeras.  She has a       Past Surgical History:   Procedure Laterality Date    ANKLE SURGERY      CARDIAC CATHETERIZATION  10/11/2013    2 stents    CATARACT REMOVAL      cataract both eyes with lenses     SECTION      x2    COLONOSCOPY      COLONOSCOPY N/A 2020    COLONOSCOPY POLYPECTOMY HOT BIOPSY performed by Barbra Essex, MD at . [facility]  2011    x2    ENDOSCOPY, COLON, DIAGNOSTIC      EYE SURGERY      b/l cataract extraction    INDUCED       NERVE BLOCK Bilateral 2018    bilat facets #1 marcaine    NERVE BLOCK  2019    caudal aborted- did not get in   [address] Bilateral 02/15/2019    bilateral l4 transforaminal. decadron 8mg/ProHance No current facility-administered medications on file prior to visit. SOCIAL HISTORY    Reviewed and no change from previous record. Sena Boeck  reports that she quit smoking about 5 years ago. Her smoking use included cigarettes. She started smoking about 52 years ago. She has a 60.00 pack-year smoking history. She has never used smokeless tobacco.    FAMILY HISTORY:    Reviewed and No change from previous visit    HEALTH MAINTENANCE DUE:      Health Maintenance Due   Topic Date Due    Diabetic foot exam  01/20/1963    Annual Wellness Visit (AWV)  05/29/2019    Diabetic retinal exam  07/10/2019    Flu vaccine (1) 09/01/2020       REVIEW OF SYSTEMS:    12 point review of symptoms completed and found to be normal except noted in the HPI    Review of Systems   Constitutional: Negative for chills, fatigue, fever and unexpected weight change. Eyes: Negative for photophobia and visual disturbance. Respiratory: Negative for cough, shortness of breath and wheezing. Cardiovascular: Negative for chest pain, palpitations and leg swelling. Gastrointestinal: Positive for nausea. Negative for abdominal pain, blood in stool, constipation and vomiting. Endocrine: Negative for polydipsia and polyuria. Genitourinary: Positive for flank pain and urgency. Negative for dysuria, frequency and hematuria. Musculoskeletal: Positive for back pain. Negative for arthralgias. Neurological: Negative for dizziness, weakness, numbness and headaches. Hematological: Negative for adenopathy. Does not bruise/bleed easily. Psychiatric/Behavioral: Negative for dysphoric mood. The patient is nervous/anxious. PHYSICAL EXAM:     Vitals:    09/08/20 1330 09/08/20 1335   BP: (!) 151/52 (!) 141/51   Site: Left Upper Arm Left Upper Arm   Position: Sitting Standing   Cuff Size: Large Adult Large Adult   Pulse: 55 57   Weight: 208 lb (94.3 kg)      Body mass index is 36.27 kg/m².     BP Readings from Last 3 Encounters:

## 2020-09-08 NOTE — PATIENT INSTRUCTIONS
-Pt due for 3 month f/u in December-- pt to call in November to set up an appt--reminder in [facility] to contact patient as well--AVS given to patient    -Bloodwork orders given to patient, they will have them done before their next visit.     -Alexandre Saab

## 2020-09-12 NOTE — TELEPHONE ENCOUNTER
Refill request for Metformin. If appropriate please send medication(s) to patients pharmacy. Next appt: Patient is currently on wait list for provider. Last appt: 9/8/2020    Health Maintenance   Topic Date Due    Diabetic foot exam  01/20/1965    Annual Wellness Visit (AWV)  05/29/2019    Diabetic retinal exam  07/10/2019    Flu vaccine (1) 09/01/2020    Hepatitis C screen  10/10/2020 (Originally 1955)    Shingles Vaccine (1 of 2) 02/20/2021 (Originally 1/20/2003)    Lipid screen  10/24/2020    Low dose CT lung screening  05/28/2021    A1C test (Diabetic or Prediabetic)  09/08/2021    Diabetic microalbuminuria test  09/08/2021    Potassium monitoring  09/08/2021    Creatinine monitoring  09/08/2021    Breast cancer screen  10/24/2021    Colon cancer screen colonoscopy  02/06/2023    DTaP/Tdap/Td vaccine (2 - Td) 10/24/2028    DEXA (modify frequency per FRAX score)  Completed    Pneumococcal 65+ years Vaccine  Completed    Hepatitis A vaccine  Aged Out    Hib vaccine  Aged Out    Meningococcal (ACWY) vaccine  Aged Out       Hemoglobin A1C (%)   Date Value   09/08/2020 6.3 (H)   02/22/2020 6.0   10/10/2019 6.4             ( goal A1C is < 7)   Microalb/Crt.  Ratio (mcg/mg creat)   Date Value   09/08/2020 CANNOT BE CALCULATED     LDL Cholesterol (mg/dL)   Date Value   10/24/2019 51       (goal LDL is <100)   AST (U/L)   Date Value   09/08/2020 20     ALT (U/L)   Date Value   09/08/2020 15     BUN (mg/dL)   Date Value   09/08/2020 19     BP Readings from Last 3 Encounters:   09/08/20 (!) 141/51   06/02/20 (!) 168/69   04/29/20 (!) 115/58          (goal 120/80)          Patient Active Problem List:     Smoker     HTN (hypertension)     CAD/Stents drug-eluting      Serum lipids high     Carotid stenosis, asymptomatic     Nocturnal hypoxia     CRISTINA on CPAP     Type 2 diabetes mellitus with complication, without long-term current use of insulin (HCC)     PAD (peripheral artery disease) ([address]

## 2020-09-18 ENCOUNTER — VIRTUAL VISIT (OUTPATIENT)
Dept: INTERNAL MEDICINE | Age: 65
End: 2020-09-18
Payer: MEDICARE

## 2020-09-18 PROCEDURE — G0438 PPPS, INITIAL VISIT: HCPCS | Performed by: NURSE PRACTITIONER

## 2020-09-18 ASSESSMENT — PATIENT HEALTH QUESTIONNAIRE - PHQ9
SUM OF ALL RESPONSES TO PHQ9 QUESTIONS 1 & 2: 0
1. LITTLE INTEREST OR PLEASURE IN DOING THINGS: 0
SUM OF ALL RESPONSES TO PHQ QUESTIONS 1-9: 0
SUM OF ALL RESPONSES TO PHQ QUESTIONS 1-9: 0
2. FEELING DOWN, DEPRESSED OR HOPELESS: 0

## 2020-09-18 ASSESSMENT — LIFESTYLE VARIABLES
HOW OFTEN DO YOU HAVE A DRINK CONTAINING ALCOHOL: 3
AUDIT-C TOTAL SCORE: 3
HOW OFTEN DURING THE LAST YEAR HAVE YOU BEEN UNABLE TO REMEMBER WHAT HAPPENED THE NIGHT BEFORE BECAUSE YOU HAD BEEN DRINKING: 0
HOW OFTEN DO YOU HAVE SIX OR MORE DRINKS ON ONE OCCASION: 0
HOW OFTEN DURING THE LAST YEAR HAVE YOU FOUND THAT YOU WERE NOT ABLE TO STOP DRINKING ONCE YOU HAD STARTED: 0
HAVE YOU OR SOMEONE ELSE BEEN INJURED AS A RESULT OF YOUR DRINKING: 0
HOW OFTEN DURING THE LAST YEAR HAVE YOU FAILED TO DO WHAT WAS NORMALLY EXPECTED FROM YOU BECAUSE OF DRINKING: 0
HOW OFTEN DURING THE LAST YEAR HAVE YOU HAD A FEELING OF GUILT OR REMORSE AFTER DRINKING: 0
HOW OFTEN DURING THE LAST YEAR HAVE YOU NEEDED AN ALCOHOLIC DRINK FIRST THING IN THE MORNING TO GET YOURSELF GOING AFTER A NIGHT OF HEAVY DRINKING: 0
AUDIT TOTAL SCORE: 3
HAS A RELATIVE, FRIEND, DOCTOR, OR ANOTHER HEALTH PROFESSIONAL EXPRESSED CONCERN ABOUT YOUR DRINKING OR SUGGESTED YOU CUT DOWN: 0
HOW MANY STANDARD DRINKS CONTAINING ALCOHOL DO YOU HAVE ON A TYPICAL DAY: 0

## 2020-09-18 NOTE — PROGRESS NOTES
Medicare Annual Wellness Visit  Are Name: Shanta Casas Date: 2020   MRN: [id number] Sex: Female   Age: 77 y.o. Ethnicity: Non-/Non    : 1955 Race: Evelina Curling is here for Medicare AWV and Health Maintenance (saw eye doctor getting results, saw foot doctor getting results. )    Screenings for behavioral, psychosocial and functional/safety risks, and cognitive dysfunction are all negative except as indicated below. These results, as well as other patient data from the [address] [employer] [address] form, are documented in Flowsheets linked to this Encounter. Allergies   Allergen Reactions    Contrast [Iodides] Itching     Mild itchiness experienced with administration of IV contrast media. Prior to Visit Medications    Medication Sig Taking?  Authorizing Provider   metFORMIN (GLUCOPHAGE) 500 MG tablet take 1 tablet by mouth twice a day with meals Yes Imani Whitmore MD   triamcinolone (KENALOG) 0.025 % cream Apply Topically Yes Imani Whitmore MD   atorvastatin (LIPITOR) 40 MG tablet Once daily Yes Imani Whitmore MD   losartan (COZAAR) 100 MG tablet take 1 tablet by mouth once daily Yes Imani Whitmore MD   omeprazole (PRILOSEC) 40 MG delayed release capsule take 1 capsule by mouth once daily Yes Imani Whitmore MD   amLODIPine (NORVASC) 5 MG tablet take 1 tablet by mouth once daily Yes Imani Whitmore MD   sertraline (ZOLOFT) 50 MG tablet take 1 tablet by mouth once daily Yes Imani Whitmore MD   Tiotropium Bromide-Olodaterol (STIOLTO RESPIMAT) 2.5-2.5 MCG/ACT AERS Inhale 2 puffs into the lungs daily Yes Christina Basilio MD   carvedilol (COREG) 3.125 MG tablet Take 1 tablet by mouth 2 times daily Yes Imani Whitmore MD   hydroCHLOROthiazide (HYDRODIURIL) 25 MG tablet take 1 tablet by mouth once daily Yes Imani Whitmore MD   potassium bicarbonate-potassium chloride (K-LYTE CL) 25 MEQ TBEF Take 1 tablet by mouth daily Yes Imani Whitmore MD betamethasone dipropionate (DIPROLENE) 0.05 % ointment Apply topically daily. Yes Aren Mo MD   b complex vitamins capsule Take 1 capsule by mouth daily Yes Historical Provider, MD   albuterol sulfate HFA (PROVENTIL HFA) 108 (90 Base) MCG/ACT inhaler Inhale 2 puffs into the lungs every 6 hours as needed for Wheezing Yes Sandra Lu MD   Coenzyme Q10-Fish Oil-Vit E (CO-Q 10 OMEGA-3 FISH OIL) CAPS Take 1,000 mg by mouth daily Yes Historical Provider, MD   magnesium 30 MG tablet Take 30 mg by mouth 2 times daily Yes Historical Provider, MD   Multiple Vitamin (MULTI-VITAMIN PO) Take 1 tablet by mouth daily. Yes Historical Provider, MD   aspirin 325 MG tablet Take 325 mg by mouth daily. Yes Historical Provider, MD         Past Medical History:   Diagnosis Date    [facility]ina pectoris ([facility]     Arthritis     Bipolar disorder ([facility]     CAD (coronary artery disease)     stents x 2 (Dr. Cecil Verdugo)    Carotid stenosis, asymptomatic 2015    Chronic back pain     COPD (chronic obstructive pulmonary disease) ([facility]     Depression     Dyspnea     Family history of colon cancer     Family history of liver cancer     Family history of ovarian cancer     GERD (gastroesophageal reflux disease)     History of colon polyps     Hyperlipidemia     Hypertension     Lung nodule     New onset type 2 diabetes mellitus ([facility] [facility] 2018    Obesity     Obesity (BMI 35.0-39.9 without comorbidity)    Peripheral vascular disease (HCC)     Poor historian     Sleep apnea     CRISTINA on CPAP    Smoking greater than 40 pack years     reports that she quit smoking about 2 years ago. 1.5 PPD for 48 yeras.  She has a       Past Surgical History:   Procedure Laterality Date    ANKLE SURGERY      CARDIAC CATHETERIZATION  10/11/2013    2 stents    CATARACT REMOVAL      cataract both eyes with lenses     SECTION      x2    COLONOSCOPY      COLONOSCOPY N/A 2020    COLONOSCOPY POLYPECTOMY HOT BIOPSY performed by Kalli Jaeger MD at . [facility]  2011    x2    ENDOSCOPY, COLON, DIAGNOSTIC      EYE SURGERY      b/l cataract extraction    INDUCED       NERVE BLOCK Bilateral 2018    bilat facets #1 marcaine    NERVE BLOCK  2019    caudal aborted- did not get in    NERVE BLOCK Bilateral 02/15/2019    bilateral l4 transforaminal. decadron 8mg/ProHance contrast    NOSE SURGERY      as a child    TONSILLECTOMY           Family History   Problem Relation Age of Onset    Hypertension Mother     Macular Degen Mother     Other Mother         vascular    Colon Polyps Brother     Cancer Maternal Aunt        CareTeam (Including outside providers/suppliers regularly involved in providing care):   Patient Care Team:  Fouzia Redmond MD as PCP - General (Internal Medicine)  Fouzia Redmond MD as PCP - [facility] Empaneled Provider  Marjan Reyna MD as Consulting Physician (Pulmonology)  Jerome Miles RN as Nurse Navigator    Wt Readings from Last 3 Encounters:   20 208 lb (94.3 kg)   20 198 lb (89.8 kg)   20 198 lb (89.8 kg)      No flowsheet data found. There is no height or weight on file to calculate BMI. Based upon direct observation of the patient, evaluation of cognition reveals recent and remote memory intact. Wt Readings from Last 3 Encounters:   20 208 lb (94.3 kg)   20 198 lb (89.8 kg)   20 198 lb (89.8 kg)     Temp Readings from Last 3 Encounters:   20 97.3 °F (36.3 °C)   20 96.6 °F (35.9 °C) (Infrared)   20 97.7 °F (36.5 °C) (Oral)     BP Readings from Last 3 Encounters:   20 (!) 141/51   20 (!) 168/69   20 (!) 115/58     Pulse Readings from Last 3 Encounters:   20 57   20 62   20 55       Patient's complete Health Risk Assessment and screening values have been reviewed and are found in Flowsheets.  The following problems were reviewed today and where weight on file to calculate BMI. Health Habits/Nutrition Interventions:  · Inadequate physical activity:  patient is not ready to increase his/her physical activity level at this time    Personalized Preventive Plan   Current Health Maintenance Status  Immunization History   Administered Date(s) Administered    Pneumococcal Conjugate 13-valent ([id number]) 04/17/2018    Pneumococcal Polysaccharide ([id number]) 02/20/2020    Tdap (Boostrix, Adacel) 10/24/2018        Health Maintenance   Topic Date Due    Diabetic foot exam  01/20/1963    Annual Wellness Visit (AWV)  05/29/2019    Diabetic retinal exam  07/10/2019    Flu vaccine (1) 09/01/2020    Hepatitis C screen  10/10/2022 (Originally 1955)    Shingles Vaccine (1 of 2) 02/20/2021 (Originally 1/20/2003)    Lipid screen  10/24/2020    Low dose CT lung screening  05/28/2021    A1C test (Diabetic or Prediabetic)  09/08/2021    Diabetic microalbuminuria test  09/08/2021    Potassium monitoring  09/08/2021    Creatinine monitoring  09/08/2021    Breast cancer screen  10/24/2021    Colon cancer screen colonoscopy  02/06/2023    DTaP/Tdap/Td vaccine (2 - Td) 10/24/2028    DEXA (modify frequency per FRAX score)  Completed    Pneumococcal 65+ years Vaccine  Completed    Hepatitis A vaccine  Aged Out    Hib vaccine  Aged Out    Meningococcal (ACWY) vaccine  Aged Out     Recommendations for [facility] Due: see orders and patient instructions/AVS.  . Recommended screening schedule for the next 5-10 years is provided to the patient in written form: see Patient Instructions/AVS.    Thaisisidoro Stollmargareth was seen today for medicare awv and health maintenance. Diagnoses and all orders for this visit:    Routine general medical examination at a health care facility  -     [address] [place] [address] [employer]              Manley Apley is a 77 y.o. female being evaluated by a Virtual Visit (phone) encounter to address concerns as mentioned above.   A caregiver was present when appropriate. Due to this being a TeleHealth encounter (During ZZCNL-60 public health emergency), evaluation of the following organ systems was limited: Vitals/Constitutional/EENT/Resp/CV/GI//MS/Neuro/Skin/Heme-Lymph-Imm. Pursuant to the emergency declaration under the [address] [place] [address], [address] [place] [address] and the [employer] Act, this Virtual Visit was conducted with patient's (and/or legal guardian's) consent, to reduce the patient's risk of exposure to COVID-19 and provide necessary medical care. The patient (and/or legal guardian) has also been advised to contact this office for worsening conditions or problems, and seek emergency medical treatment and/or call 911 if deemed necessary. Patient identification was verified at the start of the visit: Yes    Services were provided through phone to substitute for in-person clinic visit. Patient and provider were located at their individual homes. --JENNIFER Haider CNP on 9/18/2020 at 11:14 AM    An electronic signature was used to authenticate this note.

## 2020-10-06 ENCOUNTER — VIRTUAL VISIT (OUTPATIENT)
Dept: PULMONOLOGY | Age: 65
End: 2020-10-06
Payer: MEDICARE

## 2020-10-06 PROCEDURE — 1090F PRES/ABSN URINE INCON ASSESS: CPT | Performed by: INTERNAL MEDICINE

## 2020-10-06 PROCEDURE — G8427 DOCREV CUR MEDS BY ELIG CLIN: HCPCS | Performed by: INTERNAL MEDICINE

## 2020-10-06 PROCEDURE — G8417 CALC BMI ABV UP PARAM F/U: HCPCS | Performed by: INTERNAL MEDICINE

## 2020-10-06 PROCEDURE — 99214 OFFICE O/P EST MOD 30 MIN: CPT | Performed by: INTERNAL MEDICINE

## 2020-10-06 PROCEDURE — G8399 PT W/DXA RESULTS DOCUMENT: HCPCS | Performed by: INTERNAL MEDICINE

## 2020-10-06 PROCEDURE — 1036F TOBACCO NON-USER: CPT | Performed by: INTERNAL MEDICINE

## 2020-10-06 PROCEDURE — G8484 FLU IMMUNIZE NO ADMIN: HCPCS | Performed by: INTERNAL MEDICINE

## 2020-10-06 PROCEDURE — 3017F COLORECTAL CA SCREEN DOC REV: CPT | Performed by: INTERNAL MEDICINE

## 2020-10-06 PROCEDURE — 1123F ACP DISCUSS/DSCN MKR DOCD: CPT | Performed by: INTERNAL MEDICINE

## 2020-10-06 PROCEDURE — G8926 SPIRO NO PERF OR DOC: HCPCS | Performed by: INTERNAL MEDICINE

## 2020-10-06 PROCEDURE — 3023F SPIROM DOC REV: CPT | Performed by: INTERNAL MEDICINE

## 2020-10-06 PROCEDURE — 4040F PNEUMOC VAC/ADMIN/RCVD: CPT | Performed by: INTERNAL MEDICINE

## 2020-10-06 NOTE — PROGRESS NOTES
OUTPATIENT PULMONARY PROGRESS NOTE    TELEHEALTH VISIT  BELA. ME VISIT    Patient:  Miguel Angel Oro  MRN: [id number]    Consulting Physician: Dr. Rodrick Randolph  Reason for initial consult: COPD, lung nodule, dyspnea, obstructive sleep apnea  Primacy Care Physician: fEe Knapp MD    HISTORY OF PRESENT ILLNESS:   The patient is a 77 y.o. female   She is here for follow-up of obstructive sleep apnea and COPD, lung nodule. When she was seen last time she was complaining of chest tightness off and on and she was instructed to follow-up with cardiology. According to patient she was seen by cardiology and she think that she had a stress test and also when she was told no intervention needed. He has chronic dyspnea on exertion activity denies any change. She may be slightly better. She denies shortness of breath on regular activities. When she has to do exertional activity or strenuous activity she has more problem with the legs than her shortness of breath. She is able to do regular activities at home. She denies daily cough sometimes she has cough in the morning which have been every day but denies daily or persistent cough. She denies a sputum production except occasionally when she has whitish sputum  She denies waking up at night with cough wheezing chest tightness or shortness of breath and denies orthopnea and PND or changing or increasing pedal edema. She denies any weight loss weight gain fever night sweats and no chest pain. Last time she was seen her Spiriva was changed to [facility] which she is taking 2 puff once daily. She denies taking any albuterol very occasional use less than once a month. When she was seen last time she was also complaining of problem with the CPAP, she had contacted DME problem has resolved still complain of dryness in the mouth when she wake up otherwise she is using CPAP mask denies any problem with the CPAP.   She is compliant with CPAP use it every night except when she medication to control the symptoms          Sleep Medicine 2020 2020 2019 2019 12/15/2018 2018 2018   Sitting and reading 0 1 0 1 0 1 1   Watching TV 2 1 1 1 1 1 1   Sitting, inactive in a public place (e.g. a theatre or a meeting) 0 0 0 0 0 0 0   As a passenger in a car for an hour without a break 0 1 0 0 0 0 0   Lying down to rest in the afternoon when circumstances permit 3 2 2 1 2 2 2   Sitting and talking to someone 0 0 0 0 0 0 0   Sitting quietly after a lunch without alcohol 0 1 0 0 0 0 0   In a car, while stopped for a few minutes in traffic 0 0 0 0 0 0 0   Total score 5 6 3 3 3 4 4   Neck circumference - - - - 46 - -     Past Medical History:        Diagnosis Date    [facility]ina pectoris ([facility]     Arthritis     Bipolar disorder ([facility]     CAD (coronary artery disease)     stents x 2 (Dr. Tello Castillo)    Carotid stenosis, asymptomatic 2015    Chronic back pain     COPD (chronic obstructive pulmonary disease) ([facility]     Depression     Dyspnea     Family history of colon cancer     Family history of liver cancer     Family history of ovarian cancer     GERD (gastroesophageal reflux disease)     History of colon polyps     Hyperlipidemia     Hypertension     Lung nodule     New onset type 2 diabetes mellitus ([facility] 2018    Obesity     Obesity (BMI 35.0-39.9 without comorbidity)    Peripheral vascular disease (HCC)     Poor historian     Sleep apnea     CRISTINA on CPAP    Smoking greater than 40 pack years     reports that she quit smoking about 2 years ago. 1.5 PPD for 48 yeras.  She has a       Past Surgical History:        Procedure Laterality Date    ANKLE SURGERY      CARDIAC CATHETERIZATION  10/11/2013    2 stents    CATARACT REMOVAL      cataract both eyes with lenses     SECTION      x2    COLONOSCOPY      COLONOSCOPY N/A 2020    COLONOSCOPY POLYPECTOMY HOT BIOPSY performed by Christos Villegas MD at [address] WITH STENT PLACEMENT  2011    x2    ENDOSCOPY, COLON, DIAGNOSTIC      EYE SURGERY      b/l cataract extraction    INDUCED       NERVE BLOCK Bilateral 2018    bilat facets #1 marcaine    NERVE BLOCK  2019    caudal aborted- did not get in   [address] Bilateral 02/15/2019    bilateral l4 transforaminal. decadron 8mg/ProHance contrast    NOSE SURGERY      as a child    TONSILLECTOMY         Allergies: Allergies   Allergen Reactions    Contrast [Iodides] Itching     Mild itchiness experienced with administration of IV contrast media. Home Meds:   Outpatient Encounter Medications as of 10/6/2020   Medication Sig Dispense Refill    metFORMIN (GLUCOPHAGE) 500 MG tablet take 1 tablet by mouth twice a day with meals 180 tablet 0    triamcinolone (KENALOG) 0.025 % cream Apply Topically 30 g 1    atorvastatin (LIPITOR) 40 MG tablet Once daily 90 tablet 1    losartan (COZAAR) 100 MG tablet take 1 tablet by mouth once daily 90 tablet 1    omeprazole (PRILOSEC) 40 MG delayed release capsule take 1 capsule by mouth once daily 30 capsule 5    amLODIPine (NORVASC) 5 MG tablet take 1 tablet by mouth once daily 30 tablet 3    sertraline (ZOLOFT) 50 MG tablet take 1 tablet by mouth once daily 120 tablet 0    Tiotropium Bromide-Olodaterol (STIOLTO RESPIMAT) 2.5-2.5 MCG/ACT AERS Inhale 2 puffs into the lungs daily 1 Inhaler 5    carvedilol (COREG) 3.125 MG tablet Take 1 tablet by mouth 2 times daily 60 tablet 3    hydroCHLOROthiazide (HYDRODIURIL) 25 MG tablet take 1 tablet by mouth once daily 90 tablet 2    potassium bicarbonate-potassium chloride (K-LYTE CL) 25 MEQ TBEF Take 1 tablet by mouth daily 30 tablet 1    betamethasone dipropionate (DIPROLENE) 0.05 % ointment Apply topically daily.  50 g 2    b complex vitamins capsule Take 1 capsule by mouth daily      albuterol sulfate HFA (PROVENTIL HFA) 108 (90 Base) MCG/ACT inhaler Inhale 2 puffs into the lungs every 6 hours as needed change in bowel habits, diarrhea, constipation, abdominal pain, pruritus, abdominal mass, abdominal distention, jaundice, hematemesis and hemtochezia, dysphagia, reflux and regurgitation  GENITOURINARY:  negative for frequency, dysuria, nocturia, urinary incontinence, hesitancy, decreased stream and hematuria  HEMATOLOGIC/LYMPHATIC:  negative for easy bruising, bleeding, lymphadenopathy and petechiae  ALLERGIC/IMMUNOLOGIC:  negative for recurrent infections, urticaria, hay fever, angioedema, anaphylaxis and drug reactions  ENDOCRINE:  negative for heat intolerance, cold intolerance, tremor, weight changes and change in bowel habits  MUSCULOSKELETAL:  negative for  myalgias, arthralgias, joint swelling, stiff joints and decreased range of motion  NEUROLOGICAL:  negative for headaches, dizziness, seizures, memory problems, speech problems, visual disturbance, coordination problems, gait problems, weakness, numbness, syncope and tingling  BEHAVIOR/PSYCH:  negative          Physical Exam:    Vitals: There were no vitals taken for this visit. Last 3 weights: Wt Readings from Last 3 Encounters:   09/08/20 208 lb (94.3 kg)   06/02/20 198 lb (89.8 kg)   02/20/20 198 lb (89.8 kg)     There is no height or weight on file to calculate BMI.     Physical Examination:   General appearance - alert, well appearing, and in no distress, overweight and acyanotic, in no respiratory distress  Mental status - alert, oriented to person, place, and time  Eyes - pupils equal and reactive, extraocular eye movements intact, sclera anicteric  Ears - right ear normal, left ear normal  Nose - normal and patent  Mouth -not examined  Neck -short and thick neck   Chest - no tachypnea, retractions or cyanosis noted on visual exam.  Heart -not examined   Abdomen -not examined  Neurological - alert, oriented, normal speech, no focal findings or movement disorder noted  Extremities -not examined  Skin -not examined      LABS:    CBC:   WBC   Date Value Ref Range Status   09/08/2020 9.8 3.5 - 11.3 k/uL Final   11/09/2018 9.1 3.5 - 11.3 k/uL Final   08/04/2014 8.7 3.5 - 11.0 k/uL Final     Hemoglobin   Date Value Ref Range Status   09/08/2020 11.3 (L) 11.9 - 15.1 g/dL Final   11/09/2018 11.8 (L) 11.9 - 15.1 g/dL Final   08/04/2014 15.3 12.0 - 16.0 g/dL Final     Platelets   Date Value Ref Range Status   09/08/2020 312 138 - 453 k/uL Final   11/09/2018 337 138 - 453 k/uL Final   11/20/2017 272 138 - 453 k/uL Final     Comment:     [facility] [id number] [place] [address], [address] [place] [address] [phone]     BMP:   Sodium   Date Value Ref Range Status   09/08/2020 141 135 - 144 mmol/L Final   10/24/2019 143 135 - 144 mmol/L Final   04/06/2018 138 135 - 144 mmol/L Final     Potassium   Date Value Ref Range Status   09/08/2020 4.0 3.7 - 5.3 mmol/L Final   10/24/2019 4.3 3.7 - 5.3 mmol/L Final   04/06/2018 4.4 3.7 - 5.3 mmol/L Final     Chloride   Date Value Ref Range Status   09/08/2020 101 98 - 107 mmol/L Final   10/24/2019 103 98 - 107 mmol/L Final   04/06/2018 98 98 - 107 mmol/L Final     CO2   Date Value Ref Range Status   09/08/2020 25 20 - 31 mmol/L Final   10/24/2019 28 20 - 31 mmol/L Final   04/06/2018 30 20 - 31 mmol/L Final     BUN   Date Value Ref Range Status   09/08/2020 19 8 - 23 mg/dL Final   10/24/2019 24 (H) 8 - 23 mg/dL Final   04/06/2018 18 8 - 23 mg/dL Final     CREATININE   Date Value Ref Range Status   09/08/2020 0.69 0.50 - 0.90 mg/dL Final   10/24/2019 0.62 0.50 - 0.90 mg/dL Final   04/06/2018 0.76 0.50 - 0.90 mg/dL Final     Glucose   Date Value Ref Range Status   09/08/2020 84 70 - 99 mg/dL Final   10/24/2019 112 (H) 70 - 99 mg/dL Final   04/06/2018 108 (H) 70 - 99 mg/dL Final   10/28/2011 87 74 - 106 mg/dL Final     Hepatic:     Amylase: No results found for: AMYLASE  Lipase: No results found for: LIPASE  CARDIAC ENZYMES:     BNP: No results found for: BNP  Lipids:       INR: No results found for: INR  Thyroid:   TSH   Date Value Ref Range Status   09/08/2020 6.30 (H) 0.30 - 5.00 mIU/L Final     Urinalysis:       Cultures:-  -----------------------------------------------------------------  Immunization History   Administered Date(s) Administered    Pneumococcal Conjugate 13-valent ([id number]) 04/17/2018    Pneumococcal Polysaccharide ([id number]) 02/20/2020    Tdap (Boostrix, Adacel) 10/24/2018     ABGs: No results found for: PHART, PO2ART, KFQ6HIP    Pulmonary Functions Testing Results:    05/22/2019: FEV1 1.52 69%, FVC 1.85 66%, FEV1 %, TLC 3.48 75%, DLCO 15.70 88%  02/07/2018: FEV1 1.30 58%, FVC 1.62 58%, GIV5SFM 100%, TLC 3.35 72%, DLCO 13.62 76%    CXR      CT Scans     05/28/2020  Mediastinum:  Suboptimal evaluation due to low dose technique.  Thoracic   aorta demonstrates moderate calcification without aneurysm.  Pulmonary trunk   appears nondilated.  Heart appears normal size without pericardial effusion. No lymphadenopathy.  The esophagus is grossly unremarkable.       Lungs/Pleura:  No focal consolidation, pneumothorax or pleural effusion. Trachea and distal airways appear patent.  4 mm solid noncalcified pulmonary   nodule left upper lobe series 3, image 140. no new or enlarging pulmonary   nodule. 05/07/2019  1. Stable 4 mm posterior left upper lobe pulmonary nodule, unchanged from   05/07/2018.  Mild emphysema. 2. Cardiomegaly.  Coronary artery disease.  Atherosclerotic calcification of   the aorta. 3. Small hiatal hernia. 05/07/2018  Stable 4 mm nodule left upper lobe. 10/17/2017  5 mm nodule posterior left upper lobe.  No acute process with chronic   findings as described. Compliance data from CPAP. From 06/29/2020 to 09/26/2020  Auto CPAP with pressure maximum of 20 and pressure minimum of 12  Average daily usage 7 hours 32 minutes  Compliance 96 %  Average mean pressure 13.9 cm. AHI 0.8    Assessment and Plan         1. Chronic obstructive pulmonary disease, unspecified COPD type ([address]   2. Lung nodule   3. CRISTINA on CPAP   4. Obesity (BMI 35.0-39.9 without comorbidity)   5. Dyspnea on exertion     HTN (hypertension)    CAD/Stents drug-eluting     Claudication in peripheral vascular disease ([facility]     CT scan of the chest done on 05/28/2020 which shows left upper lobe 4-5 mm nodule is stable since October 2017. Plan and Recommendations:    Continue Stiolto Respimat 2 puffs once daily  Albuterol as needed  She will need yearly CT scan for screening and next one should be in May 2021   Advised her to follow-up with cardiology as she has history of coronary artery disease and history of previous stent. Maintain an active lifestyle   Refused Flu vaccine but thinking of taking flu vaccine this year   She had Prevnar 13 in April 2018  Uptodate on Pneumonia vaccine. Continue with AutoCPAP at current setting  20/12 cm  CPAP at least 4 hrs qhs  She is not on O2 with CPAP anymore  Wt loss is recommended and discussed  Follow good sleep hygeine instructions  Use humidifier   Questions answered pertaining to diagnosis and management explained importance of compliance with therapy   Compliance data from CPAP seen and she is compliant with CPAP    RTC 6 months. It was my pleasure to evaluate Lima Delaney today. Please call with questions. Please note that this chart was generated using voice recognition Dragon dictation software. Although every effort was made to ensure the accuracy of this automated transcription, some errors in transcription may have occurred. Anselmo Oliver MD             10/6/2020, 10:44 AM      Lima Delaney is a 77 y.o. female being evaluated by a Virtual Visit (video visit) encounter to address concerns as mentioned above. Due to this being a TeleHealth encounter (During [facility]- public health emergency), evaluation of the following organ systems was limited: Vitals/Constitutional/EENT/Resp/CV/GI//MS/Neuro/Skin/Heme-Lymph-Imm.   Pursuant to the emergency declaration under the [facility], [facility] waiver authority and the [employer] Act, this Virtual Visit was conducted with patient's consent, to reduce the patient's risk of exposure to COVID-19 and provide necessary medical care. The patient has also been advised to contact this office for worsening conditions or problems, and seek emergency medical treatment and/or call 911 if deemed necessary. Services were provided through a video synchronous discussion virtually to substitute for in-person clinic visit. Patient and provider were located at their individual locations. Time spent 23 minutes    --Abby Rodriguez MD on 10/6/2020 at 10:44 AM    An electronic signature was used to authenticate this note.

## 2020-10-24 NOTE — TELEPHONE ENCOUNTER
Refill request for Zoloft. If appropriate please send medication(s) to patients pharmacy. Next appt: Patient is currently on wait list for provider. Last appt: 9/8/2020    Health Maintenance   Topic Date Due    Hepatitis C screen  1955    Diabetic foot exam  01/20/1963    Diabetic retinal exam  07/10/2019    Flu vaccine (1) 09/01/2020    Lipid screen  10/24/2020    Shingles Vaccine (1 of 2) 02/20/2021 (Originally 1/20/2003)    Low dose CT lung screening  05/28/2021    A1C test (Diabetic or Prediabetic)  09/08/2021    Diabetic microalbuminuria test  09/08/2021    Potassium monitoring  09/08/2021    Creatinine monitoring  09/08/2021    Annual Wellness Visit (AWV)  09/19/2021    Breast cancer screen  10/24/2021    Colon cancer screen colonoscopy  02/06/2023    DTaP/Tdap/Td vaccine (2 - Td) 10/24/2028    DEXA (modify frequency per FRAX score)  Completed    Pneumococcal 65+ years Vaccine  Completed    Hepatitis A vaccine  Aged Out    Hib vaccine  Aged Out    Meningococcal (ACWY) vaccine  Aged Out       Hemoglobin A1C (%)   Date Value   09/08/2020 6.3 (H)   02/22/2020 6.0   10/10/2019 6.4             ( goal A1C is < 7)   Microalb/Crt.  Ratio (mcg/mg creat)   Date Value   09/08/2020 CANNOT BE CALCULATED     LDL Cholesterol (mg/dL)   Date Value   10/24/2019 51       (goal LDL is <100)   AST (U/L)   Date Value   09/08/2020 20     ALT (U/L)   Date Value   09/08/2020 15     BUN (mg/dL)   Date Value   09/08/2020 19     BP Readings from Last 3 Encounters:   09/08/20 (!) 141/51   06/02/20 (!) 168/69   04/29/20 (!) 115/58          (goal 120/80)          Patient Active Problem List:     Smoker     HTN (hypertension)     CAD/Stents drug-eluting      Serum lipids high     Carotid stenosis, asymptomatic     Nocturnal hypoxia     CRISTINA on CPAP     Type 2 diabetes mellitus with complication, without long-term current use of insulin (HCC)     PAD (peripheral artery disease) ([facility]

## 2020-12-02 RX ORDER — AMLODIPINE BESYLATE 5 MG/1
TABLET ORAL
Qty: 90 TABLET | Refills: 1 | Status: SHIPPED | OUTPATIENT
Start: 2020-12-02 | End: 2021-05-11 | Stop reason: SDUPTHER

## 2020-12-02 NOTE — TELEPHONE ENCOUNTER
E-scribe request for Norvasc. Please review and e-scribe if applicable. Next Visit Date:  Future Appointments   Date Time Provider Zohreh Hopper   1/5/2021  2:00 PM Kimberly Burton MD [facility]   4/7/2021  1:00 PM [address] [place]  [address] Maintenance   Topic Date Due    Hepatitis C screen  1955    Diabetic foot exam  01/20/1963    Diabetic retinal exam  07/10/2019    Flu vaccine (1) 09/01/2020    Lipid screen  10/24/2020    Shingles Vaccine (1 of 2) 02/20/2021 (Originally 1/20/2003)    Low dose CT lung screening  05/28/2021    A1C test (Diabetic or Prediabetic)  09/08/2021    Diabetic microalbuminuria test  09/08/2021    Potassium monitoring  09/08/2021    Creatinine monitoring  09/08/2021    Annual Wellness Visit (AWV)  09/19/2021    Breast cancer screen  10/24/2021    Colon cancer screen colonoscopy  02/06/2023    DTaP/Tdap/Td vaccine (2 - Td) 10/24/2028    DEXA (modify frequency per FRAX score)  Completed    Pneumococcal 65+ years Vaccine  Completed    Hepatitis A vaccine  Aged Out    Hib vaccine  Aged Out    Meningococcal (ACWY) vaccine  Aged Out               (applicable per patient's age: Cancer Screenings, Depression Screening, Fall Risk Screening, Immunizations)    Hemoglobin A1C (%)   Date Value   09/08/2020 6.3 (H)   02/22/2020 6.0   10/10/2019 6.4     Microalb/Crt.  Ratio (mcg/mg creat)   Date Value   09/08/2020 CANNOT BE CALCULATED     LDL Cholesterol (mg/dL)   Date Value   10/24/2019 51     AST (U/L)   Date Value   09/08/2020 20     ALT (U/L)   Date Value   09/08/2020 15     BUN (mg/dL)   Date Value   09/08/2020 19      (goal A1C is < 7)   (goal LDL is <100) need 30-50% reduction from baseline     BP Readings from Last 3 Encounters:   09/08/20 (!) 141/51   06/02/20 (!) 168/69   04/29/20 (!) 115/58    (goal /80)      All Future Testing planned in [facility]:  Lab Frequency Next Occurrence   VL DUP CAROTID BILATERAL Once 12/24/2020   VL ARTERIAL PVR LOWER WO EXERCISE Once 12/24/2020            Patient Active Problem List:     Smoker     HTN (hypertension)     CAD/Stents drug-eluting      Serum lipids high     Carotid stenosis, asymptomatic     Nocturnal hypoxia     CRISTINA on CPAP     Type 2 diabetes mellitus with complication, without long-term current use of insulin (HCC)     PAD (peripheral artery disease) ([facility]

## 2020-12-10 NOTE — TELEPHONE ENCOUNTER
Request for Metformin. Next Visit Date:  Future Appointments   Date Time Provider Zohreh Avilai   1/5/2021  2:00 PM Isiah Mayer MD [facility] IM [facility]   4/7/2021  1:00 PM Inocente Dempsey  [address] Maintenance   Topic Date Due    Hepatitis C screen  1955    Diabetic foot exam  01/20/1963    Diabetic retinal exam  07/10/2019    Flu vaccine (1) 09/01/2020    Lipid screen  10/24/2020    Shingles Vaccine (1 of 2) 02/20/2021 (Originally 1/20/2003)    Low dose CT lung screening  05/28/2021    A1C test (Diabetic or Prediabetic)  09/08/2021    Diabetic microalbuminuria test  09/08/2021    Potassium monitoring  09/08/2021    Creatinine monitoring  09/08/2021    Annual Wellness Visit (AWV)  09/19/2021    Breast cancer screen  10/24/2021    Colon cancer screen colonoscopy  02/06/2023    DTaP/Tdap/Td vaccine (2 - Td) 10/24/2028    DEXA (modify frequency per FRAX score)  Completed    Pneumococcal 65+ years Vaccine  Completed    Hepatitis A vaccine  Aged Out    Hib vaccine  Aged Out    Meningococcal (ACWY) vaccine  Aged Out       Hemoglobin A1C (%)   Date Value   09/08/2020 6.3 (H)   02/22/2020 6.0   10/10/2019 6.4             ( goal A1C is < 7)   Microalb/Crt.  Ratio (mcg/mg creat)   Date Value   09/08/2020 CANNOT BE CALCULATED     LDL Cholesterol (mg/dL)   Date Value   10/24/2019 51       (goal LDL is <100)   AST (U/L)   Date Value   09/08/2020 20     ALT (U/L)   Date Value   09/08/2020 15     BUN (mg/dL)   Date Value   09/08/2020 19     BP Readings from Last 3 Encounters:   09/08/20 (!) 141/51   06/02/20 (!) 168/69   04/29/20 (!) 115/58          (goal 120/80)    All Future Testing planned in [facility]  Lab Frequency Next Occurrence   VL DUP CAROTID BILATERAL Once 12/24/2020   VL ARTERIAL PVR LOWER WO EXERCISE Once 12/24/2020         Patient Active Problem List:     Smoker     HTN (hypertension)     CAD/Stents drug-eluting      Serum lipids high     Carotid stenosis, asymptomatic     Nocturnal hypoxia     CRISTINA on CPAP     Type 2 diabetes mellitus with complication, without long-term current use of insulin (HCC)     PAD (peripheral artery disease) ([facility]

## 2021-01-05 ENCOUNTER — VIRTUAL VISIT (OUTPATIENT)
Dept: INTERNAL MEDICINE | Age: 66
End: 2021-01-05
Payer: MEDICARE

## 2021-01-05 DIAGNOSIS — Z99.89 OSA ON CPAP: ICD-10-CM

## 2021-01-05 DIAGNOSIS — I10 ESSENTIAL HYPERTENSION: Primary | ICD-10-CM

## 2021-01-05 DIAGNOSIS — J44.9 STAGE 2 MODERATE COPD BY GOLD CLASSIFICATION (HCC): ICD-10-CM

## 2021-01-05 DIAGNOSIS — I25.10 CORONARY ARTERY DISEASE INVOLVING NATIVE CORONARY ARTERY OF NATIVE HEART WITHOUT ANGINA PECTORIS: ICD-10-CM

## 2021-01-05 DIAGNOSIS — E66.01 CLASS 2 SEVERE OBESITY DUE TO EXCESS CALORIES WITH SERIOUS COMORBIDITY AND BODY MASS INDEX (BMI) OF 36.0 TO 36.9 IN ADULT (HCC): ICD-10-CM

## 2021-01-05 DIAGNOSIS — E11.8 TYPE 2 DIABETES MELLITUS WITH COMPLICATION, WITHOUT LONG-TERM CURRENT USE OF INSULIN (HCC): ICD-10-CM

## 2021-01-05 DIAGNOSIS — G47.33 OSA ON CPAP: ICD-10-CM

## 2021-01-05 DIAGNOSIS — E78.5 DYSLIPIDEMIA: ICD-10-CM

## 2021-01-05 PROCEDURE — 99442 PR PHYS/QHP TELEPHONE EVALUATION 11-20 MIN: CPT | Performed by: INTERNAL MEDICINE

## 2021-01-05 RX ORDER — CARVEDILOL 3.12 MG/1
3.12 TABLET ORAL 2 TIMES DAILY
Qty: 60 TABLET | Refills: 3 | Status: SHIPPED | OUTPATIENT
Start: 2021-01-05 | End: 2021-05-11 | Stop reason: SDUPTHER

## 2021-01-05 RX ORDER — POT CHLORIDE/POT BICARB/CIT AC 25 MEQ
25 TABLET, EFFERVESCENT ORAL DAILY
Qty: 30 TABLET | Refills: 1 | Status: SHIPPED | OUTPATIENT
Start: 2021-01-05 | End: 2021-01-11

## 2021-01-05 RX ORDER — TIOTROPIUM BROMIDE AND OLODATEROL 3.124; 2.736 UG/1; UG/1
2 SPRAY, METERED RESPIRATORY (INHALATION) DAILY
Qty: 1 INHALER | Refills: 5 | Status: SHIPPED | OUTPATIENT
Start: 2021-01-05 | End: 2022-02-01

## 2021-01-05 SDOH — ECONOMIC STABILITY: FOOD INSECURITY: WITHIN THE PAST 12 MONTHS, THE FOOD YOU BOUGHT JUST DIDN'T LAST AND YOU DIDN'T HAVE MONEY TO GET MORE.: NEVER TRUE

## 2021-01-05 ASSESSMENT — PATIENT HEALTH QUESTIONNAIRE - PHQ9
1. LITTLE INTEREST OR PLEASURE IN DOING THINGS: 0
SUM OF ALL RESPONSES TO PHQ9 QUESTIONS 1 & 2: 1
SUM OF ALL RESPONSES TO PHQ QUESTIONS 1-9: 1

## 2021-01-05 NOTE — PROGRESS NOTES
Celena Purvis is a 77 y.o. female evaluated via telephone on 1/5/2021. Consent:  She and/or health care decision maker is aware that that she may receive a bill for this telephone service, depending on her insurance coverage, and has provided verbal consent to proceed: Yes      Documentation:  I communicated with the patient and/or health care decision maker about her chronic health problems. She is having some trouble with her blood pressure monitor. This morning she has checked it 2 or 3 times because of her appointment today and they were running high. On recheck though now when she was talking to me her blood pressure is 144/61. She says she has not checked her blood pressure in months. She has not had any headaches or dizziness or blurring of vision. She has gained some weight during the pandemic as she has been eating more. No edema. She follows up with her cardiologist and other specialists. Labs done in the last few months reviewed. She had a mildly elevated TSH. Claus Garcia Details of this discussion including any medical advice provided:   Patient advised to check her blood pressures daily and call me in a week's time. If it is running high she can call me sooner. Continue same medications. Labs ordered. Advised to get flu vaccine. She wanted to Covid vaccine and have advised her to contact the pharmacies or health department to see when she would be eligible. I affirm this is a Patient Initiated Episode with a Patient who has not had a related appointment within my department in the past 7 days or scheduled within the next 24 hours.     Patient identification was verified at the start of the visit: Yes    Total Time: minutes: 11-20 minutes    Note: not billable if this call serves to triage the patient into an appointment for the relevant concern      Sharda Gerber

## 2021-01-05 NOTE — PATIENT INSTRUCTIONS
-Pt due for 3 month f/u in April-- pt to call in March to set up an appt--reminder in [facility] to contact patient as well--AVS given to patient    -Bloodwork orders given to patient, they will have them done before their next visit.     -Brayden Door

## 2021-01-06 ENCOUNTER — TELEPHONE (OUTPATIENT)
Dept: INTERNAL MEDICINE | Age: 66
End: 2021-01-06

## 2021-01-06 DIAGNOSIS — I10 ESSENTIAL HYPERTENSION: Primary | ICD-10-CM

## 2021-01-06 NOTE — TELEPHONE ENCOUNTER
PA request received for Klor-Con/EF 25MEQ tablets    PA processed and submitted to pt insurance, waiting for response in regards to medication coverage

## 2021-01-07 NOTE — TELEPHONE ENCOUNTER
[facility]. The Prior Authorization Request for Klor-Con/EF 25MEQ tablets has been denied. Denial information scanned to this encounter. Please review and advise. Denial reason:     Deidra follows Medicare rules. The Medicare rule in the Prescription Drug Benefit Manual (Chapter 6, Section 10.1 &amp; 10.9) says for coverage under Medicare Part D, the drug must be approved by the U.S. Food and Drug Administration (FDA). The FDA has not approved the requested drug as safe and effective, and per Medicare rules is not covered.     Please discontinue denied medication in patients medication list.

## 2021-01-11 RX ORDER — POTASSIUM CHLORIDE 750 MG/1
10 TABLET, EXTENDED RELEASE ORAL DAILY
Qty: 30 TABLET | Refills: 3 | Status: SHIPPED | OUTPATIENT
Start: 2021-01-11 | End: 2021-05-26

## 2021-01-25 ENCOUNTER — HOSPITAL ENCOUNTER (OUTPATIENT)
Dept: MAMMOGRAPHY | Age: 66
Discharge: HOME | End: 2021-01-27
Payer: MEDICARE

## 2021-01-25 DIAGNOSIS — Z12.31 BREAST CANCER SCREENING BY MAMMOGRAM: ICD-10-CM

## 2021-01-25 PROCEDURE — 77063 BREAST TOMOSYNTHESIS BI: CPT

## 2021-01-27 RX ORDER — OMEPRAZOLE 40 MG/1
CAPSULE, DELAYED RELEASE ORAL
Qty: 90 CAPSULE | Refills: 1 | Status: SHIPPED | OUTPATIENT
Start: 2021-01-27 | End: 2021-07-27

## 2021-01-27 NOTE — TELEPHONE ENCOUNTER
HTN (hypertension)     CAD/Stents drug-eluting      Serum lipids high     Carotid stenosis, asymptomatic     Nocturnal hypoxia     CRISTINA on CPAP     Type 2 diabetes mellitus with complication, without long-term current use of insulin (HCC)     PAD (peripheral artery disease) ([facility] [address]

## 2021-02-21 DIAGNOSIS — I10 ESSENTIAL HYPERTENSION: ICD-10-CM

## 2021-02-22 RX ORDER — LOSARTAN POTASSIUM 100 MG/1
TABLET ORAL
Qty: 90 TABLET | Refills: 1 | Status: SHIPPED | OUTPATIENT
Start: 2021-02-22 | End: 2021-09-29

## 2021-02-22 NOTE — TELEPHONE ENCOUNTER
error
04/08/2021         Patient Active Problem List:     Smoker     HTN (hypertension)     CAD/Stents drug-eluting      Serum lipids high     Carotid stenosis, asymptomatic     Nocturnal hypoxia     CRISTINA on CPAP     Type 2 diabetes mellitus with complication, without long-term current use of insulin (HCC)     PAD (peripheral artery disease) ([facility] [address]

## 2021-02-25 DIAGNOSIS — F41.9 ANXIETY: ICD-10-CM

## 2021-02-25 NOTE — TELEPHONE ENCOUNTER
Request for Zoloft. Next Visit Date:  Future Appointments   Date Time Provider Zohreh Hopper   3/4/2021 10:15 AM Patrick Hardy MD [facility]   4/7/2021  1:00 PM [address] [place]  [address] Maintenance   Topic Date Due    Diabetic foot exam  01/20/1963    COVID-19 Vaccine (1 of 2) 01/20/1969    Shingles Vaccine (1 of 2) 01/20/2003    Diabetic retinal exam  07/10/2019    Flu vaccine (1) 09/01/2020    Lipid screen  10/24/2020    Hepatitis C screen  07/05/20[birth year] (Originally 1955)    Low dose CT lung screening  05/28/2021    A1C test (Diabetic or Prediabetic)  09/08/2021    Diabetic microalbuminuria test  09/08/2021    Potassium monitoring  09/08/2021    Creatinine monitoring  09/08/2021    Annual Wellness Visit (AWV)  09/19/2021    Breast cancer screen  01/25/2023    Colon cancer screen colonoscopy  02/06/2023    DTaP/Tdap/Td vaccine (2 - Td) 10/24/2028    DEXA (modify frequency per FRAX score)  Completed    Pneumococcal 65+ years Vaccine  Completed    Hepatitis A vaccine  Aged Out    Hib vaccine  Aged Out    Meningococcal (ACWY) vaccine  Aged Out       Hemoglobin A1C (%)   Date Value   09/08/2020 6.3 (H)   02/22/2020 6.0   10/10/2019 6.4             ( goal A1C is < 7)   Microalb/Crt.  Ratio (mcg/mg creat)   Date Value   09/08/2020 CANNOT BE CALCULATED     LDL Cholesterol (mg/dL)   Date Value   10/24/2019 51       (goal LDL is <100)   AST (U/L)   Date Value   09/08/2020 20     ALT (U/L)   Date Value   09/08/2020 15     BUN (mg/dL)   Date Value   09/08/2020 19     BP Readings from Last 3 Encounters:   09/08/20 (!) 141/51   06/02/20 (!) 168/69   04/29/20 (!) 115/58          (goal 120/80)    All Future Testing planned in [facility]  Lab Frequency Next Occurrence   VL DUP CAROTID BILATERAL Once 07/08/2021   VL ARTERIAL PVR LOWER WO EXERCISE Once 09/07/2021   Lipid Panel Once 04/15/2021   TSH with Reflex Once 04/08/2021   Hemoglobin A1C Once 04/08/2021         Patient Active Problem List:     Smoker     HTN (hypertension)     CAD/Stents drug-eluting      Serum lipids high     Carotid stenosis, asymptomatic     Nocturnal hypoxia     CRISTINA on CPAP     Type 2 diabetes mellitus with complication, without long-term current use of insulin (HCC)     PAD (peripheral artery disease) ([facility] [address]

## 2021-02-26 ENCOUNTER — TELEPHONE (OUTPATIENT)
Dept: INTERNAL MEDICINE | Age: 66
End: 2021-02-26

## 2021-02-26 NOTE — TELEPHONE ENCOUNTER
PC from patient stating that she is having bilateral shoulder pain that radiates into her middle back between her shoulder blade x 5 weeks. Pt states that the pain is constant. Pt has upcoming appt please advise if you would like imaging prior to appt.

## 2021-03-04 ENCOUNTER — VIRTUAL VISIT (OUTPATIENT)
Dept: INTERNAL MEDICINE | Age: 66
End: 2021-03-04
Payer: MEDICARE

## 2021-03-04 DIAGNOSIS — E11.8 TYPE 2 DIABETES MELLITUS WITH COMPLICATION, WITHOUT LONG-TERM CURRENT USE OF INSULIN (HCC): ICD-10-CM

## 2021-03-04 DIAGNOSIS — F17.200 SMOKER: ICD-10-CM

## 2021-03-04 DIAGNOSIS — I25.10 CORONARY ARTERY DISEASE INVOLVING NATIVE CORONARY ARTERY OF NATIVE HEART WITHOUT ANGINA PECTORIS: ICD-10-CM

## 2021-03-04 DIAGNOSIS — I73.9 PAD (PERIPHERAL ARTERY DISEASE) (HCC): ICD-10-CM

## 2021-03-04 DIAGNOSIS — M54.2 CERVICAL SPINE PAIN: Primary | ICD-10-CM

## 2021-03-04 DIAGNOSIS — J44.9 STAGE 2 MODERATE COPD BY GOLD CLASSIFICATION (HCC): ICD-10-CM

## 2021-03-04 DIAGNOSIS — Z99.89 OSA ON CPAP: ICD-10-CM

## 2021-03-04 DIAGNOSIS — E78.5 DYSLIPIDEMIA: ICD-10-CM

## 2021-03-04 DIAGNOSIS — I10 ESSENTIAL HYPERTENSION: ICD-10-CM

## 2021-03-04 DIAGNOSIS — I65.23 BILATERAL CAROTID ARTERY OCCLUSION: ICD-10-CM

## 2021-03-04 DIAGNOSIS — G47.33 OSA ON CPAP: ICD-10-CM

## 2021-03-04 PROBLEM — I48.91 ATRIAL FIBRILLATION (HCC): Status: RESOLVED | Noted: 2021-03-04 | Resolved: 2021-03-04

## 2021-03-04 PROBLEM — I48.91 ATRIAL FIBRILLATION (HCC): Status: ACTIVE | Noted: 2021-03-04

## 2021-03-04 PROCEDURE — 99443 PR PHYS/QHP TELEPHONE EVALUATION 21-30 MIN: CPT | Performed by: INTERNAL MEDICINE

## 2021-03-04 RX ORDER — ALBUTEROL SULFATE 90 UG/1
2 AEROSOL, METERED RESPIRATORY (INHALATION) EVERY 6 HOURS PRN
Qty: 1 INHALER | Refills: 5 | Status: SHIPPED | OUTPATIENT
Start: 2021-03-04

## 2021-03-04 RX ORDER — HYDROCHLOROTHIAZIDE 25 MG/1
TABLET ORAL
Qty: 90 TABLET | Refills: 2 | Status: SHIPPED | OUTPATIENT
Start: 2021-03-04 | End: 2022-02-01

## 2021-03-04 RX ORDER — ATORVASTATIN CALCIUM 40 MG/1
TABLET, FILM COATED ORAL
Qty: 90 TABLET | Refills: 1 | Status: SHIPPED | OUTPATIENT
Start: 2021-03-04 | End: 2021-11-19

## 2021-03-04 RX ORDER — TIZANIDINE 4 MG/1
4 TABLET ORAL 3 TIMES DAILY PRN
Qty: 30 TABLET | Refills: 0 | Status: SHIPPED | OUTPATIENT
Start: 2021-03-04 | End: 2021-05-11 | Stop reason: SDUPTHER

## 2021-03-04 NOTE — PATIENT INSTRUCTIONS
-Pt due for 3 month f/u in June-- pt to call in May to set up an appt--reminder in [facility] to contact patient as well--AVS given to patient    -Bloodwork orders given to patient, they will have them done before their next visit.      -Xray order given to pt, this test does not need to be scheduled, please take order with you to Main Registration at hospital and have completed before your next appointment.     -Dioni Parks records requested

## 2021-03-04 NOTE — PROGRESS NOTES
Kimberly Montes is a 74 y.o. female evaluated via telephone on 3/4/2021. Consent:  She and/or health care decision maker is aware that that she may receive a bill for this telephone service, depending on her insurance coverage, and has provided verbal consent to proceed: Yes      Documentation:  I communicated with the patient and/or health care decision maker about her acute concerns and her chronic health issues. She is complaining of creaking in her neck and pain that radiates from the base of her neck towards both upper back and shoulders. No radiculopathy or numbness or tingling. No weakness. She has a little pain when she lifts her arms above her head's. This is going on for a few days. She would like an x-ray done. She does not want therapy. She tried using a heat pad but could not get it on her neck area properly. She has been using like a soft brace for a few days with seems to help while it is on. She did not follow-up with cardiology. She was advised to check her blood pressures twice a day. She is has not seen a pulmonologist in some time. She says she gets some tightness in her chest sometimes with breathing. She has not been compliant with her inhalers. She continues to vape daily and she is unwilling to give it up. Her last PFT showed stage I COPD but in the past she has been stage II COPD as well. She had a low-dose CT for lung cancer screening last May. She is advised to stay compliant with her inhalers and follow-up with pulmonologist for repeat PFTs. She has been having some dry cough as well. She needs some labs done including A1c and TSH which will be sent to her. She is refusing physical therapy at this point. She states she is using her CPAP. Details of this discussion including any medical advice provided:   Patient advised to do physical therapy for her upper back but refusing. Get x-rays of her neck and try a muscle relaxant.   Advised it can cause sedation. Continue CPAP daily. Advised compliance with inhalers and follow-up with pulmonologist for low-dose CT and PFTs. Will get cardiology notes. Labs ordered. Advised to call if pain worsening or any other concerns. . We will see her in the office in 3 months time. I affirm this is a Patient Initiated Episode with a Patient who has not had a related appointment within my department in the past 7 days or scheduled within the next 24 hours. Patient identification was verified at the start of the visit: Yes    Total Time: minutes: 21-30 minutes    The visit was conducted pursuant to the emergency declaration under the [address] [place] [address], [address] [place] [address] authority and the [email or web address] and [employer] Act. Patient identification was verified, and a caregiver was present when appropriate. The patient was located in a state where the provider was credentialed to provide care.     Note: not billable if this call serves to triage the patient into an appointment for the relevant concern      Lauryn Carroll

## 2021-04-12 NOTE — TELEPHONE ENCOUNTER
Request for Kenalog. PT on lloyd list for 3 month f.u in June     Next Visit Date:  Future Appointments   Date Time Provider Zohreh Hopper   4/28/2021  4:00 PM Asif Pandey  [address] Maintenance   Topic Date Due    Diabetic foot exam  Never done    Diabetic retinal exam  07/10/2019    Lipid screen  10/24/2020    COVID-19 Vaccine (2 - Pfizer 2-dose series) 03/21/2021    Hepatitis C screen  07/05/2023 (Originally 1955)    Shingles Vaccine (1 of 2) 03/04/2022 (Originally 1/20/2003)    Low dose CT lung screening  05/28/2021    Flu vaccine (Season Ended) 09/01/2021    A1C test (Diabetic or Prediabetic)  09/08/2021    Diabetic microalbuminuria test  09/08/2021    Potassium monitoring  09/08/2021    Creatinine monitoring  09/08/2021    Annual Wellness Visit (AWV)  09/19/2021    Breast cancer screen  01/25/2023    Colon cancer screen colonoscopy  02/06/2023    DTaP/Tdap/Td vaccine (2 - Td) 10/24/2028    DEXA (modify frequency per FRAX score)  Completed    Pneumococcal 65+ years Vaccine  Completed    Hepatitis A vaccine  Aged Out    Hib vaccine  Aged Out    Meningococcal (ACWY) vaccine  Aged Out       Hemoglobin A1C (%)   Date Value   09/08/2020 6.3 (H)   02/22/2020 6.0   10/10/2019 6.4             ( goal A1C is < 7)   Microalb/Crt.  Ratio (mcg/mg creat)   Date Value   09/08/2020 CANNOT BE CALCULATED     LDL Cholesterol (mg/dL)   Date Value   10/24/2019 51       (goal LDL is <100)   AST (U/L)   Date Value   09/08/2020 20     ALT (U/L)   Date Value   09/08/2020 15     BUN (mg/dL)   Date Value   09/08/2020 19     BP Readings from Last 3 Encounters:   09/08/20 (!) 141/51   06/02/20 (!) 168/69   04/29/20 (!) 115/58          (goal 120/80)    All Future Testing planned in [facility]  Lab Frequency Next Occurrence   VL DUP CAROTID BILATERAL Once 07/08/2021   VL ARTERIAL PVR LOWER WO EXERCISE Once 09/07/2021   Lipid Panel Once 04/15/2021   TSH with Reflex Once 04/08/2021 Hemoglobin A1C Once 04/08/2021   Hemoglobin A1C Once 06/12/2021   XR CERVICAL SPINE (2-3 VIEWS) Once 06/19/2021   TSH with Reflex Once 06/05/2021         Patient Active Problem List:     Smoker     HTN (hypertension)     CAD/Stents drug-eluting      Serum lipids high     Carotid stenosis, asymptomatic     Nocturnal hypoxia     CRISTINA on CPAP     Type 2 diabetes mellitus with complication, without long-term current use of insulin (HCC)     PAD (peripheral artery disease) ([facility] [address]

## 2021-04-14 RX ORDER — TRIAMCINOLONE ACETONIDE 0.25 MG/G
CREAM TOPICAL
Qty: 30 G | Refills: 1 | Status: SHIPPED | OUTPATIENT
Start: 2021-04-14

## 2021-04-23 ENCOUNTER — TELEPHONE (OUTPATIENT)
Dept: INTERNAL MEDICINE | Age: 66
End: 2021-04-23

## 2021-04-23 NOTE — LETTER
[address] [facility] [place] [address] [address]  Phone: [phone]  Fax: [phone]    Fabiano Castellanos MD        April 23, 2021    [address]      Dear German Briseno: This letter is a reminder that you may have diagnostic testing that has not been completed. It is important to your well-being that these test(s) are performed. Please find the outstanding test(s) attached. If you could please have these completed before your next appointment. You can have the test completed at any [facility] facility or Lab. Please see the order for scheduling instructions. Any testing that needs completed other than blood work or xray's please call [phone] to schedule an appointment. Otherwise can be done at any [facility]. Please call our office at Dept: [phone] for additional information on the outstanding tests or let us know if they have been completed so we may update your chart. If you have any questions or concerns, please don't hesitate to call.     Sincerely,        Fabiano Castellanos MD

## 2021-04-28 ENCOUNTER — VIRTUAL VISIT (OUTPATIENT)
Dept: PULMONOLOGY | Age: 66
End: 2021-04-28
Payer: MEDICARE

## 2021-04-28 DIAGNOSIS — R91.1 LUNG NODULE: ICD-10-CM

## 2021-04-28 DIAGNOSIS — Z87.891 HISTORY OF SMOKING 30 OR MORE PACK YEARS: ICD-10-CM

## 2021-04-28 DIAGNOSIS — Z99.89 OSA ON CPAP: ICD-10-CM

## 2021-04-28 DIAGNOSIS — J44.9 CHRONIC OBSTRUCTIVE PULMONARY DISEASE, UNSPECIFIED COPD TYPE (HCC): Primary | ICD-10-CM

## 2021-04-28 DIAGNOSIS — G47.33 OSA ON CPAP: ICD-10-CM

## 2021-04-28 DIAGNOSIS — R06.09 DYSPNEA ON EXERTION: ICD-10-CM

## 2021-04-28 PROCEDURE — G8399 PT W/DXA RESULTS DOCUMENT: HCPCS | Performed by: INTERNAL MEDICINE

## 2021-04-28 PROCEDURE — 3017F COLORECTAL CA SCREEN DOC REV: CPT | Performed by: INTERNAL MEDICINE

## 2021-04-28 PROCEDURE — 1090F PRES/ABSN URINE INCON ASSESS: CPT | Performed by: INTERNAL MEDICINE

## 2021-04-28 PROCEDURE — 1036F TOBACCO NON-USER: CPT | Performed by: INTERNAL MEDICINE

## 2021-04-28 PROCEDURE — G8417 CALC BMI ABV UP PARAM F/U: HCPCS | Performed by: INTERNAL MEDICINE

## 2021-04-28 PROCEDURE — G8427 DOCREV CUR MEDS BY ELIG CLIN: HCPCS | Performed by: INTERNAL MEDICINE

## 2021-04-28 PROCEDURE — 99213 OFFICE O/P EST LOW 20 MIN: CPT | Performed by: INTERNAL MEDICINE

## 2021-04-28 PROCEDURE — G8926 SPIRO NO PERF OR DOC: HCPCS | Performed by: INTERNAL MEDICINE

## 2021-04-28 PROCEDURE — 3023F SPIROM DOC REV: CPT | Performed by: INTERNAL MEDICINE

## 2021-04-28 PROCEDURE — 1123F ACP DISCUSS/DSCN MKR DOCD: CPT | Performed by: INTERNAL MEDICINE

## 2021-04-28 PROCEDURE — 4040F PNEUMOC VAC/ADMIN/RCVD: CPT | Performed by: INTERNAL MEDICINE

## 2021-04-28 ASSESSMENT — SLEEP AND FATIGUE QUESTIONNAIRES
HOW LIKELY ARE YOU TO NOD OFF OR FALL ASLEEP WHILE LYING DOWN TO REST IN THE AFTERNOON WHEN CIRCUMSTANCES PERMIT: 0
HOW LIKELY ARE YOU TO NOD OFF OR FALL ASLEEP WHILE WATCHING TV: 1
ESS TOTAL SCORE: 2

## 2021-04-28 NOTE — PROGRESS NOTES
OUTPATIENT PULMONARY PROGRESS NOTE    TELEHEALTH VISIT  BELA. ME VISIT    Patient:  Concetta Quinn  MRN: [id number]    Consulting Physician: Dr. Doug Medina  Reason for initial consult: COPD, lung nodule, dyspnea, obstructive sleep apnea  Primacy Care Physician: Anish Soto MD    HISTORY OF PRESENT ILLNESS:   The patient is a 74 y.o. female   She is here for follow-up of obstructive sleep apnea and COPD, lung nodule. Since she was seen last time she did not have any exacerbation no ER visit no steroids or antibiotics. Mostly she is staying home she does get shortness of breath on exertional activity and sometimes on regular activities at home. Yesterday she had walked a quarter of a mile and she gets short of breath but also limited because of her pain in her legs and osteoarthritis. She does not complain of daily cough cough is occasional and denies sputum production. Denies any change in the color of the sputum volume the sputum if she had any sputum. She denies orthopnea PND or pedal edema. She denies nocturnal awakening with cough wheezing or chest tightness. She denies any weight loss but she think that she had gained some weight 8 to 10 pounds since she was seen last time. She denies any chest pain chest tightness syncope or dizziness. She is taking Stiolto 2 puff once daily. She did not really take albuterol and last time she took it was many months ago. She is very compliant with CPAP she is using CPAP every night she does not complain of dryness of mouth. According to patient she got new supplies for the CPAP and she is doing better as far as her dryness of mouth is concerned. She feels pressure when she wake up in the morning and sleep is restorative denies dozing off during the daytime. Compliance data is available from 12/21/2021 to 03/20/2021. Compliance is 100% for 90 days. Average usage is 8 hours 19 minutes. Average AHI is 0.8.   On auto CPAP    Her last CT scan in May 2020 showed 4 mm left upper lobe lung nodule is stable without much change and there was no other acute changes seen on the CT scan. Her last pulmonary function test shows improvement in FEV1 from 58% to 69% her diffusion capacity is normal.    Sleep questionnaire on 10/05/2020  Positive dry mouth upon awakening. Occasionally fatigue and tiredness during the day. Goes to sleep at 11 pm, wakes up 8 am. It takes 20- 30 minutes to fall asleep. Wakes up rarely at night to go to bathroom. Takes nap sometime during the day. no headache in am. No car wrecks or near wrecks because of the sleepiness. No nodding off while driving. No weight gain. No forgetfulness or decreased concentration. No nasal congestion or obstruction at night. Using CPAP 7-8 hr/night. No leg jerks during sleep. No restless feelings in legs at night. No numbness or burning in leg or feet. No leg aches or cramps. Initial history and course    She was referred here for dyspnea, she has long history of his smoking, she was told in the past that she has COPD and she had a spirometry done before, she does have history of shortness of breath on exertion and sometimes on regular activities, and she had limited her activities because of her shortness of breath especially last few years. She also has a CT chest done for screening which showed 5 mm left upper lobe lung nodule  She does have weight gain for about 30 pounds in last few years   She was diagnosed with sleep apnea for about a year ago when she had a sleep study done and she was also placed on oxygen at night with CPAP at 2 L.   She does have history of GERD and she is on medication to control the symptoms          Sleep Medicine 4/28/2021 6/2/2020 1/29/2020 5/22/2019 2/27/2019 12/15/2018 12/5/2018   Sitting and reading 1 0 1 0 1 0 1   Watching TV 1 2 1 1 1 1 1   Sitting, inactive in a public place (e.g. a theatre or a meeting) 0 0 0 0 0 0 0   As a passenger in a car for an hour without a break 0 0 1 0 0 0 0   Lying down to rest in the afternoon when circumstances permit 0 3 2 2 1 2 2   Sitting and talking to someone 0 0 0 0 0 0 0   Sitting quietly after a lunch without alcohol 0 0 1 0 0 0 0   In a car, while stopped for a few minutes in traffic 0 0 0 0 0 0 0   Total score 2 5 6 3 3 3 4   Neck circumference - - - - - 55 -     Past Medical History:        Diagnosis Date    Angina pectoris (HCC)     Arthritis     Bipolar disorder (HCC)     CAD (coronary artery disease)     stents x 2 (Dr. Yeimi Puente)    Carotid stenosis, asymptomatic 2015    Chronic back pain     [facility]     Depression     Dyspnea     Family history of colon cancer     Family history of liver cancer     Family history of ovarian cancer     GERD (gastroesophageal reflux disease)     History of colon polyps     Hyperlipidemia     Hypertension     Lung nodule     New onset type 2 diabetes mellitus ([facility] 2018    Obesity     Obesity (BMI 35.0-39.9 without comorbidity)    Peripheral vascular disease (HCC)     Poor historian     Sleep apnea     CRISTINA on CPAP    Smoking greater than 40 pack years     reports that she quit smoking about 2 years ago. 1.5 PPD for 48 yeras.  She has a       Past Surgical History:        Procedure Laterality Date    ANKLE SURGERY      CARDIAC CATHETERIZATION  10/11/2013    2 stents    CATARACT REMOVAL      cataract both eyes with lenses     SECTION      x2    COLONOSCOPY      COLONOSCOPY N/A 2020    COLONOSCOPY POLYPECTOMY HOT BIOPSY performed by Luna Angel MD at . [facility]  2011    x2    ENDOSCOPY, COLON, DIAGNOSTIC      EYE SURGERY      b/l cataract extraction    INDUCED       NERVE BLOCK Bilateral 2018    bilat facets #1 marcaine    NERVE BLOCK  2019    caudal aborted- did not get in   [address] Bilateral 02/15/2019    bilateral l4 transforaminal. decadron 8mg/ProHance contrast    NOSE SURGERY      as a child    TONSILLECTOMY         Allergies: Allergies   Allergen Reactions    Contrast [Iodides] Itching     Mild itchiness experienced with administration of IV contrast media. Home Meds:   Outpatient Encounter Medications as of 4/28/2021   Medication Sig Dispense Refill    triamcinolone (KENALOG) 0.025 % cream apply to affected area once daily 30 g 1    atorvastatin (LIPITOR) 40 MG tablet Once daily 90 tablet 1    hydroCHLOROthiazide (HYDRODIURIL) 25 MG tablet take 1 tablet by mouth once daily 90 tablet 2    tiZANidine (ZANAFLEX) 4 MG tablet Take 1 tablet by mouth 3 times daily as needed (spasm) 30 tablet 0    albuterol sulfate HFA (PROVENTIL HFA) 108 (90 Base) MCG/ACT inhaler Inhale 2 puffs into the lungs every 6 hours as needed for Wheezing 1 Inhaler 5    sertraline (ZOLOFT) 50 MG tablet take 1 tablet by mouth once daily 120 tablet 0    losartan (COZAAR) 100 MG tablet take 1 tablet by mouth once daily 90 tablet 1    omeprazole (PRILOSEC) 40 MG delayed release capsule take 1 capsule by mouth once daily 90 capsule 1    potassium chloride (KLOR-CON M) 10 MEQ extended release tablet Take 1 tablet by mouth daily 30 tablet 3    carvedilol (COREG) 3.125 MG tablet Take 1 tablet by mouth 2 times daily 60 tablet 3    Tiotropium Bromide-Olodaterol (STIOLTO RESPIMAT) 2.5-2.5 MCG/ACT AERS Inhale 2 puffs into the lungs daily 1 Inhaler 5    metFORMIN (GLUCOPHAGE) 500 MG tablet take 1 tablet by mouth twice a day with meals 180 tablet 3    amLODIPine (NORVASC) 5 MG tablet take 1 tablet by mouth once daily 90 tablet 1    betamethasone dipropionate (DIPROLENE) 0.05 % ointment Apply topically daily.  50 g 2    b complex vitamins capsule Take 1 capsule by mouth daily      Coenzyme Q10-Fish Oil-Vit E (CO-Q 10 OMEGA-3 FISH OIL) CAPS Take 1,000 mg by mouth daily      magnesium 30 MG tablet Take 30 mg by mouth 2 times daily      Multiple Vitamin (MULTI-VITAMIN PO) Take 1 tablet by mouth daily.  aspirin 325 MG tablet Take 325 mg by mouth daily. No facility-administered encounter medications on file as of 4/28/2021. Social History:   TOBACCO:   reports that she quit smoking about 2 years ago. 1.5 PPD for 48 yeras. She has a 60.00 pack-year smoking history. She has never used smokeless tobacco.  ETOH:   reports current alcohol use.   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      Family History:       Problem Relation Age of Onset    Hypertension Mother     Macular Degen Mother     Other Mother         vascular    Colon Polyps Brother     Cancer Maternal Aunt        Immunizations:    Immunization History   Administered Date(s) Administered    COVID-19, Moderna, PF, 100mcg/0.5mL 02/28/2021    COVID-19, Pfizer, PF, 30mcg/0.3mL 02/28/2021    Pneumococcal Conjugate 13-valent Ivanna Clines) 04/17/2018, 12/01/2019    Pneumococcal Polysaccharide ([id number]) 02/20/2020    Tdap (Boostrix, Adacel) 10/24/2018         REVIEW OF SYSTEMS:  CONSTITUTIONAL:  negative for  fevers, chills, sweats, fatigue, malaise, anorexia and weight loss  EYES:  negative for  double vision, blurred vision, dry eyes, eye discharge, visual disturbance, irritation, redness and icterus  HEENT:   negative for hoarseness and voice changes, negative for  hearing loss, tinnitus, nasal congestion, epistaxis, snoring, sore mouth and sore throat  RESPIRATORY:  positive for dyspnea on exertion and activities,,  negative for dry cough, mild occasional cough with sputum, negative for wheezing, hemoptysis, chest pain, pleuritic pain and cyanosis  CARDIOVASCULAR:  positive for  dyspnea on exertion, positive for chest heaviness/pressure, negative for palpitations, orthopnea, PND, exertional chest pressure/discomfort, fatigue, early saiety, edema, syncope  GASTROINTESTINAL:  negative for change in bowel habits, diarrhea, constipation, abdominal pain, pruritus, abdominal mass, abdominal distention, jaundice, hematemesis and hemtochezia, dysphagia, reflux and regurgitation  GENITOURINARY:  negative for frequency, dysuria, nocturia, urinary incontinence, hesitancy, decreased stream and hematuria  HEMATOLOGIC/LYMPHATIC:  negative for easy bruising, bleeding, lymphadenopathy and petechiae  ALLERGIC/IMMUNOLOGIC:  negative for recurrent infections, urticaria, hay fever, angioedema, anaphylaxis and drug reactions  ENDOCRINE:  negative for heat intolerance, cold intolerance, tremor, weight changes and change in bowel habits  MUSCULOSKELETAL:  negative for  myalgias, arthralgias, joint swelling, stiff joints and decreased range of motion  NEUROLOGICAL:  negative for headaches, dizziness, seizures, memory problems, speech problems, visual disturbance, coordination problems, gait problems, weakness, numbness, syncope and tingling  BEHAVIOR/PSYCH:  negative          Physical Exam:    Vitals: There were no vitals taken for this visit. Last 3 weights: Wt Readings from Last 3 Encounters:   09/08/20 208 lb (94.3 kg)   06/02/20 198 lb (89.8 kg)   02/20/20 198 lb (89.8 kg)     There is no height or weight on file to calculate BMI.     Physical Examination:   General appearance - alert, well appearing, and in no distress, overweight and acyanotic, in no respiratory distress  Mental status - alert, oriented to person, place, and time  Eyes - pupils equal and reactive, extraocular eye movements intact, sclera anicteric  Ears - right ear normal, left ear normal  Nose -not examined  Mouth -not examined  Neck -short and thick neck   Chest - no tachypnea, retractions or cyanosis noted on visual exam.  Heart -not examined   Abdomen -not examined  Neurological - alert, oriented, normal speech, no focal findings or movement disorder noted  Extremities -not examined  Skin -not examined      LABS:    CBC:   WBC   Date Value Ref Range Status   09/08/2020 9.8 3.5 - 11.3 k/uL Final Urinalysis:       Cultures:-  -----------------------------------------------------------------  Immunization History   Administered Date(s) Administered    COVID-19, Moderna, PF, 100mcg/0.5mL 02/28/2021    COVID-19, Pfizer, PF, 30mcg/0.3mL 02/28/2021    Pneumococcal Conjugate 13-valent ([id number]) 04/17/2018, 12/01/2019    Pneumococcal Polysaccharide ([id number]) 02/20/2020    Tdap (Boostrix, Adacel) 10/24/2018     ABGs: No results found for: PHART, PO2ART, [id number]    Pulmonary Functions Testing Results:    05/22/2019: FEV1 1.52 69%, FVC 1.85 66%, FEV1 %, TLC 3.48 75%, DLCO 15.70 88%  02/07/2018: FEV1 1.30 58%, FVC 1.62 58%, [id number] 100%, TLC 3.35 72%, DLCO 13.62 76%    CXR      CT Scans     05/28/2020  Mediastinum:  Suboptimal evaluation due to low dose technique.  Thoracic   aorta demonstrates moderate calcification without aneurysm.  Pulmonary trunk   appears nondilated.  Heart appears normal size without pericardial effusion. No lymphadenopathy.  The esophagus is grossly unremarkable.       Lungs/Pleura:  No focal consolidation, pneumothorax or pleural effusion. Trachea and distal airways appear patent.  4 mm solid noncalcified pulmonary   nodule left upper lobe series 3, image 140. no new or enlarging pulmonary   nodule. 05/07/2019  1. Stable 4 mm posterior left upper lobe pulmonary nodule, unchanged from   05/07/2018.  Mild emphysema. 2. Cardiomegaly.  Coronary artery disease.  Atherosclerotic calcification of   the aorta. 3. Small hiatal hernia. 05/07/2018  Stable 4 mm nodule left upper lobe. 10/17/2017  5 mm nodule posterior left upper lobe.  No acute process with chronic   findings as described. Compliance data from CPAP. From 06/29/2020 to 09/26/2020  Auto CPAP with pressure maximum of 20 and pressure minimum of 12  Average daily usage 7 hours 32 minutes  Compliance 96 %  Average mean pressure 13.9 cm. AHI 0.8    Assessment and Plan         1.  Chronic Vitals/Constitutional/EENT/Resp/CV/GI//MS/Neuro/Skin/Heme-Lymph-Imm. Pursuant to the emergency declaration under the [address] [place] [address] waiver authority and the [employer] Act, this Virtual Visit was conducted with patient's consent, to reduce the patient's risk of exposure to COVID-19 and provide necessary medical care. The patient has also been advised to contact this office for worsening conditions or problems, and seek emergency medical treatment and/or call 911 if deemed necessary. Services were provided through a video synchronous discussion virtually to substitute for in-person clinic visit. Patient and provider were located at their individual locations. Time spent 25 minutes    --Sarina Kellogg MD on 4/28/2021 at 1:36 PM    An electronic signature was used to authenticate this note.

## 2021-05-06 ENCOUNTER — HOSPITAL ENCOUNTER (OUTPATIENT)
Age: 66
Discharge: HOME | End: 2021-05-08
Payer: MEDICARE

## 2021-05-06 ENCOUNTER — HOSPITAL ENCOUNTER (OUTPATIENT)
Dept: GENERAL RADIOLOGY | Age: 66
Discharge: HOME | End: 2021-05-08
Payer: MEDICARE

## 2021-05-06 DIAGNOSIS — M54.2 CERVICAL SPINE PAIN: ICD-10-CM

## 2021-05-06 PROCEDURE — 72040 X-RAY EXAM NECK SPINE 2-3 VW: CPT

## 2021-05-07 ENCOUNTER — HOSPITAL ENCOUNTER (OUTPATIENT)
Age: 66
Discharge: HOME | End: 2021-05-07
Payer: MEDICARE

## 2021-05-07 DIAGNOSIS — I10 ESSENTIAL HYPERTENSION: ICD-10-CM

## 2021-05-07 DIAGNOSIS — E78.5 DYSLIPIDEMIA: ICD-10-CM

## 2021-05-07 DIAGNOSIS — E11.8 TYPE 2 DIABETES MELLITUS WITH COMPLICATION, WITHOUT LONG-TERM CURRENT USE OF INSULIN (HCC): ICD-10-CM

## 2021-05-07 LAB
CHOLESTEROL/HDL RATIO: 2.4
CHOLESTEROL: 117 MG/DL
HDLC SERPL-MCNC: 48 MG/DL
LDL CHOLESTEROL: 44 MG/DL (ref 0–130)
TRIGL SERPL-MCNC: 123 MG/DL
TSH SERPL DL<=0.05 MIU/L-ACNC: 2.71 MIU/L (ref 0.3–5)
VLDLC SERPL CALC-MCNC: NORMAL MG/DL (ref 1–30)

## 2021-05-07 PROCEDURE — 80061 LIPID PANEL: CPT

## 2021-05-07 PROCEDURE — 84443 ASSAY THYROID STIM HORMONE: CPT

## 2021-05-07 PROCEDURE — 83036 HEMOGLOBIN GLYCOSYLATED A1C: CPT

## 2021-05-07 PROCEDURE — 36415 COLL VENOUS BLD VENIPUNCTURE: CPT

## 2021-05-08 LAB
ESTIMATED AVERAGE GLUCOSE: 134 MG/DL
HBA1C MFR BLD: 6.3 % (ref 4–6)

## 2021-05-11 ENCOUNTER — OFFICE VISIT (OUTPATIENT)
Dept: INTERNAL MEDICINE | Age: 66
End: 2021-05-11
Payer: MEDICARE

## 2021-05-11 VITALS
DIASTOLIC BLOOD PRESSURE: 72 MMHG | TEMPERATURE: 97.7 F | BODY MASS INDEX: 37.66 KG/M2 | WEIGHT: 216 LBS | SYSTOLIC BLOOD PRESSURE: 118 MMHG

## 2021-05-11 DIAGNOSIS — F32.89 OTHER DEPRESSION: ICD-10-CM

## 2021-05-11 DIAGNOSIS — M62.838 CERVICAL PARASPINAL MUSCLE SPASM: ICD-10-CM

## 2021-05-11 DIAGNOSIS — G89.29 CHRONIC BILATERAL LOW BACK PAIN WITHOUT SCIATICA: ICD-10-CM

## 2021-05-11 DIAGNOSIS — Z99.89 OSA ON CPAP: ICD-10-CM

## 2021-05-11 DIAGNOSIS — R41.89 COGNITIVE DECLINE: ICD-10-CM

## 2021-05-11 DIAGNOSIS — I10 ESSENTIAL HYPERTENSION: Primary | ICD-10-CM

## 2021-05-11 DIAGNOSIS — M54.50 CHRONIC BILATERAL LOW BACK PAIN WITHOUT SCIATICA: ICD-10-CM

## 2021-05-11 DIAGNOSIS — F17.200 SMOKER: ICD-10-CM

## 2021-05-11 DIAGNOSIS — E11.8 TYPE 2 DIABETES MELLITUS WITH COMPLICATION, WITHOUT LONG-TERM CURRENT USE OF INSULIN (HCC): ICD-10-CM

## 2021-05-11 DIAGNOSIS — I25.10 CORONARY ARTERY DISEASE INVOLVING NATIVE CORONARY ARTERY OF NATIVE HEART WITHOUT ANGINA PECTORIS: ICD-10-CM

## 2021-05-11 DIAGNOSIS — E78.5 DYSLIPIDEMIA: ICD-10-CM

## 2021-05-11 DIAGNOSIS — J44.9 STAGE 2 MODERATE COPD BY GOLD CLASSIFICATION (HCC): ICD-10-CM

## 2021-05-11 DIAGNOSIS — F41.9 ANXIETY: ICD-10-CM

## 2021-05-11 DIAGNOSIS — I73.9 PAD (PERIPHERAL ARTERY DISEASE) (HCC): ICD-10-CM

## 2021-05-11 DIAGNOSIS — I65.23 BILATERAL CAROTID ARTERY OCCLUSION: ICD-10-CM

## 2021-05-11 DIAGNOSIS — G47.33 OSA ON CPAP: ICD-10-CM

## 2021-05-11 PROCEDURE — 1090F PRES/ABSN URINE INCON ASSESS: CPT | Performed by: INTERNAL MEDICINE

## 2021-05-11 PROCEDURE — 3044F HG A1C LEVEL LT 7.0%: CPT | Performed by: INTERNAL MEDICINE

## 2021-05-11 PROCEDURE — G8399 PT W/DXA RESULTS DOCUMENT: HCPCS | Performed by: INTERNAL MEDICINE

## 2021-05-11 PROCEDURE — G8417 CALC BMI ABV UP PARAM F/U: HCPCS | Performed by: INTERNAL MEDICINE

## 2021-05-11 PROCEDURE — 2022F DILAT RTA XM EVC RTNOPTHY: CPT | Performed by: INTERNAL MEDICINE

## 2021-05-11 PROCEDURE — 3017F COLORECTAL CA SCREEN DOC REV: CPT | Performed by: INTERNAL MEDICINE

## 2021-05-11 PROCEDURE — 99214 OFFICE O/P EST MOD 30 MIN: CPT | Performed by: INTERNAL MEDICINE

## 2021-05-11 PROCEDURE — G8926 SPIRO NO PERF OR DOC: HCPCS | Performed by: INTERNAL MEDICINE

## 2021-05-11 PROCEDURE — 3023F SPIROM DOC REV: CPT | Performed by: INTERNAL MEDICINE

## 2021-05-11 PROCEDURE — G8427 DOCREV CUR MEDS BY ELIG CLIN: HCPCS | Performed by: INTERNAL MEDICINE

## 2021-05-11 PROCEDURE — 99211 OFF/OP EST MAY X REQ PHY/QHP: CPT | Performed by: INTERNAL MEDICINE

## 2021-05-11 PROCEDURE — 1036F TOBACCO NON-USER: CPT | Performed by: INTERNAL MEDICINE

## 2021-05-11 PROCEDURE — 1123F ACP DISCUSS/DSCN MKR DOCD: CPT | Performed by: INTERNAL MEDICINE

## 2021-05-11 PROCEDURE — 4040F PNEUMOC VAC/ADMIN/RCVD: CPT | Performed by: INTERNAL MEDICINE

## 2021-05-11 RX ORDER — CARVEDILOL 3.12 MG/1
3.12 TABLET ORAL 2 TIMES DAILY
Qty: 60 TABLET | Refills: 3 | Status: SHIPPED | OUTPATIENT
Start: 2021-05-11 | End: 2021-11-30

## 2021-05-11 RX ORDER — AMLODIPINE BESYLATE 5 MG/1
TABLET ORAL
Qty: 90 TABLET | Refills: 1 | Status: SHIPPED | OUTPATIENT
Start: 2021-05-11 | End: 2021-11-30

## 2021-05-11 RX ORDER — TIZANIDINE 4 MG/1
4 TABLET ORAL DAILY PRN
Qty: 30 TABLET | Refills: 2 | Status: SHIPPED | OUTPATIENT
Start: 2021-05-11 | End: 2021-07-06

## 2021-05-11 ASSESSMENT — ENCOUNTER SYMPTOMS
BACK PAIN: 1
PHOTOPHOBIA: 0
SHORTNESS OF BREATH: 0
COUGH: 0
WHEEZING: 0
BLOOD IN STOOL: 0
ABDOMINAL PAIN: 0
CONSTIPATION: 0

## 2021-05-11 NOTE — PROGRESS NOTES
[facility]    Progress Note    Date of patient's visit: 5/11/2021    Patient's Name:  Abimbola Solo    YOB: 1955            Patient Care Team:  Joesph Bergman MD as PCP - General (Internal Medicine)  Joesph Bergman MD as PCP - [facility] Empaneled Provider  Guillaume Scott MD as Consulting Physician (Pulmonology)  Jr Cha, RN as Nurse Navigator    REASON FOR VISIT: Routine outpatient follow     Chief Complaint   Patient presents with    Headache     Pt states that she has been having ongoing headache on the right side of her head over the last few months     Mass     Pt c/o having a lump at the base of her skull    Neck Pain     Pt c/o having neck stiffness as well on the R side.  Health Maintenance     [facility],  podiatry records requested     Discuss Labs     5/7/2021         HISTORY OF PRESENT ILLNESS:    History was obtained from the patient. Abimbola Solo is a 74 y.o. is here for for chronic medical problems. She is tearful. She has been having more headaches. But mainly she is getting more depressed. She is taking her sertraline. She has lot of financial and other stressors. She says she is waking up early every morning and by 5:00. She has no motivation. She was expressing a lot of her frustrations. She says she is using her CPAP daily. She says she is having trouble with focus and concentration and also memory. She is forgetting numbers and names. She has hypertension but it seems well controlled. Diabetes is stable. She does have increasing weight gain due to nervous eating as she states. She agrees to see a counselor. She has had a previous MRI brain which showed some microvascular disease. Because of her cognitive changes and increasing headaches we will get another MRI though I think it will only show some chronic small vessel disease.   She continues to vape and use marijuana which helps her mood and her appetite. It helps her anxiety. She also sees pulmonologist for her COPD and sleep apnea. She is complaining of neck pain and pain at the base of her head. X-rays have shown mild degenerative changes but some cervical spasm. She wants to see a chiropractor and is wondering if her insurance will pay for it. She also has chronic low back pain. She can only stand for 15 minutes at her sink or while cooking before she has to sit down. No weakness. No radiculopathy. She follows up with cardiology regularly. No chest pains. C spine 2021  FINDINGS:   There is straightening of the cervical spine.  The vertebral body heights are   maintained.  The disc spaces are maintained.  There is degenerative facet   hypertrophy. Olamide Annie is no spondylolisthesis.  The prevertebral soft tissues   are unremarkable.  The lung apices are without acute process.           Impression   Multilevel degenerative change without acute abnormality.         MRI brain 2018  Impression   1. No evidence of an acute infarct or intracranial mass. 2. Mild-moderate chronic microvascular white matter ischemic disease is noted   supratentorially. 3. Left mastoid effusion, not appreciably changed.  Correlate with signs of   underlying infection.          Past Medical History:   Diagnosis Date    Angina pectoris ([address]     Arthritis     Bipolar disorder ([address]     CAD (coronary artery disease)     stents x 2 (Dr. Kamala Nichole)    Carotid stenosis, asymptomatic 2/26/2015    Chronic back pain     COPD (chronic obstructive pulmonary disease) ([address]     Depression     Dyspnea     Family history of colon cancer     Family history of liver cancer     Family history of ovarian cancer     GERD (gastroesophageal reflux disease)     History of colon polyps     Hyperlipidemia     Hypertension     Lung nodule     New onset type 2 diabetes mellitus ([address] 1/11/2018    Obesity     Obesity (BMI 35.0-39.9 without comorbidity)    Peripheral Negative for chest pain, palpitations and leg swelling. Gastrointestinal: Negative for abdominal pain, blood in stool and constipation. Endocrine: Negative for polydipsia and polyuria. Genitourinary: Negative for dysuria and frequency. Musculoskeletal: Positive for arthralgias, back pain and neck pain. Negative for joint swelling. Neurological: Positive for headaches. Negative for dizziness, syncope and weakness. Decreased memory   Hematological: Negative for adenopathy. Does not bruise/bleed easily. Psychiatric/Behavioral: Positive for decreased concentration, dysphoric mood and sleep disturbance. Negative for self-injury. The patient is nervous/anxious. PHYSICAL EXAM:     Vitals:    05/11/21 1118   BP: 118/72   Site: Right Upper Arm   Position: Sitting   Cuff Size: Large Adult   Temp: 97.7 °F (36.5 °C)   TempSrc: Infrared   Weight: 216 lb (98 kg)     Body mass index is 37.66 kg/m². BP Readings from Last 3 Encounters:   05/11/21 118/72   09/08/20 (!) 141/51   06/02/20 (!) 168/69        Wt Readings from Last 3 Encounters:   05/11/21 216 lb (98 kg)   09/08/20 208 lb (94.3 kg)   06/02/20 198 lb (89.8 kg)       Physical Exam  Vitals signs and nursing note reviewed. Constitutional:       Appearance: She is obese. HENT:      Head: Normocephalic and atraumatic. Eyes:      General: No scleral icterus. Extraocular Movements: Extraocular movements intact. Conjunctiva/sclera: Conjunctivae normal.      Pupils: Pupils are equal, round, and reactive to light. Neck:      Comments: Spasms of cervical spine  Cardiovascular:      Rate and Rhythm: Normal rate and regular rhythm. Heart sounds: No murmur. Pulmonary:      Effort: Pulmonary effort is normal.      Breath sounds: Normal breath sounds. No wheezing. Musculoskeletal:      Right lower leg: No edema. Left lower leg: No edema. Skin:     General: Skin is warm and dry.    Neurological:      General: No focal deficit present. Mental Status: She is alert and oriented to person, place, and time. LABORATORY FINDINGS:    CBC:  Lab Results   Component Value Date    WBC 9.8 09/08/2020    HGB 11.3 09/08/2020     09/08/2020     BMP:    Lab Results   Component Value Date     09/08/2020    K 4.0 09/08/2020     09/08/2020    CO2 25 09/08/2020    BUN 19 09/08/2020    CREATININE 0.69 09/08/2020    GLUCOSE 84 09/08/2020    GLUCOSE 87 10/28/2011     HEMOGLOBIN A1C:   Lab Results   Component Value Date    LABA1C 6.3 05/07/2021     MICROALBUMIN URINE:   Lab Results   Component Value Date    MICROALBUR <12 09/08/2020     FASTING LIPID PANEL:  Lab Results   Component Value Date    CHOL 117 05/07/2021    HDL 48 05/07/2021    TRIG 123 05/07/2021     Lab Results   Component Value Date    LDLCHOLESTEROL 44 05/07/2021       LIVER PROFILE:  Lab Results   Component Value Date    ALT 15 09/08/2020    AST 20 09/08/2020    PROT 8.7 09/08/2020    BILITOT 0.30 09/08/2020    BILIDIR 0.10 09/18/2018    LABALBU 4.4 09/08/2020      THYROID FUNCTION:   Lab Results   Component Value Date    TSH 2.71 05/07/2021      URINEANALYSIS: No results found for: LABURIN  ASSESSMENT AND PLAN:    1. Essential hypertension    - carvedilol (COREG) 3.125 MG tablet; Take 1 tablet by mouth 2 times daily  Dispense: 60 tablet; Refill: 3  - amLODIPine (NORVASC) 5 MG tablet; take 1 tablet by mouth once daily  Dispense: 90 tablet; Refill: 1  - MRI BRAIN WO CONTRAST; Future    2. Type 2 diabetes mellitus with complication, without long-term current use of insulin ([address]  Stable  ophthalmology    3. Other depression  Continue Zoloft    - [employer]    4. Cervical paraspinal muscle spasm  Tizanidine prn    - External Referral To Chiropractic    5. Coronary artery disease involving native coronary artery of native heart without angina pectoris  Asa    - carvedilol (COREG) 3.125 MG tablet;  Take 1 tablet by mouth 2 times daily  Dispense: 60

## 2021-05-11 NOTE — PATIENT INSTRUCTIONS
-Pt due for 4 month f/u in September-- pt to call in August to set up an appt--reminder in [facility] to contact patient as well--AVS given to patient    -Your [facility] [address] [facility], please contact our scheduling department at [phone] to set up an appointment to have this completed before your next visit.       -Felicitas Lam

## 2021-05-26 ENCOUNTER — TELEPHONE (OUTPATIENT)
Dept: ONCOLOGY | Age: 66
End: 2021-05-26

## 2021-05-26 DIAGNOSIS — I10 ESSENTIAL HYPERTENSION: ICD-10-CM

## 2021-05-26 RX ORDER — POTASSIUM CHLORIDE 750 MG/1
TABLET, EXTENDED RELEASE ORAL
Qty: 30 TABLET | Refills: 3 | Status: SHIPPED | OUTPATIENT
Start: 2021-05-26 | End: 2021-09-29

## 2021-05-26 NOTE — TELEPHONE ENCOUNTER
Request for Klor-Con. Next Visit Date:  Future Appointments   Date Time Provider Zohreh Rucih   5/27/2021  4:00 PM [facility] [place] [facility] RM [facility] PB MRI [facility] [place]   6/2/2021  3:45 PM [facility] [place] [facility] RM 2 [facility]VZ PB CT [facility] [place]   6/4/2021  2:00 PM Nannette [facility]   6/22/2021  9:00 AM Deven Montana MD [facility] IM [facility]   10/9/2021 12:20 PM SCHEDULE, STCZ COVID SCREENING STC[facility] COV [place]   10/13/2021  1:00 PM [facility] RM 1 [facility]Z [facility]   10/22/2021  2:30 PM Alen Milner MD Resp [facility] Maintenance   Topic Date Due    Hepatitis C screen  07/05/2021 (Originally 1955)    Shingles Vaccine (1 of 2) 03/04/2022 (Originally 1/20/2003)    Low dose CT lung screening  05/28/2021    Flu vaccine (Season Ended) 09/01/2021    Diabetic microalbuminuria test  09/08/2021    Potassium monitoring  09/08/2021    Creatinine monitoring  09/08/2021    Annual Wellness Visit (AWV)  09/19/2021    Diabetic retinal exam  03/29/2022    A1C test (Diabetic or Prediabetic)  05/07/2022    Lipid screen  05/07/2022    Diabetic foot exam  05/13/2022    Breast cancer screen  01/25/2023    Colon cancer screen colonoscopy  02/06/2023    DTaP/Tdap/Td vaccine (2 - Td) 10/24/2028    DEXA (modify frequency per FRAX score)  Completed    Pneumococcal 65+ years Vaccine  Completed    COVID-19 Vaccine  Completed    Hepatitis A vaccine  Aged Out    Hib vaccine  Aged Out    Meningococcal (ACWY) vaccine  Aged Out       Hemoglobin A1C (%)   Date Value   05/07/2021 6.3 (H)   09/08/2020 6.3 (H)   02/22/2020 6.0             ( goal A1C is < 7)   Microalb/Crt.  Ratio (mcg/mg creat)   Date Value   09/08/2020 CANNOT BE CALCULATED     LDL Cholesterol (mg/dL)   Date Value   05/07/2021 44       (goal LDL is <100)   AST (U/L)   Date Value   09/08/2020 20     ALT (U/L)   Date Value   09/08/2020 15     BUN (mg/dL)   Date Value   09/08/2020 19     BP Readings from Last 3 Encounters: 05/11/21 118/72   09/08/20 (!) 141/51   06/02/20 (!) 168/69          (goal 120/80)    All Future Testing planned in [facility]  Lab Frequency Next Occurrence   VL DUP CAROTID BILATERAL Once 07/08/2021   VL ARTERIAL PVR LOWER WO EXERCISE Once 09/07/2021   Hemoglobin A1C Once 06/12/2021   TSH with Reflex Once 06/05/2021   CT lung screen [Initial/Annual] Once 06/01/2021   Full PFT Study With Bronchodilator Once 10/13/2021   MRI BRAIN WO CONTRAST Once 08/26/2021         Patient Active Problem List:     Smoker     HTN (hypertension)     CAD/Stents drug-eluting      Serum lipids high     Carotid stenosis, asymptomatic     Nocturnal hypoxia     CRISTINA on CPAP     Type 2 diabetes mellitus with complication, without long-term current use of insulin (HCC)     PAD (peripheral artery disease) ([address]

## 2021-05-26 NOTE — LETTER
5/26/2021        [address] [facility]    Dear Kathy Olivo:    Your healthcare provider has ordered a low dose CT scan of the chest for lung cancer screening. You will find enclosed, information about CT lung screening. Please review the statement of understanding, you will be asked to sign a copy of this at the time of your CT scan    If you have not already been contacted to make the appointment for your scan, please call our scheduling department at [phone]    Keep in mind that CT lung screening does not take the place of smoking cessation. If you are a current smoker, you will find enclosed smoking cessation resources. Please do not hesitate to contact me if you have any questions or concerns.     [address] [place] [address] Lung Screening Program  [phone]

## 2021-06-02 ENCOUNTER — HOSPITAL ENCOUNTER (OUTPATIENT)
Dept: CT IMAGING | Age: 66
Discharge: HOME | End: 2021-06-04
Payer: MEDICARE

## 2021-06-02 DIAGNOSIS — Z87.891 HISTORY OF SMOKING 30 OR MORE PACK YEARS: ICD-10-CM

## 2021-06-02 PROCEDURE — 71271 CT THORAX LUNG CANCER SCR C-: CPT

## 2021-06-04 ENCOUNTER — OFFICE VISIT (OUTPATIENT)
Dept: BEHAVIORAL/MENTAL HEALTH CLINIC | Age: 66
End: 2021-06-04
Payer: MEDICARE

## 2021-06-04 DIAGNOSIS — F43.21 ADJUSTMENT DISORDER WITH DEPRESSED MOOD: Primary | ICD-10-CM

## 2021-06-04 DIAGNOSIS — L92.1 NECROBIOSIS LIPOIDICA: ICD-10-CM

## 2021-06-04 PROCEDURE — 1036F TOBACCO NON-USER: CPT | Performed by: SOCIAL WORKER

## 2021-06-04 PROCEDURE — 90791 PSYCH DIAGNOSTIC EVALUATION: CPT | Performed by: SOCIAL WORKER

## 2021-06-04 RX ORDER — BETAMETHASONE DIPROPIONATE 0.05 %
OINTMENT (GRAM) TOPICAL
Qty: 45 G | Refills: 1 | Status: SHIPPED | OUTPATIENT
Start: 2021-06-04

## 2021-06-04 NOTE — TELEPHONE ENCOUNTER
Request for betamethasone dipropionate (DIPROLENE) 0.05 % ointment   . Next Visit Date:  Future Appointments   Date Time Provider Zohreh Hopper   6/4/2021  2:00 PM Lawerence Friends [facility]   6/22/2021  9:00 AM Radha Dobbs MD [facility] IM [facility]   10/9/2021 12:20 PM SCHEDULE, STCZ COVID SCREENING STCZ COV [place]   10/13/2021  1:00 PM [facility] PFT RM 1 [facility]Z [facility]   10/22/2021  2:30 PM Alen Rosas  [address] Maintenance   Topic Date Due    Hepatitis C screen  07/05/2021 (Originally 1955)    Shingles Vaccine (1 of 2) 03/04/2022 (Originally 1/20/2003)    Flu vaccine (Season Ended) 09/01/2021    Diabetic microalbuminuria test  09/08/2021    Potassium monitoring  09/08/2021    Creatinine monitoring  09/08/2021    Annual Wellness Visit (AWV)  09/19/2021    Diabetic retinal exam  03/29/2022    A1C test (Diabetic or Prediabetic)  05/07/2022    Lipid screen  05/07/2022    Diabetic foot exam  05/13/2022    Low dose CT lung screening  06/02/2022    Breast cancer screen  01/25/2023    Colon cancer screen colonoscopy  02/06/2023    DTaP/Tdap/Td vaccine (2 - Td or Tdap) 10/24/2028    DEXA (modify frequency per FRAX score)  Completed    Pneumococcal 65+ years Vaccine  Completed    COVID-19 Vaccine  Completed    Hepatitis A vaccine  Aged Out    Hib vaccine  Aged Out    Meningococcal (ACWY) vaccine  Aged Out       Hemoglobin A1C (%)   Date Value   05/07/2021 6.3 (H)   09/08/2020 6.3 (H)   02/22/2020 6.0             ( goal A1C is < 7)   Microalb/Crt.  Ratio (mcg/mg creat)   Date Value   09/08/2020 CANNOT BE CALCULATED     LDL Cholesterol (mg/dL)   Date Value   05/07/2021 44       (goal LDL is <100)   AST (U/L)   Date Value   09/08/2020 20     ALT (U/L)   Date Value   09/08/2020 15     BUN (mg/dL)   Date Value   09/08/2020 19     BP Readings from Last 3 Encounters:   05/11/21 118/72   09/08/20 (!) 141/51   06/02/20 (!) 168/69          (goal 120/80)    All Future Testing planned in [facility]  Lab Frequency Next Occurrence   VL DUP CAROTID BILATERAL Once 07/08/2021   VL ARTERIAL PVR LOWER WO EXERCISE Once 09/07/2021   Hemoglobin A1C Once 06/12/2021   TSH with Reflex Once 06/05/2021   Full PFT Study With Bronchodilator Once 10/13/2021   MRI BRAIN WO CONTRAST Once 08/26/2021         Patient Active Problem List:     Smoker     HTN (hypertension)     CAD/Stents drug-eluting      Serum lipids high     Carotid stenosis, asymptomatic     Nocturnal hypoxia     CRISTINA on CPAP     Type 2 diabetes mellitus with complication, without long-term current use of insulin (HCC)     PAD (peripheral artery disease) ([facility] [address]

## 2021-06-11 NOTE — PROGRESS NOTES
ADULT BEHAVIORAL HEALTH ASSESSMENT  LISA Winters  Licensed Independent      Visit Date: 6/4/2021   Time of appointment: 2pm   Time spent with Patient: 60 minutes. This is patient's first appointment. Reason for Consult: Follow-up     PCP: Ed Pearson MD      Pt and/or guardian was educated on the model of service to include a short-term intervention focused approach and as well as the limits of confidentiality (i.e. abuse reporting, suicide intervention, etc.). Pt and/or guardian indicated understanding. Pt and/or guardian provided informed consent for the behavioral health program.  Visit completed in person. Patient presented alone. Patient Location: [address] [place] [address] office, [facility]  Provider Location: [address] [place] [address], [facility]  Richard Wooten is a 74 y.o. female who presents for new evaluation and treatment of depression and stress. She reports the following symptoms: depressed mood, weight loss, excessive crying and excessive anxiety and worry about specific stressors. Pt reports stressors related to relationships and a pattern of losing relationships at times or becoming overly stressed when she doesn't keep boundaries. States she gets negative feedback from others (primarly peers, not adult children or other close friends). However, pt states that she feels she has been managing stressors well and states functioning is not impaired. Pt discusses various stressors and how she is managing them.     CURRENT MEDICATIONS    Current Outpatient Medications:     betamethasone dipropionate (DIPROLENE) 0.05 % ointment, apply topically daily as directed, Disp: 45 g, Rfl: 1    potassium chloride (KLOR-CON M) 10 MEQ extended release tablet, take 1 tablet by mouth once daily, Disp: 30 tablet, Rfl: 3    tiZANidine (ZANAFLEX) 4 MG tablet, Take 1 tablet by mouth daily as needed (spasm), Disp: 30 tablet, Rfl: 2    sertraline (ZOLOFT) 50 MG tablet, Once daily, Disp: 120 tablet, Rfl: 0    carvedilol (COREG) 3.125 MG tablet, Take 1 tablet by mouth 2 times daily, Disp: 60 tablet, Rfl: 3    amLODIPine (NORVASC) 5 MG tablet, take 1 tablet by mouth once daily, Disp: 90 tablet, Rfl: 1    triamcinolone (KENALOG) 0.025 % cream, apply to affected area once daily, Disp: 30 g, Rfl: 1    atorvastatin (LIPITOR) 40 MG tablet, Once daily, Disp: 90 tablet, Rfl: 1    hydroCHLOROthiazide (HYDRODIURIL) 25 MG tablet, take 1 tablet by mouth once daily, Disp: 90 tablet, Rfl: 2    albuterol sulfate HFA (PROVENTIL HFA) 108 (90 Base) MCG/ACT inhaler, Inhale 2 puffs into the lungs every 6 hours as needed for Wheezing, Disp: 1 Inhaler, Rfl: 5    losartan (COZAAR) 100 MG tablet, take 1 tablet by mouth once daily, Disp: 90 tablet, Rfl: 1    omeprazole (PRILOSEC) 40 MG delayed release capsule, take 1 capsule by mouth once daily, Disp: 90 capsule, Rfl: 1    Tiotropium Bromide-Olodaterol (STIOLTO RESPIMAT) 2.5-2.5 MCG/ACT AERS, Inhale 2 puffs into the lungs daily, Disp: 1 Inhaler, Rfl: 5    metFORMIN (GLUCOPHAGE) 500 MG tablet, take 1 tablet by mouth twice a day with meals, Disp: 180 tablet, Rfl: 3    b complex vitamins capsule, Take 1 capsule by mouth daily, Disp: , Rfl:     Coenzyme Q10-Fish Oil-Vit E (CO-Q 10 OMEGA-3 FISH OIL) CAPS, Take 1,000 mg by mouth daily, Disp: , Rfl:     magnesium 30 MG tablet, Take 30 mg by mouth 2 times daily, Disp: , Rfl:     Multiple Vitamin (MULTI-VITAMIN PO), Take 1 tablet by mouth daily. , Disp: , Rfl:     aspirin 325 MG tablet, Take 325 mg by mouth daily. , Disp: , Rfl:      FAMILY MEDICAL/MH HISTORY   Her family history includes Cancer in her maternal aunt; Colon Polyps in her brother; Hypertension in her mother; Suzzanna Kruse in her mother; Other in her mother. PATIENT MENTAL HEALTH HISTORY  Ekta Hoang reports no previous MH concerns.     PSYCHOSOCIAL HISTORY  Ekta Hoang is currently living aloneShe reports no previous solution-focused consultation to address cognitive and behavioral interventions for relationship stress symptoms. We did briefly discuss today healthy strategies for setting boundaries and signs of healthy vs unhealthy relationships. Provided pt with validation and support. Marques Keita stated today's visit was helpful but she feels she is doing okay and is not wanting further counseling at this time but is open to returning in the future if needed. INTERVENTION  orienting pt to process of brief behavioral health services, completing initial assessment of symptoms and needs, provided recommendations, development of self-care mindset and commitment to self-care behaviors, building awareness, evaluation and challenging of problematic thinking patterns and training in communication and interpersonal skills    DIAGNOSIS  Marques Keita was seen today for follow-up. Diagnoses and all orders for this visit:    Adjustment disorder with depressed mood        PLAN  Pt will practice setting appropriate boundaries, practicing communicating with friends about difficulties they may be having   Pt can return in the future if needed        INTERACTIVE COMPLEXITY  Is interactive complexity present?   No  Reason:  N/A  Additional Supporting Information:  N/A       Electronically signed by Freda Carmona on 6/11/2021 at 2:39 PM

## 2021-07-06 ENCOUNTER — OFFICE VISIT (OUTPATIENT)
Dept: INTERNAL MEDICINE | Age: 66
End: 2021-07-06
Payer: MEDICARE

## 2021-07-06 VITALS
SYSTOLIC BLOOD PRESSURE: 119 MMHG | DIASTOLIC BLOOD PRESSURE: 60 MMHG | HEART RATE: 63 BPM | WEIGHT: 219 LBS | HEIGHT: 64 IN | BODY MASS INDEX: 37.39 KG/M2

## 2021-07-06 DIAGNOSIS — Z99.89 OSA ON CPAP: ICD-10-CM

## 2021-07-06 DIAGNOSIS — D64.9 NORMOCYTIC ANEMIA: ICD-10-CM

## 2021-07-06 DIAGNOSIS — I25.10 CORONARY ARTERY DISEASE INVOLVING NATIVE CORONARY ARTERY OF NATIVE HEART WITHOUT ANGINA PECTORIS: ICD-10-CM

## 2021-07-06 DIAGNOSIS — F41.9 ANXIETY: ICD-10-CM

## 2021-07-06 DIAGNOSIS — E11.8 TYPE 2 DIABETES MELLITUS WITH COMPLICATION, WITHOUT LONG-TERM CURRENT USE OF INSULIN (HCC): ICD-10-CM

## 2021-07-06 DIAGNOSIS — M25.551 CHRONIC PAIN OF BOTH HIPS: ICD-10-CM

## 2021-07-06 DIAGNOSIS — I10 ESSENTIAL HYPERTENSION: Primary | ICD-10-CM

## 2021-07-06 DIAGNOSIS — G89.29 CHRONIC PAIN OF BOTH HIPS: ICD-10-CM

## 2021-07-06 DIAGNOSIS — G47.33 OSA ON CPAP: ICD-10-CM

## 2021-07-06 DIAGNOSIS — M25.552 CHRONIC PAIN OF BOTH HIPS: ICD-10-CM

## 2021-07-06 DIAGNOSIS — J44.9 STAGE 2 MODERATE COPD BY GOLD CLASSIFICATION (HCC): ICD-10-CM

## 2021-07-06 PROCEDURE — 1036F TOBACCO NON-USER: CPT | Performed by: INTERNAL MEDICINE

## 2021-07-06 PROCEDURE — G8417 CALC BMI ABV UP PARAM F/U: HCPCS | Performed by: INTERNAL MEDICINE

## 2021-07-06 PROCEDURE — G8427 DOCREV CUR MEDS BY ELIG CLIN: HCPCS | Performed by: INTERNAL MEDICINE

## 2021-07-06 PROCEDURE — 1090F PRES/ABSN URINE INCON ASSESS: CPT | Performed by: INTERNAL MEDICINE

## 2021-07-06 PROCEDURE — 99211 OFF/OP EST MAY X REQ PHY/QHP: CPT | Performed by: INTERNAL MEDICINE

## 2021-07-06 PROCEDURE — 3023F SPIROM DOC REV: CPT | Performed by: INTERNAL MEDICINE

## 2021-07-06 PROCEDURE — G8399 PT W/DXA RESULTS DOCUMENT: HCPCS | Performed by: INTERNAL MEDICINE

## 2021-07-06 PROCEDURE — G8926 SPIRO NO PERF OR DOC: HCPCS | Performed by: INTERNAL MEDICINE

## 2021-07-06 PROCEDURE — 3017F COLORECTAL CA SCREEN DOC REV: CPT | Performed by: INTERNAL MEDICINE

## 2021-07-06 PROCEDURE — 1123F ACP DISCUSS/DSCN MKR DOCD: CPT | Performed by: INTERNAL MEDICINE

## 2021-07-06 PROCEDURE — 4040F PNEUMOC VAC/ADMIN/RCVD: CPT | Performed by: INTERNAL MEDICINE

## 2021-07-06 PROCEDURE — 2022F DILAT RTA XM EVC RTNOPTHY: CPT | Performed by: INTERNAL MEDICINE

## 2021-07-06 PROCEDURE — 99213 OFFICE O/P EST LOW 20 MIN: CPT | Performed by: INTERNAL MEDICINE

## 2021-07-06 PROCEDURE — 3044F HG A1C LEVEL LT 7.0%: CPT | Performed by: INTERNAL MEDICINE

## 2021-07-06 NOTE — PATIENT INSTRUCTIONS
Return To Clinic 9/14/2021 . After Visit Summary  given and reviewed. It is very important for your care that you keep your appointment. If for some reason you are unable to keep your appointment it is equally important that you call our office at [phone] to cancel your appointment and reschedule. Failure to do so may result in your termination from our practice.     -Bloodwork orders given to patient, they will have them done before their next visit.      -Xray order given to pt, this test does not need to be scheduled, please take order with you to Main Registration at hospital and have completed before your next appointment.     -Christi Mas

## 2021-07-06 NOTE — PROGRESS NOTES
[facility]    Progress Note    Date of patient's visit: 7/6/2021    Patient's Name:  Lynette Coffey    YOB: 1955            Patient Care Team:  Eric Robles MD as PCP - General (Internal Medicine)  Eric Robles MD as PCP - REHABILITATION [facility] Empaneled Provider  Gideon Galvan MD as Consulting Physician (Pulmonology)  Sebastian Myrick RN as Nurse Navigator    REASON FOR VISIT: Routine outpatient follow     Chief Complaint   Patient presents with    Hypertension     6 week f/u     Spasms     Pt states that she is still having spasms in her inner thighss and legs and pain on the top of her foot. stopped taking Zanaflex and started taking Tylenol Arthritis and Melatonin for it and it has been helping          HISTORY OF PRESENT ILLNESS:    History was obtained from the patient. Lynette Coffey is a 74 y.o. is here for follow-up. She is feeling better than last time. She did see the therapist 1 time but she does not feel she needs to continue to follow-up. She has a history of anxiety and mild depression but feels Zoloft is helping. She had some labs done and is here for follow-up. She was having a lot of stiffness and cramps in her legs. 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 She has been also having some hip pain and knee pains. She does not stretch or exercise. She has not done physical therapy in a long time. She said tizanidine did not help. She has been taking potassium supplements which has helped a little. She does get some groin pain and bilateral hip pain. We will have her do x-rays. She does not want to do water therapy right now but she says she will do it later this fall as her summer is very busy. Advised to do daily stretches at home. She has been compliant with her CPAP. She follows up with pulmonologist.  She also sees cardiologist regularly.   She had some labs done recently and these (STIOLTO RESPIMAT) 2.5-2.5 MCG/ACT AERS Inhale 2 puffs into the lungs daily 1 Inhaler 5    metFORMIN (GLUCOPHAGE) 500 MG tablet take 1 tablet by mouth twice a day with meals 180 tablet 3    b complex vitamins capsule Take 1 capsule by mouth daily      Coenzyme Q10-Fish Oil-Vit E (CO-Q 10 OMEGA-3 FISH OIL) CAPS Take 1,000 mg by mouth daily      magnesium 30 MG tablet Take 30 mg by mouth 2 times daily      Multiple Vitamin (MULTI-VITAMIN PO) Take 1 tablet by mouth daily.  aspirin 325 MG tablet Take 325 mg by mouth daily. No current facility-administered medications on file prior to visit. HISTORY    Reviewed and no change from previous record. Shante Shaw  reports that she quit smoking about 5 years ago. Her smoking use included cigarettes. She started smoking about 53 years ago. She has a 60.00 pack-year smoking history. She has never used smokeless tobacco.    FAMILY HISTORY:    Reviewed and No change from previous visit    HEALTH MAINTENANCE DUE:    There are no preventive care reminders to display for this patient. REVIEW OF SYSTEMS:    12 point review of symptoms completed and found to be normal except noted in the HPI    Review of Systems     Constitutional: Positive for fatigue. Negative for unexpected weight change. Eyes: Negative for photophobia and visual disturbance. Respiratory: Negative for cough, shortness of breath and wheezing. Cardiovascular: Negative for chest pain, palpitations and leg swelling. Gastrointestinal: Negative for abdominal pain, blood in stool and constipation. Endocrine: Negative for polydipsia and polyuria. Genitourinary: Negative for dysuria and frequency. Musculoskeletal: Positive for arthralgias, back pain and neck pain. Negative for joint swelling. Neurological: Positive for headaches. Negative for dizziness, syncope and weakness. Decreased memory   Hematological: Negative for adenopathy. Does not bruise/bleed easily. Psychiatric/Behavioral: Positive for decreased concentration, dysphoric mood and sleep disturbance. Negative for self-injury. The patient is nervous/anxious.          PHYSICAL EXAM:     Vitals:    07/06/21 1518   BP: 119/60   Site: Right Upper Arm   Position: Sitting   Cuff Size: Large Adult   Pulse: 63   Weight: 219 lb (99.3 kg)   Height: 5' 3.5\" (1.613 m)     Body mass index is 38.19 kg/m². BP Readings from Last 3 Encounters:   07/06/21 119/60   05/11/21 118/72   09/08/20 (!) 141/51        Wt Readings from Last 3 Encounters:   07/06/21 219 lb (99.3 kg)   05/11/21 216 lb (98 kg)   09/08/20 208 lb (94.3 kg)       Physical Exam    Vitals signs and nursing note reviewed. Constitutional:       Appearance: She is obese. HENT:      Head: Normocephalic and atraumatic. Eyes:      General: No scleral icterus. Extraocular Movements: Extraocular movements intact. Conjunctiva/sclera: Conjunctivae normal.      Pupils: Pupils are equal, round, and reactive to light. Cardiovascular:      Rate and Rhythm: Normal rate and regular rhythm. Heart sounds: No murmur. Pulmonary:      Effort: Pulmonary effort is normal.      Breath sounds: Normal breath sounds. No wheezing. Musculoskeletal:      Right lower leg: No edema. Left lower leg: No edema. Skin:     General: Skin is warm and dry. Neurological:      General: No focal deficit present. Mental Status: She is alert and oriented to person, place, and time.        LABORATORY FINDINGS:    CBC:  Lab Results   Component Value Date    WBC 9.8 09/08/2020    HGB 11.3 09/08/2020     09/08/2020     BMP:    Lab Results   Component Value Date     09/08/2020    K 4.0 09/08/2020     09/08/2020    CO2 25 09/08/2020    BUN 19 09/08/2020    CREATININE 0.69 09/08/2020    GLUCOSE 84 09/08/2020    GLUCOSE 87 10/28/2011     HEMOGLOBIN A1C:   Lab Results   Component Value Date    LABA1C 6.3 05/07/2021     MICROALBUMIN URINE:   Lab Results Component Value Date    MICROALBUR <12 09/08/2020     FASTING LIPID PANEL:  Lab Results   Component Value Date    CHOL 117 05/07/2021    HDL 48 05/07/2021    TRIG 123 05/07/2021     Lab Results   Component Value Date    LDLCHOLESTEROL 44 05/07/2021       LIVER PROFILE:  Lab Results   Component Value Date    ALT 15 09/08/2020    AST 20 09/08/2020    PROT 8.7 09/08/2020    BILITOT 0.30 09/08/2020    BILIDIR 0.10 09/18/2018    LABALBU 4.4 09/08/2020      THYROID FUNCTION:   Lab Results   Component Value Date    TSH 2.71 05/07/2021      URINEANALYSIS: No results found for: LABURIN  ASSESSMENT AND PLAN:    1. Essential hypertension  Continue current meds. Blood pressure well controlled. - Comprehensive Metabolic Panel; Future    2. Type 2 diabetes mellitus with complication, without long-term current use of insulin (HCC)  A1c stable. Continue to monitor.    - Comprehensive Metabolic Panel; Future    3. Stage 2 moderate COPD by GOLD classification [facility])  Follow-up with pulmonologist.    4. Coronary artery disease involving native coronary artery of native heart without angina pectoris  Continue current meds. Continue aspirin 81 mg daily. 5. CRISTINA on CPAP  She is compliant with CPAP. 6. Anxiety  Continue  with sertraline. She does not want to see the therapist anymore. 7. Normocytic anemia    - CBC With Auto Differential; Future  - Vitamin B12 & Folate; Future    8. Chronic pain of both hips  Advised physical therapy and weight loss. Continue stretches daily at home. - XR HIP RIGHT (2-3 VIEWS); Future  - XR HIP LEFT (2-3 VIEWS); Future          FOLLOW UP AND INSTRUCTIONS:   Return in about 2 months (around 9/6/2021). Natalee Reid received counseling on the following healthy behaviors: nutrition, exercise and medication adherence    Reviewed prior labs and health maintenance. Discussed use, benefit, and side effects of prescribed medications. Barriers to medication compliance addressed.   All patient questions answered. Pt voiced understanding.      Patient given educational materials - see patient instructions    Miya Torres  Attending Physician, [address] [place] [address], Internal Medicine Residency Program  [address] [place] [address]  7/6/2021, 3:40 PM

## 2021-07-23 DIAGNOSIS — I65.23 ASYMPTOMATIC BILATERAL CAROTID ARTERY STENOSIS: Primary | ICD-10-CM

## 2021-07-23 DIAGNOSIS — I73.9 PAD (PERIPHERAL ARTERY DISEASE) (HCC): ICD-10-CM

## 2021-07-26 NOTE — TELEPHONE ENCOUNTER
Request for omeprazole. Next Visit Date:  Future Appointments   Date Time Provider Zohreh Ruchi   9/14/2021  1:00 PM [facility]   [facility]2021  2[facility]45 [facility]   10/9/2021 12:20 PM SCHEDULE, STCZ COVID SCREENING STCZ COV [place]   10/13/2021  1:00 PM STV PFT RM 1 [facility]Z [facility]   10/22/2021  2:30 PM Alen Guan  [address] Maintenance   Topic Date Due    Hepatitis C screen  01/06/2022 (Originally 1955)    Shingles Vaccine (1 of 2) 03/04/2022 (Originally 1/20/2003)    Flu vaccine (1) 09/01/2021    Diabetic microalbuminuria test  09/08/2021    Potassium monitoring  09/08/2021    Creatinine monitoring  09/08/2021    Annual Wellness Visit (AWV)  09/19/2021    Diabetic retinal exam  03/29/2022    A1C test (Diabetic or Prediabetic)  05/07/2022    Lipid screen  05/07/2022    Diabetic foot exam  05/13/2022    Low dose CT lung screening  06/02/2022    Breast cancer screen  01/25/2023    Colon cancer screen colonoscopy  02/06/2023    DTaP/Tdap/Td vaccine (2 - Td or Tdap) 10/24/2028    DEXA (modify frequency per FRAX score)  Completed    Pneumococcal 65+ years Vaccine  Completed    COVID-19 Vaccine  Completed    Hepatitis A vaccine  Aged Out    Hib vaccine  Aged Out    Meningococcal (ACWY) vaccine  Aged Out       Hemoglobin A1C (%)   Date Value   05/07/2021 6.3 (H)   09/08/2020 6.3 (H)   02/22/2020 6.0             ( goal A1C is < 7)   Microalb/Crt.  Ratio (mcg/mg creat)   Date Value   09/08/2020 CANNOT BE CALCULATED     LDL Cholesterol (mg/dL)   Date Value   05/07/2021 44       (goal LDL is <100)   AST (U/L)   Date Value   09/08/2020 20     ALT (U/L)   Date Value   09/08/2020 15     BUN (mg/dL)   Date Value   09/08/2020 19     BP Readings from Last 3 Encounters:   07/06/21 119/60   05/11/21 118/72   09/08/20 (!) 141/51          (goal 120/80)    All Future Testing planned in [facility]  Lab Frequency Next Occurrence   Full PFT Study With Bronchodilator Once 10/13/2021   MRI BRAIN WO CONTRAST Once 08/26/2021   Comprehensive Metabolic Panel Once 46/52/2550   CBC With Auto Differential Once 10/14/2021   XR HIP RIGHT (2-3 VIEWS) Once 10/21/2021   XR HIP LEFT (2-3 VIEWS) Once 10/21/2021   Vitamin B12 & Folate Once 10/14/2021   VL ARTERIAL PVR LOWER WO EXERCISE Once 07/23/2021   VL DUP CAROTID BILATERAL Once 07/23/2021         Patient Active Problem List:     Smoker     HTN (hypertension)     CAD/Stents drug-eluting      Serum lipids high     Carotid stenosis, asymptomatic     Nocturnal hypoxia     CRISTINA on CPAP     Type 2 diabetes mellitus with complication, without long-term current use of insulin (HCC)     PAD (peripheral artery disease) ([address]

## 2021-07-27 RX ORDER — OMEPRAZOLE 40 MG/1
CAPSULE, DELAYED RELEASE ORAL
Qty: 90 CAPSULE | Refills: 1 | Status: SHIPPED | OUTPATIENT
Start: 2021-07-27 | End: 2022-02-01

## 2021-08-18 ENCOUNTER — HOSPITAL ENCOUNTER (OUTPATIENT)
Dept: VASCULAR LAB | Age: 66
Discharge: HOME | End: 2021-08-18
Payer: MEDICARE

## 2021-08-18 DIAGNOSIS — I65.23 ASYMPTOMATIC BILATERAL CAROTID ARTERY STENOSIS: ICD-10-CM

## 2021-08-18 DIAGNOSIS — I73.9 PAD (PERIPHERAL ARTERY DISEASE) (HCC): ICD-10-CM

## 2021-08-18 PROCEDURE — 93923 UPR/LXTR ART STDY 3+ LVLS: CPT

## 2021-08-18 PROCEDURE — 93880 EXTRACRANIAL BILAT STUDY: CPT

## 2021-08-23 ENCOUNTER — HOSPITAL ENCOUNTER (OUTPATIENT)
Dept: MRI IMAGING | Age: 66
Discharge: HOME | End: 2021-08-25
Payer: MEDICARE

## 2021-08-23 DIAGNOSIS — R41.89 COGNITIVE DECLINE: ICD-10-CM

## 2021-08-23 DIAGNOSIS — I10 ESSENTIAL HYPERTENSION: ICD-10-CM

## 2021-08-23 PROCEDURE — 70551 MRI BRAIN STEM W/O DYE: CPT

## 2021-09-13 ENCOUNTER — HOSPITAL ENCOUNTER (OUTPATIENT)
Dept: GENERAL RADIOLOGY | Age: 66
Discharge: HOME | End: 2021-09-15
Payer: MEDICARE

## 2021-09-13 ENCOUNTER — HOSPITAL ENCOUNTER (OUTPATIENT)
Age: 66
Discharge: HOME | End: 2021-09-13
Payer: MEDICARE

## 2021-09-13 ENCOUNTER — HOSPITAL ENCOUNTER (OUTPATIENT)
Age: 66
Discharge: HOME | End: 2021-09-15
Payer: MEDICARE

## 2021-09-13 DIAGNOSIS — E11.8 TYPE 2 DIABETES MELLITUS WITH COMPLICATION, WITHOUT LONG-TERM CURRENT USE OF INSULIN (HCC): ICD-10-CM

## 2021-09-13 DIAGNOSIS — G89.29 CHRONIC PAIN OF BOTH HIPS: ICD-10-CM

## 2021-09-13 DIAGNOSIS — M25.552 CHRONIC PAIN OF BOTH HIPS: ICD-10-CM

## 2021-09-13 DIAGNOSIS — D64.9 NORMOCYTIC ANEMIA: ICD-10-CM

## 2021-09-13 DIAGNOSIS — I10 ESSENTIAL HYPERTENSION: ICD-10-CM

## 2021-09-13 DIAGNOSIS — M25.551 CHRONIC PAIN OF BOTH HIPS: ICD-10-CM

## 2021-09-13 LAB
ABSOLUTE EOS #: 0.24 K/UL (ref 0–0.44)
ABSOLUTE IMMATURE GRANULOCYTE: <0.03 K/UL (ref 0–0.3)
ABSOLUTE LYMPH #: 2.3 K/UL (ref 1.1–3.7)
ABSOLUTE MONO #: 0.68 K/UL (ref 0.1–1.2)
ALBUMIN SERPL-MCNC: 4.3 G/DL (ref 3.5–5.2)
ALBUMIN/GLOBULIN RATIO: 1.1 (ref 1–2.5)
ALP BLD-CCNC: 94 U/L (ref 35–104)
ALT SERPL-CCNC: 16 U/L (ref 5–33)
ANION GAP SERPL CALCULATED.3IONS-SCNC: 14 MMOL/L (ref 9–17)
AST SERPL-CCNC: 20 U/L
BASOPHILS # BLD: 1 % (ref 0–2)
BASOPHILS ABSOLUTE: 0.06 K/UL (ref 0–0.2)
BILIRUB SERPL-MCNC: 0.27 MG/DL (ref 0.3–1.2)
BUN BLDV-MCNC: 24 MG/DL (ref 8–23)
BUN/CREAT BLD: ABNORMAL (ref 9–20)
CALCIUM SERPL-MCNC: 9.4 MG/DL (ref 8.6–10.4)
CHLORIDE BLD-SCNC: 101 MMOL/L (ref 98–107)
CO2: 25 MMOL/L (ref 20–31)
CREAT SERPL-MCNC: 0.67 MG/DL (ref 0.5–0.9)
DIFFERENTIAL TYPE: ABNORMAL
EOSINOPHILS RELATIVE PERCENT: 3 % (ref 1–4)
FOLATE: >20 NG/ML
GFR AFRICAN AMERICAN: >60 ML/MIN
GFR NON-AFRICAN AMERICAN: >60 ML/MIN
GFR SERPL CREATININE-BSD FRML MDRD: ABNORMAL ML/MIN/{1.73_M2}
GFR SERPL CREATININE-BSD FRML MDRD: ABNORMAL ML/MIN/{1.73_M2}
GLUCOSE BLD-MCNC: 89 MG/DL (ref 70–99)
HCT VFR BLD CALC: 36 % (ref 36.3–47.1)
HEMOGLOBIN: 10.7 G/DL (ref 11.9–15.1)
IMMATURE GRANULOCYTES: 0 %
LYMPHOCYTES # BLD: 28 % (ref 24–43)
MCH RBC QN AUTO: 25.7 PG (ref 25.2–33.5)
MCHC RBC AUTO-ENTMCNC: 29.7 G/DL (ref 28.4–34.8)
MCV RBC AUTO: 86.3 FL (ref 82.6–102.9)
MONOCYTES # BLD: 8 % (ref 3–12)
NRBC AUTOMATED: 0 PER 100 WBC
PDW BLD-RTO: 15.8 % (ref 11.8–14.4)
PLATELET # BLD: 317 K/UL (ref 138–453)
PLATELET ESTIMATE: ABNORMAL
PMV BLD AUTO: 10.8 FL (ref 8.1–13.5)
POTASSIUM SERPL-SCNC: 4.3 MMOL/L (ref 3.7–5.3)
RBC # BLD: 4.17 M/UL (ref 3.95–5.11)
RBC # BLD: ABNORMAL 10*6/UL
SEG NEUTROPHILS: 60 % (ref 36–65)
SEGMENTED NEUTROPHILS ABSOLUTE COUNT: 5.02 K/UL (ref 1.5–8.1)
SODIUM BLD-SCNC: 140 MMOL/L (ref 135–144)
TOTAL PROTEIN: 8.3 G/DL (ref 6.4–8.3)
VITAMIN B-12: 1262 PG/ML (ref 232–1245)
WBC # BLD: 8.3 K/UL (ref 3.5–11.3)
WBC # BLD: ABNORMAL 10*3/UL

## 2021-09-13 PROCEDURE — 73502 X-RAY EXAM HIP UNI 2-3 VIEWS: CPT

## 2021-09-13 PROCEDURE — 82607 VITAMIN B-12: CPT

## 2021-09-13 PROCEDURE — 36415 COLL VENOUS BLD VENIPUNCTURE: CPT

## 2021-09-13 PROCEDURE — 85025 COMPLETE CBC W/AUTO DIFF WBC: CPT

## 2021-09-13 PROCEDURE — 82746 ASSAY OF FOLIC ACID SERUM: CPT

## 2021-09-13 PROCEDURE — 80053 COMPREHEN METABOLIC PANEL: CPT

## 2021-09-15 DIAGNOSIS — G89.29 CHRONIC PAIN OF BOTH HIPS: Primary | ICD-10-CM

## 2021-09-15 DIAGNOSIS — M25.551 CHRONIC PAIN OF BOTH HIPS: Primary | ICD-10-CM

## 2021-09-15 DIAGNOSIS — M25.552 CHRONIC PAIN OF BOTH HIPS: Primary | ICD-10-CM

## 2021-09-20 ENCOUNTER — OFFICE VISIT (OUTPATIENT)
Dept: VASCULAR SURGERY | Age: 66
End: 2021-09-20
Payer: MEDICARE

## 2021-09-20 VITALS
TEMPERATURE: 96.8 F | RESPIRATION RATE: 18 BRPM | BODY MASS INDEX: 39.34 KG/M2 | SYSTOLIC BLOOD PRESSURE: 152 MMHG | DIASTOLIC BLOOD PRESSURE: 66 MMHG | HEIGHT: 63 IN | WEIGHT: 222 LBS | HEART RATE: 85 BPM | OXYGEN SATURATION: 95 %

## 2021-09-20 DIAGNOSIS — I70.0 ATHEROSCLEROSIS OF AORTA (HCC): ICD-10-CM

## 2021-09-20 DIAGNOSIS — I65.23 CAROTID STENOSIS, ASYMPTOMATIC, BILATERAL: Primary | ICD-10-CM

## 2021-09-20 DIAGNOSIS — I73.9 CLAUDICATION IN PERIPHERAL VASCULAR DISEASE (HCC): ICD-10-CM

## 2021-09-20 PROCEDURE — 4040F PNEUMOC VAC/ADMIN/RCVD: CPT | Performed by: SURGERY

## 2021-09-20 PROCEDURE — G8427 DOCREV CUR MEDS BY ELIG CLIN: HCPCS | Performed by: SURGERY

## 2021-09-20 PROCEDURE — 3017F COLORECTAL CA SCREEN DOC REV: CPT | Performed by: SURGERY

## 2021-09-20 PROCEDURE — G8417 CALC BMI ABV UP PARAM F/U: HCPCS | Performed by: SURGERY

## 2021-09-20 PROCEDURE — G8399 PT W/DXA RESULTS DOCUMENT: HCPCS | Performed by: SURGERY

## 2021-09-20 PROCEDURE — 1123F ACP DISCUSS/DSCN MKR DOCD: CPT | Performed by: SURGERY

## 2021-09-20 PROCEDURE — 99214 OFFICE O/P EST MOD 30 MIN: CPT | Performed by: SURGERY

## 2021-09-20 PROCEDURE — 1090F PRES/ABSN URINE INCON ASSESS: CPT | Performed by: SURGERY

## 2021-09-20 PROCEDURE — 1036F TOBACCO NON-USER: CPT | Performed by: SURGERY

## 2021-09-20 NOTE — PROGRESS NOTES
Division of Vascular Surgery        Follow Up      Chief Complaint:     PAD, carotid stenosis    History of Present Illness:      Dinesh Huntley is a 74 y.o. woman who presents for follow up regarding her carotid and peripheral arterial disease. She denies any unilateral weakness, numbness/tingling, facial droop, thick/slurred speech or symptoms suggestive of amaurosis fugax. She does have bilateral numbness inher feet. Her legs do start to hurt her when she walks short distances, does not describe significant claudication type symptoms though. Develops cramping in the evenings which improve with walking and stretching. She does not have any open wounds or sores, but has developed skin lesion along her leg and lower abdomen, being worked up by PCP and dermatologist.    Medical History:     Past Medical History:   Diagnosis Date    Angina pectoris ([address]     Arthritis     Bipolar disorder ([address]     CAD (coronary artery disease)     stents x 2 (Dr. Hedy Guthrie)    Carotid stenosis, asymptomatic 2015    Chronic back pain     COPD (chronic obstructive pulmonary disease) ([address]     Depression     Dyspnea     Family history of colon cancer     Family history of liver cancer     Family history of ovarian cancer     GERD (gastroesophageal reflux disease)     History of colon polyps     Hyperlipidemia     Hypertension     Lung nodule     New onset type 2 diabetes mellitus ([address] 2018    Obesity     Obesity (BMI 35.0-39.9 without comorbidity)    Peripheral vascular disease (HCC)     Poor historian     Sleep apnea     CRISTINA on CPAP    Smoking greater than 40 pack years     reports that she quit smoking about 2 years ago. 1.5 PPD for 48 yeras.  She has a       Surgical History:     Past Surgical History:   Procedure Laterality Date    ANKLE SURGERY      CARDIAC CATHETERIZATION  10/11/2013    2 stents    CATARACT REMOVAL      cataract both eyes with lenses     SECTION      x2   Floydene Ada COLONOSCOPY      COLONOSCOPY N/A 2020    COLONOSCOPY POLYPECTOMY HOT BIOPSY performed by Mat Mosley MD at . [facility]  2011    x2    ENDOSCOPY, COLON, DIAGNOSTIC      EYE SURGERY      b/l cataract extraction    INDUCED       NERVE BLOCK Bilateral 2018    bilat facets #1 marcaine    NERVE BLOCK  2019    caudal aborted- did not get in   [address] Bilateral 02/15/2019    bilateral l4 transforaminal. decadron 8mg/ProHance contrast    NOSE SURGERY      as a child    TONSILLECTOMY         Family History:     Family History   Problem Relation Age of Onset    Hypertension Mother     Macular Degen Mother     Other Mother         vascular    Colon Polyps Brother     Cancer Maternal Aunt        Allergies:       Contrast [iodides]    Medications:      Current Outpatient Medications   Medication Sig Dispense Refill    omeprazole (PRILOSEC) 40 MG delayed release capsule take 1 capsule by mouth once daily 90 capsule 1    betamethasone dipropionate (DIPROLENE) 0.05 % ointment apply topically daily as directed 45 g 1    potassium chloride (KLOR-CON M) 10 MEQ extended release tablet take 1 tablet by mouth once daily 30 tablet 3    sertraline (ZOLOFT) 50 MG tablet Once daily 120 tablet 0    carvedilol (COREG) 3.125 MG tablet Take 1 tablet by mouth 2 times daily 60 tablet 3    amLODIPine (NORVASC) 5 MG tablet take 1 tablet by mouth once daily 90 tablet 1    triamcinolone (KENALOG) 0.025 % cream apply to affected area once daily 30 g 1    atorvastatin (LIPITOR) 40 MG tablet Once daily 90 tablet 1    hydroCHLOROthiazide (HYDRODIURIL) 25 MG tablet take 1 tablet by mouth once daily 90 tablet 2    albuterol sulfate HFA (PROVENTIL HFA) 108 (90 Base) MCG/ACT inhaler Inhale 2 puffs into the lungs every 6 hours as needed for Wheezing 1 Inhaler 5    losartan (COZAAR) 100 MG tablet take 1 tablet by mouth once daily 90 tablet 1    Tiotropium Bromide-Olodaterol (STIOLTO RESPIMAT) 2.5-2.5 MCG/ACT AERS Inhale 2 puffs into the lungs daily 1 Inhaler 5    metFORMIN (GLUCOPHAGE) 500 MG tablet take 1 tablet by mouth twice a day with meals 180 tablet 3    b complex vitamins capsule Take 1 capsule by mouth daily      Coenzyme Q10-Fish Oil-Vit E (CO-Q 10 OMEGA-3 FISH OIL) CAPS Take 1,000 mg by mouth daily      magnesium 30 MG tablet Take 30 mg by mouth 2 times daily      Multiple Vitamin (MULTI-VITAMIN PO) Take 1 tablet by mouth daily.  aspirin 325 MG tablet Take 325 mg by mouth daily. No current facility-administered medications for this visit. Social History:     Tobacco:    reports that she quit smoking about 6 years ago. Her smoking use included cigarettes. She started smoking about 53 years ago. She has a 60.00 pack-year smoking history. She has never used smokeless tobacco.  Alcohol:      reports current alcohol use. Drug Use:  reports current drug use. Drug: Marijuana. Review of Systems:     Review of Systems   Constitutional: Negative for chills and fever. HENT: Negative for congestion. Eyes: Negative for visual disturbance. Respiratory: Negative for chest tightness and shortness of breath. Cardiovascular: Negative for chest pain and leg swelling. Gastrointestinal: Negative for abdominal pain. Endocrine: Negative. Genitourinary: Negative. Musculoskeletal: Positive for arthralgias. Skin: Negative for color change and wound. Allergic/Immunologic: Negative. Neurological: Positive for numbness. Negative for facial asymmetry, speech difficulty and weakness. Hematological: Negative. Psychiatric/Behavioral: Negative. Physical Exam:     Vitals: There were no vitals taken for this visit. Physical Exam  Constitutional:       Appearance: She is well-developed and well-groomed. Eyes:      Extraocular Movements: Extraocular movements intact.       Conjunctiva/sclera: Conjunctivae normal. Neck:      Vascular: No carotid bruit. Cardiovascular:      Rate and Rhythm: Normal rate and regular rhythm. Pulses:           Radial pulses are 2+ on the right side and 2+ on the left side. Dorsalis pedis pulses are detected w/ Doppler on the right side and 1+ on the left side. Posterior tibial pulses are detected w/ Doppler on the right side and 1+ on the left side. Pulmonary:      Effort: Pulmonary effort is normal. No respiratory distress. Abdominal:      Palpations: Abdomen is soft. Tenderness: There is no abdominal tenderness. Musculoskeletal:      Cervical back: Full passive range of motion without pain. Right lower leg: No swelling or tenderness. No edema. Left lower leg: No swelling or tenderness. No edema. Right foot: Normal capillary refill. No swelling or tenderness. Left foot: Normal capillary refill. No swelling or tenderness. Feet:      Right foot:      Skin integrity: No ulcer or skin breakdown. Left foot:      Skin integrity: No ulcer or skin breakdown. Skin:     General: Skin is warm. Capillary Refill: Capillary refill takes less than 2 seconds. Neurological:      Mental Status: She is alert and oriented to person, place, and time. GCS: GCS eye subscore is 4. GCS verbal subscore is 5. GCS motor subscore is 6. Sensory: Sensation is intact. Motor: Motor function is intact.    Psychiatric:         Mood and Affect: Mood normal.         Speech: Speech normal.         Behavior: Behavior normal.         Imaging/Labs:       PVR (2/2018) Right ANA 0.67 and left ANA 1.0    Carotid duplex (8/18/21) reveals bilateral ICA 50-69% stenosis        Assessment and Plan:     Bilateral carotid artery stenosis asymptomatic, peripheral arterial disease  · Optimal medical therapy with aspirin and statins  · Daily exercise to improve overall cardiovascular health  · Will obtain CTA aorta with runoff for evaluation at next surveillance in 6 months  · Carotid duplex at 6 months as well (elective repair when closer to 80% stenosis)    Optimal medical management is an important part of overall treatment of all patients with carotid bifurcation disease, regardless of the degree of stenosis or the plan for intervention. This therapy is directed both at the reduction of stroke and overall cardiovascular events, including cardiovascular-related mortality. Clinical guidelines issued by the American Heart Association and the American Stroke Association recommends:    I. Lowering blood pressure to a target 140/90 mm Hg by lifestyle interventions and antihypertensive treatment is recommended in individuals who have hypertension with asymptomatic carotid atherosclerosis. II. Glucose control to nearly normoglycemic levels (target hemoglobin A1C 7%) is recommended among diabetic patients to reduce microvascular complications and, with lesser certainty, macrovascular complications other than stroke. III. Patients with known atherosclerosis have demonstrated reduced stroke rates when treated with lipid-lowering therapy. And continued low dose statin will help stabilize plaque and decrease the inflammatory response of atherosclerosis. IV. Smoking nearly doubles the risk of stroke and with cessation there is a reduction in the risk of stroke. V. Antiplatelet therapy in asymptomatic patients with carotid atherosclerosis is recommended to reduce overall cardiovascular morbidity although it has not been shown to be effective in the primary prevention of stroke. Electronically signed by Devendra Cheng MD on 9/20/21 at 2:46 PM EDT      [address] [place] [address],[address] [facility]: [phone]  C: [phone]  Email: [email or web address]

## 2021-09-21 ASSESSMENT — ENCOUNTER SYMPTOMS
ALLERGIC/IMMUNOLOGIC NEGATIVE: 1
ABDOMINAL PAIN: 0
CHEST TIGHTNESS: 0
COLOR CHANGE: 0
SHORTNESS OF BREATH: 0

## 2021-09-28 DIAGNOSIS — I10 ESSENTIAL HYPERTENSION: ICD-10-CM

## 2021-09-29 DIAGNOSIS — I10 ESSENTIAL HYPERTENSION: ICD-10-CM

## 2021-09-29 RX ORDER — POTASSIUM CHLORIDE 750 MG/1
TABLET, EXTENDED RELEASE ORAL
Qty: 90 TABLET | Refills: 0 | Status: SHIPPED | OUTPATIENT
Start: 2021-09-29 | End: 2021-12-27

## 2021-09-29 RX ORDER — LOSARTAN POTASSIUM 100 MG/1
TABLET ORAL
Qty: 90 TABLET | Refills: 1 | Status: SHIPPED | OUTPATIENT
Start: 2021-09-29 | End: 2022-02-10 | Stop reason: SDUPTHER

## 2021-09-29 NOTE — TELEPHONE ENCOUNTER
baseline     BP Readings from Last 3 Encounters:   09/20/21 (!) 152/66   07/06/21 119/60   05/11/21 118/72    (goal /80)      All Future Testing planned in [facility]:  Lab Frequency Next Occurrence   Full PFT Study With Bronchodilator Once 10/13/2021   VL DUP CAROTID BILATERAL Once 03/20/2022   CTA ABDOMINAL AORTA W BILAT RUNOFF W CONTRAST Once 03/20/2022            Patient Active Problem List:     Smoker     HTN (hypertension)     CAD/Stents drug-eluting      Serum lipids high     Carotid stenosis, asymptomatic     Nocturnal hypoxia     CRISTINA on CPAP     Type 2 diabetes mellitus with complication, without long-term current use of insulin (HCC)     PAD (peripheral artery disease) ([facility]

## 2021-09-29 NOTE — TELEPHONE ENCOUNTER
Request for Losartan . Pt on wait list for f/u appt     Next Visit Date:  Future Appointments   Date Time Provider Zohreh Hopper   10/4/2021  2:00 PM Meghna Tellez, [address]   10/9/2021 12:20 PM SCHEDULE, STCZ COVID SCREENING STCZ COV [place]   10/13/2021  1:00 PM STV PFT RM 1 [facility]VZ PFT [facility]   10/22/2021  2:30 PM Janet Alvarado MD Resp Spec [facility] [place]   3/21/2022  1:00 PM Hola Redd MD heartvasc [address] Maintenance   Topic Date Due    Flu vaccine (1) Never done    Diabetic microalbuminuria test  09/08/2021    Annual Wellness Visit (AWV)  09/19/2021    Hepatitis C screen  01/06/2024 (Originally 1955)    Shingles Vaccine (1 of 2) 03/04/2022 (Originally 1/20/2003)    Diabetic retinal exam  03/29/2022    A1C test (Diabetic or Prediabetic)  05/07/2022    Lipid screen  05/07/2022    Diabetic foot exam  05/13/2022    Low dose CT lung screening  06/02/2022    Potassium monitoring  09/13/2022    Creatinine monitoring  09/13/2022    Breast cancer screen  01/25/2023    Colon cancer screen colonoscopy  02/06/2023    DTaP/Tdap/Td vaccine (2 - Td or Tdap) 10/24/2028    DEXA (modify frequency per FRAX score)  Completed    Pneumococcal 65+ years Vaccine  Completed    COVID-19 Vaccine  Completed    Hepatitis A vaccine  Aged Out    Hib vaccine  Aged Out    Meningococcal (ACWY) vaccine  Aged Out       Hemoglobin A1C (%)   Date Value   05/07/2021 6.3 (H)   09/08/2020 6.3 (H)   02/22/2020 6.0             ( goal A1C is < 7)   Microalb/Crt.  Ratio (mcg/mg creat)   Date Value   09/08/2020 CANNOT BE CALCULATED     LDL Cholesterol (mg/dL)   Date Value   05/07/2021 44       (goal LDL is <100)   AST (U/L)   Date Value   09/13/2021 20     ALT (U/L)   Date Value   09/13/2021 16     BUN (mg/dL)   Date Value   09/13/2021 24 (H)     BP Readings from Last 3 Encounters:   09/20/21 (!) 152/66   07/06/21 119/60   05/11/21 118/72          (goal 120/80)    All Future

## 2021-10-04 ENCOUNTER — HOSPITAL ENCOUNTER (OUTPATIENT)
Dept: PHYSICAL THERAPY | Facility: CLINIC | Age: 66
Setting detail: THERAPIES SERIES
Discharge: HOME | End: 2021-10-04
Payer: MEDICARE

## 2021-10-04 NOTE — FLOWSHEET NOTE
[] [facility]) - [facility] &  Therapy  [address]    [phone]  F: [phone]   [] [address]  [facility]   [address]  P: [phone]  F: [phone]  [] [address] &  Therapy  [address]  P: [phone]  F: [phone] [] [address]  P: [phone]  F: [phone]  [] [address]  [facility]   [address]   [facility] [place]: [phone]  F: [phone]   [] [place] [address] [facility] [address]  [employer] [place]: [phone]   F: [phone]     Physical Therapy Cancel/No Show note    Date: 10/4/2021  Patient: Libia Mclaughlin  : 1955  MRN: [id number]    Cancels/No Shows to date:      For today's appointment patient:    [x]  Cancelled    [] Rescheduled appointment    [] No-show     Reason given by patient:    []  Patient ill    []  Conflicting appointment    [] No transportation      [] Conflict with work    [x] No reason given    [] Weather related    [] [id number]    [] Other:      Comments:        [] Next appointment was confirmed    Electronically signed by: Oly Cary

## 2021-10-09 ENCOUNTER — HOSPITAL ENCOUNTER (OUTPATIENT)
Dept: LAB | Age: 66
Setting detail: SPECIMEN
Discharge: HOME | End: 2021-10-09
Payer: MEDICARE

## 2021-10-09 DIAGNOSIS — Z01.818 PRE-OP TESTING: Primary | ICD-10-CM

## 2021-10-09 PROCEDURE — U0003 INFECTIOUS AGENT DETECTION BY NUCLEIC ACID (DNA OR RNA); SEVERE ACUTE RESPIRATORY SYNDROME CORONAVIRUS 2 (SARS-COV-2) (CORONAVIRUS DISEASE [COVID-19]), AMPLIFIED PROBE TECHNIQUE, MAKING USE OF HIGH THROUGHPUT TECHNOLOGIES AS DESCRIBED BY CMS-2020-01-R: HCPCS

## 2021-10-09 PROCEDURE — U0005 INFEC AGEN DETEC AMPLI PROBE: HCPCS

## 2021-10-10 LAB
SARS-COV-2: ABNORMAL
SARS-COV-2: DETECTED
SOURCE: ABNORMAL

## 2021-11-08 ENCOUNTER — TELEPHONE (OUTPATIENT)
Dept: INTERNAL MEDICINE | Age: 66
End: 2021-11-08
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
## 2021-11-08 NOTE — TELEPHONE ENCOUNTER
Pt calling she she fell on 10/30/21 and landed on her right side. Pt states she did bruise her Right breast and hurt her right hand. On Saturday pt noticed she has a lump in her right breast, it is bigger then a grape. She is not sure if it is from the fall? Pt also states that her hand keeps on swelling and is painful at times. She wants to know if you want to order testing that can be done before her appt on 11/12/21?  Pt is seeing Dr Momo Schofield

## 2021-11-09 NOTE — TELEPHONE ENCOUNTER
PC to patient - informed patient that xray was ordered.  Patient advised to complete xray before appointment on 11/12/2021

## 2021-11-10 ENCOUNTER — TELEPHONE (OUTPATIENT)
Dept: INTERNAL MEDICINE | Age: 66
End: 2021-11-10

## 2021-11-10 ENCOUNTER — HOSPITAL ENCOUNTER (OUTPATIENT)
Dept: GENERAL RADIOLOGY | Age: 66
Discharge: HOME | End: 2021-11-12
Payer: MEDICARE

## 2021-11-10 ENCOUNTER — HOSPITAL ENCOUNTER (OUTPATIENT)
Age: 66
Discharge: HOME | End: 2021-11-12
Payer: MEDICARE
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
## 2021-11-10 DIAGNOSIS — S62.316A CLOSED DISPLACED FRACTURE OF BASE OF FIFTH METACARPAL BONE OF RIGHT HAND, INITIAL ENCOUNTER: Primary | ICD-10-CM

## 2021-11-10 PROCEDURE — 73130 X-RAY EXAM OF HAND: CPT

## 2021-11-10 NOTE — TELEPHONE ENCOUNTER
She has fracture of the 5th metacarpal base. Will refer to orthopedics. Please see how soon she can be scheduled.  Thanks

## 2021-11-10 NOTE — TELEPHONE ENCOUNTER
Radiology calling to make sure we received Xray of hand results. Confirm they are in epic.  Message Sent to PCP

## 2021-11-12 ENCOUNTER — OFFICE VISIT (OUTPATIENT)
Dept: INTERNAL MEDICINE | Age: 66
End: 2021-11-12
Payer: MEDICARE

## 2021-11-12 VITALS
SYSTOLIC BLOOD PRESSURE: 166 MMHG | WEIGHT: 220 LBS | HEIGHT: 64 IN | TEMPERATURE: 60 F | DIASTOLIC BLOOD PRESSURE: 97 MMHG | BODY MASS INDEX: 37.56 KG/M2 | HEART RATE: 66 BPM
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
## 2021-11-12 DIAGNOSIS — S62.336A CLOSED DISPLACED FRACTURE OF NECK OF FIFTH METACARPAL BONE OF RIGHT HAND, INITIAL ENCOUNTER: ICD-10-CM

## 2021-11-12 DIAGNOSIS — F41.9 ANXIETY: ICD-10-CM

## 2021-11-12 DIAGNOSIS — N63.0 BREAST LUMP: ICD-10-CM

## 2021-11-12 DIAGNOSIS — D64.9 NORMOCYTIC ANEMIA: ICD-10-CM

## 2021-11-12 DIAGNOSIS — R07.89 RIGHT-SIDED CHEST WALL PAIN: ICD-10-CM

## 2021-11-12 PROCEDURE — G8484 FLU IMMUNIZE NO ADMIN: HCPCS | Performed by: STUDENT IN AN ORGANIZED HEALTH CARE EDUCATION/TRAINING PROGRAM

## 2021-11-12 PROCEDURE — G8417 CALC BMI ABV UP PARAM F/U: HCPCS | Performed by: STUDENT IN AN ORGANIZED HEALTH CARE EDUCATION/TRAINING PROGRAM

## 2021-11-12 PROCEDURE — 1036F TOBACCO NON-USER: CPT | Performed by: STUDENT IN AN ORGANIZED HEALTH CARE EDUCATION/TRAINING PROGRAM

## 2021-11-12 PROCEDURE — 99213 OFFICE O/P EST LOW 20 MIN: CPT | Performed by: STUDENT IN AN ORGANIZED HEALTH CARE EDUCATION/TRAINING PROGRAM

## 2021-11-12 PROCEDURE — 3017F COLORECTAL CA SCREEN DOC REV: CPT | Performed by: STUDENT IN AN ORGANIZED HEALTH CARE EDUCATION/TRAINING PROGRAM

## 2021-11-12 PROCEDURE — 1090F PRES/ABSN URINE INCON ASSESS: CPT | Performed by: STUDENT IN AN ORGANIZED HEALTH CARE EDUCATION/TRAINING PROGRAM

## 2021-11-12 PROCEDURE — 99211 OFF/OP EST MAY X REQ PHY/QHP: CPT | Performed by: INTERNAL MEDICINE

## 2021-11-12 PROCEDURE — 1123F ACP DISCUSS/DSCN MKR DOCD: CPT | Performed by: STUDENT IN AN ORGANIZED HEALTH CARE EDUCATION/TRAINING PROGRAM

## 2021-11-12 PROCEDURE — G8399 PT W/DXA RESULTS DOCUMENT: HCPCS | Performed by: STUDENT IN AN ORGANIZED HEALTH CARE EDUCATION/TRAINING PROGRAM

## 2021-11-12 PROCEDURE — G8427 DOCREV CUR MEDS BY ELIG CLIN: HCPCS | Performed by: STUDENT IN AN ORGANIZED HEALTH CARE EDUCATION/TRAINING PROGRAM

## 2021-11-12 PROCEDURE — 4040F PNEUMOC VAC/ADMIN/RCVD: CPT | Performed by: STUDENT IN AN ORGANIZED HEALTH CARE EDUCATION/TRAINING PROGRAM

## 2021-11-12 ASSESSMENT — ENCOUNTER SYMPTOMS
EYE ITCHING: 0
EYE PAIN: 0
SHORTNESS OF BREATH: 0
COUGH: 0
CHOKING: 0

## 2021-11-12 NOTE — TELEPHONE ENCOUNTER
Request for Zoloft . Next Visit Date:  Future Appointments   Date Time Provider Zohreh Ruchi   11/12/2021  3:00 PM Ham Womack MD [facility]   11/13/2021  1:30 PM SCHEDULE, STCZ COVID SCREENING STCZ COV [place]   11/16/2021 11:30 AM Gwen Hooks PA-C [facility]T SP [facility]   11/17/2021  2:00 PM Shary Mcburney, MD Resp Spec Jacob [facility]   1[facility]18[facility]2022  [facility]00 [facility]   3/21/2022  1:00 PM Hola Kuhn MD heartvasc [address] Maintenance   Topic Date Due    Flu vaccine (1) Never done    Diabetic microalbuminuria test  09/08/2021    Annual Wellness Visit (AWV)  09/19/2021    COVID-19 Vaccine (3 - Booster) 09/24/2021    Hepatitis C screen  01/06/2022 (Originally 1955)    Shingles Vaccine (1 of 2) 03/04/2022 (Originally 1/20/2003)    Diabetic retinal exam  03/29/2022    A1C test (Diabetic or Prediabetic)  05/07/2022    Lipid screen  05/07/2022    Diabetic foot exam  05/13/2022    Low dose CT lung screening  06/02/2022    Potassium monitoring  09/13/2022    Creatinine monitoring  09/13/2022    Breast cancer screen  01/25/2023    Colon cancer screen colonoscopy  02/06/2023    DTaP/Tdap/Td vaccine (2 - Td or Tdap) 10/24/2028    DEXA (modify frequency per FRAX score)  Completed    Pneumococcal 65+ years Vaccine  Completed    Hepatitis A vaccine  Aged Out    Hib vaccine  Aged Out    Meningococcal (ACWY) vaccine  Aged Out       Hemoglobin A1C (%)   Date Value   05/07/2021 6.3 (H)   09/08/2020 6.3 (H)   02/22/2020 6.0             ( goal A1C is < 7)   Microalb/Crt.  Ratio (mcg/mg creat)   Date Value   09/08/2020 CANNOT BE CALCULATED     LDL Cholesterol (mg/dL)   Date Value   05/07/2021 44       (goal LDL is <100)   AST (U/L)   Date Value   09/13/2021 20     ALT (U/L)   Date Value   09/13/2021 16     BUN (mg/dL)   Date Value   09/13/2021 24 (H)     BP Readings from Last 3 Encounters:   09/20/21 (!) 152/66   07/06/21 119/60   05/11/21 118/72 (goal 120/80)    All Future Testing planned in [facility]  Lab Frequency Next Occurrence   Full PFT Study With Bronchodilator Once 10/13/2021   VL DUP CAROTID BILATERAL Once 03/20/2022   CTA ABDOMINAL AORTA W BILAT RUNOFF W CONTRAST Once 03/20/2022   COVID-19 Once 10/09/2021   COVID-19 Once 11/13/2021         Patient Active Problem List:     Smoker     HTN (hypertension)     CAD/Stents drug-eluting      Serum lipids high     Carotid stenosis, asymptomatic     Nocturnal hypoxia     CRISTINA on CPAP     Type 2 diabetes mellitus with complication, without long-term current use of insulin (HCC)     PAD (peripheral artery disease) ([facility] [address]

## 2021-11-12 NOTE — PROGRESS NOTES
[facility] [address] [address]  Dept: [phone]  Dept Fax: [phone]    Office Progress/Follow Up Note  Date ofpatient's visit: 11/12/2021  Patient's Name:  Thalia Quan YOB: 1955            Patient Care Team:  Rosa Baires MD as PCP - General (Internal Medicine)  Rosa Baires MD as PCP - [facility]  Ciarra Mejia MD as Consulting Physician (Pulmonology)  Wei Carlos, RN as Nurse Navigator  ================================================================    REASON FOR VISIT/CHIEF COMPLAINT:  Mass (right breast lump formed after taking a fall )    HISTORY OF PRESENTING ILLNESS:  History was obtained from: patient, electronic medical record. Valencia Jeong a 74 y.o. is here for a same-day visit for:    Fall injury to right breast, displaced fracture of right fifth metacarpal of hand: Patient states that on 30th October she fell from her daughter's SUV onto the ground, stretched out her hand to break the fall. She developed severe pain, swelling of the palmar surface of her right hand, as well as pain along her right chest and her breast.  She called the clinic and x-rays of the right hand were ordered, which showed displaced fracture of fifth metacarpal base. Patient was provided with referral for orthopedic surgery, however has not been able to get an appointment with them as yet. Her appointment is upcoming this Wednesday. Patient has not been keeping her hand in any kind of splint or brace for immobility. On physical exam, there is improved swelling as per patient, minimal tenderness on palpation, patient is able to flex fingers, flex wrist, abduct and adduct her thumb. Patient is also experiencing significant tenderness on palpation along the anterolateral chest wall, concerning for rib fractures.   Exam of the right breast shows firm breast lump roughly 4 to 5 cm in size, mobile, which could be possible hematoma. Last checked calcium was within normal limits, patient does not have any baseline vitamin D levels. DEXA scan in 2015 showed normal results, no osteopenia or osteoporosis. Patient is currently taking ibuprofen 400 mg in the morning and 200 mg at night, Tylenol 500 mg in the morning and 500 mg at night for pain relief. Patient was counseled to cut down on ibuprofen as she has anemia with last Hb 10.7, does give history of black tarry stools. Patient is also on aspirin 325 mg daily for stent placement, which increases her risk of PUD. Patient was advised to follow-up with cardiology regarding need for high dosing of aspirin due to risk of GI and  bleed. Patient Active Problem List   Diagnosis    Smoker    HTN (hypertension)    CAD/Stents drug-eluting     Serum lipids high    Carotid stenosis, asymptomatic    Nocturnal hypoxia    CRISTINA on CPAP    Type 2 diabetes mellitus with complication, without long-term current use of insulin (HCC)    PAD (peripheral artery disease) ([facility] [address]       Health Maintenance Due   Topic Date Due    Flu vaccine (1) Never done    Diabetic microalbuminuria test  09/08/2021    Annual Wellness Visit (AWV)  09/19/2021    COVID-19 Vaccine (3 - Booster) 09/24/2021       Allergies   Allergen Reactions    Contrast [Iodides] Itching     Mild itchiness experienced with administration of IV contrast media.          Current Outpatient Medications   Medication Sig Dispense Refill    sertraline (ZOLOFT) 50 MG tablet take 1 tablet by mouth once daily 120 tablet 0    losartan (COZAAR) 100 MG tablet take 1 tablet by mouth once daily 90 tablet 1    potassium chloride (KLOR-CON M) 10 MEQ extended release tablet take 1 tablet by mouth once daily 90 tablet 0    omeprazole (PRILOSEC) 40 MG delayed release capsule take 1 capsule by mouth once daily 90 capsule 1    betamethasone dipropionate (DIPROLENE) 0.05 % ointment apply topically daily as directed 45 g 1    carvedilol (COREG) 3.125 MG tablet Take 1 tablet by mouth 2 times daily 60 tablet 3    amLODIPine (NORVASC) 5 MG tablet take 1 tablet by mouth once daily 90 tablet 1    triamcinolone (KENALOG) 0.025 % cream apply to affected area once daily 30 g 1    atorvastatin (LIPITOR) 40 MG tablet Once daily 90 tablet 1    hydroCHLOROthiazide (HYDRODIURIL) 25 MG tablet take 1 tablet by mouth once daily 90 tablet 2    albuterol sulfate HFA (PROVENTIL HFA) 108 (90 Base) MCG/ACT inhaler Inhale 2 puffs into the lungs every 6 hours as needed for Wheezing 1 Inhaler 5    Tiotropium Bromide-Olodaterol (STIOLTO RESPIMAT) 2.5-2.5 MCG/ACT AERS Inhale 2 puffs into the lungs daily 1 Inhaler 5    metFORMIN (GLUCOPHAGE) 500 MG tablet take 1 tablet by mouth twice a day with meals 180 tablet 3    b complex vitamins capsule Take 1 capsule by mouth daily      Coenzyme Q10-Fish Oil-Vit E (CO-Q 10 OMEGA-3 FISH OIL) CAPS Take 1,000 mg by mouth daily      magnesium 30 MG tablet Take 30 mg by mouth 2 times daily      Multiple Vitamin (MULTI-VITAMIN PO) Take 1 tablet by mouth daily.  aspirin 325 MG tablet Take 325 mg by mouth daily. No current facility-administered medications for this visit. Social History     Tobacco Use    Smoking status: Former Smoker     Packs/day: 1.50     Years: 40.00     Pack years: 60.00     Types: Cigarettes     Start date:      Quit date: 7/10/2015     Years since quittin.3    Smokeless tobacco: Never Used    Tobacco comment: using e-cig   Vaping Use    Vaping Use: Every day    Substances: Always   Substance Use Topics    Alcohol use:  Yes     Alcohol/week: 0.0 standard drinks     Comment: occasional    Drug use: Yes     Types: Marijuana [place])     Comment: marijuana weekly       Family History   Problem Relation Age of Onset    Hypertension Mother     Macular Degen Mother     Other Mother         vascular    Colon Polyps Brother     Cancer Maternal Aunt         REVIEW OF SYSTEMS:  Review of Systems   Constitutional: Negative for activity change, chills, fatigue and fever. HENT: Negative for ear discharge and ear pain. Eyes: Negative for pain and itching. Respiratory: Negative for cough, choking and shortness of breath. Cardiovascular: Positive for chest pain. Endocrine: Negative for cold intolerance and heat intolerance. Neurological: Negative for light-headedness and headaches. Psychiatric/Behavioral: Negative for agitation and behavioral problems. PHYSICAL EXAM:  Vitals:    11/12/21 1455 11/12/21 1615   BP: (!) 148/92 (!) 166/97   Site: Right Upper Arm Right Upper Arm   Position: Sitting Sitting   Cuff Size: Large Adult Large Adult   Pulse: 92 66   Temp: (!) 60 °F (15.6 °C)    Weight: 220 lb (99.8 kg)    Height: 5' 4\" (1.626 m)      BP Readings from Last 3 Encounters:   11/12/21 (!) 166/97   09/20/21 (!) 152/66   07/06/21 119/60        Physical Exam  Constitutional:       Appearance: She is obese. HENT:      Head: Normocephalic. Right Ear: External ear normal.      Left Ear: External ear normal.      Nose: Nose normal.      Mouth/Throat:      Mouth: Mucous membranes are moist.   Eyes:      Pupils: Pupils are equal, round, and reactive to light. Cardiovascular:      Rate and Rhythm: Normal rate. Heart sounds: Normal heart sounds. Pulmonary:      Effort: Pulmonary effort is normal.      Breath sounds: Normal breath sounds. Chest:      Chest wall: Tenderness present. Breasts:      Right: Tenderness present. No swelling or bleeding. Comments: Associated bruising on right breast  Abdominal:      General: Bowel sounds are normal.      Palpations: Abdomen is soft. Musculoskeletal:        Arms:       Cervical back: Normal range of motion. Skin:     General: Skin is warm. Neurological:      General: No focal deficit present. Mental Status: She is alert and oriented to person, place, and time.            DIAGNOSTIC FINDINGS:  CBC:  Lab Results   Component Value Date    WBC 8.3 09/13/2021    HGB 10.7 09/13/2021     09/13/2021       BMP:    Lab Results   Component Value Date     09/13/2021    K 4.3 09/13/2021     09/13/2021    CO2 25 09/13/2021    BUN 24 09/13/2021    CREATININE 0.67 09/13/2021    GLUCOSE 89 09/13/2021    GLUCOSE 87 10/28/2011       HEMOGLOBIN A1C:   Lab Results   Component Value Date    LABA1C 6.3 05/07/2021       FASTING LIPID PANEL:  Lab Results   Component Value Date    CHOL 117 05/07/2021    HDL 48 05/07/2021    TRIG 123 05/07/2021       ASSESSMENT AND PLAN:  Maxine Jara was seen today for mass. Diagnoses and all orders for this visit:    Fall, initial encounter  -     Vitamin D 25 Hydroxy; Future  -     US BREAST COMPLETE RIGHT; Future  -     XR CHEST STANDARD (2 VW); Future    Right-sided chest wall pain    Closed displaced fracture of neck of fifth metacarpal bone of right hand, initial encounter    Normocytic anemia  -     Iron And TIBC; Future    Breast lump  -     US BREAST COMPLETE RIGHT; Future      FOLLOW UP AND INSTRUCTIONS:  · Return in about 3 months (around 2/12/2022). · Maxine Jara received counseling on the following healthy behaviors: nutrition and medication adherence    · Discussed use, benefit, and side effects of prescribed medications. Barriers to medication compliance addressed. All patient questions answered. Pt voiced understanding. · Patient given educational materials - see patient instructions    Virgil Lindsey, PGY2  Internal Medicine  11/12/2021, 5:33 PM    This note is created with the assistance of a speech-recognition program. While intending to generate a document that actually reflects the content of thevisit, the document can still have some mistakes which may not have been identified and corrected by editing.

## 2021-11-12 NOTE — PATIENT INSTRUCTIONS
Follow-up appointment scheduled for   01/18/22, AVS given to patient. -Xray order given to pt, this test does not need to be scheduled, please take order with you to registration.      Order for mammogram faxed to scheduling, they will contact patient with an appt original order given to patient along with scheduling phone number     TV

## 2021-11-12 NOTE — PROGRESS NOTES
Patient seen. She had a fall about 2 weeks ago and has displaced fracture of the fifth metacarpal base. She did not go to urgent care or get any splinting done. She is taking ibuprofen or Tylenol. She has not been wearing any sort of brace for her hand. She is supposed to see orthopedics but could not get it next week. Advised she needs a splint for her hand. She is also having some breast swelling and pain from a hematoma and some bilateral pain with deep breaths. She is not dyspneic. She is not hypoxic. Will get a chest x-ray. Told her hematoma could take few weeks to resolve. Continue ice packs. Attending Physician Statement  I have discussed the care of Maame Lainez, including pertinent history and exam findings,  with the resident. I have seen and examined the patient and the key elements of all parts of the encounter have been performed by me. I agree with the assessment, plan and orders as documented by the resident.   (GC Modifier)

## 2021-11-13 ENCOUNTER — HOSPITAL ENCOUNTER (OUTPATIENT)
Dept: LAB | Age: 66
Setting detail: SPECIMEN
Discharge: HOME | End: 2021-11-13
Payer: MEDICARE

## 2021-11-13 DIAGNOSIS — Z01.818 PREOP TESTING: Primary | ICD-10-CM

## 2021-11-13 PROCEDURE — U0003 INFECTIOUS AGENT DETECTION BY NUCLEIC ACID (DNA OR RNA); SEVERE ACUTE RESPIRATORY SYNDROME CORONAVIRUS 2 (SARS-COV-2) (CORONAVIRUS DISEASE [COVID-19]), AMPLIFIED PROBE TECHNIQUE, MAKING USE OF HIGH THROUGHPUT TECHNOLOGIES AS DESCRIBED BY CMS-2020-01-R: HCPCS

## 2021-11-13 PROCEDURE — U0005 INFEC AGEN DETEC AMPLI PROBE: HCPCS

## 2021-11-14 LAB
SARS-COV-2: NORMAL
SARS-COV-2: NOT DETECTED
SOURCE: NORMAL

## 2021-11-15 ENCOUNTER — TELEPHONE (OUTPATIENT)
Dept: INTERNAL MEDICINE | Age: 66
End: 2021-11-15

## 2021-11-15 DIAGNOSIS — N63.10 MASS OF BREAST, RIGHT: ICD-10-CM
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
## 2021-11-15 DIAGNOSIS — N63.0 BREAST LUMP: ICD-10-CM

## 2021-11-15 NOTE — TELEPHONE ENCOUNTER
Scheduling called asking for another order for a Mammogram of the right breast. Unable to pend was unsure which one was the correct order.

## 2021-11-17 ENCOUNTER — OFFICE VISIT (OUTPATIENT)
Dept: PULMONOLOGY | Age: 66
End: 2021-11-17
Payer: MEDICARE

## 2021-11-17 VITALS
WEIGHT: 219 LBS | DIASTOLIC BLOOD PRESSURE: 60 MMHG | SYSTOLIC BLOOD PRESSURE: 127 MMHG | HEIGHT: 63 IN | HEART RATE: 69 BPM | BODY MASS INDEX: 38.8 KG/M2 | RESPIRATION RATE: 16 BRPM | OXYGEN SATURATION: 94 %

## 2021-11-17 DIAGNOSIS — R91.1 LUNG NODULE: ICD-10-CM

## 2021-11-17 DIAGNOSIS — G47.33 OSA ON CPAP: Primary | ICD-10-CM

## 2021-11-17 DIAGNOSIS — J44.9 CHRONIC OBSTRUCTIVE PULMONARY DISEASE, UNSPECIFIED COPD TYPE (HCC): ICD-10-CM

## 2021-11-17 DIAGNOSIS — Z87.891 HISTORY OF SMOKING 30 OR MORE PACK YEARS: ICD-10-CM

## 2021-11-17 DIAGNOSIS — R06.09 DYSPNEA ON EXERTION: ICD-10-CM

## 2021-11-17 DIAGNOSIS — Z99.89 OSA ON CPAP: Primary | ICD-10-CM

## 2021-11-17 PROCEDURE — 94726 PLETHYSMOGRAPHY LUNG VOLUMES: CPT | Performed by: INTERNAL MEDICINE

## 2021-11-17 PROCEDURE — 3017F COLORECTAL CA SCREEN DOC REV: CPT | Performed by: INTERNAL MEDICINE

## 2021-11-17 PROCEDURE — 4040F PNEUMOC VAC/ADMIN/RCVD: CPT | Performed by: INTERNAL MEDICINE

## 2021-11-17 PROCEDURE — 3023F SPIROM DOC REV: CPT | Performed by: INTERNAL MEDICINE

## 2021-11-17 PROCEDURE — 94375 RESPIRATORY FLOW VOLUME LOOP: CPT | Performed by: INTERNAL MEDICINE

## 2021-11-17 PROCEDURE — 1090F PRES/ABSN URINE INCON ASSESS: CPT | Performed by: INTERNAL MEDICINE

## 2021-11-17 PROCEDURE — 90694 VACC AIIV4 NO PRSRV 0.5ML IM: CPT | Performed by: INTERNAL MEDICINE

## 2021-11-17 PROCEDURE — G8484 FLU IMMUNIZE NO ADMIN: HCPCS | Performed by: INTERNAL MEDICINE

## 2021-11-17 PROCEDURE — G0008 ADMIN INFLUENZA VIRUS VAC: HCPCS | Performed by: INTERNAL MEDICINE

## 2021-11-17 PROCEDURE — G8399 PT W/DXA RESULTS DOCUMENT: HCPCS | Performed by: INTERNAL MEDICINE

## 2021-11-17 PROCEDURE — 99214 OFFICE O/P EST MOD 30 MIN: CPT | Performed by: INTERNAL MEDICINE

## 2021-11-17 PROCEDURE — G8428 CUR MEDS NOT DOCUMENT: HCPCS | Performed by: INTERNAL MEDICINE

## 2021-11-17 PROCEDURE — G8417 CALC BMI ABV UP PARAM F/U: HCPCS | Performed by: INTERNAL MEDICINE

## 2021-11-17 PROCEDURE — 1123F ACP DISCUSS/DSCN MKR DOCD: CPT | Performed by: INTERNAL MEDICINE

## 2021-11-17 PROCEDURE — G8926 SPIRO NO PERF OR DOC: HCPCS | Performed by: INTERNAL MEDICINE

## 2021-11-17 PROCEDURE — 1036F TOBACCO NON-USER: CPT | Performed by: INTERNAL MEDICINE

## 2021-11-17 PROCEDURE — 94729 DIFFUSING CAPACITY: CPT | Performed by: INTERNAL MEDICINE

## 2021-11-17 NOTE — PROGRESS NOTES
OUTPATIENT PULMONARY PROGRESS NOTE      Patient:  Veda Finney  MRN: [id number]    Consulting Physician: Dr. Parvez Trevino  Reason for initial consult: COPD, lung nodule, dyspnea, obstructive sleep apnea  Primacy Care Physician: Wilman Patel MD    HISTORY OF PRESENT ILLNESS:   The patient is a 74 y.o. female   She is here for follow-up of obstructive sleep apnea and COPD, lung nodule. Since she was seen last time according to patient she had Covid infection in October she did not require hospitalization she had symptoms for couple of days she did not have fever but had chills, tiredness, mild cough and sore throat symptom resolved in 2 days at home. She does have history of COVID vaccination both the doses in February. Her pulmonary symptoms are stable she did not have any exacerbation or ER visit or antibiotic or steroid use since she was seen last time. She does have history of chronic dyspnea on exertion she denies shortness of breath on regular activities. She gets shortness of breath by walking half a block or quarter of a block without much change she just gets shortness of breath when she has to do activities fast already using stairs  She does not complain of daily or persistent cough cough is intermittent only. Denies sputum production denies change in the color of the sputum volume the sputum. She does not complain of wheezing. Denies nocturnal awakening with cough wheezing chest tightness or shortness of breath. According to patient she is taking Stiolto once daily she is compliant with medication and she hardly require albuterol. She had a CT scan of the chest LDCT in June 2021 shows a stable 4 mm left upper lobe nodule as compared to CT scan in May 2020.   Pulmonary function test done today 11/17/2021 showed FEV1 of 65% borderline restriction which could be extraparenchymal or intraparenchymal and mild reduction in diffusion capacity pulmonary function test mostly stable from 2019 2018.    She has history of CPAP very compliant with CPAP according to patient her machine had a recall for CPAP and she had called the company she did not get any replacement but her CPAP machine is old. She still using the CPAP machine. Last compliance data is available from 12/21/2021 to 03/20/2021. Compliance is 100% for 90 days. Average usage is 8 hours 19 minutes. Average AHI is 0.8. On auto CPAP    Her last pulmonary function test shows improvement in FEV1 from 58% to 69% her diffusion capacity is normal.    Sleep questionnaire on 11/17/2021  Occasional dry mouth upon awakening. Occasionally fatigue and tiredness during the day. Goes to sleep at 11 pm, wakes up 8 am. It takes 20 minutes to fall asleep. Wakes up rarely at night to go to bathroom. Takes some time nap during the day. no headache in am. No car wrecks or near wrecks because of the sleepiness. No nodding off while driving. No weight gain. No forgetfulness or decreased concentration. No nasal congestion or obstruction at night. Using CPAP 7-8 hr/night. No leg jerks during sleep. No restless feelings in legs at night. No numbness or burning in leg or feet. No leg aches or cramps. Initial history and course    She was referred here for dyspnea, she has long history of his smoking, she was told in the past that she has COPD and she had a spirometry done before, she does have history of shortness of breath on exertion and sometimes on regular activities, and she had limited her activities because of her shortness of breath especially last few years. She also has a CT chest done for screening which showed 5 mm left upper lobe lung nodule  She does have weight gain for about 30 pounds in last few years   She was diagnosed with sleep apnea for about a year ago when she had a sleep study done and she was also placed on oxygen at night with CPAP at 2 L.   She does have history of GERD and she is on medication to control the symptoms          Sleep Medicine 2021 2020 2020 2019 2019 12/15/2018 2018   Sitting and reading 1 0 1 0 1 0 1   Watching TV 1 2 1 1 1 1 1   Sitting, inactive in a public place (e.g. a theatre or a meeting) 0 0 0 0 0 0 0   As a passenger in a car for an hour without a break 0 0 1 0 0 0 0   Lying down to rest in the afternoon when circumstances permit 0 3 2 2 1 2 2   Sitting and talking to someone 0 0 0 0 0 0 0   Sitting quietly after a lunch without alcohol 0 0 1 0 0 0 0   In a car, while stopped for a few minutes in traffic 0 0 0 0 0 0 0   Total score 2 5 6 3 3 3 4   Neck circumference - - - - - 55 -     Past Medical History:        Diagnosis Date    Angina pectoris ([facility]     Arthritis     Bipolar disorder ([facility]     CAD (coronary artery disease)     stents x 2 (Dr. Ted Carrillo)    Carotid stenosis, asymptomatic 2015    Chronic back pain     COPD (chronic obstructive pulmonary disease) ([facility]     Depression     Dyspnea     Family history of colon cancer     Family history of liver cancer     Family history of ovarian cancer     GERD (gastroesophageal reflux disease)     History of colon polyps     Hyperlipidemia     Hypertension     Lung nodule     New onset type 2 diabetes mellitus ([facility] 2018    Obesity     Obesity (BMI 35.0-39.9 without comorbidity)    Peripheral vascular disease (HCC)     Poor historian     Sleep apnea     CRISTINA on CPAP    Smoking greater than 40 pack years     reports that she quit smoking about 2 years ago. 1.5 PPD for 48 yeras.  She has a       Past Surgical History:        Procedure Laterality Date    ANKLE SURGERY      CARDIAC CATHETERIZATION  10/11/2013    2 stents    CATARACT REMOVAL      cataract both eyes with lenses     SECTION      x2    COLONOSCOPY      COLONOSCOPY N/A 2020    COLONOSCOPY POLYPECTOMY HOT BIOPSY performed by Lisa Mora MD at [place] [address]   x2    ENDOSCOPY, COLON, DIAGNOSTIC      EYE SURGERY      b/l cataract extraction    INDUCED       NERVE BLOCK Bilateral 2018    bilat facets #1 marcaine    NERVE BLOCK  2019    caudal aborted- did not get in   [address] Bilateral 02/15/2019    bilateral l4 transforaminal. decadron 8mg/ProHance contrast    NOSE SURGERY      as a child    TONSILLECTOMY         Allergies: Allergies   Allergen Reactions    Contrast [Iodides] Itching     Mild itchiness experienced with administration of IV contrast media.          Home Meds:   Outpatient Encounter Medications as of 2021   Medication Sig Dispense Refill    sertraline (ZOLOFT) 50 MG tablet take 1 tablet by mouth once daily 120 tablet 0    losartan (COZAAR) 100 MG tablet take 1 tablet by mouth once daily 90 tablet 1    potassium chloride (KLOR-CON M) 10 MEQ extended release tablet take 1 tablet by mouth once daily 90 tablet 0    omeprazole (PRILOSEC) 40 MG delayed release capsule take 1 capsule by mouth once daily 90 capsule 1    betamethasone dipropionate (DIPROLENE) 0.05 % ointment apply topically daily as directed 45 g 1    carvedilol (COREG) 3.125 MG tablet Take 1 tablet by mouth 2 times daily 60 tablet 3    amLODIPine (NORVASC) 5 MG tablet take 1 tablet by mouth once daily 90 tablet 1    triamcinolone (KENALOG) 0.025 % cream apply to affected area once daily 30 g 1    atorvastatin (LIPITOR) 40 MG tablet Once daily 90 tablet 1    hydroCHLOROthiazide (HYDRODIURIL) 25 MG tablet take 1 tablet by mouth once daily 90 tablet 2    albuterol sulfate HFA (PROVENTIL HFA) 108 (90 Base) MCG/ACT inhaler Inhale 2 puffs into the lungs every 6 hours as needed for Wheezing 1 Inhaler 5    Tiotropium Bromide-Olodaterol (STIOLTO RESPIMAT) 2.5-2.5 MCG/ACT AERS Inhale 2 puffs into the lungs daily 1 Inhaler 5    metFORMIN (GLUCOPHAGE) 500 MG tablet take 1 tablet by mouth twice a day with meals 180 tablet 3    b complex vitamins capsule Take 1 capsule by mouth daily      Coenzyme Q10-Fish Oil-Vit E (CO-Q 10 OMEGA-3 FISH OIL) CAPS Take 1,000 mg by mouth daily      magnesium 30 MG tablet Take 30 mg by mouth 2 times daily      Multiple Vitamin (MULTI-VITAMIN PO) Take 1 tablet by mouth daily.  aspirin 325 MG tablet Take 325 mg by mouth daily. No facility-administered encounter medications on file as of 11/17/2021. Social History:   TOBACCO:   reports that she quit smoking about 2 years ago. 1.5 PPD for 48 yeras. She has a 60.00 pack-year smoking history. She has never used smokeless tobacco.  ETOH:   reports current alcohol use.   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      Family History:       Problem Relation Age of Onset    Hypertension Mother     Macular Degen Mother     Other Mother         vascular    Colon Polyps Brother     Cancer Maternal Aunt        Immunizations:    Immunization History   Administered Date(s) Administered    COVID-19, Harle Distel, Primary or Immunocompromised, PF, 100mcg/0.5mL 02/28/2021, 03/24/2021    COVID-19, Pfizer, PF, 30mcg/0.3mL 02/28/2021    Pneumococcal Conjugate 13-valent (Saundra Armani) 04/17/2018, 12/01/2019    Pneumococcal Polysaccharide ([id number]) 02/20/2020    Tdap (Boostrix, Adacel) 10/24/2018         REVIEW OF SYSTEMS:  CONSTITUTIONAL:  negative for  fevers, chills, sweats, fatigue, malaise, anorexia and weight loss  EYES:  negative for  double vision, blurred vision, dry eyes, eye discharge, visual disturbance, irritation, redness and icterus  HEENT:   negative for hoarseness and voice changes, negative for  hearing loss, tinnitus, nasal congestion, epistaxis, snoring, sore mouth and sore throat  RESPIRATORY:  positive for dyspnea on exertion and activities,,  negative for dry cough, negative for wheezing, hemoptysis, chest pain, pleuritic pain and cyanosis  CARDIOVASCULAR:  positive for  dyspnea on exertion, positive for chest heaviness/pressure, negative for palpitations, orthopnea, PND, exertional chest pressure/discomfort, fatigue, early saiety, edema, syncope  GASTROINTESTINAL:  negative for change in bowel habits, diarrhea, constipation, abdominal pain, pruritus, abdominal mass, abdominal distention, jaundice, hematemesis and hemtochezia, dysphagia, reflux and regurgitation  GENITOURINARY:  negative for frequency, dysuria, nocturia, urinary incontinence, hesitancy, decreased stream and hematuria  HEMATOLOGIC/LYMPHATIC:  negative for easy bruising, bleeding, lymphadenopathy and petechiae  ALLERGIC/IMMUNOLOGIC:  negative for recurrent infections, urticaria, hay fever, angioedema, anaphylaxis and drug reactions  ENDOCRINE:  negative for heat intolerance, cold intolerance, tremor, weight changes and change in bowel habits  MUSCULOSKELETAL:  negative for  myalgias, arthralgias, joint swelling, stiff joints and decreased range of motion  NEUROLOGICAL:  negative for headaches, dizziness, seizures, memory problems, speech problems, visual disturbance, coordination problems, gait problems, weakness, numbness, syncope and tingling  BEHAVIOR/PSYCH:  negative          Physical Exam:    Vitals: /60 (Site: Right Lower Arm)   Pulse 69   Resp 16   Ht 5' 3\" (1.6 m)   Wt 219 lb (99.3 kg)   SpO2 94% Comment: ra  BMI 38.79 kg/m²   Last 3 weights: Wt Readings from Last 3 Encounters:   11/17/21 219 lb (99.3 kg)   11/12/21 220 lb (99.8 kg)   09/20/21 222 lb (100.7 kg)     Body mass index is 38.79 kg/m².     Physical Examination:   General appearance - alert, well appearing, and in no distress, overweight and acyanotic, in no respiratory distress  Mental status - alert, oriented to person, place, and time  Eyes - pupils equal and reactive, extraocular eye movements intact, sclera anicteric  Ears - right ear normal, left ear normal  Nose - normal and patent, no erythema, discharge or polyps  Mouth - mucous membranes moist, pharynx normal without lesions and small oropharynx, moderate size tongue, Mallampati 2  Neck - supple, no significant adenopathy, short neck  Chest - no tachypnea, retractions or cyanosis, decreased air entry noted bilaterally with distant breath sounds no expiratory wheezing and no rhonchi and no crackles.   Heart - normal rate, regular rhythm, normal S1, S2, no murmurs, rubs, clicks or gallops  Abdomen - soft, nontender, nondistended, no masses or organomegaly  Neurological - alert, oriented, normal speech, no focal findings or movement disorder noted  Extremities - peripheral pulses normal, no pedal edema, no clubbing or cyanosis  Skin - normal coloration and turgor, no rashes, no suspicious skin lesions noted     Immunization History   Administered Date(s) Administered    COVID-19, Moderna, Primary or Immunocompromised, PF, 100mcg/0.5mL 02/28/2021, 03/24/2021    COVID-19, Pfizer, PF, 30mcg/0.3mL 02/28/2021    Pneumococcal Conjugate 13-valent ([id number]) 04/17/2018, 12/01/2019    Pneumococcal Polysaccharide ([id number]) 02/20/2020    Tdap (Boostrix, Adacel) 10/24/2018         LABS:    CBC:   WBC   Date Value Ref Range Status   09/13/2021 8.3 3.5 - 11.3 k/uL Final   09/08/2020 9.8 3.5 - 11.3 k/uL Final   11/09/2018 9.1 3.5 - 11.3 k/uL Final     Hemoglobin   Date Value Ref Range Status   09/13/2021 10.7 (L) 11.9 - 15.1 g/dL Final   09/08/2020 11.3 (L) 11.9 - 15.1 g/dL Final   11/09/2018 11.8 (L) 11.9 - 15.1 g/dL Final     Platelets   Date Value Ref Range Status   09/13/2021 317 138 - 453 k/uL Final   09/08/2020 312 138 - 453 k/uL Final   11/09/2018 337 138 - 453 k/uL Final     BMP:   Sodium   Date Value Ref Range Status   09/13/2021 140 135 - 144 mmol/L Final   09/08/2020 141 135 - 144 mmol/L Final   10/24/2019 143 135 - 144 mmol/L Final     Potassium   Date Value Ref Range Status   09/13/2021 4.3 3.7 - 5.3 mmol/L Final   09/08/2020 4.0 3.7 - 5.3 mmol/L Final   10/24/2019 4.3 3.7 - 5.3 mmol/L Final     Chloride   Date Value Ref Range Status 09/13/2021 101 98 - 107 mmol/L Final   09/08/2020 101 98 - 107 mmol/L Final   10/24/2019 103 98 - 107 mmol/L Final     CO2   Date Value Ref Range Status   09/13/2021 25 20 - 31 mmol/L Final   09/08/2020 25 20 - 31 mmol/L Final   10/24/2019 28 20 - 31 mmol/L Final     BUN   Date Value Ref Range Status   09/13/2021 24 (H) 8 - 23 mg/dL Final   09/08/2020 19 8 - 23 mg/dL Final   10/24/2019 24 (H) 8 - 23 mg/dL Final     CREATININE   Date Value Ref Range Status   09/13/2021 0.67 0.50 - 0.90 mg/dL Final   09/08/2020 0.69 0.50 - 0.90 mg/dL Final   10/24/2019 0.62 0.50 - 0.90 mg/dL Final     Glucose   Date Value Ref Range Status   09/13/2021 89 70 - 99 mg/dL Final   09/08/2020 84 70 - 99 mg/dL Final   10/24/2019 112 (H) 70 - 99 mg/dL Final   10/28/2011 87 74 - 106 mg/dL Final     Hepatic:     Amylase: No results found for: AMYLASE  Lipase: No results found for: LIPASE  CARDIAC ENZYMES:     BNP: No results found for: BNP  Lipids:       INR: No results found for: INR  Thyroid:   TSH   Date Value Ref Range Status   05/07/2021 2.71 0.30 - 5.00 mIU/L Final     Urinalysis:       Cultures:-  -----------------------------------------------------------------  Immunization History   Administered Date(s) Administered    COVID-19, Moderna, Primary or Immunocompromised, PF, 100mcg/0.5mL 02/28/2021, 03/24/2021    COVID-19, Pfizer, PF, 30mcg/0.3mL 02/28/2021    Pneumococcal Conjugate 13-valent ([id number]) 04/17/2018, 12/01/2019    Pneumococcal Polysaccharide ([id number]) 02/20/2020    Tdap (Boostrix, Adacel) 10/24/2018     ABGs: No results found for: PHART, PO2ART, MOD7COD    Pulmonary Functions Testing Results:    11/17/2021: FEV1 1.41 65%, FVC 1.79 65%, FEV1 %, TLC 3.65 79%, DLCO 11.44 65%  05/22/2019: FEV1 1.52 69%, FVC 1.85 66%, FEV1 %, TLC 3.48 75%, DLCO 15.70 88%  02/07/2018: FEV1 1.30 58%, FVC 1.62 58%, YHC5TNF 100%, TLC 3.35 72%, DLCO 13.62 76%    CXR      CT Scans     LDCT chest 06/02/2021.   Lungs/Pleura:  There are no significant nodules or masses.  No focal   consolidation.   There is no pleural effusion or pneumothorax.  Normal   tracheobronchial tree       LDCT chest 05/28/2020  Mediastinum:  Suboptimal evaluation due to low dose technique.  Thoracic   aorta demonstrates moderate calcification without aneurysm.  Pulmonary trunk   appears nondilated.  Heart appears normal size without pericardial effusion. No lymphadenopathy.  The esophagus is grossly unremarkable.       Lungs/Pleura:  No focal consolidation, pneumothorax or pleural effusion. Trachea and distal airways appear patent.  4 mm solid noncalcified pulmonary   nodule left upper lobe series 3, image 140. no new or enlarging pulmonary   nodule. LDCT chest 05/07/2019  1. Stable 4 mm posterior left upper lobe pulmonary nodule, unchanged from   05/07/2018.  Mild emphysema. 2. Cardiomegaly.  Coronary artery disease.  Atherosclerotic calcification of   the aorta. 3. Small hiatal hernia. 05/07/2018  Stable 4 mm nodule left upper lobe. 10/17/2017  5 mm nodule posterior left upper lobe.  No acute process with chronic   findings as described. Compliance data from CPAP. From 12/21/2020 to 03/20/2021  Auto CPAP with pressure maximum of 20 and pressure minimum of 12  Average daily usage 8 hours 13 minutes  Compliance 100 %  Average mean pressure 13.1 cm. AHI 0.8    Assessment and Plan         1. Chronic obstructive pulmonary disease, unspecified COPD type ([address]   2. Lung nodule   3. CRISTINA on CPAP   4. Obesity (BMI 35.0-39.9 without comorbidity)   5.       Dyspnea on exertion     HTN (hypertension)    CAD/Stents drug-eluting     Claudication in peripheral vascular disease (HCC)     CT scan of the chest reviewed no change in 4 mm left upper lobe nodule      Plan and Recommendations:    Continue Stiolto Respimat 2 puffs once daily  Albuterol as needed  She will need yearly CT scan for screening and next one should be in June 2022  Advised her to follow-up with cardiology as she has history of coronary artery disease and history of previous stent. Maintain an active lifestyle   Recommended flu vaccine in fall. Flu vaccine today. She had both doses of Covid vaccine. Recommend booster  She had Prevnar 13 in April 2018  Uptodate on Pneumonia vaccine. Prescription for new CPAP machine. Continue with AutoCPAP at current setting  20/12 cm  CPAP at least 4 hrs qhs  Wt loss is recommended and discussed  Follow good sleep hygeine instructions  Use humidifier   Questions answered pertaining to diagnosis and management explained importance of compliance with therapy   Last compliance data from CPAP seen and she is compliant with CPAP. Need compliance data on next visit    RTC 6 months. It was my pleasure to evaluate Zacarias Hudson today. Please call with questions. Please note that this chart was generated using voice recognition Dragon dictation software. Although every effort was made to ensure the accuracy of this automated transcription, some errors in transcription may have occurred.     Joann Mejia MD, MD             11/17/2021, 2:47 PM

## 2021-11-17 NOTE — PROGRESS NOTES
Pulmonary function test   Date of study 11/17/2021. Spirometry shows FEV1 is 1.41 65% predicted consistent with mild obstructive mental impairment. FVC is 1.79 65% predicted which could be suggestive of mild restrictive ventilatory impairment and correlation with TLC is recommended. Lung volume shows residual volume is 1.67 91% predicted. Total lung capacity is 3.65 79% predicted suggestive of mild restrictive mental impairment which could be intraparenchymal.  Diffusion capacity is 11.44 65% predicted consistent with mild reduction diffusion capacity which could be secondary to emphysema, intraparenchymal lung disease or pulmonary vascular disease. Impression: This pulmonary function test is consistent with mild to moderate obstructive ventilatory impairment. There could be mild concomitant restrictive mental impairment which could be intraparenchymal.  There is mild reduction diffusion capacity which could be secondary to emphysema or intraparenchymal lung disease or pulmonary vascular disease. Clinical correlation is recommended.

## 2021-11-18 ENCOUNTER — OFFICE VISIT (OUTPATIENT)
Dept: ORTHOPEDIC SURGERY | Age: 66
End: 2021-11-18
Payer: MEDICARE

## 2021-11-18 VITALS — BODY MASS INDEX: 38.8 KG/M2 | HEIGHT: 63 IN | WEIGHT: 219 LBS | RESPIRATION RATE: 12 BRPM

## 2021-11-18 DIAGNOSIS — M79.642 LEFT HAND PAIN: ICD-10-CM

## 2021-11-18 DIAGNOSIS — M65.342 TRIGGER FINGER, LEFT RING FINGER: ICD-10-CM

## 2021-11-18 DIAGNOSIS — S62.346A NONDISPLACED FRACTURE OF BASE OF FIFTH METACARPAL BONE, RIGHT HAND, INITIAL ENCOUNTER FOR CLOSED FRACTURE: Primary | ICD-10-CM

## 2021-11-18 PROCEDURE — G8427 DOCREV CUR MEDS BY ELIG CLIN: HCPCS | Performed by: PHYSICIAN ASSISTANT

## 2021-11-18 PROCEDURE — 20550 NJX 1 TENDON SHEATH/LIGAMENT: CPT | Performed by: PHYSICIAN ASSISTANT

## 2021-11-18 PROCEDURE — G8484 FLU IMMUNIZE NO ADMIN: HCPCS | Performed by: PHYSICIAN ASSISTANT

## 2021-11-18 PROCEDURE — 99204 OFFICE O/P NEW MOD 45 MIN: CPT | Performed by: PHYSICIAN ASSISTANT

## 2021-11-18 PROCEDURE — G8417 CALC BMI ABV UP PARAM F/U: HCPCS | Performed by: PHYSICIAN ASSISTANT

## 2021-11-18 PROCEDURE — 1090F PRES/ABSN URINE INCON ASSESS: CPT | Performed by: PHYSICIAN ASSISTANT

## 2021-11-18 RX ORDER — LIDOCAINE HYDROCHLORIDE 10 MG/ML
0.5 INJECTION, SOLUTION INFILTRATION; PERINEURAL ONCE
Status: COMPLETED | OUTPATIENT
Start: 2021-11-18 | End: 2021-11-18

## 2021-11-18 RX ORDER — BETAMETHASONE SODIUM PHOSPHATE AND BETAMETHASONE ACETATE 3; 3 MG/ML; MG/ML
3 INJECTION, SUSPENSION INTRA-ARTICULAR; INTRALESIONAL; INTRAMUSCULAR; SOFT TISSUE ONCE
Status: COMPLETED | OUTPATIENT
Start: 2021-11-18 | End: 2021-11-18

## 2021-11-18 RX ADMIN — LIDOCAINE HYDROCHLORIDE 0.5 ML: 10 INJECTION, SOLUTION INFILTRATION; PERINEURAL at 12:42

## 2021-11-18 RX ADMIN — BETAMETHASONE SODIUM PHOSPHATE AND BETAMETHASONE ACETATE 3 MG: 3; 3 INJECTION, SUSPENSION INTRA-ARTICULAR; INTRALESIONAL; INTRAMUSCULAR; SOFT TISSUE at 12:43

## 2021-11-18 ASSESSMENT — ENCOUNTER SYMPTOMS
SHORTNESS OF BREATH: 0
COUGH: 0
COLOR CHANGE: 0
VOMITING: 0

## 2021-11-18 NOTE — PROGRESS NOTES
[facility] [place] [facility]  [address] [address] [facility] [address]  Dept: [phone]    Ambulatory Orthopedic Consult      CHIEF COMPLAINT:    Chief Complaint   Patient presents with    Hand Injury     bilateral, right hand DOI- 10/30/21, left hand ring trigger finger        HISTORY OF PRESENT ILLNESS:      Date of Injury: 10/30/2021 right hand     The patient is a 74 y.o. female who is being seen at the request of  Crispin Hardwick MD for consultation and evaluation of  Right hand pain s/p fall out of a parked car on 10/30/2021. Patient notes that on 10/30/2020 when she was getting out of her daughter's big SUV and lost her balance falling onto the right hand. The patient immediately had increasing pain and swelling within the ulnar side of the right hand but did not get evaluated until approximately 2 weeks later when her PCP ordered an x-ray of the right hand. Patient was found to have a fracture at the base of the fifth metacarpal that was nondisplaced. Referral was placed for the patient to be evaluated in our office on 11/10/2021 and has come for further evaluation today. She notes that she was never placed in a brace or splint on the right hand. She notes significant improvement in her right hand pain and swelling. She still notes some tenderness on the dorsum of the hand over the base of the fifth metacarpal otherwise the remainder of the right hand is not giving her any issues. Patient does note that she is having 6 weeks of left ring finger pain that is not associated with the fall on 10/30/2021. Patient notes that her left ring finger will intermittently trigger. She notes decreased range of motion of the left ring finger. She notes a shooting type pain that starts at the tip of the left ring finger into the palmar aspect of the left hand. Patient denies previous triggering prior to 6 weeks ago.   She denies trauma, injury or surgery to the left hand. She denies numbness and tingling into the left hand. Past Medical History:    Past Medical History:   Diagnosis Date    Angina pectoris ([facility]     Arthritis     Bipolar disorder ([facility]     CAD (coronary artery disease)     stents x 2 (Dr. Marielos David)    Carotid stenosis, asymptomatic 2015    Chronic back pain     COPD (chronic obstructive pulmonary disease) ([facility]     Depression     Dyspnea     Family history of colon cancer     Family history of liver cancer     Family history of ovarian cancer     GERD (gastroesophageal reflux disease)     History of colon polyps     Hyperlipidemia     Hypertension     Lung nodule     New onset type 2 diabetes mellitus ([facility] 2018    Obesity     Obesity (BMI 35.0-39.9 without comorbidity)    Peripheral vascular disease (HCC)     Poor historian     Sleep apnea     CRISTINA on CPAP    Smoking greater than 40 pack years     reports that she quit smoking about 2 years ago. 1.5 PPD for 48 yeras.  She has a       Past Surgical History:    Past Surgical History:   Procedure Laterality Date    ANKLE SURGERY      CARDIAC CATHETERIZATION  10/11/2013    2 stents    CATARACT REMOVAL      cataract both eyes with lenses     SECTION      x2    COLONOSCOPY      COLONOSCOPY N/A 2020    COLONOSCOPY POLYPECTOMY HOT BIOPSY performed by Aditya Etienne MD at [address] [place] [address]  2011    x2    ENDOSCOPY, COLON, DIAGNOSTIC      EYE SURGERY      b/l cataract extraction    INDUCED       NERVE BLOCK Bilateral 2018    bilat facets #1 marcaine    NERVE BLOCK  2019    caudal aborted- did not get in   [address] Bilateral 02/15/2019    bilateral l4 transforaminal. decadron 8mg/ProHance contrast    NOSE SURGERY      as a child    TONSILLECTOMY         Current Medications:   Current Outpatient Medications   Medication Sig Dispense Refill    sertraline (ZOLOFT) 50 MG tablet take 1 tablet by mouth once daily 120 tablet 0    losartan (COZAAR) 100 MG tablet take 1 tablet by mouth once daily 90 tablet 1    potassium chloride (KLOR-CON M) 10 MEQ extended release tablet take 1 tablet by mouth once daily 90 tablet 0    omeprazole (PRILOSEC) 40 MG delayed release capsule take 1 capsule by mouth once daily 90 capsule 1    betamethasone dipropionate (DIPROLENE) 0.05 % ointment apply topically daily as directed 45 g 1    carvedilol (COREG) 3.125 MG tablet Take 1 tablet by mouth 2 times daily 60 tablet 3    amLODIPine (NORVASC) 5 MG tablet take 1 tablet by mouth once daily 90 tablet 1    triamcinolone (KENALOG) 0.025 % cream apply to affected area once daily 30 g 1    atorvastatin (LIPITOR) 40 MG tablet Once daily 90 tablet 1    hydroCHLOROthiazide (HYDRODIURIL) 25 MG tablet take 1 tablet by mouth once daily 90 tablet 2    albuterol sulfate HFA (PROVENTIL HFA) 108 (90 Base) MCG/ACT inhaler Inhale 2 puffs into the lungs every 6 hours as needed for Wheezing 1 Inhaler 5    Tiotropium Bromide-Olodaterol (STIOLTO RESPIMAT) 2.5-2.5 MCG/ACT AERS Inhale 2 puffs into the lungs daily 1 Inhaler 5    metFORMIN (GLUCOPHAGE) 500 MG tablet take 1 tablet by mouth twice a day with meals 180 tablet 3    b complex vitamins capsule Take 1 capsule by mouth daily      Coenzyme Q10-Fish Oil-Vit E (CO-Q 10 OMEGA-3 FISH OIL) CAPS Take 1,000 mg by mouth daily      magnesium 30 MG tablet Take 30 mg by mouth 2 times daily      Multiple Vitamin (MULTI-VITAMIN PO) Take 1 tablet by mouth daily.  aspirin 325 MG tablet Take 325 mg by mouth daily. No current facility-administered medications for this visit.        Allergies:    Contrast [iodides]    Social History:   Social History     Socioeconomic History    Marital status:      Spouse name: Not on file    Number of children: Not on file    Years of education: Not on file    Highest education level: Not on file Occupational History    Not on file   Tobacco Use    Smoking status: Former Smoker     Packs/day: 1.50     Years: 40.00     Pack years: 60.00     Types: Cigarettes     Start date:      Quit date: 7/10/2015     Years since quittin.3    Smokeless tobacco: Never Used    Tobacco comment: using e-cig   Vaping Use    Vaping Use: Every day    Substances: Always   Substance and Sexual Activity    Alcohol use: Yes     Alcohol/week: 0.0 standard drinks     Comment: occasional    Drug use: Yes     Types: Marijuana [place])     Comment: marijuana weekly    Sexual activity: Never   Other Topics Concern    Not on file   Social History Narrative    Not on file     Social Determinants of Health     Financial Resource Strain: Low Risk     Difficulty of Paying Living Expenses: Not very hard   Food Insecurity: No Food Insecurity    Worried About Running Out of Food in the Last Year: Never true    Mounika of Food in the Last Year: Never true   Transportation Needs: No Transportation Needs    Lack of Transportation (Medical): No    Lack of Transportation (Non-Medical):  No   Physical Activity:     Days of Exercise per Week: Not on file    Minutes of Exercise per Session: Not on file   Stress:     Feeling of Stress : Not on file   Social Connections:     Frequency of Communication with Friends and Family: Not on file    Frequency of Social Gatherings with Friends and Family: Not on file    Attends Congregational Services: Not on file    Active Member of Clubs or Organizations: Not on file    Attends Club or Organization Meetings: Not on file    Marital Status: Not on file   Intimate Partner Violence:     Fear of Current or Ex-Partner: Not on file    Emotionally Abused: Not on file    Physically Abused: Not on file    Sexually Abused: Not on file   Housing Stability:     Unable to Pay for Housing in the Last Year: Not on file    Number of [place] in the Last Year: Not on file    Unstable Housing in the Last Year: Not on file       Family History:  Family History   Problem Relation Age of Onset    Hypertension Mother     Macular Degen Mother     Other Mother         vascular    Colon Polyps Brother     Cancer Maternal Aunt          REVIEW OF SYSTEMS:  Review of Systems   Constitutional: Negative for activity change and fever. HENT: Negative for sneezing. Respiratory: Negative for cough and shortness of breath. Cardiovascular: Negative for chest pain. Gastrointestinal: Negative for vomiting. Musculoskeletal: Positive for arthralgias (Right hand, left hand) and joint swelling (Right hand). Negative for myalgias. Skin: Negative for color change. Neurological: Negative for weakness and numbness. Psychiatric/Behavioral: Negative for sleep disturbance. PHYSICAL EXAM:  Resp 12   Ht 5' 3\" (1.6 m)   Wt 219 lb (99.3 kg)   BMI 38.79 kg/m²  Body mass index is 38.79 kg/m². Physical Exam  Gen: alert and oriented to person and place. Psych:  Appropriate affect; Appropriate knowledge base; Appropriate mood; No hallucinations; Head: normocephalic, atraumatic   Chest: symmetric chest excursion; nonlabored respiratory effort. Pelvis: stable; no obvious pelvis deformity  Ortho Exam  Extremity:   Evaluation of the right hand reveals mild diffuse swelling noted of the base of the fifth metacarpal on the dorsum of the right hand otherwise there is no other obvious deformity noted to the right hand. Mild tenderness to palpation noted along the base of the fifth metacarpal on the dorsum of the hand. The patient is able to make a loose composite fist with the right hand with tightness/pain noted on the ulnar aspect of the right hand. Sensation is intact to light touch of the right upper extremity without focal deficits present. The skin is noted to be pink warm with brisk capillary refill distally. Evaluation of the Left hand shows no outward deformity.   Palpable nodule in the region of the A1 pulley of the Left ring finger is noted. Active triggering of the left ring finger is noted. Motor, sensory, vascular examination to the Left upper extremity is noted to be intact without deficits. Patient has full range of motion of the other fingers of the Left hand without tenderness and has full range of motion of the wrist.      Radiology:     Please refer to radiology read from 11/18/2021 for most recent imaging result. Procedure:  Procedure: After informed consent was obtained verbally, the left ring finger A1 pulley area was locally prepped with Betadine and locally anesthetized with ethyl chloride spray and then injected with a mixture of 1% lidocaine plain and 3 mg of Celestone. A sterile dressing was applied. The patient tolerated the procedure well without post injection complications. ASSESSMENT:     1. Nondisplaced fracture of base of fifth metacarpal bone, right hand, initial encounter for closed fracture    2. Trigger finger, left ring finger    3. Left hand pain         PLAN:     Patient is here for right hand fifth metacarpal base fracture sustained on 10/30/2021 after falling out of her daughter's park SUV. Overall the patient has completed conservative management and has not worn a splint or brace on the right hand since the injury. Patient has noticed significant improvement in her pain and swelling in the right hand therefore she can continue conservative management including no heavy lifting or gripping with the right upper extremity. Patient denied a right TKO brace to the fact that she feels she is doing significantly better than right after the injury. The patient will follow up in 6 weeks with repeat right hand x-rays to track the healing of the base of the right fifth metacarpal fracture. In regards to the left hand. The patient has an active trigger finger of the left ring finger. We discussed conservative versus surgical intervention for the trigger finger.   The

## 2021-11-18 NOTE — LETTER
Gwen Gonzales, EARL  [facility]  [facility]  [address] [address] [facility] [address] [address]  Dept: [phone]  Dept Fax: [phone]  11/18/21    Patient: Veda Finney  YOB: 1955    Dear Wilman Patel MD,    I had the pleasure of seeing one of your patients, Waldemar Franco today in the office. Below are the relevant portions of my assessment and plan of care. IMPRESSION:  1. Nondisplaced fracture of base of fifth metacarpal bone, right hand, initial encounter for closed fracture    2. Trigger finger, left ring finger    3. Left hand pain      PLAN:  Patient is here for right hand fifth metacarpal base fracture sustained on 10/30/2021 after falling out of her daughter's park SUV. Overall the patient has completed conservative management and has not worn a splint or brace on the right hand since the injury. Patient has noticed significant improvement in her pain and swelling in the right hand therefore she can continue conservative management including no heavy lifting or gripping with the right upper extremity. Patient denied a right TKO brace to the fact that she feels she is doing significantly better than right after the injury. The patient will follow up in 6 weeks with repeat right hand x-rays to track the healing of the base of the right fifth metacarpal fracture. In regards to the left hand. The patient has an active trigger finger of the left ring finger. We discussed conservative versus surgical intervention for the trigger finger. The patient would like to proceed with a left ring finger trigger finger injection. Patient was given the injection today in office. She tolerated the procedure without complication. I discussed with the patient that if the injection does not significantly improve her pain and triggering that she would be a good candidate for a left ring finger trigger finger release. Patient notes that she would not want to proceed with any sort of surgery until after the holidays. Patient will follow up in 6 weeks for right fifth metacarpal fracture and left ring finger trigger finger. We will obtain new right hand x-rays at the next visit. The patient was instructed to call our office with any questions or concerns prior to the next appointment. She voiced her understanding. Thank you for allowing me to participate in the care of this patient. I will keep you updated on this patient's follow up and I look forward to serving you and your patients again in the future.           Gwen Hooks PA-C

## 2021-11-18 NOTE — TELEPHONE ENCOUNTER
The patient called the office because she said [facility] told her she can not schedule her US for her breast until Dr. Osvaldo Fang places an order for a Mammogram. Writer called [facility] and they said Dr. Osvaldo Fang needs to order a Unilateral mammogram for which ever side the US is for because they need to do a mammogram first and then the [address]. Order is pended.

## 2021-11-19 DIAGNOSIS — I25.10 CORONARY ARTERY DISEASE INVOLVING NATIVE CORONARY ARTERY OF NATIVE HEART WITHOUT ANGINA PECTORIS: ICD-10-CM

## 2021-11-19 DIAGNOSIS — E78.5 DYSLIPIDEMIA: ICD-10-CM

## 2021-11-19 DIAGNOSIS — I65.23 BILATERAL CAROTID ARTERY OCCLUSION: ICD-10-CM

## 2021-11-19 DIAGNOSIS — E11.8 TYPE 2 DIABETES MELLITUS WITH COMPLICATION, WITHOUT LONG-TERM CURRENT USE OF INSULIN (HCC): ICD-10-CM

## 2021-11-19 RX ORDER — ATORVASTATIN CALCIUM 40 MG/1
TABLET, FILM COATED ORAL
Qty: 90 TABLET | Refills: 1 | Status: SHIPPED | OUTPATIENT
Start: 2021-11-19 | End: 2022-03-23 | Stop reason: SINTOL

## 2021-11-19 NOTE — TELEPHONE ENCOUNTER
Request forLipitor . Next Visit Date:  Future Appointments   Date Time Provider Zohreh Avilai   2022 10:30 AM Gwen Hooks PA-C [facility] ORT SP RUST   2022  1:00 PM López Barboza MD [address] [place] [address] 305 IM TOLPP   3/21/2022  1:00 PM Danica Siddiqi MD heartvasc TOLP   2022  1:30 PM Alen Juarez MD Resp Spec [address] Maintenance   Topic Date Due    Diabetic microalbuminuria test  2021    Annual Wellness Visit (AWV)  2021    COVID-19 Vaccine (3 - Booster) 2021    Hepatitis C screen  20[birth year] (Originally 1955)    Shingles Vaccine (1 of 2) 2022 (Originally 2005)    Diabetic retinal exam  2022    A1C test (Diabetic or Prediabetic)  2022    Lipid screen  2022    Diabetic foot exam  2022    Low dose CT lung screening  2022    Potassium monitoring  2022    Creatinine monitoring  2022    Breast cancer screen  2023    Colon cancer screen colonoscopy  2023    DTaP/Tdap/Td vaccine (2 - Td or Tdap) 10/24/2028    DEXA (modify frequency per FRAX score)  Completed    Flu vaccine  Completed    Pneumococcal 65+ years Vaccine  Completed    Hepatitis A vaccine  Aged Out    Hib vaccine  Aged Out    Meningococcal (ACWY) vaccine  Aged Out       Hemoglobin A1C (%)   Date Value   2021 6.3 (H)   2020 6.3 (H)   2020 6.0             ( goal A1C is < 7)   Microalb/Crt.  Ratio (mcg/mg creat)   Date Value   2020 CANNOT BE CALCULATED     LDL Cholesterol (mg/dL)   Date Value   2021 44       (goal LDL is <100)   AST (U/L)   Date Value   2021 20     ALT (U/L)   Date Value   2021 16     BUN (mg/dL)   Date Value   2021 24 (H)     BP Readings from Last 3 Encounters:   21 127/60   21 (!) 166/97   21 (!) 152/66          (goal 120/80)    All Future Testing planned in [facility]  Lab Frequency Next Occurrence   Full PFT Study With Bronchodilator Once

## 2021-11-19 NOTE — TELEPHONE ENCOUNTER
PC to patient advised her order was placed and she stated scheduling is gonna contact her to schedule.

## 2021-11-26 ENCOUNTER — TELEPHONE (OUTPATIENT)
Dept: DERMATOLOGY | Age: 66
End: 2021-11-26

## 2021-11-26 ENCOUNTER — TELEPHONE (OUTPATIENT)
Dept: INTERNAL MEDICINE | Age: 66
End: 2021-11-26

## 2021-11-26 DIAGNOSIS — N64.89 HEMATOMA OF RIGHT BREAST: Primary | ICD-10-CM

## 2021-11-26 NOTE — TELEPHONE ENCOUNTER
central[facility] scheduling is calling the office for new diagnostic for mammogram, insurance medicare doesn't cover Breast Mass it need to be more specific     Mammogram pend

## 2021-11-26 NOTE — TELEPHONE ENCOUNTER
Pt wants to come in and sign a release of records for her 2018 derm pathology results and clinic records for [facility] dermatology. She also wants a copy of the derm results for herself. PT states she will come in Monday to sign the paperwork.

## 2021-11-28 DIAGNOSIS — I25.10 CORONARY ARTERY DISEASE INVOLVING NATIVE CORONARY ARTERY OF NATIVE HEART WITHOUT ANGINA PECTORIS: ICD-10-CM

## 2021-11-28 DIAGNOSIS — I10 ESSENTIAL HYPERTENSION: ICD-10-CM

## 2021-11-30 RX ORDER — CARVEDILOL 3.12 MG/1
TABLET ORAL
Qty: 60 TABLET | Refills: 3 | Status: SHIPPED | OUTPATIENT
Start: 2021-11-30 | End: 2022-02-10 | Stop reason: SDUPTHER

## 2021-11-30 RX ORDER — AMLODIPINE BESYLATE 5 MG/1
TABLET ORAL
Qty: 90 TABLET | Refills: 1 | Status: SHIPPED | OUTPATIENT
Start: 2021-11-30 | End: 2022-03-31 | Stop reason: SDUPTHER

## 2021-12-01 NOTE — TELEPHONE ENCOUNTER
Jaylin Yen from centralized scheduling calling stating that the diagnosis on the mammogram from 11/24 is bringing up an ABN stating the diagnosis is not covered and they need another diagnosis.

## 2021-12-03 NOTE — TELEPHONE ENCOUNTER
Spoke with scheduling she said the diagnoses that most likely wasn't approved by the insurance on order that was already sent is ([address] XXXA [ICD-10-CM]) .

## 2021-12-10 ENCOUNTER — HOSPITAL ENCOUNTER (OUTPATIENT)
Age: 66
Discharge: HOME | End: 2021-12-10
Payer: MEDICARE

## 2021-12-10 ENCOUNTER — HOSPITAL ENCOUNTER (OUTPATIENT)
Dept: GENERAL RADIOLOGY | Age: 66
Discharge: HOME | End: 2021-12-12
Payer: MEDICARE

## 2021-12-10 ENCOUNTER — HOSPITAL ENCOUNTER (OUTPATIENT)
Age: 66
Discharge: HOME | End: 2021-12-12
Payer: MEDICARE
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
## 2021-12-10 DIAGNOSIS — D64.9 NORMOCYTIC ANEMIA: ICD-10-CM

## 2021-12-10 LAB
IRON SATURATION: 8 % (ref 20–55)
IRON: 28 UG/DL (ref 37–145)
TOTAL IRON BINDING CAPACITY: 360 UG/DL (ref 250–450)
UNSATURATED IRON BINDING CAPACITY: 332 UG/DL (ref 112–347)
VITAMIN D 25-HYDROXY: 23.9 NG/ML (ref 30–100)

## 2021-12-10 PROCEDURE — 83540 ASSAY OF IRON: CPT

## 2021-12-10 PROCEDURE — 82306 VITAMIN D 25 HYDROXY: CPT

## 2021-12-10 PROCEDURE — 83550 IRON BINDING TEST: CPT

## 2021-12-10 PROCEDURE — 71046 X-RAY EXAM CHEST 2 VIEWS: CPT

## 2021-12-10 PROCEDURE — 36415 COLL VENOUS BLD VENIPUNCTURE: CPT

## 2021-12-16 ENCOUNTER — HOSPITAL ENCOUNTER (OUTPATIENT)
Age: 66
Discharge: HOME | End: 2021-12-16
Payer: MEDICARE

## 2021-12-16 PROCEDURE — 36415 COLL VENOUS BLD VENIPUNCTURE: CPT

## 2021-12-16 PROCEDURE — 86334 IMMUNOFIX E-PHORESIS SERUM: CPT

## 2021-12-16 PROCEDURE — 84155 ASSAY OF PROTEIN SERUM: CPT

## 2021-12-16 PROCEDURE — 84165 PROTEIN E-PHORESIS SERUM: CPT

## 2021-12-19 LAB
ALBUMIN (CALCULATED): 4.4 G/DL (ref 3.2–5.2)
ALBUMIN PERCENT: 56 % (ref 45–65)
ALPHA 1 PERCENT: 3 % (ref 3–6)
ALPHA 2 PERCENT: 11 % (ref 6–13)
ALPHA-1-GLOBULIN: 0.2 G/DL (ref 0.1–0.4)
ALPHA-2-GLOBULIN: 0.9 G/DL (ref 0.5–0.9)
BETA GLOBULIN: 0.8 G/DL (ref 0.5–1.1)
BETA PERCENT: 10 % (ref 11–19)
GAMMA GLOBULIN %: 20 % (ref 9–20)
GAMMA GLOBULIN: 1.5 G/DL (ref 0.5–1.5)
PATHOLOGIST: ABNORMAL
PATHOLOGIST: NORMAL
PROTEIN ELECTROPHORESIS, SERUM: ABNORMAL
SERUM IFX INTERP: NORMAL
TOTAL PROT. SUM,%: 100 % (ref 98–102)
TOTAL PROT. SUM: 7.8 G/DL (ref 6.3–8.2)
TOTAL PROTEIN: 7.8 G/DL (ref 6.4–8.3)

## 2021-12-25 DIAGNOSIS — I10 ESSENTIAL HYPERTENSION: ICD-10-CM

## 2021-12-27 ENCOUNTER — TELEPHONE (OUTPATIENT)
Dept: PULMONOLOGY | Age: 66
End: 2021-12-27

## 2021-12-27 NOTE — TELEPHONE ENCOUNTER
Request for potassium. Next Visit Date:  Future Appointments   Date Time Provider Zohreh Ruchi   12/30/2021 10:30 AM STC DIAG MAMMO RM ST[facility] MAMMO STC Radiolog   12/30/2021 11:00 AM ST WC US RM STCZ MAMMO STC Radiolog   1/6/2022 10:30 AM Gwen Hooks PA-C [facility] ORT SP TOLPP   1/18/2022  1:00 PM [facility]   [facility]  [facility]00 [facility]   5/18/2022  1:30 PM Alen Petersen MD Resp Spec [address] Maintenance   Topic Date Due    Diabetic microalbuminuria test  09/08/2021    Annual Wellness Visit (AWV)  09/19/2021    COVID-19 Vaccine (3 - Booster) 09/24/2021    Hepatitis C screen  01/06/2022 (Originally 1955)    Shingles Vaccine (1 of 2) 03/04/2022 (Originally 1/20/2003)    Diabetic retinal exam  03/29/2022    A1C test (Diabetic or Prediabetic)  05/07/2022    Lipid screen  05/07/2022    Diabetic foot exam  05/13/2022    Low dose CT lung screening  06/02/2022    Potassium monitoring  09/13/2022    Creatinine monitoring  09/13/2022    Breast cancer screen  01/25/2023    Colon cancer screen colonoscopy  02/06/2023    DTaP/Tdap/Td vaccine (2 - Td or Tdap) 10/24/2028    DEXA (modify frequency per FRAX score)  Completed    Flu vaccine  Completed    Pneumococcal 65+ years Vaccine  Completed    Hepatitis A vaccine  Aged Out    Hib vaccine  Aged Out    Meningococcal (ACWY) vaccine  Aged Out       Hemoglobin A1C (%)   Date Value   05/07/2021 6.3 (H)   09/08/2020 6.3 (H)   02/22/2020 6.0             ( goal A1C is < 7)   Microalb/Crt.  Ratio (mcg/mg creat)   Date Value   09/08/2020 CANNOT BE CALCULATED     LDL Cholesterol (mg/dL)   Date Value   05/07/2021 44       (goal LDL is <100)   AST (U/L)   Date Value   09/13/2021 20     ALT (U/L)   Date Value   09/13/2021 16     BUN (mg/dL)   Date Value   09/13/2021 24 (H)     BP Readings from Last 3 Encounters:   11/17/21 127/60   11/12/21 (!) 166/97   09/20/21 (!) 152/66          (goal 120/80)    All Future Testing planned in [facility]  Lab Frequency Next Occurrence   Full PFT Study With Bronchodilator Once 10/13/2021   VL DUP CAROTID BILATERAL Once 03/20/2022   CTA ABDOMINAL AORTA W BILAT RUNOFF W CONTRAST Once 03/20/2022   COVID-19 Once 10/09/2021   COVID-19 Once 11/13/2021   US BREAST COMPLETE RIGHT Once 02/28/2022   CONOR DIGITAL SCREEN UNILATERAL RIGHT Once 03/03/2022   CONOR DIGITAL DIAGNOSTIC W OR WO CAD RIGHT Once 03/09/2022   CONOR DIGITAL DIAGNOSTIC W OR WO CAD BILATERAL Once 12/14/2021   US BREAST COMPLETE RIGHT Once 12/14/2021         Patient Active Problem List:     Smoker     HTN (hypertension)     CAD/Stents drug-eluting      Serum lipids high     Carotid stenosis, asymptomatic     Nocturnal hypoxia     CRISTINA on CPAP     Type 2 diabetes mellitus with complication, without long-term current use of insulin (HCC)     PAD (peripheral artery disease) ([facility]

## 2021-12-27 NOTE — TELEPHONE ENCOUNTER
Pt called in today requesting an order for a new cpap machine. The order is in but the pt has switched insurance and does not know what the insurance will cover. Pt will get the information and call the office back to get the order sent.  zeyad

## 2021-12-28 RX ORDER — POTASSIUM CHLORIDE 750 MG/1
TABLET, EXTENDED RELEASE ORAL
Qty: 90 TABLET | Refills: 2 | Status: SHIPPED | OUTPATIENT
Start: 2021-12-28

## 2021-12-30 ENCOUNTER — HOSPITAL ENCOUNTER (OUTPATIENT)
Dept: WOMENS IMAGING | Age: 66
Discharge: HOME | End: 2022-01-01
Payer: MEDICARE

## 2021-12-30 DIAGNOSIS — N64.89 HEMATOMA OF RIGHT BREAST: ICD-10-CM

## 2021-12-30 PROCEDURE — G0279 TOMOSYNTHESIS, MAMMO: HCPCS

## 2022-01-31 NOTE — TELEPHONE ENCOUNTER
Request for Kira . Next Visit Date:  Future Appointments   Date Time Provider Zohreh Avilai   2/3/2022  1:00 PM Ever Villegas MD [facility] IM [facility]   3/24/2022  1:00 PM Hemal Cedillo MD heartvasc [facility] Later   5/18/2022  1:30 PM Kathren Brunner, MD Resp Spec [address] Maintenance   Topic Date Due    Hepatitis C screen  Never done    Depression Monitoring  Never done    COVID-19 Vaccine (3 - Booster) 08/24/2021    Diabetic microalbuminuria test  09/08/2021    Annual Wellness Visit (AWV)  09/19/2021    Shingles Vaccine (1 of 2) 03/04/2022 (Originally 1/20/2003)    Diabetic retinal exam  03/29/2022    A1C test (Diabetic or Prediabetic)  05/07/2022    Lipid screen  05/07/2022    Diabetic foot exam  05/13/2022    Low dose CT lung screening  06/02/2022    Potassium monitoring  09/13/2022    Creatinine monitoring  09/13/2022    Colon cancer screen colonoscopy  02/06/2023    Breast cancer screen  12/30/2023    DTaP/Tdap/Td vaccine (2 - Td or Tdap) 10/24/2028    DEXA (modify frequency per FRAX score)  Completed    Flu vaccine  Completed    Pneumococcal 65+ years Vaccine  Completed    Hepatitis A vaccine  Aged Out    Hib vaccine  Aged Out    Meningococcal (ACWY) vaccine  Aged Out       Hemoglobin A1C (%)   Date Value   05/07/2021 6.3 (H)   09/08/2020 6.3 (H)   02/22/2020 6.0             ( goal A1C is < 7)   Microalb/Crt.  Ratio (mcg/mg creat)   Date Value   09/08/2020 CANNOT BE CALCULATED     LDL Cholesterol (mg/dL)   Date Value   05/07/2021 44       (goal LDL is <100)   AST (U/L)   Date Value   09/13/2021 20     ALT (U/L)   Date Value   09/13/2021 16     BUN (mg/dL)   Date Value   09/13/2021 24 (H)     BP Readings from Last 3 Encounters:   11/17/21 127/60   11/12/21 (!) 166/97   09/20/21 (!) 152/66          (goal 120/80)    All Future Testing planned in [facility]  Lab Frequency Next Occurrence   Full PFT Study With Bronchodilator Once 10/13/2021   VL DUP CAROTID BILATERAL Once 03/20/2022   CTA ABDOMINAL AORTA W BILAT RUNOFF W CONTRAST Once 03/20/2022   COVID-19 Once 10/09/2021   COVID-19 Once 11/13/2021   US BREAST COMPLETE RIGHT Once 02/28/2022   CONOR DIGITAL SCREEN UNILATERAL RIGHT Once 03/03/2022   CONOR DIGITAL DIAGNOSTIC W OR WO CAD RIGHT Once 03/09/2022         Patient Active Problem List:     Smoker     HTN (hypertension)     CAD/Stents drug-eluting      Serum lipids high     Carotid stenosis, asymptomatic     Nocturnal hypoxia     CRISTINA on CPAP     Type 2 diabetes mellitus with complication, without long-term current use of insulin (HCC)     PAD (peripheral artery disease) ([address]

## 2022-02-01 RX ORDER — TIOTROPIUM BROMIDE AND OLODATEROL 3.124; 2.736 UG/1; UG/1
SPRAY, METERED RESPIRATORY (INHALATION)
Qty: 4 G | Refills: 2 | Status: SHIPPED | OUTPATIENT
Start: 2022-02-01 | End: 2022-07-19 | Stop reason: CLARIF

## 2022-02-10 ENCOUNTER — OFFICE VISIT (OUTPATIENT)
Dept: INTERNAL MEDICINE | Age: 67
End: 2022-02-10
Payer: MEDICARE

## 2022-02-10 VITALS
WEIGHT: 221 LBS | HEART RATE: 67 BPM | BODY MASS INDEX: 39.15 KG/M2 | SYSTOLIC BLOOD PRESSURE: 137 MMHG | DIASTOLIC BLOOD PRESSURE: 62 MMHG

## 2022-02-10 DIAGNOSIS — I25.10 CORONARY ARTERY DISEASE INVOLVING NATIVE CORONARY ARTERY OF NATIVE HEART WITHOUT ANGINA PECTORIS: ICD-10-CM

## 2022-02-10 DIAGNOSIS — E78.5 DYSLIPIDEMIA: ICD-10-CM

## 2022-02-10 DIAGNOSIS — F41.9 ANXIETY: ICD-10-CM

## 2022-02-10 DIAGNOSIS — I65.23 ASYMPTOMATIC BILATERAL CAROTID ARTERY STENOSIS: ICD-10-CM

## 2022-02-10 DIAGNOSIS — R73.03 PREDIABETES: ICD-10-CM

## 2022-02-10 DIAGNOSIS — G47.33 OSA (OBSTRUCTIVE SLEEP APNEA): ICD-10-CM

## 2022-02-10 DIAGNOSIS — I10 ESSENTIAL HYPERTENSION: Primary | ICD-10-CM

## 2022-02-10 DIAGNOSIS — E66.01 CLASS 2 SEVERE OBESITY DUE TO EXCESS CALORIES WITH SERIOUS COMORBIDITY AND BODY MASS INDEX (BMI) OF 39.0 TO 39.9 IN ADULT (HCC): ICD-10-CM

## 2022-02-10 PROBLEM — E11.8 TYPE 2 DIABETES MELLITUS WITH COMPLICATION, WITHOUT LONG-TERM CURRENT USE OF INSULIN (HCC): Status: RESOLVED | Noted: 2018-01-11 | Resolved: 2022-02-10

## 2022-02-10 LAB — HBA1C MFR BLD: 6.3 %

## 2022-02-10 PROCEDURE — 3017F COLORECTAL CA SCREEN DOC REV: CPT | Performed by: INTERNAL MEDICINE

## 2022-02-10 PROCEDURE — 4040F PNEUMOC VAC/ADMIN/RCVD: CPT | Performed by: INTERNAL MEDICINE

## 2022-02-10 PROCEDURE — G8427 DOCREV CUR MEDS BY ELIG CLIN: HCPCS | Performed by: INTERNAL MEDICINE

## 2022-02-10 PROCEDURE — 99211 OFF/OP EST MAY X REQ PHY/QHP: CPT | Performed by: INTERNAL MEDICINE

## 2022-02-10 PROCEDURE — 1123F ACP DISCUSS/DSCN MKR DOCD: CPT | Performed by: INTERNAL MEDICINE

## 2022-02-10 PROCEDURE — 1036F TOBACCO NON-USER: CPT | Performed by: INTERNAL MEDICINE

## 2022-02-10 PROCEDURE — 83036 HEMOGLOBIN GLYCOSYLATED A1C: CPT | Performed by: INTERNAL MEDICINE

## 2022-02-10 PROCEDURE — 99214 OFFICE O/P EST MOD 30 MIN: CPT | Performed by: INTERNAL MEDICINE

## 2022-02-10 PROCEDURE — G8484 FLU IMMUNIZE NO ADMIN: HCPCS | Performed by: INTERNAL MEDICINE

## 2022-02-10 PROCEDURE — G8399 PT W/DXA RESULTS DOCUMENT: HCPCS | Performed by: INTERNAL MEDICINE

## 2022-02-10 PROCEDURE — 1090F PRES/ABSN URINE INCON ASSESS: CPT | Performed by: INTERNAL MEDICINE

## 2022-02-10 PROCEDURE — G8417 CALC BMI ABV UP PARAM F/U: HCPCS | Performed by: INTERNAL MEDICINE

## 2022-02-10 RX ORDER — LOSARTAN POTASSIUM 100 MG/1
TABLET ORAL
Qty: 90 TABLET | Refills: 1 | Status: SHIPPED | OUTPATIENT
Start: 2022-02-10 | End: 2022-07-19 | Stop reason: SDUPTHER

## 2022-02-10 RX ORDER — MINOCYCLINE HYDROCHLORIDE 100 MG/1
100 CAPSULE ORAL 2 TIMES DAILY
COMMUNITY
Start: 2022-01-31 | End: 2022-03-31 | Stop reason: ALTCHOICE

## 2022-02-10 RX ORDER — CARVEDILOL 3.12 MG/1
TABLET ORAL
Qty: 180 TABLET | Refills: 1 | Status: SHIPPED | OUTPATIENT
Start: 2022-02-10 | End: 2022-03-23 | Stop reason: SDUPTHER

## 2022-02-10 SDOH — ECONOMIC STABILITY: FOOD INSECURITY: WITHIN THE PAST 12 MONTHS, YOU WORRIED THAT YOUR FOOD WOULD RUN OUT BEFORE YOU GOT MONEY TO BUY MORE.: NEVER TRUE

## 2022-02-10 SDOH — ECONOMIC STABILITY: FOOD INSECURITY: WITHIN THE PAST 12 MONTHS, THE FOOD YOU BOUGHT JUST DIDN'T LAST AND YOU DIDN'T HAVE MONEY TO GET MORE.: NEVER TRUE

## 2022-02-10 ASSESSMENT — ENCOUNTER SYMPTOMS
WHEEZING: 0
PHOTOPHOBIA: 0
COUGH: 0
CONSTIPATION: 0
ABDOMINAL PAIN: 0
SHORTNESS OF BREATH: 0

## 2022-02-10 ASSESSMENT — PATIENT HEALTH QUESTIONNAIRE - PHQ9
8. MOVING OR SPEAKING SO SLOWLY THAT OTHER PEOPLE COULD HAVE NOTICED. OR THE OPPOSITE, BEING SO FIGETY OR RESTLESS THAT YOU HAVE BEEN MOVING AROUND A LOT MORE THAN USUAL: 0
10. IF YOU CHECKED OFF ANY PROBLEMS, HOW DIFFICULT HAVE THESE PROBLEMS MADE IT FOR YOU TO DO YOUR WORK, TAKE CARE OF THINGS AT HOME, OR GET ALONG WITH OTHER PEOPLE: 0
1. LITTLE INTEREST OR PLEASURE IN DOING THINGS: 1
2. FEELING DOWN, DEPRESSED OR HOPELESS: 2
4. FEELING TIRED OR HAVING LITTLE ENERGY: 2
SUM OF ALL RESPONSES TO PHQ QUESTIONS 1-9: 8
9. THOUGHTS THAT YOU WOULD BE BETTER OFF DEAD, OR OF HURTING YOURSELF: 0
SUM OF ALL RESPONSES TO PHQ QUESTIONS 1-9: 8
SUM OF ALL RESPONSES TO PHQ QUESTIONS 1-9: 8
5. POOR APPETITE OR OVEREATING: 1
7. TROUBLE CONCENTRATING ON THINGS, SUCH AS READING THE NEWSPAPER OR WATCHING TELEVISION: 0
SUM OF ALL RESPONSES TO PHQ9 QUESTIONS 1 & 2: 3
SUM OF ALL RESPONSES TO PHQ QUESTIONS 1-9: 8
3. TROUBLE FALLING OR STAYING ASLEEP: 2
6. FEELING BAD ABOUT YOURSELF - OR THAT YOU ARE A FAILURE OR HAVE LET YOURSELF OR YOUR FAMILY DOWN: 0

## 2022-02-10 ASSESSMENT — SOCIAL DETERMINANTS OF HEALTH (SDOH): HOW HARD IS IT FOR YOU TO PAY FOR THE VERY BASICS LIKE FOOD, HOUSING, MEDICAL CARE, AND HEATING?: NOT HARD AT ALL

## 2022-02-10 NOTE — PROGRESS NOTES
[facility]    Progress Note    Date of patient's visit: 2/10/2022    Patient's Name:  Oscar Maldonado    YOB: 1955            Patient Care Team:  Roger Cisse MD as PCP - General (Internal Medicine)  Roger Cisse MD as PCP - [facility]  Rossy Wise MD as Consulting Physician (Pulmonology)  Jarred Burdick RN as Nurse Navigator    REASON FOR VISIT: Routine outpatient follow     Chief Complaint   Patient presents with    Hypertension    Diabetes    Health Maintenance     depression screen completed, AWV scheduled     Spasms     pt c/o having pain, crmaping adn muacle spasms in her legs and feet          HISTORY OF PRESENT ILLNESS:    History was obtained from the patient. Oscar Maldonado is a 69 y.o. is here for routine follow-up. Overall she is doing well. Depression is better. She still has some anxiety related to her mother's health as well but is feeling better on the current dose of Zoloft. She has seen her most of her specialist. She saw her pulmonologist and her vascular surgeon and has an upcoming appointment with her cardiologist. She denies any shortness of breath though occasionally has wheezing and has to use her albuterol. She is an ex-smoker and had stopped smoking about 5 or 6 years ago. She used to smoke 1 pack/day for 40 years. She is falling asleep in the middle of the day in the afternoon because of her sleep apnea. She has not had her CPAP on 3 months. There was a recall on her previous machine and she recently got a new CPAP machine sent by the company but does not have the mask and tubing. She is going to contact the company so they can send her all the accessories for her CPAP machine. At night though she is sleeping well. She says she is taking melatonin  And sometimes a muscle relaxant and that helps with her sleep. She is having some cramps in her feet and in her  Legs in the calf muscles sometimes.  It wakes her up at night and she has to get up and stretch or walk around. Advised to do stretches at night before bedtime and warm shower or bath to help stretch her muscles. Continue potassium and can add a magnesium supplement if needed. It would probably help to also get her CPAP back on. She is on Metformin. She has prediabetes. She has not had diabetes for 5 years now. Advised we can stop her Metformin but she prefers to continue it for now. Advised to call if any low blood sugars. No other active concerns today    Results for POC orders placed in visit on 02/10/22   POCT glycosylated hemoglobin (Hb A1C)   Result Value Ref Range    Hemoglobin A1C 6.3 %     Coronary angiogram 2017   Conclusions      Procedure Summary      Patent RCA stent   Minimal to moderate disease in LAD   Hyperdynamic LV     ANA 2021   Summary        Bilateral lower extremity ABIs and PVRs (with toe pressures) were    performed for the evaluation of peripheral vascular disease.  Study    demonstrates:        Right:    Abnormal PVRs    ANA suggests moderate vascular insufficiency at rest        Left:    Abnormal PVRs    ANA suggests mild vascular insufficiency at rest         LDCT 2021  Impression   No lung nodules or masses.       LUNG RADS:   Per ACR Lung-RADS Version 1.1       Category 1, Negative (No nodules and definitely benign nodules).       Management: Continue annual lung screening with LDCT in 12 months.       .         Past Medical History:   Diagnosis Date    Angina pectoris ([address]     Arthritis     Bipolar disorder ([address]     CAD (coronary artery disease)     stents x 2 (Dr. Valerio Castaneda)    Carotid stenosis, asymptomatic 2/26/2015    Chronic back pain     COPD (chronic obstructive pulmonary disease) ([address]     Depression     Dyspnea     Family history of colon cancer     Family history of liver cancer     Family history of ovarian cancer     GERD (gastroesophageal reflux disease)     History of colon polyps     Hyperlipidemia     Hypertension     Lung nodule     New onset type 2 diabetes mellitus ([facility] 2018    Obesity     Obesity (BMI 35.0-39.9 without comorbidity)    Peripheral vascular disease (HCC)     Poor historian     Sleep apnea     CRISTINA on CPAP    Smoking greater than 40 pack years     reports that she quit smoking about 2 years ago. 1.5 PPD for 48 yeras. She has a       Past Surgical History:   Procedure Laterality Date    ANKLE SURGERY      CARDIAC CATHETERIZATION  10/11/2013    2 stents    CATARACT REMOVAL      cataract both eyes with lenses     SECTION      x2    COLONOSCOPY      COLONOSCOPY N/A 2020    COLONOSCOPY POLYPECTOMY HOT BIOPSY performed by Petros Glasgow MD at [facility]  2011    x2    ENDOSCOPY, COLON, DIAGNOSTIC      EYE SURGERY      b/l cataract extraction    INDUCED       NERVE BLOCK Bilateral 2018    bilat facets #1 marcaine    NERVE BLOCK  2019    caudal aborted- did not get in    NERVE BLOCK Bilateral 02/15/2019    bilateral l4 transforaminal. decadron 8mg/ProHance contrast    NOSE SURGERY      as a child    TONSILLECTOMY           ALLERGIES      Allergies   Allergen Reactions    Contrast [Iodides] Itching     Mild itchiness experienced with administration of IV contrast media.        MEDICATIONS:      Current Outpatient Medications on File Prior to Visit   Medication Sig Dispense Refill    minocycline (MINOCIN;DYNACIN) 100 MG capsule Take 100 mg by mouth 2 times daily      metFORMIN (GLUCOPHAGE) 500 MG tablet take 1 tablet by mouth twice a day with meals 180 tablet 0    hydroCHLOROthiazide (HYDRODIURIL) 25 MG tablet take 1 tablet by mouth once daily 90 tablet 0    omeprazole (PRILOSEC) 40 MG delayed release capsule take 1 capsule by mouth once daily 90 capsule 0    STIOLTO RESPIMAT 2.5-2.5 MCG/ACT AERS inhale 2 puffs by mouth and INTO THE LUNGS once daily 4 g 2    potassium chloride (KLOR-CON M) 10 MEQ extended release tablet take 1 tablet by mouth once daily 90 tablet 2    carvedilol (COREG) 3.125 MG tablet take 1 tablet by mouth twice a day 60 tablet 3    amLODIPine (NORVASC) 5 MG tablet take 1 tablet by mouth once daily 90 tablet 1    atorvastatin (LIPITOR) 40 MG tablet take 1 tablet by mouth once daily 90 tablet 1    sertraline (ZOLOFT) 50 MG tablet take 1 tablet by mouth once daily 120 tablet 0    losartan (COZAAR) 100 MG tablet take 1 tablet by mouth once daily 90 tablet 1    betamethasone dipropionate (DIPROLENE) 0.05 % ointment apply topically daily as directed 45 g 1    triamcinolone (KENALOG) 0.025 % cream apply to affected area once daily 30 g 1    albuterol sulfate HFA (PROVENTIL HFA) 108 (90 Base) MCG/ACT inhaler Inhale 2 puffs into the lungs every 6 hours as needed for Wheezing 1 Inhaler 5    b complex vitamins capsule Take 1 capsule by mouth daily      Coenzyme Q10-Fish Oil-Vit E (CO-Q 10 OMEGA-3 FISH OIL) CAPS Take 1,000 mg by mouth daily      magnesium 30 MG tablet Take 30 mg by mouth 2 times daily      Multiple Vitamin (MULTI-VITAMIN PO) Take 1 tablet by mouth daily.  aspirin 325 MG tablet Take 325 mg by mouth daily. No current facility-administered medications on file prior to visit. HISTORY    Reviewed and no change from previous record. [facility]  reports that she quit smoking about 6 years ago. Her smoking use included cigarettes. She started smoking about 54 years ago. She has a 60.00 pack-year smoking history.  She has never used smokeless tobacco.    FAMILY HISTORY:    Reviewed and No change from previous visit    HEALTH MAINTENANCE DUE:      Health Maintenance Due   Topic Date Due    Depression Monitoring  Never done    COVID-19 Vaccine (3 - Booster) 08/24/2021    Diabetic microalbuminuria test  09/08/2021    Annual Wellness Visit (AWV)  09/19/2021       REVIEW OF SYSTEMS:    12 point review of symptoms completed and found to be normal except noted in the HPI    Review of Systems   Constitutional: Positive for fatigue. Negative for unexpected weight change. Eyes: Negative for photophobia and visual disturbance. Respiratory: Negative for cough, shortness of breath and wheezing. Cardiovascular: Negative for chest pain, palpitations and leg swelling. Gastrointestinal: Negative for abdominal pain and constipation. Endocrine: Negative for polydipsia and polyuria. Musculoskeletal: Positive for gait problem, joint swelling and myalgias. Skin: Positive for rash. Negative for wound. Neurological: Negative for dizziness, weakness, numbness and headaches. Hematological: Negative for adenopathy. Bruises/bleeds easily. Psychiatric/Behavioral: Negative for dysphoric mood and sleep disturbance. The patient is nervous/anxious. PHYSICAL EXAM:     Vitals:    02/10/22 1324   BP: 137/62   Site: Left Upper Arm   Position: Sitting   Cuff Size: Large Adult   Pulse: 67   Weight: 221 lb (100.2 kg)     Body mass index is 39.15 kg/m². BP Readings from Last 3 Encounters:   02/10/22 137/62   11/17/21 127/60   11/12/21 (!) 166/97        Wt Readings from Last 3 Encounters:   02/10/22 221 lb (100.2 kg)   11/18/21 219 lb (99.3 kg)   11/17/21 219 lb (99.3 kg)       Physical Exam  Vitals and nursing note reviewed. Constitutional:       Appearance: Normal appearance. She is obese. HENT:      Head: Normocephalic and atraumatic. Eyes:      General: No scleral icterus. Extraocular Movements: Extraocular movements intact. Conjunctiva/sclera: Conjunctivae normal.      Pupils: Pupils are equal, round, and reactive to light. Cardiovascular:      Rate and Rhythm: Normal rate and regular rhythm. Heart sounds: No murmur heard. Pulmonary:      Effort: Pulmonary effort is normal.      Breath sounds: Normal breath sounds. No wheezing. Musculoskeletal:      Cervical back: Normal range of motion and neck supple.       Right lower artery of native heart without angina pectoris  Asa/statin    - carvedilol (COREG) 3.125 MG tablet; take 1 tablet by mouth twice a day  Dispense: 180 tablet; Refill: 1    6. Class 2 severe obesity due to excess calories with serious comorbidity and body mass index (BMI) of 39.0 to 39.9 in adult [facility])  Lifestyle changes     7. Asymptomatic bilateral carotid artery stenosis  Carotid dopplers pending    8. Anxiety  On Zoloft        FOLLOW UP AND INSTRUCTIONS:   Return in about 4 months (around 6/10/2022). 1. Kunal Mcguire received counseling on the following healthy behaviors: nutrition, exercise and medication adherence    2. Reviewed prior labs and health maintenance. 3. Discussed use, benefit, and side effects of prescribed medications. Barriers to medication compliance addressed. All patient questions answered. Pt voiced understanding.      4. Patient given educational materials - see patient instructions    Falguni Alaniz  Attending Physician, [address] [place] [address], Internal Medicine Residency Program  [address] [place] [address]  2/10/2022, 1:28 PM

## 2022-02-10 NOTE — PATIENT INSTRUCTIONS
-Pt due for 4 month f/u in June-- pt to call in May to set up an appt--reminder in [facility] to contact patient as well--AVS given to patient    -Bloodwork orders given to patient, they will have them done before their next visit.     -Angy Nuñez

## 2022-03-04 DIAGNOSIS — F41.9 ANXIETY: ICD-10-CM

## 2022-03-04 NOTE — TELEPHONE ENCOUNTER
Request for Zoloft. Next Visit Date:  Future Appointments   Date Time Provider Zohreh Avilai   3/24/2022  1:00 PM [facility]   5/18/2022  1:30 PM [address] EVELIN MD Resp Spec [address] Maintenance   Topic Date Due    COVID-19 Vaccine (3 - Booster) 08/24/2021    Diabetic microalbuminuria test  09/08/2021    Annual Wellness Visit (AWV)  09/19/2021    Shingles Vaccine (1 of 2) 03/04/2022 (Originally 1/20/2003)    Hepatitis C screen  08/09/2022 (Originally 1955)    Diabetic retinal exam  03/29/2022    Lipid screen  05/07/2022    Diabetic foot exam  05/13/2022    Low dose CT lung screening  06/02/2022    Potassium monitoring  09/13/2022    Creatinine monitoring  09/13/2022    Colorectal Cancer Screen  02/06/2023    A1C test (Diabetic or Prediabetic)  02/10/2023    Depression Monitoring  02/10/2023    Breast cancer screen  12/30/2023    DTaP/Tdap/Td vaccine (2 - Td or Tdap) 10/24/2028    DEXA (modify frequency per FRAX score)  Completed    Flu vaccine  Completed    Pneumococcal 65+ years Vaccine  Completed    Hepatitis A vaccine  Aged Out    Hib vaccine  Aged Out    Meningococcal (ACWY) vaccine  Aged Out       Hemoglobin A1C (%)   Date Value   02/10/2022 6.3   05/07/2021 6.3 (H)   09/08/2020 6.3 (H)             ( goal A1C is < 7)   Microalb/Crt.  Ratio (mcg/mg creat)   Date Value   09/08/2020 CANNOT BE CALCULATED     LDL Cholesterol (mg/dL)   Date Value   05/07/2021 44       (goal LDL is <100)   AST (U/L)   Date Value   09/13/2021 20     ALT (U/L)   Date Value   09/13/2021 16     BUN (mg/dL)   Date Value   09/13/2021 24 (H)     BP Readings from Last 3 Encounters:   02/10/22 137/62   11/17/21 127/60   11/12/21 (!) 166/97          (goal 120/80)    All Future Testing planned in [facility]  Lab Frequency Next Occurrence   Full PFT Study With Bronchodilator Once 10/13/2021   VL DUP CAROTID BILATERAL Once 03/20/2022   CTA ABDOMINAL AORTA W BILAT RUNOFF W CONTRAST Once 03/20/2022   COVID-19 Once 10/09/2021   COVID-19 Once 11/13/2021   US BREAST COMPLETE RIGHT Once 02/28/2022   CONOR DIGITAL SCREEN UNILATERAL RIGHT Once 03/03/2022   CONOR DIGITAL DIAGNOSTIC W OR WO CAD RIGHT Once 03/09/2022   Microalbumin / Creatinine Urine Ratio Once 08/10/2022         Patient Active Problem List:     Smoker     HTN (hypertension)     CAD/Stents drug-eluting      Serum lipids high     Carotid stenosis, asymptomatic     Nocturnal hypoxia     CRISTINA on CPAP     PAD (peripheral artery disease) ([address]

## 2022-03-23 ENCOUNTER — OFFICE VISIT (OUTPATIENT)
Dept: INTERNAL MEDICINE | Age: 67
End: 2022-03-23
Payer: MEDICARE

## 2022-03-23 VITALS
OXYGEN SATURATION: 95 % | BODY MASS INDEX: 39.86 KG/M2 | DIASTOLIC BLOOD PRESSURE: 61 MMHG | HEART RATE: 78 BPM | SYSTOLIC BLOOD PRESSURE: 143 MMHG | WEIGHT: 225 LBS

## 2022-03-23 DIAGNOSIS — R73.03 PREDIABETES: ICD-10-CM

## 2022-03-23 DIAGNOSIS — M62.838 MUSCLE SPASMS OF BOTH LOWER EXTREMITIES: ICD-10-CM

## 2022-03-23 DIAGNOSIS — I10 ESSENTIAL HYPERTENSION: Primary | ICD-10-CM

## 2022-03-23 DIAGNOSIS — T36.95XA ANTIBIOTIC CAUSING ADVERSE EFFECT: ICD-10-CM

## 2022-03-23 DIAGNOSIS — I73.9 PAD (PERIPHERAL ARTERY DISEASE) (HCC): ICD-10-CM

## 2022-03-23 DIAGNOSIS — K52.9 CHRONIC DIARRHEA: ICD-10-CM

## 2022-03-23 DIAGNOSIS — E78.5 DYSLIPIDEMIA: ICD-10-CM

## 2022-03-23 DIAGNOSIS — I25.10 CORONARY ARTERY DISEASE INVOLVING NATIVE CORONARY ARTERY OF NATIVE HEART WITHOUT ANGINA PECTORIS: ICD-10-CM

## 2022-03-23 PROCEDURE — 3017F COLORECTAL CA SCREEN DOC REV: CPT | Performed by: INTERNAL MEDICINE

## 2022-03-23 PROCEDURE — 99214 OFFICE O/P EST MOD 30 MIN: CPT | Performed by: INTERNAL MEDICINE

## 2022-03-23 PROCEDURE — G8484 FLU IMMUNIZE NO ADMIN: HCPCS | Performed by: INTERNAL MEDICINE

## 2022-03-23 PROCEDURE — G8427 DOCREV CUR MEDS BY ELIG CLIN: HCPCS | Performed by: INTERNAL MEDICINE

## 2022-03-23 PROCEDURE — 99211 OFF/OP EST MAY X REQ PHY/QHP: CPT | Performed by: INTERNAL MEDICINE

## 2022-03-23 PROCEDURE — 4040F PNEUMOC VAC/ADMIN/RCVD: CPT | Performed by: INTERNAL MEDICINE

## 2022-03-23 PROCEDURE — G8417 CALC BMI ABV UP PARAM F/U: HCPCS | Performed by: INTERNAL MEDICINE

## 2022-03-23 PROCEDURE — G8399 PT W/DXA RESULTS DOCUMENT: HCPCS | Performed by: INTERNAL MEDICINE

## 2022-03-23 PROCEDURE — 1036F TOBACCO NON-USER: CPT | Performed by: INTERNAL MEDICINE

## 2022-03-23 PROCEDURE — 1123F ACP DISCUSS/DSCN MKR DOCD: CPT | Performed by: INTERNAL MEDICINE

## 2022-03-23 PROCEDURE — 1090F PRES/ABSN URINE INCON ASSESS: CPT | Performed by: INTERNAL MEDICINE

## 2022-03-23 RX ORDER — CARVEDILOL 3.12 MG/1
TABLET ORAL
Qty: 180 TABLET | Refills: 1 | Status: SHIPPED | OUTPATIENT
Start: 2022-03-23 | End: 2022-07-19 | Stop reason: SDUPTHER

## 2022-03-23 RX ORDER — ROSUVASTATIN CALCIUM 5 MG/1
5 TABLET, COATED ORAL NIGHTLY
Qty: 30 TABLET | Refills: 1 | Status: SHIPPED | OUTPATIENT
Start: 2022-03-23 | End: 2022-04-19 | Stop reason: SDUPTHER

## 2022-03-23 ASSESSMENT — ENCOUNTER SYMPTOMS
ABDOMINAL PAIN: 1
BLOOD IN STOOL: 0
CONSTIPATION: 0
ANAL BLEEDING: 0
COUGH: 0
NAUSEA: 0
WHEEZING: 0
SHORTNESS OF BREATH: 0
DIARRHEA: 1

## 2022-03-23 NOTE — PROGRESS NOTES
[facility]    Progress Note    Date of patient's visit: 3/23/2022    Patient's Name:  Bernadette Munoz    YOB: 1955            Patient Care Team:  Miriam Hardy MD as PCP - General (Internal Medicine)  Miriam Hardy MD as PCP - [address] [place] [address] Dr Gonzalez Provider  Shereen Llamas MD as Consulting Physician (Pulmonology)  Ulices Arora, RN as Nurse Navigator    REASON FOR VISIT: Routine outpatient follow     Chief Complaint   Patient presents with    GI Problem     Pt c/o having ongoing abdominal cramping and loose stool 4-5 times daily.  Shortness of Breath     pt also states that she has been having wheezing, sob and tickle in her throat over the last few weeks     Discuss Medications     Pt states that she stopped her atorvastatin d/t leg cramping          HISTORY OF PRESENT ILLNESS:    History was obtained from the patient. Bernadette Munoz is a 69 y.o. is here for follow-up. Blood pressure is a little high today but she says she has been under a lot of stress. Her mother is in a nursing home and she has been trying to get rid of her trailer and getting Medicaid for arrange for her mother. Patient has been running around a lot. She is mainly here to talk about her abdominal discomfort and diarrhea that she has had for 2 months. She has 4-5 loose stools a day. She has cramping abdominal pain. No blood in her stools. No fever or chills. No GERD symptoms or vomiting. She is on Metformin for prediabetes which she did not want to stop in the past but have advised her now to stop it though she has been on it for several years. Her A1c has been below 6.5 for many years now and will watch her diabetes. She also was on minocycline for 3 months from December. Diarrhea started a month after that. For now I have advised her to stop the minocycline. This was started by her dermatologist for her skin rash.   She has been out of the minocycline for a month now but the diarrhea has not resolved. Advised we will check some of her labs including stool D C. difficile as she has been on antibiotics. We will see if stopping Metformin and antibiotics resolves her diarrhea. She is advised to take some fiber for now. We will get stool tests and labs including CBC done. She is also worried about her pancreatic cancer as her brother  of that but he was an alcoholic as well    She is also complaining of muscle cramps which are severe. This has been on and off but has been worsening. 2 weeks ago she stopped her statin and that has helped a lot with her cramping. She has tried magnesium and co-Q10 over-the-counter without relief of the spasms. She has coronary artery disease with stents and carotid artery disease. She is an ex-smoker but smoked for 40-pack-year history. She has a carotid scan pending and she is supposed to follow-up with cardiology which she has not yet done. At this point advised to stop her statin  . We will try a different statin at a much lower dose after another 2 weeks and see if the spasms recur.   If her myalgias and cramps recur she will need to start on a PCSK9 inhibitor because of her history of coronary artery disease and peripheral artery disease and patient agrees      Coronary angiogram       Cardiac Arteries and Lesion Findings     LMCA: Normal 0% stenosis.     LAD: Proximal 30-40% stenosis     LCx: Dominant with no disease  OM1 and OM2: normal  OM3: 30%  PDA minimal disease     RCA: Non dominant with patent mid stent with 30-40% distal end of the stent      Coronary Tree      Dominance: Left     LV Analysis  LV function assessed as:Normal.  Ejection Fraction      Carotid scan   Conclusions        Summary        Simultaneous real time imaging utilizing B-Mode, color flow doppler and    spectral waveform analysis was performed on the bilateral extracerebral    vascular system.    The study demonstrates:        Right:  Internal carotid artery has a moderate, 50-69% stenosis based on    velocities.    The vertebral artery is patent with antegrade flow.        Left:    Internal carotid artery has a moderate, 50-69% stenosis based on    velocities.    The vertebral artery is patent with antegrade flow.        Signature         Past Medical History:   Diagnosis Date    [facility]ina [facility]is [facility]     Arthritis     Bipolar disorder ([facility]     CAD (coronary artery disease)     stents x 2 (Dr. Tashia Castillo)    Carotid stenosis, asymptomatic 2015    Chronic back pain     COPD (chronic obstructive pulmonary disease) ([facility]     Depression     Dyspnea     Family history of colon cancer     Family history of liver cancer     Family history of ovarian cancer     GERD (gastroesophageal reflux disease)     History of colon polyps     Hyperlipidemia     Hypertension     Lung nodule     New onset type 2 diabetes mellitus ([facility] 2018    Obesity     Obesity (BMI 35.0-39.9 without comorbidity)    Peripheral vascular disease (HCC)     Poor historian     Sleep apnea     CRISTINA on CPAP    Smoking greater than 40 pack years     reports that she quit smoking about 2 years ago. 1.5 PPD for 48 yeras.  She has a       Past Surgical History:   Procedure Laterality Date    ANKLE SURGERY      CARDIAC CATHETERIZATION  10/11/2013    2 stents    CATARACT REMOVAL      cataract both eyes with lenses     SECTION      x2    COLONOSCOPY      COLONOSCOPY N/A 2020    COLONOSCOPY POLYPECTOMY HOT BIOPSY performed by Melvi Sinclair MD at . [facility]  2011    x2    ENDOSCOPY, COLON, DIAGNOSTIC      EYE SURGERY      b/l cataract extraction    INDUCED       NERVE BLOCK Bilateral 2018    bilat facets #1 marcaine    NERVE BLOCK  2019    caudal aborted- did not get in   [address] Bilateral 02/15/2019    bilateral l4 transforaminal. decadron 8mg/ProHance contrast    NOSE SURGERY      as a child    TONSILLECTOMY           ALLERGIES      Allergies   Allergen Reactions    Contrast [Iodides] Itching     Mild itchiness experienced with administration of IV contrast media. MEDICATIONS:      Current Outpatient Medications on File Prior to Visit   Medication Sig Dispense Refill    sertraline (ZOLOFT) 50 MG tablet take 1 tablet by mouth once daily 120 tablet 0    minocycline (MINOCIN;DYNACIN) 100 MG capsule Take 100 mg by mouth 2 times daily      carvedilol (COREG) 3.125 MG tablet take 1 tablet by mouth twice a day 180 tablet 1    losartan (COZAAR) 100 MG tablet Once a day 90 tablet 1    metFORMIN (GLUCOPHAGE) 500 MG tablet take 1 tablet by mouth twice a day with meals 180 tablet 0    hydroCHLOROthiazide (HYDRODIURIL) 25 MG tablet take 1 tablet by mouth once daily 90 tablet 0    omeprazole (PRILOSEC) 40 MG delayed release capsule take 1 capsule by mouth once daily 90 capsule 0    STIOLTO RESPIMAT 2.5-2.5 MCG/ACT AERS inhale 2 puffs by mouth and INTO THE LUNGS once daily 4 g 2    potassium chloride (KLOR-CON M) 10 MEQ extended release tablet take 1 tablet by mouth once daily 90 tablet 2    amLODIPine (NORVASC) 5 MG tablet take 1 tablet by mouth once daily 90 tablet 1    betamethasone dipropionate (DIPROLENE) 0.05 % ointment apply topically daily as directed 45 g 1    triamcinolone (KENALOG) 0.025 % cream apply to affected area once daily 30 g 1    albuterol sulfate HFA (PROVENTIL HFA) 108 (90 Base) MCG/ACT inhaler Inhale 2 puffs into the lungs every 6 hours as needed for Wheezing 1 Inhaler 5    b complex vitamins capsule Take 1 capsule by mouth daily      Coenzyme Q10-Fish Oil-Vit E (CO-Q 10 OMEGA-3 FISH OIL) CAPS Take 1,000 mg by mouth daily      magnesium 30 MG tablet Take 30 mg by mouth 2 times daily      Multiple Vitamin (MULTI-VITAMIN PO) Take 1 tablet by mouth daily.  aspirin 325 MG tablet Take 325 mg by mouth daily.         atorvastatin (LIPITOR) 40 MG tablet take 1 tablet by mouth once daily (Patient not taking: Reported on 3/23/2022) 90 tablet 1     No current facility-administered medications on file prior to visit. HISTORY    Reviewed and no change from previous record. Linnea Jack  reports that she quit smoking about 6 years ago. Her smoking use included cigarettes. She started smoking about 54 years ago. She has a 60.00 pack-year smoking history. She has never used smokeless tobacco.    FAMILY HISTORY:    Reviewed and No change from previous visit    HEALTH MAINTENANCE DUE:      Health Maintenance Due   Topic Date Due    COVID-19 Vaccine (3 - Booster) 08/24/2021    Diabetic microalbuminuria test  09/08/2021    Annual Wellness Visit (AWV)  09/19/2021    Diabetic retinal exam  03/29/2022       REVIEW OF SYSTEMS:    12 point review of symptoms completed and found to be normal except noted in the HPI    Review of Systems   Constitutional: Negative for chills, fatigue and fever. Respiratory: Negative for cough, shortness of breath and wheezing. Cardiovascular: Negative for chest pain, palpitations and leg swelling. Gastrointestinal: Positive for abdominal pain and diarrhea. Negative for anal bleeding, blood in stool, constipation and nausea. Musculoskeletal: Positive for arthralgias and myalgias. Neurological: Negative for dizziness, weakness and headaches. Hematological: Negative for adenopathy. Does not bruise/bleed easily. Psychiatric/Behavioral: Positive for dysphoric mood. The patient is nervous/anxious. PHYSICAL EXAM:     Vitals:    03/23/22 1524 03/23/22 1532   BP: (!) 149/59 (!) 143/61   Site: Left Upper Arm Left Upper Arm   Position: Sitting Sitting   Cuff Size: Large Adult Large Adult   Pulse: 72 78   SpO2: 95%    Weight: 225 lb (102.1 kg)      Body mass index is 39.86 kg/m².     BP Readings from Last 3 Encounters:   03/23/22 (!) 143/61   02/10/22 137/62   11/17/21 127/60        Wt Readings from Last 3 Encounters:   03/23/22 225 lb (102.1 kg)   02/10/22 221 lb (100.2 kg)   11/18/21 219 lb (99.3 kg)       Physical Exam  Vitals and nursing note reviewed. Constitutional:       Appearance: Normal appearance. She is obese. She is not toxic-appearing. HENT:      Head: Normocephalic and atraumatic. Mouth/Throat:      Mouth: Mucous membranes are moist.      Pharynx: Oropharynx is clear. Eyes:      Extraocular Movements: Extraocular movements intact. Conjunctiva/sclera: Conjunctivae normal.      Pupils: Pupils are equal, round, and reactive to light. Cardiovascular:      Rate and Rhythm: Normal rate and regular rhythm. Heart sounds: No murmur heard. Pulmonary:      Effort: Pulmonary effort is normal.      Breath sounds: Normal breath sounds. No wheezing. Abdominal:      General: Bowel sounds are normal.      Palpations: Abdomen is soft. Tenderness: There is no abdominal tenderness. Musculoskeletal:      Cervical back: Normal range of motion and neck supple. Right lower leg: No edema. Left lower leg: No edema. Neurological:      General: No focal deficit present. Mental Status: She is alert and oriented to person, place, and time.                LABORATORY FINDINGS:    CBC:  Lab Results   Component Value Date    WBC 8.3 09/13/2021    HGB 10.7 09/13/2021     09/13/2021     BMP:    Lab Results   Component Value Date     09/13/2021    K 4.3 09/13/2021     09/13/2021    CO2 25 09/13/2021    BUN 24 09/13/2021    CREATININE 0.67 09/13/2021    GLUCOSE 89 09/13/2021    GLUCOSE 87 10/28/2011     HEMOGLOBIN A1C:   Lab Results   Component Value Date    LABA1C 6.3 02/10/2022     MICROALBUMIN URINE:   Lab Results   Component Value Date    MICROALBUR <12 09/08/2020     FASTING LIPID PANEL:  Lab Results   Component Value Date    CHOL 117 05/07/2021    HDL 48 05/07/2021    TRIG 123 05/07/2021     Lab Results   Component Value Date    LDLCHOLESTEROL 44 05/07/2021       LIVER PROFILE:  Lab Results   Component Value Date    ALT 16 09/13/2021    AST 20 09/13/2021    PROT 7.8 12/16/2021    BILITOT 0.27 09/13/2021    BILIDIR 0.10 09/18/2018    LABALBU 4.3 09/13/2021      THYROID FUNCTION:   Lab Results   Component Value Date    TSH 2.71 05/07/2021      URINEANALYSIS: No results found for: LABURIN  ASSESSMENT AND PLAN:    1. Essential hypertension  Call home BP readings    - carvedilol (COREG) 3.125 MG tablet; take 1 tablet by mouth twice a day  Dispense: 180 tablet; Refill: 1    2. Coronary artery disease involving native coronary artery of native heart without angina pectoris    - carvedilol (COREG) 3.125 MG tablet; take 1 tablet by mouth twice a day  Dispense: 180 tablet; Refill: 1  - Lipid Panel; Future  - rosuvastatin (CRESTOR) 5 MG tablet; Take 1 tablet by mouth nightly  Dispense: 30 tablet; Refill: 1    3. Chronic diarrhea  Stop Metformin  Stop Minocycline    - CBC with Auto Differential; Future  - XR ABDOMEN (KUB) (SINGLE AP VIEW); Future  - Fecal lactoferrin; Future  - Calprotectin Stool; Future  - Lipase; Future  - Clostridium Difficile Toxin/Antigen; Future  - Gastrointestinal Panel, Molecular; Future    4. Muscle spasms of both lower extremities  Hold atorvastatin    - CK; Future    5. Prediabetes      6. PAD (peripheral artery disease) (HCC)    - rosuvastatin (CRESTOR) 5 MG tablet; Take 1 tablet by mouth nightly  Dispense: 30 tablet; Refill: 1    7. Dyslipidemia    - Lipid Panel; Future  - rosuvastatin (CRESTOR) 5 MG tablet; Take 1 tablet by mouth nightly  Dispense: 30 tablet; Refill: 1  - CK; Future    8. Antibiotic causing adverse effect    - Gastrointestinal Panel, Molecular; Future          FOLLOW UP AND INSTRUCTIONS:   Return in about 4 weeks (around 4/20/2022). 1. Alesia Leonardo received counseling on the following healthy behaviors: nutrition, exercise and medication adherence    2. Reviewed prior labs and health maintenance.       3. Discussed use, benefit, and side effects of prescribed medications. Barriers to medication compliance addressed. All patient questions answered. Pt voiced understanding.      4. Patient given educational materials - see patient instructions    Raeann Mathias  Attending Physician, [address] [place] [address], Internal Medicine Residency Program  [address] [place] [address]  3/23/2022, 3:43 PM

## 2022-03-23 NOTE — PATIENT INSTRUCTIONS
Return To Clinic 4/19/2022 . After Visit Summary  given and reviewed. It is very important for your care that you keep your appointment. If for some reason you are unable to keep your appointment it is equally important that you call our office at [phone] to cancel your appointment and reschedule. Failure to do so may result in your termination from our practice.     -Bloodwork orders given to patient, they will have them done before their next visit.      -Xray order given to pt, this test does not need to be scheduled, please take order with you to Main Registration at hospital and have completed before your next appointment.     -Your doctor has ordered a VL Dup Carotid for you, please contact our scheduling department at [phone] to set up an appointment to have this completed before your next visit.     -Lois Dinero

## 2022-03-24 ENCOUNTER — TELEPHONE (OUTPATIENT)
Dept: PULMONOLOGY | Age: 67
End: 2022-03-24

## 2022-03-24 DIAGNOSIS — Z99.89 OSA ON CPAP: Primary | ICD-10-CM

## 2022-03-24 DIAGNOSIS — G47.33 OSA ON CPAP: Primary | ICD-10-CM

## 2022-03-29 ENCOUNTER — TELEPHONE (OUTPATIENT)
Dept: VASCULAR SURGERY | Age: 67
End: 2022-03-29

## 2022-03-30 ENCOUNTER — TELEPHONE (OUTPATIENT)
Dept: INTERNAL MEDICINE | Age: 67
End: 2022-03-30

## 2022-03-30 NOTE — TELEPHONE ENCOUNTER
Received triage call from [facility], pt c/o BP being elevated and c/o back pain. Pt reports recent BP as follows:    3/25 before meds 184/72, several hours later 131/71    3/26 1 hour after meds 180/81, several hours later 163/83    3/27 before meds 204/95    3/28 192/101    3/29 204/95    Pt denies headache, dizziness, flashing lights in vision. Review of chart shows recent office BP's have been slightly elevated but not near what she is getting on her home cuff. Pt scheduled for same day appt with PCP tomorrow, advised to bring home cuff so we can compare reading to office machine and evaluate fit of BP cuff on pt's arm. May require larger cuff.

## 2022-03-31 ENCOUNTER — OFFICE VISIT (OUTPATIENT)
Dept: INTERNAL MEDICINE | Age: 67
End: 2022-03-31
Payer: MEDICARE

## 2022-03-31 VITALS
HEART RATE: 68 BPM | DIASTOLIC BLOOD PRESSURE: 66 MMHG | BODY MASS INDEX: 40.03 KG/M2 | SYSTOLIC BLOOD PRESSURE: 174 MMHG | WEIGHT: 226 LBS

## 2022-03-31 DIAGNOSIS — M54.41 ACUTE RIGHT-SIDED LOW BACK PAIN WITH RIGHT-SIDED SCIATICA: ICD-10-CM

## 2022-03-31 DIAGNOSIS — I10 UNCONTROLLED HYPERTENSION: Primary | ICD-10-CM

## 2022-03-31 PROCEDURE — G8417 CALC BMI ABV UP PARAM F/U: HCPCS | Performed by: INTERNAL MEDICINE

## 2022-03-31 PROCEDURE — 1123F ACP DISCUSS/DSCN MKR DOCD: CPT | Performed by: INTERNAL MEDICINE

## 2022-03-31 PROCEDURE — 4040F PNEUMOC VAC/ADMIN/RCVD: CPT | Performed by: INTERNAL MEDICINE

## 2022-03-31 PROCEDURE — G8484 FLU IMMUNIZE NO ADMIN: HCPCS | Performed by: INTERNAL MEDICINE

## 2022-03-31 PROCEDURE — 1036F TOBACCO NON-USER: CPT | Performed by: INTERNAL MEDICINE

## 2022-03-31 PROCEDURE — 99211 OFF/OP EST MAY X REQ PHY/QHP: CPT | Performed by: INTERNAL MEDICINE

## 2022-03-31 PROCEDURE — G8399 PT W/DXA RESULTS DOCUMENT: HCPCS | Performed by: INTERNAL MEDICINE

## 2022-03-31 PROCEDURE — G8427 DOCREV CUR MEDS BY ELIG CLIN: HCPCS | Performed by: INTERNAL MEDICINE

## 2022-03-31 PROCEDURE — 99213 OFFICE O/P EST LOW 20 MIN: CPT | Performed by: INTERNAL MEDICINE

## 2022-03-31 PROCEDURE — 3017F COLORECTAL CA SCREEN DOC REV: CPT | Performed by: INTERNAL MEDICINE

## 2022-03-31 PROCEDURE — 1090F PRES/ABSN URINE INCON ASSESS: CPT | Performed by: INTERNAL MEDICINE

## 2022-03-31 RX ORDER — LIDOCAINE 4 G/G
1 PATCH TOPICAL DAILY
Qty: 30 PATCH | Refills: 0 | Status: SHIPPED | OUTPATIENT
Start: 2022-03-31 | End: 2022-04-30

## 2022-03-31 RX ORDER — AMLODIPINE BESYLATE 10 MG/1
TABLET ORAL
Qty: 90 TABLET | Refills: 1 | Status: SHIPPED | OUTPATIENT
Start: 2022-03-31 | End: 2022-04-19 | Stop reason: SDUPTHER

## 2022-03-31 RX ORDER — HYDROCODONE BITARTRATE AND ACETAMINOPHEN 5; 325 MG/1; MG/1
1 TABLET ORAL 2 TIMES DAILY PRN
Qty: 10 TABLET | Refills: 0 | Status: SHIPPED | OUTPATIENT
Start: 2022-03-31 | End: 2022-04-05

## 2022-03-31 ASSESSMENT — ENCOUNTER SYMPTOMS
BACK PAIN: 1
COUGH: 0
PHOTOPHOBIA: 0
CONSTIPATION: 0
WHEEZING: 0
SHORTNESS OF BREATH: 0
DIARRHEA: 1
ABDOMINAL PAIN: 0
BLOOD IN STOOL: 0

## 2022-03-31 NOTE — PATIENT INSTRUCTIONS
Patient Education        Back Stretches: Exercises  Introduction  Here are some examples of exercises for stretching your back. Start eachexercise slowly. Ease off the exercise if you start to have pain. Your doctor or physical therapist will tell you when you can start theseexercises and which ones will work best for you. How to do the exercises  Overhead stretch    1. Stand comfortably with your feet shoulder-width apart. 2. Looking straight ahead, raise both arms over your head and reach toward the ceiling. Do not allow your head to tilt back. 3. Hold for 15 to 30 seconds, then lower your arms to your sides. 4. Repeat 2 to 4 times. Side stretch    1. Stand comfortably with your feet shoulder-width apart. 2. Raise one arm over your head, and then lean to the other side. 3. Slide your hand down your leg as you let the weight of your arm gently stretch your side muscles. Hold for 15 to 30 seconds. 4. Repeat 2 to 4 times on each side. Press-up    1. Lie on your stomach, supporting your body with your forearms. 2. Press your elbows down into the floor to raise your upper back. As you do this, relax your stomach muscles and allow your back to arch without using your back muscles. As your press up, do not let your hips or pelvis come off the floor. 3. Hold for 15 to 30 seconds, then relax. 4. Repeat 2 to 4 times. Relax and rest    1. Lie on your back with a rolled towel under your neck and a pillow under your knees. Extend your arms comfortably to your sides. 2. Relax and breathe normally. 3. Remain in this position for about 10 minutes. 4. If you can, do this 2 or 3 times each day. Follow-up care is a key part of your treatment and safety. Be sure to make and go to all appointments, and call your doctor if you are having problems. It's also a good idea to know your test results and keep alist of the medicines you take. Where can you learn more? Go to https://corby.[email or web address] org and sign in to your [facility] account. Enter M410 in the [facility] box to learn more about \"Back Stretches: Exercises. \"     If you do not have an account, please click on the \"Sign Up Now\" link. Current as of: July 1, 2021               Content Version: 13.2  © 2006-2022 [employer]. Care instructions adapted under license by [facility] ([facility]). If you have questions about a medical condition or this instruction, always ask your healthcare professional. [place] [address] any warranty or liability for your use of this information. Patient Education        Back Pain, Emergency or Urgent Symptoms: Care Instructions  Your Care Instructions     Many people have back pain at one time or another. In most cases, pain gets better with self-care that includes over-the-counter pain medicine, ice, heat,and exercises. Unless you have symptoms of a severe injury or heart attack, you may be able to give yourself a few days before you call a doctor. But some back problems arevery serious. Do not ignore symptoms that need to be checked right away. Follow-up care is a key part of your treatment and safety. Be sure to make and go to all appointments, and call your doctor if you are having problems. It's also a good idea to know your test results and keep alist of the medicines you take. How can you care for yourself at home?  Sit or lie in positions that are most comfortable and that reduce your pain. Try one of these positions when you lie down:  ? Lie on your back with your knees bent and supported by large pillows. ? Lie on the floor with your legs on the seat of a sofa or chair. ? Lie on your side with your knees and hips bent and a pillow between your legs. ? Lie on your stomach if it does not make pain worse.  Do not sit up in bed, and avoid soft couches and twisted positions. Bed rest can help relieve pain at first, but it delays healing.  Avoid bed rest after the first day.   Change positions every 30 minutes. If you must sit for long periods of time, take breaks from sitting. Get up and walk around, or lie flat.  Try using a heating pad on a low or medium setting, for 15 to 20 minutes every 2 or 3 hours. Try a warm shower in place of one session with the heating pad. You can also buy single-use heat wraps that last up to 8 hours. You can also try ice or cold packs on your back for 10 to 20 minutes at a time, several times a day. (Put a thin cloth between the ice pack and your skin.) This reduces pain and makes it easier to be active and exercise.  Take pain medicines exactly as directed. ? If the doctor gave you a prescription medicine for pain, take it as prescribed. ? If you are not taking a prescription pain medicine, ask your doctor if you can take an over-the-counter medicine. When should you call for help? Call 911 anytime you think you may need emergency care. For example, call if:     You are unable to move a leg at all.      You have back pain with severe belly pain.      You have symptoms of a heart attack. These may include:  ? Chest pain or pressure, or a strange feeling in the chest.  ? Sweating. ? Shortness of breath. ? Nausea or vomiting. ? Pain, pressure, or a strange feeling in the back, neck, jaw, or upper belly or in one or both shoulders or arms. ? Lightheadedness or sudden weakness. ? A fast or irregular heartbeat. After you call 911, the  may tell you to chew 1 adult-strength or 2 to 4 low-dose aspirin. Wait for an ambulance. Do not try to drive yourself. Call your doctor now or seek immediate medical care if:     You have new or worse symptoms in your arms, legs, chest, belly, or buttocks. Symptoms may include:  ? Numbness or tingling. ? Weakness. ? Pain.      You lose bladder or bowel control.      You have back pain and:  ? You have injured your back while lifting or doing some other activity.  Call if the pain is severe, has not gone away after 1 or 2 days, and you cannot do your normal daily activities. ? You have had a back injury before that needed treatment. ? Your pain has lasted longer than 4 weeks. ? You have had weight loss you cannot explain. ? You have a fever. ? You are age 48 or older. ? You have cancer now or have had it before. Watch closely for changes in your health, and be sure to contact your doctor ifyou are not getting better as expected. Where can you learn more? Go to https://Solx.[employer]. org and sign in to your [employer] account. Enter P080 in the [email or web address] box to learn more about \"Back Pain, Emergency or Urgent Symptoms: Care Instructions. \"     If you do not have an account, please click on the \"Sign Up Now\" link. Current as of: July 1, 2021               Content Version: 13.2  © 2006-2022 [employer]. Care instructions adapted under license by [facility] ([facility]). If you have questions about a medical condition or this instruction, always ask your healthcare professional. [place] [address] any warranty or liability for your use of this information. Return To Clinic 4/19/2022 . After Visit Summary  given and reviewed. It is very important for your care that you keep your appointment. If for some reason you are unable to keep your appointment it is equally important that you call our office at [phone] to cancel your appointment and reschedule.  Failure to do so may result in your termination from our practice.     Carlos,[facility]

## 2022-03-31 NOTE — PROGRESS NOTES
[facility]    Progress Note    Date of patient's visit: 3/31/2022    Patient's Name:  Blanche Calero    YOB: 1955            Patient Care Team:  Wei Ruff MD as PCP - General (Internal Medicine)  Wei Ruff MD as PCP - [facility]  Guevara Anaya MD as Consulting Physician (Pulmonology)  Hermelinda Anderson, RN as Nurse Navigator    REASON FOR VISIT: Routine outpatient follow     Chief Complaint   Patient presents with    Hypertension     Pt states that her Bp has been elevated-- brought her cuff to compaire readings , Home cuff 176/85 P-72     Back Pain     Pt c/o having back pain that radiates into her legs          HISTORY OF PRESENT ILLNESS:    History was obtained from the patient. Blanche Calero is a 69 y.o. is here for complaints of high blood pressures at home and also acute low back pain for the last week. Patient has been checking blood pressures at home and usually in the mornings her systolic blood pressures between 1 60-1 70. She has even had a few numbers up to 376 systolic. Diastolics are not as high. Her blood pressures were better controlled earlier this year. She admits she is under a lot of stress with her mother being in a nursing home. She has also been eating a lot of salty food as she buys canned goods and vegetables. She denies leg edema. No chest pain or shortness of breath or PND. She is compliant with medications. She takes all her pills in the morning except for Coreg that she takes twice daily. She is also complaining of acute low back pain. She was trying to help her mother get up from bed a week ago. Since then she has had a sharp pain on the right lower back which radiates down her gluteal area to the back of her right thigh. She denies weakness or falls. Pain is worse when she is standing or laying down for too long.   She has taken Tylenol Extra Strength several times a day and also has taken occasional 200 to 400 mg ibuprofen with the pain. She is using a heating pad. Past Medical History:   Diagnosis Date    [facility]ina pectoris [facility]     Arthritis     Bipolar disorder ([facility]     CAD (coronary artery disease)     stents x 2 (Dr. Nathalie De León)    Carotid stenosis, asymptomatic 2015    Chronic back pain     COPD (chronic obstructive pulmonary disease) ([facility]     Depression     Dyspnea     Family history of colon cancer     Family history of liver cancer     Family history of ovarian cancer     GERD (gastroesophageal reflux disease)     History of colon polyps     Hyperlipidemia     Hypertension     Lung nodule     New onset type 2 diabetes mellitus ([facility] 2018    Obesity     Obesity (BMI 35.0-39.9 without comorbidity)    Peripheral vascular disease (HCC)     Poor historian     Sleep apnea     CRISTINA on CPAP    Smoking greater than 40 pack years     reports that she quit smoking about 2 years ago. 1.5 PPD for 48 yeras. She has a       Past Surgical History:   Procedure Laterality Date    ANKLE SURGERY      CARDIAC CATHETERIZATION  10/11/2013    2 stents    CATARACT REMOVAL      cataract both eyes with lenses     SECTION      x2    COLONOSCOPY      COLONOSCOPY N/A 2020    COLONOSCOPY POLYPECTOMY HOT BIOPSY performed by Jayde Lezama MD at . [facility]  2011    x2    ENDOSCOPY, COLON, DIAGNOSTIC      EYE SURGERY      b/l cataract extraction    INDUCED       NERVE BLOCK Bilateral 2018    bilat facets #1 marcaine    NERVE BLOCK  2019    caudal aborted- did not get in    NERVE BLOCK Bilateral 02/15/2019    bilateral l4 transforaminal. decadron 8mg/ProHance contrast    NOSE SURGERY      as a child    TONSILLECTOMY           ALLERGIES      Allergies   Allergen Reactions    Contrast [Iodides] Itching     Mild itchiness experienced with administration of IV contrast media. MEDICATIONS:      Current Outpatient Medications on File Prior to Visit   Medication Sig Dispense Refill    carvedilol (COREG) 3.125 MG tablet take 1 tablet by mouth twice a day 180 tablet 1    rosuvastatin (CRESTOR) 5 MG tablet Take 1 tablet by mouth nightly 30 tablet 1    sertraline (ZOLOFT) 50 MG tablet take 1 tablet by mouth once daily 120 tablet 0    losartan (COZAAR) 100 MG tablet Once a day 90 tablet 1    hydroCHLOROthiazide (HYDRODIURIL) 25 MG tablet take 1 tablet by mouth once daily 90 tablet 0    omeprazole (PRILOSEC) 40 MG delayed release capsule take 1 capsule by mouth once daily 90 capsule 0    STIOLTO RESPIMAT 2.5-2.5 MCG/ACT AERS inhale 2 puffs by mouth and INTO THE LUNGS once daily 4 g 2    potassium chloride (KLOR-CON M) 10 MEQ extended release tablet take 1 tablet by mouth once daily 90 tablet 2    betamethasone dipropionate (DIPROLENE) 0.05 % ointment apply topically daily as directed 45 g 1    triamcinolone (KENALOG) 0.025 % cream apply to affected area once daily 30 g 1    albuterol sulfate HFA (PROVENTIL HFA) 108 (90 Base) MCG/ACT inhaler Inhale 2 puffs into the lungs every 6 hours as needed for Wheezing 1 Inhaler 5    b complex vitamins capsule Take 1 capsule by mouth daily      Coenzyme Q10-Fish Oil-Vit E (CO-Q 10 OMEGA-3 FISH OIL) CAPS Take 1,000 mg by mouth daily      magnesium 30 MG tablet Take 30 mg by mouth 2 times daily      Multiple Vitamin (MULTI-VITAMIN PO) Take 1 tablet by mouth daily.  aspirin 325 MG tablet Take 325 mg by mouth daily. No current facility-administered medications on file prior to visit. HISTORY    Reviewed and no change from previous record. Ambrose Chase  reports that she quit smoking about 6 years ago. Her smoking use included cigarettes. She started smoking about 54 years ago. She has a 60.00 pack-year smoking history.  She has never used smokeless tobacco.    FAMILY HISTORY:    Reviewed and No change from previous visit    HEALTH MAINTENANCE DUE:      Health Maintenance Due   Topic Date Due    COVID-19 Vaccine (3 - Booster) 08/24/2021    Diabetic microalbuminuria test  09/08/2021    Annual Wellness Visit (AWV)  09/19/2021    Diabetic retinal exam  03/29/2022       REVIEW OF SYSTEMS:    12 point review of symptoms completed and found to be normal except noted in the HPI    Review of Systems   Constitutional: Negative for fatigue and fever. Eyes: Negative for photophobia and visual disturbance. Respiratory: Negative for cough, shortness of breath and wheezing. Cardiovascular: Negative for chest pain, palpitations and leg swelling. Gastrointestinal: Positive for diarrhea. Negative for abdominal pain, blood in stool and constipation. Endocrine: Negative for polydipsia and polyuria. Genitourinary: Negative for dysuria and frequency. Musculoskeletal: Positive for arthralgias and back pain. Neurological: Positive for headaches. Negative for dizziness, syncope and weakness. Psychiatric/Behavioral: Positive for sleep disturbance. Negative for dysphoric mood. The patient is nervous/anxious. PHYSICAL EXAM:     Vitals:    03/31/22 1103 03/31/22 1121   BP: (!) 163/70 (!) 174/66   Site: Left Upper Arm    Position: Sitting    Cuff Size: Large Adult    Pulse: 68    Weight: 226 lb (102.5 kg)      Body mass index is 40.03 kg/m². BP Readings from Last 3 Encounters:   03/31/22 (!) 174/66   03/23/22 (!) 143/61   02/10/22 137/62        Wt Readings from Last 3 Encounters:   03/31/22 226 lb (102.5 kg)   03/23/22 225 lb (102.1 kg)   02/10/22 221 lb (100.2 kg)       Physical Exam  Vitals and nursing note reviewed. Constitutional:       General: She is not in acute distress. Appearance: Normal appearance. She is obese. HENT:      Head: Normocephalic and atraumatic. Mouth/Throat:      Mouth: Mucous membranes are dry. Pharynx: Oropharynx is clear. Eyes:      General: No scleral icterus.      Extraocular Movements: Extraocular movements intact. Conjunctiva/sclera: Conjunctivae normal.      Pupils: Pupils are equal, round, and reactive to light. Cardiovascular:      Rate and Rhythm: Normal rate and regular rhythm. Heart sounds: No murmur heard. Pulmonary:      Effort: Pulmonary effort is normal.      Breath sounds: Normal breath sounds. No wheezing. Musculoskeletal:      Cervical back: Normal range of motion and neck supple. Right lower leg: No edema. Left lower leg: No edema. Lymphadenopathy:      Cervical: No cervical adenopathy. Skin:     General: Skin is warm and dry. Neurological:      General: No focal deficit present. Mental Status: She is alert and oriented to person, place, and time. LABORATORY FINDINGS:    CBC:  Lab Results   Component Value Date    WBC 8.3 09/13/2021    HGB 10.7 09/13/2021     09/13/2021     BMP:    Lab Results   Component Value Date     09/13/2021    K 4.3 09/13/2021     09/13/2021    CO2 25 09/13/2021    BUN 24 09/13/2021    CREATININE 0.67 09/13/2021    GLUCOSE 89 09/13/2021    GLUCOSE 87 10/28/2011     HEMOGLOBIN A1C:   Lab Results   Component Value Date    LABA1C 6.3 02/10/2022     MICROALBUMIN URINE:   Lab Results   Component Value Date    MICROALBUR <12 09/08/2020     FASTING LIPID PANEL:  Lab Results   Component Value Date    CHOL 117 05/07/2021    HDL 48 05/07/2021    TRIG 123 05/07/2021     Lab Results   Component Value Date    LDLCHOLESTEROL 44 05/07/2021       LIVER PROFILE:  Lab Results   Component Value Date    ALT 16 09/13/2021    AST 20 09/13/2021    PROT 7.8 12/16/2021    BILITOT 0.27 09/13/2021    BILIDIR 0.10 09/18/2018    LABALBU 4.3 09/13/2021      THYROID FUNCTION:   Lab Results   Component Value Date    TSH 2.71 05/07/2021      URINEANALYSIS: No results found for: LABURIN  ASSESSMENT AND PLAN:    1.  Uncontrolled hypertension  Increase  Amlodipine    - amLODIPine (NORVASC) 10 MG tablet; 1 tablet once a day Dispense: 90 tablet; Refill: 1    2. Acute right-sided low back pain with right-sided sciatica    - HYDROcodone-acetaminophen (NORCO) 5-325 MG per tablet; Take 1 tablet by mouth 2 times daily as needed for Pain for up to 5 days. Intended supply: 5 days. Take lowest dose possible to manage pain  Dispense: 10 tablet; Refill: 0  - lidocaine 4 % external patch; Place 1 patch onto the skin daily  Dispense: 30 patch; Refill: 0          FOLLOW UP AND INSTRUCTIONS:   1. Return in about 2 weeks (around 4/14/2022). 2. Neo Guajardo received counseling on the following healthy behaviors: nutrition, exercise and medication adherence    3. Reviewed prior labs and health maintenance. 4. Discussed use, benefit, and side effects of prescribed medications. Barriers to medication compliance addressed. All patient questions answered. Pt voiced understanding.      5. Patient given educational materials - see patient instructions    Raeann Mathias  Attending Physician, [address] [place] [address], Internal Medicine Residency Program  [address] [place] [address]  3/31/2022, 11:28 AM

## 2022-04-07 ENCOUNTER — HOSPITAL ENCOUNTER (OUTPATIENT)
Dept: GENERAL RADIOLOGY | Age: 67
Discharge: HOME | End: 2022-04-09
Payer: MEDICARE

## 2022-04-07 ENCOUNTER — HOSPITAL ENCOUNTER (OUTPATIENT)
Age: 67
Discharge: HOME | End: 2022-04-07
Payer: MEDICARE

## 2022-04-07 ENCOUNTER — HOSPITAL ENCOUNTER (OUTPATIENT)
Age: 67
Discharge: HOME | End: 2022-04-09
Payer: MEDICARE

## 2022-04-07 DIAGNOSIS — K52.9 CHRONIC DIARRHEA: ICD-10-CM

## 2022-04-07 DIAGNOSIS — I25.10 CORONARY ARTERY DISEASE INVOLVING NATIVE CORONARY ARTERY OF NATIVE HEART WITHOUT ANGINA PECTORIS: ICD-10-CM

## 2022-04-07 DIAGNOSIS — E78.5 DYSLIPIDEMIA: ICD-10-CM

## 2022-04-07 DIAGNOSIS — M62.838 MUSCLE SPASMS OF BOTH LOWER EXTREMITIES: ICD-10-CM

## 2022-04-07 DIAGNOSIS — R73.03 PREDIABETES: ICD-10-CM

## 2022-04-07 LAB
ABSOLUTE EOS #: 0.44 K/UL (ref 0–0.44)
ABSOLUTE IMMATURE GRANULOCYTE: <0.03 K/UL (ref 0–0.3)
ABSOLUTE LYMPH #: 1.54 K/UL (ref 1.1–3.7)
ABSOLUTE MONO #: 0.73 K/UL (ref 0.1–1.2)
BASOPHILS # BLD: 1 % (ref 0–2)
BASOPHILS ABSOLUTE: 0.06 K/UL (ref 0–0.2)
CHOLESTEROL/HDL RATIO: 4.5
CHOLESTEROL: 178 MG/DL
CREATININE URINE: 88 MG/DL (ref 28–217)
EOSINOPHILS RELATIVE PERCENT: 7 % (ref 1–4)
HCT VFR BLD CALC: 38.6 % (ref 36.3–47.1)
HDLC SERPL-MCNC: 40 MG/DL
HEMOGLOBIN: 11.4 G/DL (ref 11.9–15.1)
IMMATURE GRANULOCYTES: 0 %
LDL CHOLESTEROL: 108 MG/DL (ref 0–130)
LIPASE: 20 U/L (ref 13–60)
LYMPHOCYTES # BLD: 23 % (ref 24–43)
MCH RBC QN AUTO: 24.7 PG (ref 25.2–33.5)
MCHC RBC AUTO-ENTMCNC: 29.5 G/DL (ref 28.4–34.8)
MCV RBC AUTO: 83.5 FL (ref 82.6–102.9)
MICROALBUMIN/CREAT 24H UR: 18 MG/L
MICROALBUMIN/CREAT UR-RTO: 20 MCG/MG CREAT
MONOCYTES # BLD: 11 % (ref 3–12)
NRBC AUTOMATED: 0 PER 100 WBC
PDW BLD-RTO: 17.2 % (ref 11.8–14.4)
PLATELET # BLD: 355 K/UL (ref 138–453)
PMV BLD AUTO: 10.7 FL (ref 8.1–13.5)
RBC # BLD: 4.62 M/UL (ref 3.95–5.11)
RBC # BLD: ABNORMAL 10*6/UL
SEG NEUTROPHILS: 58 % (ref 36–65)
SEGMENTED NEUTROPHILS ABSOLUTE COUNT: 3.99 K/UL (ref 1.5–8.1)
TOTAL CK: 90 U/L (ref 26–192)
TRIGL SERPL-MCNC: 151 MG/DL
WBC # BLD: 6.8 K/UL (ref 3.5–11.3)

## 2022-04-07 PROCEDURE — 36415 COLL VENOUS BLD VENIPUNCTURE: CPT

## 2022-04-07 PROCEDURE — 80061 LIPID PANEL: CPT

## 2022-04-07 PROCEDURE — 85025 COMPLETE CBC W/AUTO DIFF WBC: CPT

## 2022-04-07 PROCEDURE — 82043 UR ALBUMIN QUANTITATIVE: CPT

## 2022-04-07 PROCEDURE — 82570 ASSAY OF URINE CREATININE: CPT

## 2022-04-07 PROCEDURE — 83690 ASSAY OF LIPASE: CPT

## 2022-04-07 PROCEDURE — 74018 RADEX ABDOMEN 1 VIEW: CPT

## 2022-04-07 PROCEDURE — 82550 ASSAY OF CK (CPK): CPT

## 2022-04-11 ENCOUNTER — HOSPITAL ENCOUNTER (OUTPATIENT)
Age: 67
Setting detail: SPECIMEN
Discharge: HOME | End: 2022-04-11
Payer: MEDICARE

## 2022-04-11 DIAGNOSIS — T36.95XA ANTIBIOTIC CAUSING ADVERSE EFFECT: ICD-10-CM

## 2022-04-11 DIAGNOSIS — K52.9 CHRONIC DIARRHEA: ICD-10-CM

## 2022-04-11 PROCEDURE — 83993 ASSAY FOR CALPROTECTIN FECAL: CPT

## 2022-04-11 PROCEDURE — 83630 LACTOFERRIN FECAL (QUAL): CPT

## 2022-04-11 PROCEDURE — 87506 IADNA-DNA/RNA PROBE TQ 6-11: CPT

## 2022-04-12 LAB
CAMPYLOBACTER PCR: NORMAL
E COLI ENTEROTOXIGENIC PCR: NORMAL
LACTOFERRIN, QUAL: NORMAL
PLESIOMONAS SHIGELLOIDES PCR: NORMAL
SALMONELLA PCR: NORMAL
SHIGATOXIN GENE PCR: NORMAL
SHIGELLA SP PCR: NORMAL
SPECIMEN DESCRIPTION: NORMAL
VIBRIO PCR: NORMAL
YERSINIA ENTEROCOLITICA PCR: NORMAL

## 2022-04-14 LAB — CALPROTECTIN, FECAL: 40 UG/G

## 2022-04-19 ENCOUNTER — TELEMEDICINE (OUTPATIENT)
Dept: INTERNAL MEDICINE | Age: 67
End: 2022-04-19
Payer: MEDICARE

## 2022-04-19 DIAGNOSIS — E66.01 MORBID OBESITY WITH BODY MASS INDEX (BMI) OF 40.0 OR HIGHER (HCC): ICD-10-CM

## 2022-04-19 DIAGNOSIS — I10 UNCONTROLLED HYPERTENSION: Primary | ICD-10-CM

## 2022-04-19 DIAGNOSIS — I25.10 CORONARY ARTERY DISEASE INVOLVING NATIVE CORONARY ARTERY OF NATIVE HEART WITHOUT ANGINA PECTORIS: ICD-10-CM

## 2022-04-19 DIAGNOSIS — E78.5 DYSLIPIDEMIA: ICD-10-CM

## 2022-04-19 DIAGNOSIS — R73.03 PREDIABETES: ICD-10-CM

## 2022-04-19 DIAGNOSIS — I73.9 PAD (PERIPHERAL ARTERY DISEASE) (HCC): ICD-10-CM

## 2022-04-19 PROCEDURE — 99442 PR PHYS/QHP TELEPHONE EVALUATION 11-20 MIN: CPT | Performed by: INTERNAL MEDICINE

## 2022-04-19 RX ORDER — ROSUVASTATIN CALCIUM 5 MG/1
5 TABLET, COATED ORAL NIGHTLY
Qty: 90 TABLET | Refills: 1 | Status: SHIPPED | OUTPATIENT
Start: 2022-04-19 | End: 2022-07-19 | Stop reason: SDUPTHER

## 2022-04-19 RX ORDER — AMLODIPINE BESYLATE 10 MG/1
TABLET ORAL
Qty: 90 TABLET | Refills: 1 | Status: SHIPPED | OUTPATIENT
Start: 2022-04-19 | End: 2022-07-19 | Stop reason: SDUPTHER

## 2022-04-19 RX ORDER — HYDROCHLOROTHIAZIDE 25 MG/1
TABLET ORAL
Qty: 90 TABLET | Refills: 1 | Status: SHIPPED | OUTPATIENT
Start: 2022-04-19 | End: 2022-07-19 | Stop reason: SDUPTHER

## 2022-04-19 ASSESSMENT — ENCOUNTER SYMPTOMS
PHOTOPHOBIA: 0
SHORTNESS OF BREATH: 0
DIARRHEA: 0
BACK PAIN: 1
WHEEZING: 0
CONSTIPATION: 0
ABDOMINAL PAIN: 0
COUGH: 0

## 2022-04-19 NOTE — PROGRESS NOTES
Levi Montiel (:  1955) is a Established patient, here for evaluation of the following:    Assessment & Plan   Below is the assessment and plan developed based on review of pertinent history, physical exam, labs, studies, and medications. 1. Uncontrolled hypertension  -     amLODIPine (NORVASC) 10 MG tablet; 1 tablet once a day, Disp-90 tablet, R-1Normal  -     hydroCHLOROthiazide (HYDRODIURIL) 25 MG tablet; 1 tablet daily, Disp-90 tablet, R-1Normal  2. Coronary artery disease involving native coronary artery of native heart without angina pectoris  -     rosuvastatin (CRESTOR) 5 MG tablet; Take 1 tablet by mouth nightly, Disp-90 tablet, R-1Normal  3. Prediabetes  4. Dyslipidemia  -     rosuvastatin (CRESTOR) 5 MG tablet; Take 1 tablet by mouth nightly, Disp-90 tablet, R-1Normal  5. PAD (peripheral artery disease) (HCC)  -     rosuvastatin (CRESTOR) 5 MG tablet; Take 1 tablet by mouth nightly, Disp-90 tablet, R-1Normal    Return in about 3 months (around 2022). Subjective   HPI   Patient initiated a phone visit to discuss her recent lab work and her blood pressure readings. She is doing much better with blood pressure. Amlodipine was increased last visit. She has been compliant with all her medications. She was only able to give me one number from today as she was at her daughter's house but she says she has no headaches and blood pressure is better. She had labs done. She was having diarrhea and that is why stool studies were obtained. All her tests are negative. She stopped taking the antibiotic that she was on for a few months from her dermatologist and she said diarrhea resolved after that. She has been under a lot of stress as well due to her mother's illness. She is requesting information on the earthmine. She says she has read insulin resistance can cause weight gain and she was going to spend some money buying some vitamins for this diet.   She is advised she has prediabetes and morbid obesity and possibly at risk for impaired glucose tolerance and insulin resistance. Would advise weight loss with decrease caloric intake and more exercise to help with weight loss. Golo diet pills reviewed and seems more of vitamins and minerals and carbohydrates from fruit extracts. At this point advised not to spend money on these products. She was also inquiring about keto diet but again with her history of coronary artery disease and peripheral artery disease have advised her not to go through a keto diet. Her best bet would be to just decrease her caloric intake and this was reiterated with her      Review of Systems   Constitutional: Negative for fatigue and unexpected weight change. Eyes: Negative for photophobia and visual disturbance. Respiratory: Negative for cough, shortness of breath and wheezing. Cardiovascular: Negative for chest pain, palpitations and leg swelling. Gastrointestinal: Negative for abdominal pain, constipation and diarrhea. Endocrine: Negative for polydipsia and polyuria. Musculoskeletal: Positive for arthralgias, back pain and gait problem. Negative for joint swelling. Neurological: Negative for dizziness, weakness, numbness and headaches. Objective   Patient-Reported Vitals  Patient-Reported Systolic (Top): 030 mmHg  Patient-Reported Diastolic (Bottom): 60 mmHg  BP Observations: Yes, BP was taken on electronic monitoring device with digital readout  Patient-Reported Pulse: 71       Physical Exam         On this date 4/19/2022 I have spent 20 minutes reviewing previous notes, test results and face to face (virtual) with the patient discussing the diagnosis and importance of compliance with the treatment plan as well as documenting on the day of the visit. Emperatriz Rodriguez, was evaluated through a synchronous (real-time) audio-video encounter.  The patient (or guardian if applicable) is aware that this is a billable service, which includes applicable co-pays. This Virtual Visit was conducted with patient's (and/or legal guardian's) consent. The visit was conducted pursuant to the emergency declaration under the [address] [place] [address] authority and the [employer] General Act. Patient identification was verified, and a caregiver was present when appropriate. The patient was located at home in a state where the provider was licensed to provide care.        --Ramonita Best MD

## 2022-04-25 DIAGNOSIS — E11.8 TYPE 2 DIABETES MELLITUS WITH COMPLICATION, WITHOUT LONG-TERM CURRENT USE OF INSULIN (HCC): ICD-10-CM

## 2022-04-25 NOTE — TELEPHONE ENCOUNTER
Request for Prilsec    Metformin dc'ed 3/23/2022 . Next Visit Date:  Future Appointments   Date Time Provider Zohreh Hopper   5/9/2022 12:30 PM [facility] VASCULAR [address]   5/18/2022  1:30 PM Ciara Hanson MD Resp Spec [place]   5/24/2022 11:00 AM Hola Castro MD heartvasc [address] Maintenance   Topic Date Due    COVID-19 Vaccine (3 - Booster) 08/24/2021    Annual Wellness Visit (AWV)  09/19/2021    Diabetic retinal exam  03/29/2022    Hepatitis C screen  08/09/2022 (Originally 1/20/1973)    Shingles Vaccine (1 of 2) 03/23/2023 (Originally 1/20/2003)    Diabetic foot exam  05/13/2022    Low dose CT lung screening  06/02/2022    Potassium  09/13/2022    Creatinine  09/13/2022    Colorectal Cancer Screen  02/06/2023    A1C test (Diabetic or Prediabetic)  02/10/2023    Depression Monitoring  02/10/2023    Diabetic microalbuminuria test  04/07/2023    Lipids  04/07/2023    Breast cancer screen  12/30/2023    DTaP/Tdap/Td vaccine (2 - Td or Tdap) 10/24/2028    DEXA (modify frequency per FRAX score)  Completed    Flu vaccine  Completed    Pneumococcal 65+ years Vaccine  Completed    Hepatitis A vaccine  Aged Out    Hib vaccine  Aged Out    Meningococcal (ACWY) vaccine  Aged Out       Hemoglobin A1C (%)   Date Value   02/10/2022 6.3   05/07/2021 6.3 (H)   09/08/2020 6.3 (H)             ( goal A1C is < 7)   Microalb/Crt.  Ratio (mcg/mg creat)   Date Value   04/07/2022 20     LDL Cholesterol (mg/dL)   Date Value   04/07/2022 108       (goal LDL is <100)   AST (U/L)   Date Value   09/13/2021 20     ALT (U/L)   Date Value   09/13/2021 16     BUN (mg/dL)   Date Value   09/13/2021 24 (H)     BP Readings from Last 3 Encounters:   03/31/22 (!) 174/66   03/23/22 (!) 143/61   02/10/22 137/62          (goal 120/80)    All Future Testing planned in [facility]  Lab Frequency Next Occurrence   Full PFT Study With Bronchodilator Once 10/13/2021   VL DUP CAROTID BILATERAL Once 09/04/2022   CTA ABDOMINAL AORTA W BILAT RUNOFF W CONTRAST Once 08/04/2022   COVID-19 Once 10/09/2021   COVID-19 Once 11/13/2021   US BREAST COMPLETE RIGHT Once 06/15/2022   CONOR DIGITAL SCREEN UNILATERAL RIGHT Once 01/18/2023   Clostridium Difficile Toxin/Antigen Once 06/30/2022         Patient Active Problem List:     Smoker     HTN (hypertension)     CAD/Stents drug-eluting      Serum lipids high     Carotid stenosis, asymptomatic     Nocturnal hypoxia     CRISTINA on CPAP     PAD (peripheral artery disease) ([address]

## 2022-04-26 RX ORDER — OMEPRAZOLE 40 MG/1
CAPSULE, DELAYED RELEASE ORAL
Qty: 90 CAPSULE | Refills: 0 | Status: SHIPPED | OUTPATIENT
Start: 2022-04-26 | End: 2022-07-19 | Stop reason: SDUPTHER

## 2022-05-09 ENCOUNTER — HOSPITAL ENCOUNTER (OUTPATIENT)
Dept: VASCULAR LAB | Age: 67
Discharge: HOME | End: 2022-05-09
Payer: MEDICARE

## 2022-05-09 DIAGNOSIS — I65.23 CAROTID STENOSIS, ASYMPTOMATIC, BILATERAL: ICD-10-CM

## 2022-05-09 PROCEDURE — 93880 EXTRACRANIAL BILAT STUDY: CPT

## 2022-05-18 ENCOUNTER — OFFICE VISIT (OUTPATIENT)
Dept: PULMONOLOGY | Age: 67
End: 2022-05-18
Payer: MEDICARE

## 2022-05-18 VITALS
DIASTOLIC BLOOD PRESSURE: 58 MMHG | WEIGHT: 225.2 LBS | HEART RATE: 61 BPM | BODY MASS INDEX: 39.9 KG/M2 | SYSTOLIC BLOOD PRESSURE: 157 MMHG | HEIGHT: 63 IN | OXYGEN SATURATION: 98 % | TEMPERATURE: 97.3 F

## 2022-05-18 DIAGNOSIS — G47.33 OSA ON CPAP: Primary | ICD-10-CM

## 2022-05-18 DIAGNOSIS — R91.1 LUNG NODULE: ICD-10-CM

## 2022-05-18 DIAGNOSIS — J44.9 CHRONIC OBSTRUCTIVE PULMONARY DISEASE, UNSPECIFIED COPD TYPE (HCC): ICD-10-CM

## 2022-05-18 DIAGNOSIS — Z87.891 HISTORY OF SMOKING 30 OR MORE PACK YEARS: ICD-10-CM

## 2022-05-18 DIAGNOSIS — R06.09 DYSPNEA ON EXERTION: ICD-10-CM

## 2022-05-18 DIAGNOSIS — Z99.89 OSA ON CPAP: Primary | ICD-10-CM

## 2022-05-18 DIAGNOSIS — Z87.891 PERSONAL HISTORY OF TOBACCO USE: ICD-10-CM

## 2022-05-18 PROCEDURE — G8417 CALC BMI ABV UP PARAM F/U: HCPCS | Performed by: INTERNAL MEDICINE

## 2022-05-18 PROCEDURE — 1036F TOBACCO NON-USER: CPT | Performed by: INTERNAL MEDICINE

## 2022-05-18 PROCEDURE — G0296 VISIT TO DETERM LDCT ELIG: HCPCS | Performed by: INTERNAL MEDICINE

## 2022-05-18 PROCEDURE — 3017F COLORECTAL CA SCREEN DOC REV: CPT | Performed by: INTERNAL MEDICINE

## 2022-05-18 PROCEDURE — 1090F PRES/ABSN URINE INCON ASSESS: CPT | Performed by: INTERNAL MEDICINE

## 2022-05-18 PROCEDURE — 4040F PNEUMOC VAC/ADMIN/RCVD: CPT | Performed by: INTERNAL MEDICINE

## 2022-05-18 PROCEDURE — G8399 PT W/DXA RESULTS DOCUMENT: HCPCS | Performed by: INTERNAL MEDICINE

## 2022-05-18 PROCEDURE — 1123F ACP DISCUSS/DSCN MKR DOCD: CPT | Performed by: INTERNAL MEDICINE

## 2022-05-18 PROCEDURE — 99214 OFFICE O/P EST MOD 30 MIN: CPT | Performed by: INTERNAL MEDICINE

## 2022-05-18 PROCEDURE — 3023F SPIROM DOC REV: CPT | Performed by: INTERNAL MEDICINE

## 2022-05-18 PROCEDURE — G8427 DOCREV CUR MEDS BY ELIG CLIN: HCPCS | Performed by: INTERNAL MEDICINE

## 2022-05-18 RX ORDER — FLUTICASONE FUROATE 100 UG/1
1 POWDER RESPIRATORY (INHALATION) DAILY
Qty: 30 EACH | Refills: 5 | Status: SHIPPED | OUTPATIENT
Start: 2022-05-18 | End: 2022-07-19 | Stop reason: SDUPTHER

## 2022-05-18 ASSESSMENT — SLEEP AND FATIGUE QUESTIONNAIRES
ESS TOTAL SCORE: 6
HOW LIKELY ARE YOU TO NOD OFF OR FALL ASLEEP WHEN YOU ARE A PASSENGER IN A CAR FOR AN HOUR WITHOUT A BREAK: 2
HOW LIKELY ARE YOU TO NOD OFF OR FALL ASLEEP WHILE WATCHING TV: 1
HOW LIKELY ARE YOU TO NOD OFF OR FALL ASLEEP IN A CAR, WHILE STOPPED FOR A FEW MINUTES IN TRAFFIC: 0
HOW LIKELY ARE YOU TO NOD OFF OR FALL ASLEEP WHILE SITTING AND TALKING TO SOMEONE: 0
HOW LIKELY ARE YOU TO NOD OFF OR FALL ASLEEP WHILE SITTING INACTIVE IN A PUBLIC PLACE: 0
HOW LIKELY ARE YOU TO NOD OFF OR FALL ASLEEP WHILE LYING DOWN TO REST IN THE AFTERNOON WHEN CIRCUMSTANCES PERMIT: 2
HOW LIKELY ARE YOU TO NOD OFF OR FALL ASLEEP WHILE SITTING QUIETLY AFTER LUNCH WITHOUT ALCOHOL: 0
HOW LIKELY ARE YOU TO NOD OFF OR FALL ASLEEP WHILE SITTING AND READING: 1

## 2022-05-18 NOTE — PROGRESS NOTES
OUTPATIENT PULMONARY PROGRESS NOTE      Patient:  Lynette Coffey  MRN: [id number]    Consulting Physician: Dr. Rosie Negrete  Reason for initial consult: COPD, lung nodule, dyspnea, obstructive sleep apnea  Primacy Care Physician: Eric Robles MD    HISTORY OF PRESENT ILLNESS:   The patient is a 69 y.o. female   She is here for follow-up of obstructive sleep apnea and COPD, lung nodule. According to patient she had COVID in October last year. She had 2 doses of COVID-vaccine but did not have the booster. Since COVID she think that she has more cough. Cough is associated with sputum which is clear to white denies any change in the color of the sputum volume the sputum. She has wheezing which is off and on denies daily or persistent wheezing. She does complain of shortness of breath on overexertion able to do her regular activities. She denies nocturnal awakening with cough wheezing chest tightness or shortness of breath. She use albuterol denies daily use of albuterol although she has been using albuterol more often since she had COVID. She is using Stiolto once daily and compliant with medication  Since she was seen last time she did not have exacerbation ER visit antibiotic or steroid use. She had history of sleep apnea she has been very compliant with CPAP but according to patient there was a recall for CPAP she had called the company first she did not get the replacement then she got the replacement CPAP but she did not have the headgear further discussed with machine and she is not able to use the CPAP lately because of problem getting the supplies and headgear which did not fit with a new CPAP machine. Last compliance data is available from 12/21/2020 to 03/20/2021. Compliance is 100% for 90 days. Average usage is 8 hours 19 minutes. Average AHI is 0.8.   On auto CPAP      She had a CT scan of the chest LDCT in June 2021 shows a stable 4 mm left upper lobe nodule as compared to CT scan in May 2020.  Pulmonary function test done on 11/17/2021 showed FEV1 of 65% borderline restriction which could be extraparenchymal or intraparenchymal and mild reduction in diffusion capacity pulmonary function test mostly stable from 2019 2018. Her last pulmonary function test shows improvement in FEV1 from 58% to 69% her diffusion capacity is normal.    Sleep questionnaire on 11/17/2021  Occasional dry mouth upon awakening. Occasionally fatigue and tiredness during the day. Goes to sleep at 11 pm, wakes up 8 am. It takes 20 minutes to fall asleep. Wakes up rarely at night to go to bathroom. Takes some time nap during the day. no headache in am. No car wrecks or near wrecks because of the sleepiness. No nodding off while driving. No weight gain. No forgetfulness or decreased concentration. No nasal congestion or obstruction at night. Using CPAP 7-8 hr/night. No leg jerks during sleep. No restless feelings in legs at night. No numbness or burning in leg or feet. No leg aches or cramps. Initial history and course    She was referred here for dyspnea, she has long history of his smoking, she was told in the past that she has COPD and she had a spirometry done before, she does have history of shortness of breath on exertion and sometimes on regular activities, and she had limited her activities because of her shortness of breath especially last few years. She also has a CT chest done for screening which showed 5 mm left upper lobe lung nodule  She does have weight gain for about 30 pounds in last few years   She was diagnosed with sleep apnea for about a year ago when she had a sleep study done and she was also placed on oxygen at night with CPAP at 2 L.   She does have history of GERD and she is on medication to control the symptoms          Sleep Medicine 5/18/2022 4/28/2021 6/2/2020 1/29/2020 5/22/2019 2/27/2019 12/15/2018   Sitting and reading 1 1 0 1 0 1 0   Watching TV 1 1 2 1 1 1 1   Sitting, inactive in a public place (e.g. a theatre or a meeting) 0 0 0 0 0 0 0   As a passenger in a car for an hour without a break 2 0 0 1 0 0 0   Lying down to rest in the afternoon when circumstances permit 2 0 3 2 2 1 2   Sitting and talking to someone 0 0 0 0 0 0 0   Sitting quietly after a lunch without alcohol 0 0 0 1 0 0 0   In a car, while stopped for a few minutes in traffic 0 0 0 0 0 0 0   Total score 6 2 5 6 3 3 3   Neck circumference (Inches) - - - - - - 55     Past Medical History:        Diagnosis Date    [facility] p[facility]is [facility]     Arthritis     Bipolar disorder ([facility]     CAD (coronary artery disease)     stents x 2 (Dr. Hedy Guthrie)    Carotid stenosis, asymptomatic 2015    Chronic back pain     COPD (chronic obstructive pulmonary disease) ([facility]     Depression     Dyspnea     Family history of colon cancer     Family history of liver cancer     Family history of ovarian cancer     GERD (gastroesophageal reflux disease)     History of colon polyps     Hyperlipidemia     Hypertension     Lung nodule     New onset type 2 diabetes mellitus ([facility] 2018    Obesity     Obesity (BMI 35.0-39.9 without comorbidity)    Peripheral vascular disease (HCC)     Poor historian     Sleep apnea     CRISTINA on CPAP    Smoking greater than 40 pack years     reports that she quit smoking about 2 years ago. 1.5 PPD for 48 yeras.  She has a       Past Surgical History:        Procedure Laterality Date    ANKLE SURGERY      CARDIAC CATHETERIZATION  10/11/2013    2 stents    CATARACT REMOVAL      cataract both eyes with lenses     SECTION      x2    COLONOSCOPY      COLONOSCOPY N/A 2020    COLONOSCOPY POLYPECTOMY HOT BIOPSY performed by Alejandra Morris MD at [facility]  2011    x2    ENDOSCOPY, COLON, DIAGNOSTIC      EYE SURGERY      b/l cataract extraction    INDUCED       NERVE BLOCK Bilateral 2018    bilat facets #1 marcaine    NERVE BLOCK  02/01/2019    caudal aborted- did not get in   [address] Bilateral 02/15/2019    bilateral l4 transforaminal. decadron 8mg/ProHance contrast    NOSE SURGERY      as a child    TONSILLECTOMY         Allergies: Allergies   Allergen Reactions    Contrast [Iodides] Itching     Mild itchiness experienced with administration of IV contrast media. Home Meds:   Outpatient Encounter Medications as of 5/18/2022   Medication Sig Dispense Refill    omeprazole (PRILOSEC) 40 MG delayed release capsule take 1 capsule by mouth once daily 90 capsule 0    amLODIPine (NORVASC) 10 MG tablet 1 tablet once a day 90 tablet 1    hydroCHLOROthiazide (HYDRODIURIL) 25 MG tablet 1 tablet daily 90 tablet 1    rosuvastatin (CRESTOR) 5 MG tablet Take 1 tablet by mouth nightly 90 tablet 1    carvedilol (COREG) 3.125 MG tablet take 1 tablet by mouth twice a day 180 tablet 1    sertraline (ZOLOFT) 50 MG tablet take 1 tablet by mouth once daily 120 tablet 0    losartan (COZAAR) 100 MG tablet Once a day 90 tablet 1    STIOLTO RESPIMAT 2.5-2.5 MCG/ACT AERS inhale 2 puffs by mouth and INTO THE LUNGS once daily 4 g 2    potassium chloride (KLOR-CON M) 10 MEQ extended release tablet take 1 tablet by mouth once daily 90 tablet 2    betamethasone dipropionate (DIPROLENE) 0.05 % ointment apply topically daily as directed 45 g 1    triamcinolone (KENALOG) 0.025 % cream apply to affected area once daily 30 g 1    albuterol sulfate HFA (PROVENTIL HFA) 108 (90 Base) MCG/ACT inhaler Inhale 2 puffs into the lungs every 6 hours as needed for Wheezing 1 Inhaler 5    b complex vitamins capsule Take 1 capsule by mouth daily      Coenzyme Q10-Fish Oil-Vit E (CO-Q 10 OMEGA-3 FISH OIL) CAPS Take 1,000 mg by mouth daily      magnesium 30 MG tablet Take 30 mg by mouth 2 times daily      Multiple Vitamin (MULTI-VITAMIN PO) Take 1 tablet by mouth daily.  aspirin 325 MG tablet Take 325 mg by mouth daily.          No facility-administered encounter medications on file as of 5/18/2022. Social History:   TOBACCO:   reports that she quit smoking about 2 years ago. 1.5 PPD for 48 yeras. She has a 60.00 pack-year smoking history. She has never used smokeless tobacco.  ETOH:   reports current alcohol use.   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      Family History:       Problem Relation Age of Onset    Hypertension Mother     Macular Degen Mother     Other Mother         vascular    Colon Polyps Brother     Cancer Maternal Aunt        Immunizations:    Immunization History   Administered Date(s) Administered    COVID-19, Deborah De Luna, Primary or Immunocompromised, PF, 100mcg/0.5mL 02/28/2021, 03/24/2021    COVID-19, Pfizer Purple top, DILUTE for use, 12+ yrs, 30mcg/0.3mL dose 02/28/2021    Influenza, Quadv, adjuvanted, 65 yrs +, IM, PF (Fluad) 11/17/2021    Pneumococcal Conjugate 13-valent ([id number]) 04/17/2018, 12/01/2019    Pneumococcal Polysaccharide ([id number]) 02/20/2020    Tdap (Boostrix, Adacel) 10/24/2018         REVIEW OF SYSTEMS:  CONSTITUTIONAL:  negative for  fevers, chills, sweats, fatigue, malaise, anorexia and weight loss  EYES:  negative for  double vision, blurred vision, dry eyes, eye discharge, visual disturbance, irritation, redness and icterus  HEENT:   negative for hoarseness and voice changes, negative for  hearing loss, tinnitus, nasal congestion, epistaxis, snoring, sore mouth and sore throat  RESPIRATORY:  positive for dyspnea on exertion and activities,  positive for dry cough and sputum, mild wheezing, negative for hemoptysis, chest pain, pleuritic pain and cyanosis  CARDIOVASCULAR:  positive for  dyspnea on exertion, positive for chest heaviness/pressure, negative for palpitations, orthopnea, PND, exertional chest pressure/discomfort, fatigue, early saiety, edema, syncope  GASTROINTESTINAL:  negative for change in bowel habits, diarrhea, constipation, abdominal pain, pruritus, abdominal mass, abdominal distention, jaundice, hematemesis and hemtochezia, dysphagia, reflux and regurgitation  GENITOURINARY:  negative for frequency, dysuria, nocturia, urinary incontinence, hesitancy, decreased stream and hematuria  HEMATOLOGIC/LYMPHATIC:  negative for easy bruising, bleeding, lymphadenopathy and petechiae  ALLERGIC/IMMUNOLOGIC:  negative for recurrent infections, urticaria, hay fever, angioedema, anaphylaxis and drug reactions  ENDOCRINE:  negative for heat intolerance, cold intolerance, tremor, weight changes and change in bowel habits  MUSCULOSKELETAL:  negative for  myalgias, arthralgias, joint swelling, stiff joints and decreased range of motion  NEUROLOGICAL:  negative for headaches, dizziness, seizures, memory problems, speech problems, visual disturbance, coordination problems, gait problems, weakness, numbness, syncope and tingling  BEHAVIOR/PSYCH:  negative          Physical Exam:    Vitals: BP (!) 157/58 (Site: Left Upper Arm, Position: Sitting, Cuff Size: Large Adult)   Pulse 61   Temp 97.3 °F (36.3 °C)   Ht 5' 3\" (1.6 m)   Wt 225 lb 3.2 oz (102.2 kg)   SpO2 98%   BMI 39.89 kg/m²   Last 3 weights: Wt Readings from Last 3 Encounters:   05/18/22 225 lb 3.2 oz (102.2 kg)   03/31/22 226 lb (102.5 kg)   03/23/22 225 lb (102.1 kg)     Body mass index is 39.89 kg/m².     Physical Examination:   General appearance - alert, well appearing, and in no distress, overweight and acyanotic, in no respiratory distress  Mental status - alert, oriented to person, place, and time  Eyes - pupils equal and reactive, extraocular eye movements intact, sclera anicteric  Ears - right ear normal, left ear normal  Nose - normal and patent, no erythema, discharge or polyps  Mouth - mucous membranes moist, pharynx normal without lesions and small oropharynx, moderate size tongue, Mallampati 2  Neck - supple, no significant adenopathy, short neck  Chest - no tachypnea, retractions or cyanosis, decreased air entry noted bilaterally with distant breath sounds no expiratory wheezing and no rhonchi and no crackles.   Heart - normal rate, regular rhythm, normal S1, S2, no murmurs, rubs, clicks or gallops  Abdomen - soft, nontender, nondistended, no masses or organomegaly  Neurological - alert, oriented, normal speech, no focal findings or movement disorder noted  Extremities - peripheral pulses normal, no pedal edema, no clubbing or cyanosis  Skin - normal coloration and turgor, no rashes, no suspicious skin lesions noted     Immunization History   Administered Date(s) Administered    COVID-19, Moderna, Primary or Immunocompromised, PF, 100mcg/0.5mL 02/28/2021, 03/24/2021    COVID-19, Pfizer Purple top, DILUTE for use, 12+ yrs, 30mcg/0.3mL dose 02/28/2021    Influenza, Quadv, adjuvanted, 65 yrs +, IM, PF (Fluad) 11/17/2021    Pneumococcal Conjugate 13-valent ([id number]) 04/17/2018, 12/01/2019    Pneumococcal Polysaccharide ([id number]) 02/20/2020    Tdap (Boostrix, Adacel) 10/24/2018         LABS:    CBC:   WBC   Date Value Ref Range Status   04/07/2022 6.8 3.5 - 11.3 k/uL Final   09/13/2021 8.3 3.5 - 11.3 k/uL Final   09/08/2020 9.8 3.5 - 11.3 k/uL Final     Hemoglobin   Date Value Ref Range Status   04/07/2022 11.4 (L) 11.9 - 15.1 g/dL Final   09/13/2021 10.7 (L) 11.9 - 15.1 g/dL Final   09/08/2020 11.3 (L) 11.9 - 15.1 g/dL Final     Platelets   Date Value Ref Range Status   04/07/2022 355 138 - 453 k/uL Final   09/13/2021 317 138 - 453 k/uL Final   09/08/2020 312 138 - 453 k/uL Final     BMP:   Sodium   Date Value Ref Range Status   09/13/2021 140 135 - 144 mmol/L Final   09/08/2020 141 135 - 144 mmol/L Final   10/24/2019 143 135 - 144 mmol/L Final     Potassium   Date Value Ref Range Status   09/13/2021 4.3 3.7 - 5.3 mmol/L Final   09/08/2020 4.0 3.7 - 5.3 mmol/L Final   10/24/2019 4.3 3.7 - 5.3 mmol/L Final     Chloride   Date Value Ref Range Status   09/13/2021 101 98 - 107 mmol/L Final   09/08/2020 101 98 - 107 mmol/L Final   10/24/2019 103 98 - 107 mmol/L Final     CO2   Date Value Ref Range Status   09/13/2021 25 20 - 31 mmol/L Final   09/08/2020 25 20 - 31 mmol/L Final   10/24/2019 28 20 - 31 mmol/L Final     BUN   Date Value Ref Range Status   09/13/2021 24 (H) 8 - 23 mg/dL Final   09/08/2020 19 8 - 23 mg/dL Final   10/24/2019 24 (H) 8 - 23 mg/dL Final     CREATININE   Date Value Ref Range Status   09/13/2021 0.67 0.50 - 0.90 mg/dL Final   09/08/2020 0.69 0.50 - 0.90 mg/dL Final   10/24/2019 0.62 0.50 - 0.90 mg/dL Final     Glucose   Date Value Ref Range Status   09/13/2021 89 70 - 99 mg/dL Final   09/08/2020 84 70 - 99 mg/dL Final   10/24/2019 112 (H) 70 - 99 mg/dL Final   10/28/2011 87 74 - 106 mg/dL Final     Hepatic:     Amylase: No results found for: AMYLASE  Lipase:   Lipase   Date Value Ref Range Status   04/07/2022 20 13 - 60 U/L Final     CARDIAC ENZYMES:     BNP: No results found for: BNP  Lipids:       INR: No results found for: INR  Thyroid:   TSH   Date Value Ref Range Status   05/07/2021 2.71 0.30 - 5.00 mIU/L Final     Urinalysis:       Cultures:-  -----------------------------------------------------------------  Immunization History   Administered Date(s) Administered    COVID-19, Moderna, Primary or Immunocompromised, PF, 100mcg/0.5mL 02/28/2021, 03/24/2021    COVID-19, Pfizer Purple top, DILUTE for use, 12+ yrs, 30mcg/0.3mL dose 02/28/2021    Influenza, Quadv, adjuvanted, 65 yrs +, IM, PF (Fluad) 11/17/2021    Pneumococcal Conjugate 13-valent ([id number]) 04/17/2018, 12/01/2019    Pneumococcal Polysaccharide ([id number]) 02/20/2020    Tdap (Boostrix, Adacel) 10/24/2018     ABGs: No results found for: PHART, PO2ART, DRP3PEL    Pulmonary Functions Testing Results:    11/17/2021: FEV1 1.41 65%, FVC 1.79 65%, FEV1 %, TLC 3.65 79%, DLCO 11.44 65%  05/22/2019: FEV1 1.52 69%, FVC 1.85 66%, FEV1 %, TLC 3.48 75%, DLCO 15.70 88%  02/07/2018: FEV1 1.30 58%, FVC 1.62 58%, [id number] 100%, TLC 3.35 72%, DLCO 13.62 76%    CXR      CT Scans     LDCT chest 06/02/2021. Lungs/Pleura: [place] are no significant nodules or masses.  No focal   consolidation.   There is no pleural effusion or pneumothorax.  Normal   tracheobronchial tree       LDCT chest 05/28/2020  Mediastinum:  Suboptimal evaluation due to low dose technique.  Thoracic   aorta demonstrates moderate calcification without aneurysm.  Pulmonary trunk   appears nondilated.  Heart appears normal size without pericardial effusion. No lymphadenopathy.  The esophagus is grossly unremarkable.       Lungs/Pleura:  No focal consolidation, pneumothorax or pleural effusion. Trachea and distal airways appear patent.  4 mm solid noncalcified pulmonary   nodule left upper lobe series 3, image 140. no new or enlarging pulmonary   nodule. LDCT chest 05/07/2019  1. Stable 4 mm posterior left upper lobe pulmonary nodule, unchanged from   05/07/2018.  Mild emphysema. 2. Cardiomegaly.  Coronary artery disease.  Atherosclerotic calcification of   the aorta. 3. Small hiatal hernia. 05/07/2018  Stable 4 mm nodule left upper lobe. 10/17/2017  5 mm nodule posterior left upper lobe.  No acute process with chronic   findings as described. Compliance data from CPAP. From 12/21/2020 to 03/20/2021  Auto CPAP with pressure maximum of 20 and pressure minimum of 12  Average daily usage 8 hours 13 minutes  Compliance 100 %  Average mean pressure 13.1 cm. AHI 0.8    Assessment and Plan         1. Chronic obstructive pulmonary disease, unspecified COPD type ([address]   2. Lung nodule   3. CRISTINA on CPAP   4. Obesity (BMI 35.0-39.9 without comorbidity)   5.       Dyspnea on exertion     HTN (hypertension)    CAD/Stents drug-eluting     Claudication in peripheral vascular disease (HCC)     CT scan of the chest reviewed no change in 4 mm left upper lobe nodule      Plan and Recommendations:    Continue Stiolto Respimat 2 puffs once daily  Add Arnuity 100 mcg 1 puff once daily and will see the response of ICS as well as cough is concern  Albuterol as needed  She will need yearly CT scan for screening and next one should be in June 2022 (requested)  Advised her to follow-up with cardiology as she has history of coronary artery disease and history of previous stent. Maintain an active lifestyle   Recommended flu vaccine in fall. She had both doses of Covid vaccine. Recommend booster  She had Prevnar 13 in April 2018  Uptodate on Pneumonia vaccine. Prescription for new CPAP supplies to be used with new CPAP machine. Continue with AutoCPAP at current setting  20/12 cm  CPAP at least 4 hrs qhs  Wt loss is recommended and discussed  Follow good sleep hygeine instructions  Use humidifier   Questions answered pertaining to diagnosis and management explained importance of compliance with therapy   Last compliance data from CPAP seen and she is compliant with CPAP. Need compliance data on next visit    RTC 6 months. It was my pleasure to evaluate Mateo Castro today. Please call with questions. Please note that this chart was generated using voice recognition Dragon dictation software. Although every effort was made to ensure the accuracy of this automated transcription, some errors in transcription may have occurred. Ciara Hanson MD, MD             5/18/2022, 1:38 PM      Low Dose CT (LDCT) Lung Screening criteria met:     Age 50-77(Medicare) or 50-80 (USPSTF)   Pack year smoking >20   Still smoking or less than 15 year since quit   No sign or symptoms of lung cancer   > 11 months since last LDCT     Risks and benefits of lung cancer screening with LDCT scans discussed:    Significance of positive screen - False-positive LDCT results often occur. 95% of all positive results do not lead to a diagnosis of cancer.  Usually further imaging can resolve most false-positive results; however, some patients may require invasive procedures. Over diagnosis risk - 10% to 12% of screen-detected lung cancer cases are over diagnosed--that is, the cancer would not have been detected in the patient's lifetime without the screening. Need for follow up screens annually to continue lung cancer screening effectiveness     Risks associated with radiation from annual LDCT- Radiation exposure is about the same as for a mammogram, which is about 1/3 of the annual background radiation exposure from everyday life. Starting screening at age 54 is not likely to increase cancer risk from radiation exposure. Patients with comorbidities resulting in life expectancy of < 10 years, or that would preclude treatment of an abnormality identified on CT, should not be screened due to lack of benefit.     To obtain maximal benefit from this screening, smoking cessation and long-term abstinence from smoking is critical

## 2022-05-18 NOTE — PATIENT INSTRUCTIONS
Christina talked to pt about Cpap supplies - gave Christina order-  LS   What is lung cancer screening? Lung cancer screening is a way in which doctors check the lungs for early signs of cancer in people who have no symptoms of lung cancer. A low-dose CT scan uses much less radiation than a normal CT scan and shows a more detailed image of the lungs than a standard X-ray. The goal of lung cancer screening is to find cancer early, before it has a chance to grow, spread, or cause problems. One large study found that smokers who were screened with low-dose CT scans were less likely to die of lung cancer than those who were screened with standard X-ray. Below is a summary of the things you need to know regarding screening for lung cancer with low-dose computed tomography (LDCT). This is a screening program that involves routine annual screening with LDCT studies of the lung. The LDCTs are done using low-dose radiation that is not thought to increase your cancer risk. If you have other serious medical conditions (other cancers, congestive heart failure) that limit your life expectancy to less than 10 years, you should not undergo lung cancer screening with LDCT. The chance is 20%-60% that the LDCT result will show abnormalities. This would require additional testing which could include repeat imaging or even invasive procedures. Most (about 95%) of \"abnormal\" LDCT results are false in the sense that no lung cancer is ultimately found. Additionally, some (about 10%) of the cancers found would not affect your life expectancy, even if undetected and untreated. If you are still smoking, the single most important thing that you can do to reduce your risk of dying of lung cancer is to quit. For this screening to be covered by Medicare and most other insurers, strict criteria must be met.   If you do not meet these criteria, but still wish to undergo LDCT testing, you will be required to sign a waiver indicating your willingness to pay for the scan.

## 2022-05-18 NOTE — LETTER
[facility], [address] [place] [id number]  Phone: [phone]  Fax: [phone]    Parvez Leonard MD    May 18, 2022     MD Ashley Richards [address]    Patient: Leda Persaud   MR Number: [id number]   YOB: 1955   Date of Visit: 5/18/2022       Dear Talya Gould: Thank you for referring Stella Mendenhall to me for evaluation/treatment. Below are the relevant portions of my assessment and plan of care. If you have questions, please do not hesitate to call me. I look forward to following Jeisonfranklin Broussard along with you.     Sincerely,      Parvez Leonard MD

## 2022-05-24 ENCOUNTER — OFFICE VISIT (OUTPATIENT)
Dept: VASCULAR SURGERY | Age: 67
End: 2022-05-24
Payer: MEDICARE

## 2022-05-24 VITALS
TEMPERATURE: 98.6 F | DIASTOLIC BLOOD PRESSURE: 82 MMHG | BODY MASS INDEX: 37.9 KG/M2 | HEIGHT: 64 IN | RESPIRATION RATE: 20 BRPM | SYSTOLIC BLOOD PRESSURE: 138 MMHG | HEART RATE: 66 BPM | OXYGEN SATURATION: 96 % | WEIGHT: 222 LBS

## 2022-05-24 DIAGNOSIS — I65.23 CAROTID STENOSIS, ASYMPTOMATIC, BILATERAL: ICD-10-CM

## 2022-05-24 DIAGNOSIS — Z82.49 FAMILY HISTORY OF ABDOMINAL AORTIC ANEURYSM (AAA): ICD-10-CM

## 2022-05-24 DIAGNOSIS — I73.9 PAD (PERIPHERAL ARTERY DISEASE) (HCC): Primary | ICD-10-CM

## 2022-05-24 PROCEDURE — G8417 CALC BMI ABV UP PARAM F/U: HCPCS | Performed by: SURGERY

## 2022-05-24 PROCEDURE — 3017F COLORECTAL CA SCREEN DOC REV: CPT | Performed by: SURGERY

## 2022-05-24 PROCEDURE — 1090F PRES/ABSN URINE INCON ASSESS: CPT | Performed by: SURGERY

## 2022-05-24 PROCEDURE — 1036F TOBACCO NON-USER: CPT | Performed by: SURGERY

## 2022-05-24 PROCEDURE — 1123F ACP DISCUSS/DSCN MKR DOCD: CPT | Performed by: SURGERY

## 2022-05-24 PROCEDURE — 99214 OFFICE O/P EST MOD 30 MIN: CPT | Performed by: SURGERY

## 2022-05-24 PROCEDURE — G8399 PT W/DXA RESULTS DOCUMENT: HCPCS | Performed by: SURGERY

## 2022-05-24 PROCEDURE — G8427 DOCREV CUR MEDS BY ELIG CLIN: HCPCS | Performed by: SURGERY

## 2022-05-24 NOTE — PROGRESS NOTES
Division of Vascular Surgery        Follow Up      Chief Complaint:      Carotid stenosis, peripheral arterial disease    History of Present Illness:      Mauricio Mendoza is a 69 y.o. woman who presents for follow up regarding her carotid stenosis and peripheral arterial disease. She denies any new neurologic symptoms suggesting stroke or TIA. No unilateral weakness, numbness/tingling, facial droop, thick/slurred speech or symptoms suggestive of amaurosis fugax. She continue to get cramping in her feet at night time, no specific claudication symptoms, she can walk just fine. Does get limited at times due to back and hip issues from arthritis. She does have evidenc eof venous insufficiency in her legs with varicose and reticular veins. Denies any numbness/tingling or weakness in her feet to suggest ischemic rest pain. Cramping at night time    Mauricio Mendoza is a 74 y.o. woman who presents for follow up regarding her carotid and peripheral arterial disease. She denies any unilateral weakness, numbness/tingling, facial droop, thick/slurred speech or symptoms suggestive of amaurosis fugax. She does have bilateral numbness inher feet. Her legs do start to hurt her when she walks short distances, does not describe significant claudication type symptoms though. Develops cramping in the evenings which improve with walking and stretching.   She does not have any open wounds or sores, but has developed skin lesion along her leg and lower abdomen, being worked up by PCP and dermatologist.    Medical History:     Past Medical History:   Diagnosis Date    Angina pectoris ([facility]     Arthritis     Bipolar disorder ([facility]     CAD (coronary artery disease)     stents x 2 (Dr. Yvonne Holland)    Carotid stenosis, asymptomatic 2/26/2015    Chronic back pain     COPD (chronic obstructive pulmonary disease) ([facility]     Depression     Dyspnea     Family history of colon cancer     Family history of liver cancer     Family history of ovarian cancer     GERD (gastroesophageal reflux disease)     History of colon polyps     Hyperlipidemia     Hypertension     Lung nodule     New onset type 2 diabetes mellitus ([facility] 2018    Obesity     Obesity (BMI 35.0-39.9 without comorbidity)    Peripheral vascular disease (HCC)     Poor historian     Sleep apnea     CRISTINA on CPAP    Smoking greater than 40 pack years     reports that she quit smoking about 2 years ago. 1.5 PPD for 48 yeras.  She has a       Surgical History:     Past Surgical History:   Procedure Laterality Date    ANKLE SURGERY      CARDIAC CATHETERIZATION  10/11/2013    2 stents    CATARACT REMOVAL      cataract both eyes with lenses     SECTION      x2    COLONOSCOPY      COLONOSCOPY N/A 2020    COLONOSCOPY POLYPECTOMY HOT BIOPSY performed by Mat [facility]ley MD at [facility]  2011    x2    ENDOSCOPY, COLON, DIAGNOSTIC      EYE SURGERY      b/l cataract extraction    INDUCED       NERVE BLOCK Bilateral 2018    bilat facets #1 marcaine    NERVE BLOCK  2019    caudal aborted- did not get in   [address] Bilateral 02/15/2019    bilateral l4 transforaminal. decadron 8mg/ProHance contrast    NOSE SURGERY      as a child    TONSILLECTOMY         Family History:     Family History   Problem Relation Age of Onset    Hypertension Mother     Macular Degen Mother     Other Mother         vascular    Colon Polyps Brother     Cancer Maternal Aunt    Father passed away with ruptured abdominal aortic aneurysm    Allergies:       Contrast [iodides]    Medications:      Current Outpatient Medications   Medication Sig Dispense Refill    fluticasone (ARNUITY ELLIPTA) 100 MCG/ACT AEPB Inhale 1 puff into the lungs daily 30 each 5    omeprazole (PRILOSEC) 40 MG delayed release capsule take 1 capsule by mouth once daily 90 capsule 0    amLODIPine (NORVASC) 10 MG tablet 1 tablet once a day 90 tablet 1    hydroCHLOROthiazide (HYDRODIURIL) 25 MG tablet 1 tablet daily 90 tablet 1    rosuvastatin (CRESTOR) 5 MG tablet Take 1 tablet by mouth nightly 90 tablet 1    carvedilol (COREG) 3.125 MG tablet take 1 tablet by mouth twice a day 180 tablet 1    sertraline (ZOLOFT) 50 MG tablet take 1 tablet by mouth once daily 120 tablet 0    losartan (COZAAR) 100 MG tablet Once a day 90 tablet 1    STIOLTO RESPIMAT 2.5-2.5 MCG/ACT AERS inhale 2 puffs by mouth and INTO THE LUNGS once daily 4 g 2    potassium chloride (KLOR-CON M) 10 MEQ extended release tablet take 1 tablet by mouth once daily 90 tablet 2    betamethasone dipropionate (DIPROLENE) 0.05 % ointment apply topically daily as directed 45 g 1    triamcinolone (KENALOG) 0.025 % cream apply to affected area once daily 30 g 1    albuterol sulfate HFA (PROVENTIL HFA) 108 (90 Base) MCG/ACT inhaler Inhale 2 puffs into the lungs every 6 hours as needed for Wheezing 1 Inhaler 5    b complex vitamins capsule Take 1 capsule by mouth daily      Coenzyme Q10-Fish Oil-Vit E (CO-Q 10 OMEGA-3 FISH OIL) CAPS Take 1,000 mg by mouth daily      magnesium 30 MG tablet Take 30 mg by mouth 2 times daily      Multiple Vitamin (MULTI-VITAMIN PO) Take 1 tablet by mouth daily.  aspirin 325 MG tablet Take 325 mg by mouth daily. No current facility-administered medications for this visit. Social History:     Tobacco:    reports that she quit smoking about 6 years ago. Her smoking use included cigarettes. She started smoking about 54 years ago. She has a 60.00 pack-year smoking history. She has never used smokeless tobacco.  Alcohol:      reports current alcohol use. Drug Use:  reports current drug use. Drug: Marijuana Jaskaran Parfranklin). Review of Systems:     Review of Systems   Constitutional: Negative for chills and fever. HENT: Negative for congestion. Eyes: Negative for visual disturbance.    Respiratory: Negative for chest tightness and shortness of breath. Cardiovascular: Negative for chest pain and leg swelling. Gastrointestinal: Negative for abdominal pain. Endocrine: Negative. Genitourinary: Negative. Musculoskeletal: Positive for arthralgias. Skin: Negative for color change and wound. Allergic/Immunologic: Negative. Neurological: Positive for numbness. Negative for facial asymmetry, speech difficulty and weakness. Hematological: Negative. Psychiatric/Behavioral: Negative. Physical Exam:     Vitals:  /82 (Site: Right Upper Arm, Position: Sitting, Cuff Size: Large Adult)   Pulse 66   Temp 98.6 °F (37 °C) (Temporal)   Resp 20   Ht 5' 3.5\" (1.613 m)   Wt 222 lb (100.7 kg)   SpO2 96%   BMI 38.71 kg/m²     Physical Exam  Constitutional:       Appearance: She is well-developed and well-groomed. Eyes:      Extraocular Movements: Extraocular movements intact. Conjunctiva/sclera: Conjunctivae normal.   Neck:      Vascular: No carotid bruit. Cardiovascular:      Rate and Rhythm: Normal rate and regular rhythm. Pulses:           Radial pulses are 2+ on the right side and 2+ on the left side. Dorsalis pedis pulses are detected w/ Doppler on the right side and 1+ on the left side. Posterior tibial pulses are detected w/ Doppler on the right side and 2+ on the left side. Pulmonary:      Effort: Pulmonary effort is normal. No respiratory distress. Abdominal:      Palpations: Abdomen is soft. Tenderness: There is no abdominal tenderness. Musculoskeletal:      Cervical back: Full passive range of motion without pain. Right lower leg: No swelling or tenderness. No edema. Left lower leg: No swelling or tenderness. No edema. Right foot: Normal capillary refill. No swelling or tenderness. Left foot: Normal capillary refill. No swelling or tenderness. Feet:      Right foot:      Skin integrity: No ulcer or skin breakdown.       Left foot:      Skin integrity: No ulcer or skin breakdown. Skin:     General: Skin is warm. Capillary Refill: Capillary refill takes less than 2 seconds. Neurological:      Mental Status: She is alert and oriented to person, place, and time. GCS: GCS eye subscore is 4. GCS verbal subscore is 5. GCS motor subscore is 6. Sensory: Sensation is intact. Motor: Motor function is intact. Psychiatric:         Mood and Affect: Mood normal.         Speech: Speech normal.         Behavior: Behavior normal.       Imaging/Labs:     Carotid duplex May 2022    Right ICA 50-69% stenosis  Left ICA 70-99% stenosis            Assessment and Plan:     Bilateral carotid stenosis asymptomatic, peripheral arterial disease, family history of AAA  · Symptoms do not correlate with claudication but clinically has evidence of PAD  · She was not able to get scheduled for CTA which was ordered last time,will try to arrange for this  · Will need premedication with steroids and benadryl prior to imaging due to itchiness allergy to contrast agents (50 mg prednisone at 13, 7 and 1 hour prior and 50 mg benadryl 1 hour prior to contrast administration)  · This will allow me to see extent of her PAD as well as evaluate for aneurysmal disease  · Optimal medical therapy with aspirin and statins  · Daily exercise to improve overall cardiovascular health  · She will follow up once her testing is done  · Continue medical therapy and management of carotid disease, elective repair if stenosis progresses to 80-99% for best stroke reduction risk benefit ratio    Optimal medical management is an important part of overall treatment of all patients with carotid bifurcation disease, regardless of the degree of stenosis or the plan for intervention. This therapy is directed both at the reduction of stroke and overall cardiovascular events, including cardiovascular-related mortality.     Clinical guidelines issued by the American Heart Association and the American Stroke Association recommends:    I. Lowering blood pressure to a target 140/90 mm Hg by lifestyle interventions and antihypertensive treatment is recommended in individuals who have hypertension with asymptomatic carotid atherosclerosis. II. Glucose control to nearly normoglycemic levels (target hemoglobin A1C 7%) is recommended among diabetic patients to reduce microvascular complications and, with lesser certainty, macrovascular complications other than stroke. III. Patients with known atherosclerosis have demonstrated reduced stroke rates when treated with lipid-lowering therapy. And continued low dose statin will help stabilize plaque and decrease the inflammatory response of atherosclerosis. IV. Smoking nearly doubles the risk of stroke and with cessation there is a reduction in the risk of stroke. V. Antiplatelet therapy in asymptomatic patients with carotid atherosclerosis is recommended to reduce overall cardiovascular morbidity although it has not been shown to be effective in the primary prevention of stroke. Electronically signed by Tiffanie Levi MD on 5/24/22 at 11:44 AM EDT      [address]: [phone]  C: [phone]  Email: [email or web address]

## 2022-05-31 RX ORDER — PREDNISONE 50 MG/1
50 TABLET ORAL SEE ADMIN INSTRUCTIONS
Qty: 3 TABLET | Refills: 0 | Status: SHIPPED | OUTPATIENT
Start: 2022-05-31 | End: 2022-07-19 | Stop reason: ALTCHOICE

## 2022-05-31 ASSESSMENT — ENCOUNTER SYMPTOMS
ABDOMINAL PAIN: 0
ALLERGIC/IMMUNOLOGIC NEGATIVE: 1
CHEST TIGHTNESS: 0
COLOR CHANGE: 0
SHORTNESS OF BREATH: 0

## 2022-06-01 ENCOUNTER — TELEPHONE (OUTPATIENT)
Dept: VASCULAR SURGERY | Age: 67
End: 2022-06-01

## 2022-06-01 NOTE — TELEPHONE ENCOUNTER
----- Message from Randal Rincon MA sent at 6/1/2022  9:03 AM EDT -----  PT notified,she verbalized understanding.   ----- Message -----  From: Concepcion Maradiaga MD  Sent: 5/31/2022   3:54 PM EDT  To: Carlos Girard Heart/Vascular Clinical Staff    Will need premedication with steroids and benadryl prior to imaging due to itchiness allergy to contrast agents (50 mg prednisone at 13, 7 and 1 hour prior and 50 mg benadryl 1 hour prior to contrast administration)

## 2022-06-10 ENCOUNTER — HOSPITAL ENCOUNTER (OUTPATIENT)
Dept: CT IMAGING | Age: 67
Discharge: HOME | End: 2022-06-12

## 2022-06-10 DIAGNOSIS — I70.0 ATHEROSCLEROSIS OF AORTA (HCC): ICD-10-CM

## 2022-06-10 DIAGNOSIS — I73.9 CLAUDICATION IN PERIPHERAL VASCULAR DISEASE (HCC): ICD-10-CM

## 2022-06-10 RX ORDER — SODIUM CHLORIDE 0.9 % (FLUSH) 0.9 %
10 SYRINGE (ML) INJECTION PRN
Status: DISCONTINUED | OUTPATIENT
Start: 2022-06-10 | End: 2022-06-10

## 2022-06-10 RX ORDER — 0.9 % SODIUM CHLORIDE 0.9 %
80 INTRAVENOUS SOLUTION INTRAVENOUS ONCE
Status: DISCONTINUED | OUTPATIENT
Start: 2022-06-10 | End: 2022-06-10

## 2022-06-13 ENCOUNTER — TELEPHONE (OUTPATIENT)
Dept: ONCOLOGY | Age: 67
End: 2022-06-13

## 2022-06-20 ENCOUNTER — HOSPITAL ENCOUNTER (OUTPATIENT)
Dept: CT IMAGING | Age: 67
Discharge: HOME | End: 2022-06-22
Payer: MEDICARE

## 2022-06-20 DIAGNOSIS — Z87.891 PERSONAL HISTORY OF TOBACCO USE: ICD-10-CM

## 2022-06-20 PROCEDURE — 71271 CT THORAX LUNG CANCER SCR C-: CPT

## 2022-07-02 DIAGNOSIS — F41.9 ANXIETY: ICD-10-CM

## 2022-07-05 NOTE — TELEPHONE ENCOUNTER
E-scribe request for Zoloft. Please review and e-scribe if applicable. Next Visit Date:  Future Appointments   Date Time Provider Zohreh Avilai   7/19/2022  2:45 PM Brendan Murphy MD [facility]   8/25/2022 10:30 [facility]   11/9/2022  1:30 PM Anitha King MD [facility] [place] Maintenance   Topic Date Due    Annual Wellness Visit (AWV)  09/19/2021    Diabetic retinal exam  03/29/2022    Diabetic foot exam  05/13/2022    Hepatitis C screen  08/09/2022 (Originally 1/20/1973)    Shingles vaccine (1 of 2) 03/23/2023 (Originally 1/20/2003)    Flu vaccine (1) 09/01/2022    Colorectal Cancer Screen  02/06/2023    A1C test (Diabetic or Prediabetic)  02/10/2023    Depression Monitoring  02/10/2023    Diabetic microalbuminuria test  04/07/2023    Lipids  04/07/2023    Low dose CT lung screening  06/20/2023    Breast cancer screen  12/30/2023    DTaP/Tdap/Td vaccine (2 - Td or Tdap) 10/24/2028    DEXA (modify frequency per FRAX score)  Completed    Pneumococcal 65+ years Vaccine  Completed    COVID-19 Vaccine  Completed    Hepatitis A vaccine  Aged Out    Hib vaccine  Aged Out    Meningococcal (ACWY) vaccine  Aged Out               (applicable per patient's age: Cancer Screenings, Depression Screening, Fall Risk Screening, Immunizations)    Hemoglobin A1C (%)   Date Value   02/10/2022 6.3   05/07/2021 6.3 (H)   09/08/2020 6.3 (H)     Microalb/Crt.  Ratio (mcg/mg creat)   Date Value   04/07/2022 20     LDL Cholesterol (mg/dL)   Date Value   04/07/2022 108     AST (U/L)   Date Value   09/13/2021 20     ALT (U/L)   Date Value   09/13/2021 16     BUN (mg/dL)   Date Value   09/13/2021 24 (H)      (goal A1C is < 7)   (goal LDL is <100) need 30-50% reduction from baseline     BP Readings from Last 3 Encounters:   05/24/22 138/82   05/18/22 (!) 157/58   03/31/22 (!) 174/66    (goal /80)      All Future Testing planned in [facility]:  Lab Frequency Next Occurrence COVID-19 Once 10/09/2021   COVID-19 Once 11/13/2021   US BREAST COMPLETE RIGHT Once 06/15/2022   CONOR DIGITAL SCREEN UNILATERAL RIGHT Once 01/18/2023   Clostridium Difficile Toxin/Antigen Once 06/30/2022            Patient Active Problem List:     Smoker     HTN (hypertension)     CAD/Stents drug-eluting      Serum lipids high     Carotid stenosis, asymptomatic     Nocturnal hypoxia     CRISTINA on CPAP     PAD (peripheral artery disease) ([facility]

## 2022-07-16 NOTE — TELEPHONE ENCOUNTER
What diagnosis should I use? [Dear  ___] : Dear  [unfilled], [Consult Letter:] : I had the pleasure of evaluating your patient, [unfilled]. [Please see my note below.] : Please see my note below. [Consult Closing:] : Thank you very much for allowing me to participate in the care of this patient.  If you have any questions, please do not hesitate to contact me. [Sincerely,] : Sincerely, [FreeTextEntry2] : Dr. Wilber Crum [FreeTextEntry3] : Antonette Tran MD, FAASM\par  of Medicine\par Associate , Fellowship in Pulmonary and Critical Care Medicine\par Division of Pulmonary, Critical Care & Sleep Medicine\par [employer] [facility] [employer]\par \par \par

## 2022-07-19 ENCOUNTER — OFFICE VISIT (OUTPATIENT)
Dept: INTERNAL MEDICINE | Age: 67
End: 2022-07-19
Payer: MEDICARE

## 2022-07-19 VITALS
SYSTOLIC BLOOD PRESSURE: 125 MMHG | DIASTOLIC BLOOD PRESSURE: 56 MMHG | BODY MASS INDEX: 38.71 KG/M2 | TEMPERATURE: 97.8 F | HEART RATE: 70 BPM | WEIGHT: 222 LBS | OXYGEN SATURATION: 94 %

## 2022-07-19 DIAGNOSIS — E66.01 MORBID OBESITY WITH BODY MASS INDEX (BMI) OF 40.0 OR HIGHER (HCC): ICD-10-CM

## 2022-07-19 DIAGNOSIS — E78.5 DYSLIPIDEMIA: ICD-10-CM

## 2022-07-19 DIAGNOSIS — E11.8 TYPE 2 DIABETES MELLITUS WITH COMPLICATION, WITHOUT LONG-TERM CURRENT USE OF INSULIN (HCC): ICD-10-CM

## 2022-07-19 DIAGNOSIS — Z13.31 POSITIVE DEPRESSION SCREENING: ICD-10-CM

## 2022-07-19 DIAGNOSIS — I10 ESSENTIAL HYPERTENSION: Primary | ICD-10-CM

## 2022-07-19 DIAGNOSIS — I73.9 PAD (PERIPHERAL ARTERY DISEASE) (HCC): ICD-10-CM

## 2022-07-19 DIAGNOSIS — G89.29 CHRONIC BILATERAL LOW BACK PAIN WITHOUT SCIATICA: ICD-10-CM

## 2022-07-19 DIAGNOSIS — I25.10 CORONARY ARTERY DISEASE INVOLVING NATIVE CORONARY ARTERY OF NATIVE HEART WITHOUT ANGINA PECTORIS: ICD-10-CM

## 2022-07-19 DIAGNOSIS — G47.33 OSA ON CPAP: ICD-10-CM

## 2022-07-19 DIAGNOSIS — M54.50 CHRONIC BILATERAL LOW BACK PAIN WITHOUT SCIATICA: ICD-10-CM

## 2022-07-19 DIAGNOSIS — F33.1 MODERATE RECURRENT MAJOR DEPRESSION (HCC): ICD-10-CM

## 2022-07-19 DIAGNOSIS — M79.10 MYALGIA: ICD-10-CM

## 2022-07-19 DIAGNOSIS — Z99.89 OSA ON CPAP: ICD-10-CM

## 2022-07-19 PROCEDURE — 1123F ACP DISCUSS/DSCN MKR DOCD: CPT | Performed by: INTERNAL MEDICINE

## 2022-07-19 PROCEDURE — 3044F HG A1C LEVEL LT 7.0%: CPT | Performed by: INTERNAL MEDICINE

## 2022-07-19 PROCEDURE — G8427 DOCREV CUR MEDS BY ELIG CLIN: HCPCS | Performed by: INTERNAL MEDICINE

## 2022-07-19 PROCEDURE — G8417 CALC BMI ABV UP PARAM F/U: HCPCS | Performed by: INTERNAL MEDICINE

## 2022-07-19 PROCEDURE — 1036F TOBACCO NON-USER: CPT | Performed by: INTERNAL MEDICINE

## 2022-07-19 PROCEDURE — 2022F DILAT RTA XM EVC RTNOPTHY: CPT | Performed by: INTERNAL MEDICINE

## 2022-07-19 PROCEDURE — 99211 OFF/OP EST MAY X REQ PHY/QHP: CPT | Performed by: INTERNAL MEDICINE

## 2022-07-19 PROCEDURE — 3017F COLORECTAL CA SCREEN DOC REV: CPT | Performed by: INTERNAL MEDICINE

## 2022-07-19 PROCEDURE — G8399 PT W/DXA RESULTS DOCUMENT: HCPCS | Performed by: INTERNAL MEDICINE

## 2022-07-19 PROCEDURE — 99214 OFFICE O/P EST MOD 30 MIN: CPT | Performed by: INTERNAL MEDICINE

## 2022-07-19 PROCEDURE — 1090F PRES/ABSN URINE INCON ASSESS: CPT | Performed by: INTERNAL MEDICINE

## 2022-07-19 RX ORDER — LOSARTAN POTASSIUM 100 MG/1
TABLET ORAL
Qty: 90 TABLET | Refills: 1 | Status: SHIPPED | OUTPATIENT
Start: 2022-07-19

## 2022-07-19 RX ORDER — FLUTICASONE FUROATE 100 UG/1
1 POWDER RESPIRATORY (INHALATION) DAILY
Qty: 30 EACH | Refills: 5 | Status: SHIPPED | OUTPATIENT
Start: 2022-07-19

## 2022-07-19 RX ORDER — AMLODIPINE BESYLATE 10 MG/1
TABLET ORAL
Qty: 90 TABLET | Refills: 1 | Status: SHIPPED | OUTPATIENT
Start: 2022-07-19

## 2022-07-19 RX ORDER — CARVEDILOL 3.12 MG/1
TABLET ORAL
Qty: 180 TABLET | Refills: 1 | Status: SHIPPED | OUTPATIENT
Start: 2022-07-19

## 2022-07-19 RX ORDER — HYDROCHLOROTHIAZIDE 25 MG/1
TABLET ORAL
Qty: 90 TABLET | Refills: 1 | Status: SHIPPED | OUTPATIENT
Start: 2022-07-19

## 2022-07-19 RX ORDER — OMEPRAZOLE 40 MG/1
CAPSULE, DELAYED RELEASE ORAL
Qty: 90 CAPSULE | Refills: 1 | Status: SHIPPED | OUTPATIENT
Start: 2022-07-19

## 2022-07-19 RX ORDER — ROSUVASTATIN CALCIUM 5 MG/1
5 TABLET, COATED ORAL NIGHTLY
Qty: 90 TABLET | Refills: 1 | Status: SHIPPED | OUTPATIENT
Start: 2022-07-19

## 2022-07-19 ASSESSMENT — PATIENT HEALTH QUESTIONNAIRE - PHQ9
SUM OF ALL RESPONSES TO PHQ QUESTIONS 1-9: 19
4. FEELING TIRED OR HAVING LITTLE ENERGY: 3
3. TROUBLE FALLING OR STAYING ASLEEP: 2
5. POOR APPETITE OR OVEREATING: 2
SUM OF ALL RESPONSES TO PHQ QUESTIONS 1-9: 19
9. THOUGHTS THAT YOU WOULD BE BETTER OFF DEAD, OR OF HURTING YOURSELF: 0
10. IF YOU CHECKED OFF ANY PROBLEMS, HOW DIFFICULT HAVE THESE PROBLEMS MADE IT FOR YOU TO DO YOUR WORK, TAKE CARE OF THINGS AT HOME, OR GET ALONG WITH OTHER PEOPLE: 1
8. MOVING OR SPEAKING SO SLOWLY THAT OTHER PEOPLE COULD HAVE NOTICED. OR THE OPPOSITE, BEING SO FIGETY OR RESTLESS THAT YOU HAVE BEEN MOVING AROUND A LOT MORE THAN USUAL: 3
7. TROUBLE CONCENTRATING ON THINGS, SUCH AS READING THE NEWSPAPER OR WATCHING TELEVISION: 3
SUM OF ALL RESPONSES TO PHQ QUESTIONS 1-9: 19
SUM OF ALL RESPONSES TO PHQ QUESTIONS 1-9: 19
2. FEELING DOWN, DEPRESSED OR HOPELESS: 3
6. FEELING BAD ABOUT YOURSELF - OR THAT YOU ARE A FAILURE OR HAVE LET YOURSELF OR YOUR FAMILY DOWN: 3

## 2022-07-19 ASSESSMENT — ENCOUNTER SYMPTOMS
WHEEZING: 0
BACK PAIN: 1
SHORTNESS OF BREATH: 0
RHINORRHEA: 1
COUGH: 1

## 2022-07-19 NOTE — PROGRESS NOTES
PHQ-9 score today: (PHQ-9 Total Score: 19), additional evaluation and assessment performed, follow-up plan includes but not limited to: Medication management and Referral to /Specialist  for evaluation and management.

## 2022-07-19 NOTE — PROGRESS NOTES
[facility]    Progress Note    Date of patient's visit: 7/19/2022    Patient's Name:  Elsie Snellen    YOB: 1955            Patient Care Team:  Crispin Hardwick MD as PCP - General (Internal Medicine)  Crispin Hardwick MD as PCP - [facility] Empaneled Provider  Dago Dumont MD as Consulting Physician (Pulmonology)    REASON FOR VISIT: Routine outpatient follow     Chief Complaint   Patient presents with    Hypertension    Health Maintenance    Diabetes    Leg Pain     Pt c/o having ongoing leg cramping , can not stand at kitchen sink or walk from kitchen to bedroom without having pain and cramping     Depression     Pt states that she is feeling very depressed and does not feel  that her mental health is okay. States that she has a lot of going on with her mother and a lot of stress going on because of it. Dysphagia     Pt states that she has been having some difficulty swallowing at times, states that she has found herself choking when she swallows her spit at times along with a cough. HISTORY OF PRESENT ILLNESS:    History was obtained from the patient. Elsie Snellen is a 69 y.o. is here for follow-up. She is very anxious and also depressed. She is tearful. She has had a lot of stressors with her mother being in an assisted living. She has to handle all the paperwork. She also realized recently that if she went to a nursing home she would lose her house on Monday. She has been having some financial issues. I have advised her to talk to a  as she has been stressing about her financial issues. She is on sertraline. She has a long history of anxiety and depression. We will discussed maybe seeing a therapist.  I will see her back in a month. Her PHQ screen was high today but she is frustrated because of her health. She says she has noticed some increasing low back pain and hip pain bilaterally.   She seems in no CATHETERIZATION  10/11/2013    2 stents    CATARACT REMOVAL      cataract both eyes with lenses     SECTION      x2    COLONOSCOPY      COLONOSCOPY N/A 2020    COLONOSCOPY POLYPECTOMY HOT BIOPSY performed by Guevara Moreno MD at [address] [place] [address]  2011    x2    ENDOSCOPY, COLON, DIAGNOSTIC      EYE SURGERY      b/l cataract extraction    INDUCED       NERVE BLOCK Bilateral 2018    bilat facets #1 marcaine    NERVE BLOCK  2019    caudal aborted- did not get in    NERVE BLOCK Bilateral 02/15/2019    bilateral l4 transforaminal. decadron 8mg/ProHance contrast    NOSE SURGERY      as a child    TONSILLECTOMY           ALLERGIES      Allergies   Allergen Reactions    Contrast [Iodides] Itching     Mild itchiness experienced with administration of IV contrast media.        MEDICATIONS:      Current Outpatient Medications on File Prior to Visit   Medication Sig Dispense Refill    sertraline (ZOLOFT) 50 MG tablet take 1 tablet by mouth once daily 120 tablet 0    fluticasone (ARNUITY ELLIPTA) 100 MCG/ACT AEPB Inhale 1 puff into the lungs daily 30 each 5    omeprazole (PRILOSEC) 40 MG delayed release capsule take 1 capsule by mouth once daily 90 capsule 0    amLODIPine (NORVASC) 10 MG tablet 1 tablet once a day 90 tablet 1    hydroCHLOROthiazide (HYDRODIURIL) 25 MG tablet 1 tablet daily 90 tablet 1    rosuvastatin (CRESTOR) 5 MG tablet Take 1 tablet by mouth nightly 90 tablet 1    carvedilol (COREG) 3.125 MG tablet take 1 tablet by mouth twice a day 180 tablet 1    losartan (COZAAR) 100 MG tablet Once a day 90 tablet 1    potassium chloride (KLOR-CON M) 10 MEQ extended release tablet take 1 tablet by mouth once daily 90 tablet 2    betamethasone dipropionate (DIPROLENE) 0.05 % ointment apply topically daily as directed 45 g 1    triamcinolone (KENALOG) 0.025 % cream apply to affected area once daily 30 g 1    albuterol sulfate HFA (PROVENTIL HFA) 108 (90 Base) MCG/ACT inhaler Inhale 2 puffs into the lungs every 6 hours as needed for Wheezing 1 Inhaler 5    magnesium 30 MG tablet Take 30 mg by mouth 2 times daily      Multiple Vitamin (MULTI-VITAMIN PO) Take 1 tablet by mouth daily. aspirin 325 MG tablet Take 325 mg by mouth daily. No current facility-administered medications on file prior to visit. HISTORY    Reviewed and no change from previous record. Aida Quinn  reports that she quit smoking about 7 years ago. Her smoking use included cigarettes. She started smoking about 54 years ago. She has a 60.00 pack-year smoking history. She has never used smokeless tobacco.    FAMILY HISTORY:    Reviewed and No change from previous visit    HEALTH MAINTENANCE DUE:      Health Maintenance Due   Topic Date Due    Annual Wellness Visit (AWV)  09/19/2021    Diabetic retinal exam  03/29/2022    Diabetic foot exam  05/13/2022       REVIEW OF SYSTEMS:    12 point review of symptoms completed and found to be normal except noted in the HPI    Review of Systems   Constitutional:  Positive for fatigue. HENT:  Positive for congestion and rhinorrhea. Respiratory:  Positive for cough. Negative for shortness of breath and wheezing. Cardiovascular:  Negative for chest pain, palpitations and leg swelling. Endocrine: Negative for polydipsia and polyuria. Musculoskeletal:  Positive for arthralgias, back pain and myalgias. Neurological:  Positive for numbness. Negative for dizziness, syncope and weakness. Hematological:  Negative for adenopathy. Does not bruise/bleed easily. Psychiatric/Behavioral:  Positive for dysphoric mood and sleep disturbance. Negative for self-injury and suicidal ideas. The patient is nervous/anxious.        PHYSICAL EXAM:     Vitals:    07/19/22 1503   BP: (!) 125/56   Site: Left Upper Arm   Position: Sitting   Cuff Size: Large Adult   Pulse: 70   Temp: 97.8 °F (36.6 °C)   TempSrc: Infrared   SpO2: 94%   Weight: 222 lb (100.7 kg) Body mass index is 38.71 kg/m². BP Readings from Last 3 Encounters:   07/19/22 (!) 125/56   05/24/22 138/82   05/18/22 (!) 157/58        Wt Readings from Last 3 Encounters:   07/19/22 222 lb (100.7 kg)   05/24/22 222 lb (100.7 kg)   05/18/22 225 lb 3.2 oz (102.2 kg)       Physical Exam  Vitals and nursing note reviewed. Constitutional:       Appearance: Normal appearance. HENT:      Head: Normocephalic and atraumatic. Eyes:      Extraocular Movements: Extraocular movements intact. Conjunctiva/sclera: Conjunctivae normal.      Pupils: Pupils are equal, round, and reactive to light. Cardiovascular:      Rate and Rhythm: Normal rate and regular rhythm. Heart sounds: No murmur heard. Pulmonary:      Effort: Pulmonary effort is normal.      Breath sounds: Normal breath sounds. No wheezing. Musculoskeletal:      Cervical back: Normal range of motion and neck supple. Right lower leg: No edema. Left lower leg: No edema. Neurological:      General: No focal deficit present. Mental Status: She is alert and oriented to person, place, and time. Sensory: Sensory deficit present.            LABORATORY FINDINGS:    CBC:  Lab Results   Component Value Date/Time    WBC 6.8 04/07/2022 11:19 AM    HGB 11.4 04/07/2022 11:19 AM     04/07/2022 11:19 AM     BMP:    Lab Results   Component Value Date/Time     09/13/2021 03:46 PM    K 4.3 09/13/2021 03:46 PM     09/13/2021 03:46 PM    CO2 25 09/13/2021 03:46 PM    BUN 24 09/13/2021 03:46 PM    CREATININE 0.67 09/13/2021 03:46 PM    GLUCOSE 89 09/13/2021 03:46 PM    GLUCOSE 87 10/28/2011 08:57 AM     HEMOGLOBIN A1C:   Lab Results   Component Value Date/Time    LABA1C 6.3 02/10/2022 01:26 PM     MICROALBUMIN URINE:   Lab Results   Component Value Date/Time    MICROALBUR 18 04/07/2022 11:19 AM     FASTING LIPID PANEL:  Lab Results   Component Value Date    CHOL 178 04/07/2022    HDL 40 (L) 04/07/2022    TRIG 151 (H) [facility]  Asa    - rosuvastatin (CRESTOR) 5 MG tablet; Take 1 tablet by mouth nightly  Dispense: 90 tablet; Refill: 1    8. CRISTINA on CPAP      9. Morbid obesity with body mass index (BMI) of 40.0 or higher ([facility])  Lifestyle changes    10. Positive depression screening    - Ambulatory referral to [address] [place] [address]. Myalgia    - CK; Future          FOLLOW UP AND INSTRUCTIONS:   Return in about 4 weeks (around 8/16/2022). [place] received counseling on the following healthy behaviors: nutrition, exercise, and medication adherence    Reviewed prior labs and health maintenance. Discussed use, benefit, and side effects of prescribed medications. Barriers to medication compliance addressed. All patient questions answered. Pt voiced understanding.          Cindy Arguello  Attending Physician, [address] [place] [address], Internal Medicine Residency Program  [address] [place] [address]  7/19/2022, 3:12 PM

## 2022-08-05 ENCOUNTER — HOSPITAL ENCOUNTER (OUTPATIENT)
Dept: GENERAL RADIOLOGY | Age: 67
Discharge: HOME | End: 2022-08-07
Payer: MEDICARE

## 2022-08-05 ENCOUNTER — HOSPITAL ENCOUNTER (OUTPATIENT)
Age: 67
Discharge: HOME | End: 2022-08-05
Payer: MEDICARE

## 2022-08-05 ENCOUNTER — HOSPITAL ENCOUNTER (OUTPATIENT)
Age: 67
Discharge: HOME | End: 2022-08-07
Payer: MEDICARE

## 2022-08-05 DIAGNOSIS — F33.1 MODERATE RECURRENT MAJOR DEPRESSION (HCC): ICD-10-CM

## 2022-08-05 DIAGNOSIS — G89.29 CHRONIC BILATERAL LOW BACK PAIN WITHOUT SCIATICA: ICD-10-CM

## 2022-08-05 DIAGNOSIS — E11.8 TYPE 2 DIABETES MELLITUS WITH COMPLICATION, WITHOUT LONG-TERM CURRENT USE OF INSULIN (HCC): ICD-10-CM

## 2022-08-05 DIAGNOSIS — M54.50 CHRONIC BILATERAL LOW BACK PAIN WITHOUT SCIATICA: ICD-10-CM

## 2022-08-05 DIAGNOSIS — M79.10 MYALGIA: ICD-10-CM

## 2022-08-05 DIAGNOSIS — E78.5 DYSLIPIDEMIA: ICD-10-CM

## 2022-08-05 LAB
CHOLESTEROL/HDL RATIO: 4.1
CHOLESTEROL: 181 MG/DL
ESTIMATED AVERAGE GLUCOSE: 140 MG/DL
HBA1C MFR BLD: 6.5 % (ref 4–6)
HDLC SERPL-MCNC: 44 MG/DL
LDL CHOLESTEROL: 108 MG/DL (ref 0–130)
TOTAL CK: 69 U/L (ref 26–192)
TRIGL SERPL-MCNC: 147 MG/DL

## 2022-08-05 PROCEDURE — 84443 ASSAY THYROID STIM HORMONE: CPT

## 2022-08-05 PROCEDURE — 36415 COLL VENOUS BLD VENIPUNCTURE: CPT

## 2022-08-05 PROCEDURE — 72100 X-RAY EXAM L-S SPINE 2/3 VWS: CPT

## 2022-08-05 PROCEDURE — 82550 ASSAY OF CK (CPK): CPT

## 2022-08-05 PROCEDURE — 83036 HEMOGLOBIN GLYCOSYLATED A1C: CPT

## 2022-08-05 PROCEDURE — 73502 X-RAY EXAM HIP UNI 2-3 VIEWS: CPT

## 2022-08-05 PROCEDURE — 80061 LIPID PANEL: CPT

## 2022-08-06 LAB — TSH SERPL DL<=0.05 MIU/L-ACNC: 3.34 UIU/ML (ref 0.3–5)

## 2022-08-09 ENCOUNTER — TELEMEDICINE (OUTPATIENT)
Dept: INTERNAL MEDICINE | Age: 67
End: 2022-08-09
Payer: MEDICARE

## 2022-08-09 ENCOUNTER — HOSPITAL ENCOUNTER (OUTPATIENT)
Dept: CT IMAGING | Age: 67
Discharge: HOME | End: 2022-08-11
Payer: MEDICARE

## 2022-08-09 DIAGNOSIS — M54.50 CHRONIC BILATERAL LOW BACK PAIN WITHOUT SCIATICA: ICD-10-CM

## 2022-08-09 DIAGNOSIS — G89.29 CHRONIC BILATERAL LOW BACK PAIN WITHOUT SCIATICA: ICD-10-CM

## 2022-08-09 DIAGNOSIS — F33.1 MODERATE RECURRENT MAJOR DEPRESSION (HCC): Primary | ICD-10-CM

## 2022-08-09 DIAGNOSIS — M16.0 PRIMARY OSTEOARTHRITIS OF BOTH HIPS: ICD-10-CM

## 2022-08-09 DIAGNOSIS — E66.01 CLASS 2 SEVERE OBESITY DUE TO EXCESS CALORIES WITH SERIOUS COMORBIDITY AND BODY MASS INDEX (BMI) OF 38.0 TO 38.9 IN ADULT (HCC): ICD-10-CM

## 2022-08-09 LAB
CREAT SERPL-MCNC: 0.61 MG/DL (ref 0.5–0.9)
GFR AFRICAN AMERICAN: >60 ML/MIN
GFR NON-AFRICAN AMERICAN: >60 ML/MIN
GFR SERPL CREATININE-BSD FRML MDRD: NORMAL ML/MIN/{1.73_M2}

## 2022-08-09 PROCEDURE — 6360000004 HC RX CONTRAST MEDICATION: Performed by: SURGERY

## 2022-08-09 PROCEDURE — 75635 CT ANGIO ABDOMINAL ARTERIES: CPT

## 2022-08-09 PROCEDURE — 36415 COLL VENOUS BLD VENIPUNCTURE: CPT

## 2022-08-09 PROCEDURE — 2580000003 HC RX 258: Performed by: SURGERY

## 2022-08-09 PROCEDURE — 99442 PR PHYS/QHP TELEPHONE EVALUATION 11-20 MIN: CPT | Performed by: INTERNAL MEDICINE

## 2022-08-09 PROCEDURE — 82565 ASSAY OF CREATININE: CPT

## 2022-08-09 RX ORDER — 0.9 % SODIUM CHLORIDE 0.9 %
80 INTRAVENOUS SOLUTION INTRAVENOUS ONCE
Status: DISCONTINUED | OUTPATIENT
Start: 2022-08-09 | End: 2022-08-12 | Stop reason: HOSPADM

## 2022-08-09 RX ORDER — SODIUM CHLORIDE 0.9 % (FLUSH) 0.9 %
10 SYRINGE (ML) INJECTION PRN
Status: DISCONTINUED | OUTPATIENT
Start: 2022-08-09 | End: 2022-08-12 | Stop reason: HOSPADM

## 2022-08-09 RX ADMIN — IOPAMIDOL 100 ML: 755 INJECTION, SOLUTION INTRAVENOUS at 10:59

## 2022-08-09 RX ADMIN — Medication 80 ML: at 11:00

## 2022-08-09 RX ADMIN — SODIUM CHLORIDE, PRESERVATIVE FREE 10 ML: 5 INJECTION INTRAVENOUS at 11:00

## 2022-08-09 ASSESSMENT — ENCOUNTER SYMPTOMS
DIARRHEA: 0
WHEEZING: 0
PHOTOPHOBIA: 0
SHORTNESS OF BREATH: 0
COUGH: 0
CONSTIPATION: 0
ABDOMINAL PAIN: 0
BACK PAIN: 1

## 2022-08-09 NOTE — PROGRESS NOTES
Ricardo Kramer (:  1955) is a Established patient, here for evaluation of the following:    Assessment & Plan   Below is the assessment and plan developed based on review of pertinent history, physical exam, labs, studies, and medications. 1. Moderate recurrent major depression ([address]  2. Chronic bilateral low back pain without sciatica  -     [facility] - [place]  3. Primary osteoarthritis of both hips  -     [facility] - [place]  4. Class 2 severe obesity due to excess calories with serious comorbidity and body mass index (BMI) of 38.0 to 38.9 in [facility])    Return in about 3 months (around 2022). Subjective   HPI  Patient initiated a phone visit to discuss her test results. She was complaining of diarrhea last time. Stool tests have all been negative and calprotectin is negative. She says now stools are actually improved and she has been having formed stools the last few days. She admits it could be related to her stress. She did have a colonoscopy with Dr. Kieran Calixto a few years ago. She does have dysphoric symptoms and major depression. She did make an appointment to see a counselor for CBT next week. She had x-rays which are showing osteoarthritic changes in hips and lumbar spine. I discussed weight loss and staying active. We will put in a referral for physical therapy for her back and hip pain. She agrees to start therapy. She would like to do water therapy. She did do a CT of her abdomen today. She has a follow-up with vascular surgeon later this month. Routine labs done recently also discussed with her. No medication changes were made today. Impression   Right hip:       1. Mild right hip osteoarthrosis. 2. No acute fracture or dislocation. Left hip:       1. Mild left hip osteoarthrosis. 2. No acute fracture or dislocation. Lumbar spine:       1.  Mild to moderate multilevel degenerative changes in the lumbar spine.   2. No acute vertebral body height loss in the lumbar spine. 3. Mild S-shaped scoliosis of the thoracolumbar spine. 4. Atherosclerotic calcification of the aorta and branch vasculature. Review of Systems   Constitutional:  Positive for fatigue. Negative for chills and fever. Eyes:  Negative for photophobia. Respiratory:  Negative for cough, shortness of breath and wheezing. Cardiovascular:  Negative for chest pain, palpitations and leg swelling. Gastrointestinal:  Negative for abdominal pain, constipation and diarrhea. Endocrine: Negative for polydipsia and polyuria. Musculoskeletal:  Positive for arthralgias and back pain. Negative for gait problem. Neurological:  Negative for dizziness, weakness and headaches. Psychiatric/Behavioral:  Positive for dysphoric mood. Negative for self-injury, sleep disturbance and suicidal ideas. The patient is nervous/anxious. Objective   Patient-Reported Vitals  No data recorded     Physical Exam         On this date 8/9/2022 I have spent 20 minutes reviewing previous notes, test results and face to face (virtual) with the patient discussing the diagnosis and importance of compliance with the treatment plan as well as documenting on the day of the visitSusana Quan, was evaluated through a synchronous (real-time) audio-video encounter. The patient (or guardian if applicable) is aware that this is a billable service, which includes applicable co-pays. This Virtual Visit was conducted with patient's (and/or legal guardian's) consent. The visit was conducted pursuant to the emergency declaration under the [address] [place] [address], [address] [place] [address] authority and the [employer] General Act. Patient identification was verified, and a caregiver was present when appropriate. The patient was located at Home: [address] [place] [address].    Provider was located at [facility] (Appt Dept): [address],  [address] [place] [address].         --Ana Gaming MD

## 2022-08-15 ENCOUNTER — HOSPITAL ENCOUNTER (OUTPATIENT)
Dept: PHYSICAL THERAPY | Facility: CLINIC | Age: 67
Setting detail: THERAPIES SERIES
Discharge: HOME | End: 2022-08-15
Payer: MEDICARE

## 2022-08-15 PROCEDURE — 97110 THERAPEUTIC EXERCISES: CPT

## 2022-08-15 PROCEDURE — 97162 PT EVAL MOD COMPLEX 30 MIN: CPT

## 2022-08-15 NOTE — CONSULTS
[] [facility]) [facility] &  Therapy  [address]  [phone]  F: [phone] [] [address]  [facility]   [address]  P: [phone]  F: [phone] [x] [address] &  Therapy  [address] [facility] [address] [facility] [address]  P: [phone]  F: [phone] [] [address]  P: [phone]  F: [phone] [] [address]  [facility]   [address]   [place]: [phone]  F: [phone]      Physical Therapy Lower Extremity Evaluation    Date:  8/15/2022  Patient: Javan Ruvalcaba  : 1955  MRN: [id number]  Physician: Dr Dion King: [facility] Medicare  Medical Diagnosis: chronic LBP, taryn hip OA  Rehab Codes: M54.5, M25.551, M25.552,   Onset date: 8/15/2016  Next 's appt.: none    Subjective:   CC: Taryn LB and hip   HPI: Taryn LB and taryn glut discomfort that limits standing to 15 min or walking more than 1/4 mile. MRI and XR + arthritis in LB, hip and feet. Feet and calves cramp up, but she feels that this is due to use of statins. She will wobble due to hips. Uses a heat wrap and acetominophen. Pt states that she is too sedentary. She uses  sock aide to put her socks on. PMHx: [] Unremarkable [] Diabetes [] HTN  [] Pacemaker   [] MI/Heart Problems [] Cancer [x] Arthritis   Other: stents              [x] Refer to full medical chart  In EPIC       Comorbidities:   [x] Obesity [] Dialysis  [] N/A   [] Asthma/COPD [] Dementia [] Other:   [] Stroke [] Sleep apnea [] Other:   [] Vascular disease [] Rheumatic disease [] Other:     Tests: [x] X-Ray:  Impression   Right hip:       1. Mild right hip osteoarthrosis. 2. No acute fracture or dislocation. Left hip:       1. Mild left hip osteoarthrosis. 2. No acute fracture or dislocation.    Lumbar spine: 1. Mild to moderate multilevel degenerative changes in the lumbar spine. 2. No acute vertebral body height loss in the lumbar spine. 3. Mild S-shaped scoliosis of the thoracolumbar spine. 4. Atherosclerotic calcification of the aorta and branch vasculature. [x] MRI:5/17/2018  Impression   Degenerative disc disease and neural foraminal narrowing at L3-4 and L4-5 as   above. [] Other:    Medications: [x] Refer to full medical record [] None [] Other:  Allergies:      [x] Refer to full medical record [] None [] Other:    Function:  Hand Dominance  [] Right  [] Left  Patient lives with: Alone, mobile home   In what type of home [x]  One story   [] Two story   [] Split level   Number of stairs to enter 5 handrail on both   With handrail on the []  Right to enter   [] Left to enter   Bathroom has a []  Tub only  [x] Tub/shower combo   [] Walk in shower    []  Grab bars   Washing machine is on [x]  Main level   [] Second level   [] Basement   Employer    Job Status []  Normal duty   [] Light duty   [] Off due to condition    [x]  Retired   [] Not employed   [] Disability  [] Other:  []  Return to work: Work activities/duties Can't garden anymore; crafting, takes care of her mother but she now lives in an assisted living facility.          ADL/IADL Previous level of function Current level of function Who currently assists the patient with task   Bathing  [] Independent  [] Assist [x] Independent  [] Assist    Dress/grooming [] Independent  [] Assist [x] Independent  [] Assist    Transfer/mobility [] Independent  [] Assist [x] Independent  [] Assist    Feeding [] Independent  [] Assist [x] Independent  [] Assist    Toileting [] Independent  [] Assist [x] Independent  [] Assist    Driving [] Independent  [] Assist [x] Independent  [] Assist    Housekeeping [] Independent  [] Assist [x] Independent  [] Assist    Grocery shop/meal prep [] Independent  [] Assist [x] Independent  [] Assist Gait Prior level of function Current level of function    [] Independent  [] Assist [x] Independent  [] Assist   Device: [] Independent [x] Independent    [] Straight Cane [] Quad cane [] Straight Cane [] Quad cane    [] Standard walker [] Rolling walker   [] 4 wheeled walker [] Standard walker [] Rolling walker   [] 4 wheeled walker    [] Wheelchair [] Wheelchair     Pain:  [x] Yes  [] No Location: LB and taryn hips Pain Rating: (0-10 scale) 1/10 LB now; 8/10 worst after 15 min of dynamic movement. Pain altered Tx:  [] Yes  [x] No  Action:    Symptoms:  [] Improving [x] Worsening [] Same  Better:  [] AM    [] PM    [] Sit    [] Rise/Sit    []Stand    [] Walk    [] Lying    [] Other:  Worse: [] AM    [] PM    [] Sit    [] Rise/Sit    [x]Stand    [x] Walk    [] Lying    [] Bend                      [] Valsalva    [] Other:  Sleep: [x] OK    [] Disturbed    Objective:    ROM  ° A/P STRENGTH TESTS (+/-) Left Right Not Tested    Left Right Left Right Ant. Drawer   []   Hip Flex wfl wfl   Post. Drawer   []   Ext 10 10   Lachmans   []   ER wfl wfl   Valgus Stress   []   IR Wf;l wfl   Varus Stress   []   ABD wfl wfl   Jonathans   []   ADD     Apleys Comp.   []   Knee Flex wfl wfl   Apleys Dist.   []   Ext wfl wfl   Hip Scouring   []   Ankle DF     RAYMUNDOs stiff stiff []   PF     Piriformis   []   INV     Tis   []   EVER     Talor Tilt   []        Pat-Fem Grind   []   Hamstring and TFL dominant.      OBSERVATION No Deficit Deficit Not Tested Comments   Posture       Forward Head [] [] []    Rounded Shoulders [] [] []    Kyphosis [] [] []    Lordosis [] [] []    Lateral Shift [] [] []    Scoliosis [] [] []    Iliac Crest [] [] []    PSIS [] [] []    ASIS [] [] []    Genu Valgus [] [] []    Genu Varus [] [] []    Genu Recurvatum [] [] []    Pronation [] [] []    Supination [] [] []    Leg Length Discrp [] [] []    Slumped Sitting [] [] []    Palpation [] [] []    Sensation [] [] []    Edema [] [] []    Neurological [] [] []    Patellar Mobility [] [] []    Patellar Orientation [] [] []    Gait [] [x] [] Analysis:decreased hip ext ROM          FUNCTION Normal Difficult Unable   Sitting [x] [] []   Standing [] [x] []   Ambulation [] [x] []   Groom/Dress [] [] []   Lift/Carry [] [] [x]   Stairs [] [x] []   Bending [] [x] []   Squat [] [x] []   Kneel [] [] []       Functional Test: modified oswestry Score: 30% functionally impaired     Comments:    Assessment:  Patient would benefit from skilled physical therapy services in order to: improve taryn hip mobility and hip ext/ER/abd strength to improve LBP. As pt has been in aquatic PT prior without a successful outcome, pt wishes to continue with a land based program.  She was given a HEP (see below)    Problems:    [x] ? Pain:  [x] ? ROM:  [x] ? Strength:  [x] ? Function: not able to walk 1/4 mile or tolerate more than 15 min of activity  [] Other:       STG: (to be met in 8 treatments)  ? Pain:<5/10 at the worst  ? ROM: in taryn hips with ext  ? Strength: able to perform 10 sit to stand transfers  ? Function:Modified oswestry <20% interference  Able to walk 1/2 mile without increased symptoms  Able to perform 20 min of dynamic activity. Patient to be independent with home exercise program as demonstrated by performance with correct form without cues.     LTG: (to be met in 16 treatments)  <3/10 at the worst for pain  <15% interference on modified oswestry  Able to walk 1 mile without increase symptoms  Able to perform dynamic work for at least 30 min                     Patient goals:'less pain and more flexibility\"    Rehab Potential:  [] Good  [x] Fair  [] Poor   Suggested Professional Referral:  [x] No  [] Yes:  Barriers to Goal Achievement:  [x] No  [] Yes:  Domestic Concerns:  [x] No  [] Yes:    Pt. Education:  [x] Plans/Goals, Risks/Benefits discussed    [x] Home exercise program    Method of Education:   [x] Verbal   Reviewed not crossing legs   [x] Demo  [x] Written  Access Complexity [x] Moderate Complexity [] High Complexity       Treatment Charges: Mins Units   [x] Evaluation       []  Low       [x]  Moderate       []  High  1   []  Modalities     [x]  Ther Exercise 25 2   []  Manual Therapy     []  Ther Activities     []  Aquatics     []  Vasocompression     []  Other       TOTAL TREATMENT TIME: 50    Time BW:4421   Time IPR:1171    Electronically signed by: Tacho Hawkins PT        Physician Signature:________________________________Date:__________________  By signing above or cosigning this note, I have reviewed this plan of care and certify a need for medically necessary rehabilitation services.      *PLEASE SIGN ABOVE AND FAX BACK ALL PAGES*
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
## 2022-08-16 ENCOUNTER — OFFICE VISIT (OUTPATIENT)
Dept: BEHAVIORAL/MENTAL HEALTH CLINIC | Age: 67
End: 2022-08-16
Payer: MEDICARE

## 2022-08-16 DIAGNOSIS — F33.0 MAJOR DEPRESSIVE DISORDER, RECURRENT EPISODE, MILD WITH ANXIOUS DISTRESS (HCC): ICD-10-CM

## 2022-08-16 PROCEDURE — 90791 PSYCH DIAGNOSTIC EVALUATION: CPT | Performed by: SOCIAL WORKER

## 2022-08-16 PROCEDURE — 1123F ACP DISCUSS/DSCN MKR DOCD: CPT | Performed by: SOCIAL WORKER

## 2022-08-16 NOTE — Clinical Note
Pt meets DSM5 criteria for major depressive disorder recurrent mild with anxious distress. Pt agreed to ongoing therapy moving forward.

## 2022-08-18 ENCOUNTER — HOSPITAL ENCOUNTER (OUTPATIENT)
Dept: PHYSICAL THERAPY | Facility: CLINIC | Age: 67
Setting detail: THERAPIES SERIES
Discharge: HOME | End: 2022-08-18
Payer: MEDICARE

## 2022-08-18 NOTE — FLOWSHEET NOTE
[] [facility]) - [facility] &  Therapy  [address]    [phone]  F: [phone]   [] [address] [facility] [address]   [address]  P: [phone]  F: [phone]  [] [address] &  Therapy  [address] [facility]  P: [phone]  F: [phone] [] [address]  P: [phone]  F: [phone]  [] [address]  [address] [facility]   [address]   [place]: [phone]  F: [phone]   [] [address]  [place]: [phone]   F: [phone]     Physical Therapy Cancel/No Show note    Date: 2022  Patient: Suzanne Lugo  : 1955  MRN: [id number]    Cancels/No Shows to date:     For today's appointment patient:    [x]  Cancelled    [] Rescheduled appointment    [] No-show     Reason given by patient:    []  Patient ill    []  Conflicting appointment    [] No transportation      [] Conflict with work    [x] No reason given    [] Weather related    [] [id number]    [] Other:      Comments:        [] Next appointment was confirmed    Electronically signed by: José Melendez

## 2022-08-22 ENCOUNTER — HOSPITAL ENCOUNTER (OUTPATIENT)
Dept: PHYSICAL THERAPY | Facility: CLINIC | Age: 67
Setting detail: THERAPIES SERIES
Discharge: HOME | End: 2022-08-22
Payer: MEDICARE

## 2022-08-22 NOTE — FLOWSHEET NOTE
[] [facility] &  Therapy  [address]    [phone]  F: [phone]   [] [address]  [facility] 36   [address]  P: [phone]  F: [phone]  [] [address] &  Therapy  [address] [facility] [address] [address]  P: [phone]  F: [phone] [] [address]  P: [phone]  F: [phone]  [] [address]  [address] [facility] [address]   [address]   [place]: [phone]  F: [phone]   [] [facility]  [address] [facility] [address]  [place]: [phone]   F: [phone]     Physical Therapy Cancel/No Show note    Date: 2022  Patient: Ricardo Kramer  : 1955  MRN: [id number]    Cancels/No Shows to date: 0    For today's appointment patient:    [x]  Cancelled    [] Rescheduled appointment    [] No-show     Reason given by patient:    []  Patient ill    []  Conflicting appointment    [] No transportation      [] Conflict with work    [] No reason given    [] Weather related    [] COVID-19    [x] Other:      Comments: family emergency       [] Next appointment was confirmed    Electronically signed by: Francisca Solares

## 2022-08-25 ENCOUNTER — APPOINTMENT (OUTPATIENT)
Dept: PHYSICAL THERAPY | Facility: CLINIC | Age: 67
End: 2022-08-25
Payer: MEDICARE

## 2022-09-29 ENCOUNTER — OFFICE VISIT (OUTPATIENT)
Dept: VASCULAR SURGERY | Age: 67
End: 2022-09-29
Payer: MEDICARE

## 2022-09-29 VITALS
RESPIRATION RATE: 17 BRPM | BODY MASS INDEX: 37.22 KG/M2 | WEIGHT: 218 LBS | TEMPERATURE: 97.1 F | HEIGHT: 64 IN | DIASTOLIC BLOOD PRESSURE: 67 MMHG | HEART RATE: 78 BPM | SYSTOLIC BLOOD PRESSURE: 146 MMHG | OXYGEN SATURATION: 94 %

## 2022-09-29 DIAGNOSIS — I73.9 CLAUDICATION IN PERIPHERAL VASCULAR DISEASE (HCC): ICD-10-CM

## 2022-09-29 DIAGNOSIS — I65.23 CAROTID STENOSIS, ASYMPTOMATIC, BILATERAL: Primary | ICD-10-CM

## 2022-09-29 DIAGNOSIS — I73.9 PAD (PERIPHERAL ARTERY DISEASE) (HCC): ICD-10-CM

## 2022-09-29 PROCEDURE — G8417 CALC BMI ABV UP PARAM F/U: HCPCS | Performed by: SURGERY

## 2022-09-29 PROCEDURE — 1090F PRES/ABSN URINE INCON ASSESS: CPT | Performed by: SURGERY

## 2022-09-29 PROCEDURE — 3017F COLORECTAL CA SCREEN DOC REV: CPT | Performed by: SURGERY

## 2022-09-29 PROCEDURE — G8399 PT W/DXA RESULTS DOCUMENT: HCPCS | Performed by: SURGERY

## 2022-09-29 PROCEDURE — 1123F ACP DISCUSS/DSCN MKR DOCD: CPT | Performed by: SURGERY

## 2022-09-29 PROCEDURE — 1036F TOBACCO NON-USER: CPT | Performed by: SURGERY

## 2022-09-29 PROCEDURE — G8427 DOCREV CUR MEDS BY ELIG CLIN: HCPCS | Performed by: SURGERY

## 2022-09-29 PROCEDURE — 99214 OFFICE O/P EST MOD 30 MIN: CPT | Performed by: SURGERY

## 2022-09-29 NOTE — PROGRESS NOTES
Division of Vascular Surgery        Follow Up      Chief Complaint:      Carotid stenosis and peripheral arterial disease follow up    History of Present Illness:      Melissa Tapia is a 69 y.o. woman who presents for follow up regarding her peripheral arterial and carotid disease. She can walk about 200 feet before back and hips start to hurt. Does not describe claudication type pain in her calves, thighs or buttocks. Does get some cramping at night, right more then left  Numbness in feet turn to feeling cold and burning sensation at times. Quit smoking for 6 years, picked up a little bit recently due to stress. Denies symptoms suggestive of ischemic rest pain, she does not have any open wounds or sores on her feet. Denies any unilateral weakness, numbness/tingling, facial droop, thick/slurred speech or symptoms suggestive of amaurosis fugax. She stopped doing physical therapy for her back, due to things have been hectic taking care of 80year-old mother. Medical History:     Past Medical History:   Diagnosis Date    Angina pectoris ([facility]     Arthritis     Bipolar disorder ([facility]     CAD (coronary artery disease)     stents x 2 (Dr. Shania Peres)    Carotid stenosis, asymptomatic 2/26/2015    Chronic back pain     COPD (chronic obstructive pulmonary disease) ([facility]     Depression     Dyspnea     Family history of colon cancer     Family history of liver cancer     Family history of ovarian cancer     GERD (gastroesophageal reflux disease)     History of colon polyps     Hyperlipidemia     Hypertension     Lung nodule     New onset type 2 diabetes mellitus ([facility] 1/11/2018    Obesity     Obesity (BMI 35.0-39.9 without comorbidity)    Peripheral vascular disease (HCC)     Poor historian     Sleep apnea     CRISTINA on CPAP    Smoking greater than 40 pack years     reports that she quit smoking about 2 years ago. 1.5 PPD for 48 yeras.  She has a       Surgical History:     Past Surgical History:   Procedure Laterality Date    ANKLE SURGERY      CARDIAC CATHETERIZATION  10/11/2013    2 stents    CATARACT REMOVAL      cataract both eyes with lenses     SECTION      x2    COLONOSCOPY      COLONOSCOPY N/A 2020    COLONOSCOPY POLYPECTOMY HOT BIOPSY performed by Conan Lundborg, MD at [address] [place] [address]  2011    x2    ENDOSCOPY, COLON, DIAGNOSTIC      EYE SURGERY      b/l cataract extraction    INDUCED       NERVE BLOCK Bilateral 2018    bilat facets #1 marcaine    NERVE BLOCK  2019    caudal aborted- did not get in    NERVE BLOCK Bilateral 02/15/2019    bilateral l4 transforaminal. decadron 8mg/ProHance contrast    NOSE SURGERY      as a child    TONSILLECTOMY         Family History:     Family History   Problem Relation Age of Onset    Hypertension Mother     Macular Degen Mother     Other Mother         vascular    Colon Polyps Brother     Cancer Maternal Aunt        Allergies:       Contrast [iodides]    Medications:      Current Outpatient Medications   Medication Sig Dispense Refill    fluticasone (ARNUITY ELLIPTA) 100 MCG/ACT AEPB Inhale 1 puff into the lungs daily 30 each 5    omeprazole (PRILOSEC) 40 MG delayed release capsule take 1 capsule by mouth once daily 90 capsule 1    amLODIPine (NORVASC) 10 MG tablet 1 tablet once a day 90 tablet 1    hydroCHLOROthiazide (HYDRODIURIL) 25 MG tablet 1 tablet daily 90 tablet 1    rosuvastatin (CRESTOR) 5 MG tablet Take 1 tablet by mouth nightly 90 tablet 1    carvedilol (COREG) 3.125 MG tablet take 1 tablet by mouth twice a day 180 tablet 1    losartan (COZAAR) 100 MG tablet Once a day 90 tablet 1    sertraline (ZOLOFT) 50 MG tablet take 1 tablet by mouth once daily 120 tablet 0    potassium chloride (KLOR-CON M) 10 MEQ extended release tablet take 1 tablet by mouth once daily 90 tablet 2    betamethasone dipropionate (DIPROLENE) 0.05 % ointment apply topically daily as directed 45 g 1    triamcinolone (KENALOG) 0.025 % cream apply to affected area once daily 30 g 1    albuterol sulfate HFA (PROVENTIL HFA) 108 (90 Base) MCG/ACT inhaler Inhale 2 puffs into the lungs every 6 hours as needed for Wheezing 1 Inhaler 5    magnesium 30 MG tablet Take 30 mg by mouth 2 times daily      Multiple Vitamin (MULTI-VITAMIN PO) Take 1 tablet by mouth daily. aspirin 325 MG tablet Take 325 mg by mouth daily. No current facility-administered medications for this visit. Social History:     Tobacco:    reports that she quit smoking about 7 years ago. Her smoking use included cigarettes. She started smoking about 54 years ago. She has a 60.00 pack-year smoking history. She has never used smokeless tobacco.  Alcohol:      reports current alcohol use. Drug Use:  reports current drug use. Drug: Marijuana Garrel Calender).   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     Review of Systems:     Review of Systems  Physical Exam:     Vitals:  BP (!) 146/67 (Site: Left Upper Arm, Position: Sitting, Cuff Size: Medium Adult)   Pulse 78   Temp 97.1 °F (36.2 °C) (Temporal)   Resp 17   Ht 5' 3.5\" (1.613 m)   Wt 218 lb (98.9 kg)   SpO2 94%   BMI 38.01 kg/m²     Physical Exam  Imaging/Labs:     Carotid duplex May 2022     Right ICA 50-69% stenosis  Left ICA 70-99% stenosis               CTA August 2022 reveals multisegmental severe stenosis and occlusive disease of right superficial femoral artery          Assessment and Plan:     Carotid artery stenosis asymptomatic, peripheral arterial disease, neurogenic claudication  CTA reveals no aortoiliac disease, back/hip pain likely neurogenic in nature and not from arterial disease  Does have severe carotid disease and we discussed potential repair in the new year yovanny things get settled down personally for her  3 month f/u with carotid ulterasound   Continue optimal medical therapy with aspirin and statins    Optimal medical management is an important part of overall treatment of all patients with carotid bifurcation disease, regardless of the degree of stenosis or the plan for intervention. This therapy is directed both at the reduction of stroke and overall cardiovascular events, including cardiovascular-related mortality. Clinical guidelines issued by the American Heart Association and the American Stroke Association recommends:    I. Lowering blood pressure to a target 140/90 mm Hg by lifestyle interventions and antihypertensive treatment is recommended in individuals who have hypertension with asymptomatic carotid atherosclerosis. II. Glucose control to nearly normoglycemic levels (target hemoglobin A1C 7%) is recommended among diabetic patients to reduce microvascular complications and, with lesser certainty, macrovascular complications other than stroke. III. Patients with known atherosclerosis have demonstrated reduced stroke rates when treated with lipid-lowering therapy. And continued low dose statin will help stabilize plaque and decrease the inflammatory response of atherosclerosis. IV. Smoking nearly doubles the risk of stroke and with cessation there is a reduction in the risk of stroke. V. Antiplatelet therapy in asymptomatic patients with carotid atherosclerosis is recommended to reduce overall cardiovascular morbidity although it has not been shown to be effective in the primary prevention of stroke. Electronically signed by Julissa Fontenot MD on 9/29/22 at 1:42 PM EDT      [address] [place] [address] North: [phone]  C: [phone]  Email: [email or web address]

## 2022-10-23 PROBLEM — I65.23 CAROTID STENOSIS, ASYMPTOMATIC, BILATERAL: Status: ACTIVE | Noted: 2022-10-23

## 2022-10-23 PROBLEM — I73.9 CLAUDICATION IN PERIPHERAL VASCULAR DISEASE (HCC): Status: ACTIVE | Noted: 2022-10-23

## 2022-10-23 PROBLEM — E11.9 TYPE 2 DIABETES MELLITUS (HCC): Status: ACTIVE | Noted: 2022-10-23

## 2022-11-05 DIAGNOSIS — I10 ESSENTIAL HYPERTENSION: ICD-10-CM

## 2022-11-07 RX ORDER — POTASSIUM CHLORIDE 750 MG/1
TABLET, EXTENDED RELEASE ORAL
Qty: 90 TABLET | Refills: 2 | Status: SHIPPED | OUTPATIENT
Start: 2022-11-07

## 2022-11-07 NOTE — TELEPHONE ENCOUNTER
E-scribe request for Phil Dela Cruz . Please review and e-scribe if applicable. Next Visit Date:  Future Appointments   Date Time Provider Zohreh Ruchi   11/15/2022  1:00 PM [facility]   12/29/2022 11:30 AM [facility]   1/20/2023  3:00 PM [address] EVELIN MD Resp Spec [place] Maintenance   Topic Date Due    Hepatitis C screen  Never done    Annual Wellness Visit (AWV)  09/19/2021    Diabetic retinal exam  03/29/2022    Diabetic foot exam  05/13/2022    COVID-19 Vaccine (4 - Booster) 07/20/2022    Flu vaccine (1) 08/01/2022    Shingles vaccine (1 of 2) 03/23/2023 (Originally 1/20/2003)    Colorectal Cancer Screen  02/06/2023    Diabetic microalbuminuria test  04/07/2023    Low dose CT lung screening  06/20/2023    Depression Monitoring  07/19/2023    A1C test (Diabetic or Prediabetic)  08/05/2023    Lipids  08/05/2023    Breast cancer screen  12/30/2023    DTaP/Tdap/Td vaccine (2 - Td or Tdap) 10/24/2028    DEXA (modify frequency per FRAX score)  Completed    Pneumococcal 65+ years Vaccine  Completed    Hepatitis A vaccine  Aged Out    Hib vaccine  Aged Out    Meningococcal (ACWY) vaccine  Aged Out               (applicable per patient's age: Cancer Screenings, Depression Screening, Fall Risk Screening, Immunizations)    Hemoglobin A1C (%)   Date Value   08/05/2022 6.5 (H)   02/10/2022 6.3   05/07/2021 6.3 (H)     Microalb/Crt.  Ratio (mcg/mg creat)   Date Value   04/07/2022 20     LDL Cholesterol (mg/dL)   Date Value   08/05/2022 108     AST (U/L)   Date Value   09/13/2021 20     ALT (U/L)   Date Value   09/13/2021 16     BUN (mg/dL)   Date Value   09/13/2021 24 (H)      (goal A1C is < 7)   (goal LDL is <100) need 30-50% reduction from baseline     BP Readings from Last 3 Encounters:   09/29/22 (!) 146/67   07/19/22 (!) 125/56   05/24/22 138/82    (goal /80)      All Future Testing planned in [facility]:  Lab Frequency Next Occurrence COVID-19 Once 11/13/2021   US BREAST COMPLETE RIGHT Once 06/15/2022   CONOR DIGITAL SCREEN UNILATERAL RIGHT Once 01/18/2023   Clostridium Difficile Toxin/Antigen Once 06/30/2022   VL DUP CAROTID BILATERAL Once 12/29/2022            Patient Active Problem List:     Smoker     HTN (hypertension)     CAD/Stents drug-eluting      Serum lipids high     Carotid stenosis, asymptomatic     Nocturnal hypoxia     CRISTINA on CPAP     PAD (peripheral artery disease) ([facility]     Major depressive disorder, recurrent episode, mild with anxious distress ([facility])     Type 2 diabetes mellitus     Claudication in peripheral vascular disease ([facility]     Carotid stenosis, asymptomatic, bilateral

## 2022-11-15 ENCOUNTER — OFFICE VISIT (OUTPATIENT)
Dept: INTERNAL MEDICINE | Age: 67
End: 2022-11-15
Payer: MEDICARE

## 2022-11-15 VITALS
TEMPERATURE: 97.3 F | SYSTOLIC BLOOD PRESSURE: 138 MMHG | HEIGHT: 64 IN | WEIGHT: 216 LBS | BODY MASS INDEX: 36.88 KG/M2 | HEART RATE: 72 BPM | OXYGEN SATURATION: 95 % | DIASTOLIC BLOOD PRESSURE: 72 MMHG

## 2022-11-15 DIAGNOSIS — F33.1 MODERATE RECURRENT MAJOR DEPRESSION (HCC): ICD-10-CM

## 2022-11-15 DIAGNOSIS — I10 ESSENTIAL HYPERTENSION: Primary | ICD-10-CM

## 2022-11-15 DIAGNOSIS — F41.9 ANXIETY: ICD-10-CM

## 2022-11-15 DIAGNOSIS — I25.10 CORONARY ARTERY DISEASE INVOLVING NATIVE CORONARY ARTERY OF NATIVE HEART WITHOUT ANGINA PECTORIS: ICD-10-CM

## 2022-11-15 DIAGNOSIS — N39.46 MIXED STRESS AND URGE URINARY INCONTINENCE: ICD-10-CM

## 2022-11-15 DIAGNOSIS — E78.5 DYSLIPIDEMIA: ICD-10-CM

## 2022-11-15 DIAGNOSIS — E11.8 TYPE 2 DIABETES MELLITUS WITH COMPLICATION, WITHOUT LONG-TERM CURRENT USE OF INSULIN (HCC): ICD-10-CM

## 2022-11-15 DIAGNOSIS — I73.9 PAD (PERIPHERAL ARTERY DISEASE) (HCC): ICD-10-CM

## 2022-11-15 DIAGNOSIS — Z23 NEEDS FLU SHOT: ICD-10-CM

## 2022-11-15 DIAGNOSIS — K63.5 POLYP OF SIGMOID COLON, UNSPECIFIED TYPE: ICD-10-CM

## 2022-11-15 PROCEDURE — 2022F DILAT RTA XM EVC RTNOPTHY: CPT | Performed by: INTERNAL MEDICINE

## 2022-11-15 PROCEDURE — 3044F HG A1C LEVEL LT 7.0%: CPT | Performed by: INTERNAL MEDICINE

## 2022-11-15 PROCEDURE — 3078F DIAST BP <80 MM HG: CPT | Performed by: INTERNAL MEDICINE

## 2022-11-15 PROCEDURE — 3074F SYST BP LT 130 MM HG: CPT | Performed by: INTERNAL MEDICINE

## 2022-11-15 PROCEDURE — 1123F ACP DISCUSS/DSCN MKR DOCD: CPT | Performed by: INTERNAL MEDICINE

## 2022-11-15 PROCEDURE — 3017F COLORECTAL CA SCREEN DOC REV: CPT | Performed by: INTERNAL MEDICINE

## 2022-11-15 PROCEDURE — G8427 DOCREV CUR MEDS BY ELIG CLIN: HCPCS | Performed by: INTERNAL MEDICINE

## 2022-11-15 PROCEDURE — G0008 ADMIN INFLUENZA VIRUS VAC: HCPCS | Performed by: INTERNAL MEDICINE

## 2022-11-15 PROCEDURE — G8417 CALC BMI ABV UP PARAM F/U: HCPCS | Performed by: INTERNAL MEDICINE

## 2022-11-15 PROCEDURE — G8482 FLU IMMUNIZE ORDER/ADMIN: HCPCS | Performed by: INTERNAL MEDICINE

## 2022-11-15 PROCEDURE — 1090F PRES/ABSN URINE INCON ASSESS: CPT | Performed by: INTERNAL MEDICINE

## 2022-11-15 PROCEDURE — 99214 OFFICE O/P EST MOD 30 MIN: CPT | Performed by: INTERNAL MEDICINE

## 2022-11-15 PROCEDURE — G8399 PT W/DXA RESULTS DOCUMENT: HCPCS | Performed by: INTERNAL MEDICINE

## 2022-11-15 PROCEDURE — 0509F URINE INCON PLAN DOCD: CPT | Performed by: INTERNAL MEDICINE

## 2022-11-15 PROCEDURE — 1036F TOBACCO NON-USER: CPT | Performed by: INTERNAL MEDICINE

## 2022-11-15 PROCEDURE — 99211 OFF/OP EST MAY X REQ PHY/QHP: CPT | Performed by: INTERNAL MEDICINE

## 2022-11-15 RX ORDER — LOSARTAN POTASSIUM 100 MG/1
TABLET ORAL
Qty: 90 TABLET | Refills: 1 | Status: SHIPPED | OUTPATIENT
Start: 2022-11-15

## 2022-11-15 RX ORDER — SOLIFENACIN SUCCINATE 5 MG/1
5 TABLET, FILM COATED ORAL DAILY
Qty: 30 TABLET | Refills: 3 | Status: SHIPPED | OUTPATIENT
Start: 2022-11-15 | End: 2023-11-15

## 2022-11-15 RX ORDER — HYDROCHLOROTHIAZIDE 25 MG/1
TABLET ORAL
Qty: 90 TABLET | Refills: 1 | Status: SHIPPED | OUTPATIENT
Start: 2022-11-15

## 2022-11-15 RX ORDER — CARVEDILOL 3.12 MG/1
TABLET ORAL
Qty: 180 TABLET | Refills: 1 | Status: SHIPPED | OUTPATIENT
Start: 2022-11-15

## 2022-11-15 RX ORDER — ROSUVASTATIN CALCIUM 5 MG/1
5 TABLET, COATED ORAL NIGHTLY
Qty: 90 TABLET | Refills: 1 | Status: SHIPPED | OUTPATIENT
Start: 2022-11-15

## 2022-11-15 RX ORDER — AMLODIPINE BESYLATE 10 MG/1
TABLET ORAL
Qty: 90 TABLET | Refills: 1 | Status: SHIPPED | OUTPATIENT
Start: 2022-11-15

## 2022-11-15 ASSESSMENT — ENCOUNTER SYMPTOMS
SHORTNESS OF BREATH: 0
PHOTOPHOBIA: 0
WHEEZING: 0
COUGH: 0
CONSTIPATION: 1
ABDOMINAL PAIN: 0
BACK PAIN: 1

## 2022-11-15 ASSESSMENT — PATIENT HEALTH QUESTIONNAIRE - PHQ9
7. TROUBLE CONCENTRATING ON THINGS, SUCH AS READING THE NEWSPAPER OR WATCHING TELEVISION: 3
3. TROUBLE FALLING OR STAYING ASLEEP: 1
SUM OF ALL RESPONSES TO PHQ9 QUESTIONS 1 & 2: 1
SUM OF ALL RESPONSES TO PHQ QUESTIONS 1-9: 9
SUM OF ALL RESPONSES TO PHQ QUESTIONS 1-9: 9
10. IF YOU CHECKED OFF ANY PROBLEMS, HOW DIFFICULT HAVE THESE PROBLEMS MADE IT FOR YOU TO DO YOUR WORK, TAKE CARE OF THINGS AT HOME, OR GET ALONG WITH OTHER PEOPLE: 1
9. THOUGHTS THAT YOU WOULD BE BETTER OFF DEAD, OR OF HURTING YOURSELF: 0
8. MOVING OR SPEAKING SO SLOWLY THAT OTHER PEOPLE COULD HAVE NOTICED. OR THE OPPOSITE, BEING SO FIGETY OR RESTLESS THAT YOU HAVE BEEN MOVING AROUND A LOT MORE THAN USUAL: 3
SUM OF ALL RESPONSES TO PHQ QUESTIONS 1-9: 9
5. POOR APPETITE OR OVEREATING: 0
6. FEELING BAD ABOUT YOURSELF - OR THAT YOU ARE A FAILURE OR HAVE LET YOURSELF OR YOUR FAMILY DOWN: 0
1. LITTLE INTEREST OR PLEASURE IN DOING THINGS: 0
SUM OF ALL RESPONSES TO PHQ QUESTIONS 1-9: 9
2. FEELING DOWN, DEPRESSED OR HOPELESS: 1
4. FEELING TIRED OR HAVING LITTLE ENERGY: 1

## 2022-11-15 NOTE — PROGRESS NOTES
[facility] [place] [place]    Progress Note    Date of patient's visit: 11/15/2022    Patient's Name:  Nereyda Thornton    YOB: 1955            Patient Care Team:  Stark Mcardle, MD as PCP - General (Internal Medicine)  Stark Mcardle, MD as PCP - [facility] Empaneled Provider  Ryan Berumen MD as Consulting Physician (Pulmonology)    REASON FOR VISIT: Routine outpatient follow     Chief Complaint   Patient presents with    Hypertension     Been feeling very fatigued    Depression     Feeling better, did not like therapist         HISTORY OF PRESENT ILLNESS:    History was obtained from the patient. Nereyda Thornton is a 69 y.o. is here for follow-up on her chronic medical problems including hypertension and diabetes. Blood pressure is controlled. She is compliant with medications. Unfortunately she has been having a lot of fatigue and headaches. She has CRISTINA but has not been able to use her CPAP for a few months. Her last sleep study was in 2018. She says she is going to call her pulmonologist as she has not been able to get the supplies needed for her CPAP. She does have depression and anxiety. She is taking care of her elderly mother who is in assisted living. Patient has been under a lot of stress. She did see a therapist 1 time but did not enjoy the interaction and does not want to go back. She has chronic low back pain and arthralgias in several joints including her hips. She went to physical therapy 1 time but had to cancel all subsequent appointments due to her travels. She is complaining of urge and stress incontinence. She has been wearing urinary pads or diapers for several months but it feels like it is worsening. She has dribbling and urgency. She also has symptoms of incontinence with sneezing or coughing. I have advised her to do Kegel exercises. I have given her some pelvic exercises to do. She denies hematuria or dysuria.   Will start her on Vesicare see if it helps her symptoms but advised her to cut back on caffeinated drinks. She had a colonoscopy in . She was advised to come back in 3 years for repeat: Colonoscopy. I will refer her. HIP xrays  Impression   Right hip:       1. Mild right hip osteoarthrosis. 2. No acute fracture or dislocation. Left hip:       1. Mild left hip osteoarthrosis. 2. No acute fracture or dislocation. Lumbar spine:       1. Mild to moderate multilevel degenerative changes in the lumbar spine. 2. No acute vertebral body height loss in the lumbar spine. 3. Mild S-shaped scoliosis of the thoracolumbar spine. 4. Atherosclerotic calcification of the aorta and branch vasculature. Colonoscopy   Patient: Marialuisa Patel                  :                           1955           Date:  2020                 Procedure:  Colonoscopy with Hot Snare Polypectomy       Indication:  History of polyps       Findings   1. Sigmoid polyp, sessile, small; resected with hot snare. 2. Multiple diminutive/small sessile polyps in the proximal ascending colon and cecum. 3. Hemorrhoids   4. Diverticulosis L > R       Recommendations   1. Await pathology. 2. Repeat colonoscopy in 3 years.   TWO DAY PREP         Past Medical History:   Diagnosis Date    Angina pectoris ([address]     Arthritis     Bipolar disorder ([address]     CAD (coronary artery disease)     stents x 2 (Dr. Mercedez Sabillon)    Carotid stenosis, asymptomatic 2015    Chronic back pain     COPD (chronic obstructive pulmonary disease) ([address]     Depression     Dyspnea     Family history of colon cancer     Family history of liver cancer     Family history of ovarian cancer     GERD (gastroesophageal reflux disease)     History of colon polyps     Hyperlipidemia     Hypertension     Lung nodule     New onset type 2 diabetes mellitus ([address] 2018    Obesity     Obesity (BMI 35.0-39.9 without comorbidity)    Peripheral vascular disease ([address] Poor historian     Sleep apnea     CRISTINA on CPAP    Smoking greater than 40 pack years     reports that she quit smoking about 2 years ago. 1.5 PPD for 48 yeras. She has a       Past Surgical History:   Procedure Laterality Date    ANKLE SURGERY      CARDIAC CATHETERIZATION  10/11/2013    2 stents    CATARACT REMOVAL      cataract both eyes with lenses     SECTION      x2    COLONOSCOPY      COLONOSCOPY N/A 2020    COLONOSCOPY POLYPECTOMY HOT BIOPSY performed by Sharlene Robert MD at [facility]  2011    x2    ENDOSCOPY, COLON, DIAGNOSTIC      EYE SURGERY      b/l cataract extraction    INDUCED       NERVE BLOCK Bilateral 2018    bilat facets #1 marcaine    NERVE BLOCK  2019    caudal aborted- did not get in    NERVE BLOCK Bilateral 02/15/2019    bilateral l4 transforaminal. decadron 8mg/ProHance contrast    NOSE SURGERY      as a child    TONSILLECTOMY           ALLERGIES      Allergies   Allergen Reactions    Contrast [Iodides] Itching     Mild itchiness experienced with administration of IV contrast media.        MEDICATIONS:      Current Outpatient Medications on File Prior to Visit   Medication Sig Dispense Refill    potassium chloride (KLOR-CON M) 10 MEQ extended release tablet take 1 tablet by mouth once daily 90 tablet 2    fluticasone (ARNUITY ELLIPTA) 100 MCG/ACT AEPB Inhale 1 puff into the lungs daily 30 each 5    omeprazole (PRILOSEC) 40 MG delayed release capsule take 1 capsule by mouth once daily 90 capsule 1    amLODIPine (NORVASC) 10 MG tablet 1 tablet once a day 90 tablet 1    hydroCHLOROthiazide (HYDRODIURIL) 25 MG tablet 1 tablet daily 90 tablet 1    rosuvastatin (CRESTOR) 5 MG tablet Take 1 tablet by mouth nightly 90 tablet 1    carvedilol (COREG) 3.125 MG tablet take 1 tablet by mouth twice a day 180 tablet 1    losartan (COZAAR) 100 MG tablet Once a day 90 tablet 1    sertraline (ZOLOFT) 50 MG tablet take 1 tablet by mouth once daily 120 tablet 0    betamethasone dipropionate (DIPROLENE) 0.05 % ointment apply topically daily as directed 45 g 1    triamcinolone (KENALOG) 0.025 % cream apply to affected area once daily 30 g 1    albuterol sulfate HFA (PROVENTIL HFA) 108 (90 Base) MCG/ACT inhaler Inhale 2 puffs into the lungs every 6 hours as needed for Wheezing 1 Inhaler 5    magnesium 30 MG tablet Take 30 mg by mouth 2 times daily      aspirin 325 MG tablet Take 325 mg by mouth daily. Multiple Vitamin (MULTI-VITAMIN PO) Take 1 tablet by mouth daily. (Patient not taking: Reported on 11/15/2022)       No current facility-administered medications on file prior to visit. HISTORY    Reviewed and no change from previous record. Negra Trevizo  reports that she quit smoking about 7 years ago. Her smoking use included cigarettes. She started smoking about 54 years ago. She has a 60.00 pack-year smoking history. She has never used smokeless tobacco.    FAMILY HISTORY:    Reviewed and No change from previous visit    HEALTH MAINTENANCE DUE:      Health Maintenance Due   Topic Date Due    Hepatitis C screen  Never done    Annual Wellness Visit (AWV)  09/19/2021    Diabetic retinal exam  03/29/2022    Diabetic foot exam  05/13/2022    COVID-19 Vaccine (4 - Booster) 07/20/2022    Flu vaccine (1) 08/01/2022       REVIEW OF SYSTEMS:    12 point review of symptoms completed and found to be normal except noted in the HPI    Review of Systems   Constitutional:  Positive for fatigue. Negative for chills, fever and unexpected weight change. Eyes:  Negative for photophobia and visual disturbance. Respiratory:  Negative for cough, shortness of breath and wheezing. Cardiovascular:  Negative for chest pain, palpitations and leg swelling. Gastrointestinal:  Positive for constipation. Negative for abdominal pain. Genitourinary:  Positive for urgency. Negative for dysuria, frequency and pelvic pain.         Urge Incontinence Musculoskeletal:  Positive for arthralgias and back pain. Neurological:  Positive for headaches. Negative for dizziness and weakness. Psychiatric/Behavioral:  Positive for dysphoric mood. Negative for decreased concentration and sleep disturbance. The patient is nervous/anxious. PHYSICAL EXAM:     Vitals:    11/15/22 1302 11/15/22 1313   BP: (!) 138/105 138/72   Pulse: 71 72   Temp: 97.3 °F (36.3 °C)    TempSrc: Infrared    SpO2: 95%    Weight: 216 lb (98 kg)    Height: 5' 3.5\" (1.613 m)      Body mass index is 37.66 kg/m². BP Readings from Last 3 Encounters:   11/15/22 138/72   09/29/22 (!) 146/67   07/19/22 (!) 125/56        Wt Readings from Last 3 Encounters:   11/15/22 216 lb (98 kg)   09/29/22 218 lb (98.9 kg)   07/19/22 222 lb (100.7 kg)       Physical Exam  Vitals and nursing note reviewed. Constitutional:       Appearance: Normal appearance. She is obese. HENT:      Head: Normocephalic and atraumatic. Eyes:      Extraocular Movements: Extraocular movements intact. Conjunctiva/sclera: Conjunctivae normal.      Pupils: Pupils are equal, round, and reactive to light. Cardiovascular:      Rate and Rhythm: Normal rate and regular rhythm. Heart sounds: No murmur heard. Pulmonary:      Effort: Pulmonary effort is normal.      Breath sounds: Normal breath sounds. No wheezing. Musculoskeletal:      Right lower leg: No edema. Left lower leg: No edema. Neurological:      General: No focal deficit present. Mental Status: She is alert and oriented to person, place, and time.              LABORATORY FINDINGS:    CBC:  Lab Results   Component Value Date/Time    WBC 6.8 04/07/2022 11:19 AM    HGB 11.4 04/07/2022 11:19 AM     04/07/2022 11:19 AM     BMP:    Lab Results   Component Value Date/Time     09/13/2021 03:46 PM    K 4.3 09/13/2021 03:46 PM     09/13/2021 03:46 PM    CO2 25 09/13/2021 03:46 PM    BUN 24 09/13/2021 03:46 PM    CREATININE 0.61 08/09/2022 10:25 AM    GLUCOSE 89 09/13/2021 03:46 PM    GLUCOSE 87 10/28/2011 08:57 AM     HEMOGLOBIN A1C:   Lab Results   Component Value Date/Time    LABA1C 6.5 08/05/2022 10:44 AM     MICROALBUMIN URINE:   Lab Results   Component Value Date/Time    MICROALBUR 18 04/07/2022 11:19 AM     FASTING LIPID PANEL:  Lab Results   Component Value Date    CHOL 181 08/05/2022    HDL 44 08/05/2022    TRIG 147 08/05/2022     Lab Results   Component Value Date    LDLCHOLESTEROL 108 08/05/2022       LIVER PROFILE:  Lab Results   Component Value Date/Time    ALT 16 09/13/2021 03:46 PM    AST 20 09/13/2021 03:46 PM    PROT 7.8 12/16/2021 03:21 PM    BILITOT 0.27 09/13/2021 03:46 PM    BILIDIR 0.10 09/18/2018 08:18 AM    LABALBU 4.3 09/13/2021 03:46 PM      THYROID FUNCTION:   Lab Results   Component Value Date/Time    TSH 3.34 08/05/2022 10:44 AM      URINEANALYSIS: No results found for: LABURIN  ASSESSMENT AND PLAN:    1. Essential hypertension    - amLODIPine (NORVASC) 10 MG tablet; 1 tablet once a day  Dispense: 90 tablet; Refill: 1  - hydroCHLOROthiazide (HYDRODIURIL) 25 MG tablet; 1 tablet daily  Dispense: 90 tablet; Refill: 1  - carvedilol (COREG) 3.125 MG tablet; take 1 tablet by mouth twice a day  Dispense: 180 tablet; Refill: 1  - losartan (COZAAR) 100 MG tablet; Once a day  Dispense: 90 tablet; Refill: 1    2. Type 2 diabetes mellitus with complication, without long-term current use of insulin ([address]  Diet controlled  Monitor   Eye exam     3. Moderate recurrent major depression (HCC)  On Sertraline  Refuses behavioral therapy     4. Mixed stress and urge urinary incontinence  Kegel exercises    - solifenacin (VESICARE) 5 MG tablet; Take 1 tablet by mouth daily  Dispense: 30 tablet; Refill: 3  - Urinalysis with Reflex to Culture; Future    5. Coronary artery disease involving native coronary artery of native heart without angina pectoris  Asa  Follow up with cardiology     - rosuvastatin (CRESTOR) 5 MG tablet;  Take 1 tablet by mouth nightly  Dispense: 90 tablet; Refill: 1  - carvedilol (COREG) 3.125 MG tablet; take 1 tablet by mouth twice a day  Dispense: 180 tablet; Refill: 1    6. Dyslipidemia    - rosuvastatin (CRESTOR) 5 MG tablet; Take 1 tablet by mouth nightly  Dispense: 90 tablet; Refill: 1    7. PAD (peripheral artery disease) (HCC)  Carotid dopplers ordered by vascular    - rosuvastatin (CRESTOR) 5 MG tablet; Take 1 tablet by mouth nightly  Dispense: 90 tablet; Refill: 1    8. Anxiety  Refuses CBT    - sertraline (ZOLOFT) 50 MG tablet; 1 tablet daily  Dispense: 90 tablet; Refill: 1    9. Needs flu shot    - Influenza, AFLURIA, (age 1 y+), IM, Preservative Free, 0.5 mL  - CT IMMUNIZ ADMIN,1 SINGLE/COMB VAC/TOXOID    10. Polyp of sigmoid colon, unspecified type    - AFL - Devorah Spatz, MD, Gastroenterology, [facility]:   Return in about 4 months (around 3/15/2023). Keerthi Mcleod received counseling on the following healthy behaviors: nutrition, exercise, and medication adherence    Reviewed prior labs and health maintenance. Discussed use, benefit, and side effects of prescribed medications. Barriers to medication compliance addressed. All patient questions answered. Pt voiced understanding.        Chase Palomino  Attending Physician, [address] [place] [address], Internal Medicine Residency Program  [address] [place] [address]  11/15/2022, 1:18 PM

## 2022-12-20 ENCOUNTER — HOSPITAL ENCOUNTER (OUTPATIENT)
Dept: VASCULAR LAB | Age: 67
Discharge: HOME | End: 2022-12-20
Payer: MEDICARE

## 2022-12-20 DIAGNOSIS — I65.23 CAROTID STENOSIS, ASYMPTOMATIC, BILATERAL: ICD-10-CM

## 2022-12-20 PROCEDURE — 93880 EXTRACRANIAL BILAT STUDY: CPT

## 2022-12-29 ENCOUNTER — OFFICE VISIT (OUTPATIENT)
Dept: VASCULAR SURGERY | Age: 67
End: 2022-12-29
Payer: MEDICARE

## 2022-12-29 VITALS
DIASTOLIC BLOOD PRESSURE: 55 MMHG | WEIGHT: 216 LBS | HEIGHT: 64 IN | SYSTOLIC BLOOD PRESSURE: 140 MMHG | HEART RATE: 57 BPM | OXYGEN SATURATION: 92 % | BODY MASS INDEX: 36.88 KG/M2

## 2022-12-29 DIAGNOSIS — I65.23 CAROTID STENOSIS, ASYMPTOMATIC, BILATERAL: Primary | ICD-10-CM

## 2022-12-29 DIAGNOSIS — I73.9 CLAUDICATION IN PERIPHERAL VASCULAR DISEASE (HCC): ICD-10-CM

## 2022-12-29 DIAGNOSIS — I73.9 PAD (PERIPHERAL ARTERY DISEASE) (HCC): ICD-10-CM

## 2022-12-29 PROCEDURE — 1090F PRES/ABSN URINE INCON ASSESS: CPT | Performed by: SURGERY

## 2022-12-29 PROCEDURE — 1036F TOBACCO NON-USER: CPT | Performed by: SURGERY

## 2022-12-29 PROCEDURE — 99214 OFFICE O/P EST MOD 30 MIN: CPT | Performed by: SURGERY

## 2022-12-29 PROCEDURE — 1123F ACP DISCUSS/DSCN MKR DOCD: CPT | Performed by: SURGERY

## 2022-12-29 PROCEDURE — 3017F COLORECTAL CA SCREEN DOC REV: CPT | Performed by: SURGERY

## 2022-12-29 PROCEDURE — G8482 FLU IMMUNIZE ORDER/ADMIN: HCPCS | Performed by: SURGERY

## 2022-12-29 PROCEDURE — 3074F SYST BP LT 130 MM HG: CPT | Performed by: SURGERY

## 2022-12-29 PROCEDURE — G8399 PT W/DXA RESULTS DOCUMENT: HCPCS | Performed by: SURGERY

## 2022-12-29 PROCEDURE — G8427 DOCREV CUR MEDS BY ELIG CLIN: HCPCS | Performed by: SURGERY

## 2022-12-29 PROCEDURE — G8417 CALC BMI ABV UP PARAM F/U: HCPCS | Performed by: SURGERY

## 2022-12-29 PROCEDURE — 3078F DIAST BP <80 MM HG: CPT | Performed by: SURGERY

## 2022-12-29 NOTE — PROGRESS NOTES
Division of Vascular Surgery        Follow Up      Chief Complaint:     Claudication, carotid stenosis    History of Present Illness:      Thalia Quan is a 69 y.o. woman who presents for follow up and surveillance of her carotid and peripheral arterial disease. Her claudication is at baseline, pain occurring at a couple hundred feet. Hips and backs seem to cause her worse issues and have become her main limiting factors at this point. She denies any new onset neurologic symptoms to suggest acute stroke such as unilateral weakness, numbness/tingling, facial droop, thick/slurred speech or symptoms suggestive of amaurosis fugax. She did quit smoking. She is seeing cardiologist and pulmonologist in January for follow up. Medical History:     Past Medical History:   Diagnosis Date    Angina pectoris ([facility] [address]     Arthritis     Bipolar disorder ([address]     CAD (coronary artery disease)     stents x 2 (Dr. Florentin Rao)    Carotid stenosis, asymptomatic 2015    Chronic back pain     COPD (chronic obstructive pulmonary disease) ([facility] [address]     Depression     Dyspnea     Family history of colon cancer     Family history of liver cancer     Family history of ovarian cancer     GERD (gastroesophageal reflux disease)     History of colon polyps     Hyperlipidemia     Hypertension     Lung nodule     New onset type 2 diabetes mellitus ([address] 2018    Obesity     Obesity (BMI 35.0-39.9 without comorbidity)    Peripheral vascular disease (HCC)     Poor historian     Sleep apnea     CRISTINA on CPAP    Smoking greater than 40 pack years     reports that she quit smoking about 2 years ago. 1.5 PPD for 48 yeras.  She has a       Surgical History:     Past Surgical History:   Procedure Laterality Date    ANKLE SURGERY      CARDIAC CATHETERIZATION  10/11/2013    2 stents    CATARACT REMOVAL      cataract both eyes with lenses     SECTION      x2    COLONOSCOPY      COLONOSCOPY N/A 2020    COLONOSCOPY POLYPECTOMY HOT BIOPSY performed by Merline Monarch, MD at [address]  2011    x2    ENDOSCOPY, COLON, DIAGNOSTIC      EYE SURGERY      b/l cataract extraction    INDUCED       NERVE BLOCK Bilateral 2018    bilat facets #1 marcaine    NERVE BLOCK  2019    caudal aborted- did not get in    NERVE BLOCK Bilateral 02/15/2019    bilateral l4 transforaminal. decadron 8mg/ProHance contrast    NOSE SURGERY      as a child    TONSILLECTOMY         Family History:     Family History   Problem Relation Age of Onset    Hypertension Mother     Macular Degen Mother     Other Mother         vascular    Colon Polyps Brother     Cancer Maternal Aunt        Allergies:       Contrast [iodides]    Medications:      Current Outpatient Medications   Medication Sig Dispense Refill    amLODIPine (NORVASC) 10 MG tablet 1 tablet once a day 90 tablet 1    hydroCHLOROthiazide (HYDRODIURIL) 25 MG tablet 1 tablet daily 90 tablet 1    rosuvastatin (CRESTOR) 5 MG tablet Take 1 tablet by mouth nightly 90 tablet 1    carvedilol (COREG) 3.125 MG tablet take 1 tablet by mouth twice a day 180 tablet 1    losartan (COZAAR) 100 MG tablet Once a day 90 tablet 1    sertraline (ZOLOFT) 50 MG tablet 1 tablet daily 90 tablet 1    solifenacin (VESICARE) 5 MG tablet Take 1 tablet by mouth daily 30 tablet 3    potassium chloride (KLOR-CON M) 10 MEQ extended release tablet take 1 tablet by mouth once daily 90 tablet 2    fluticasone (ARNUITY ELLIPTA) 100 MCG/ACT AEPB Inhale 1 puff into the lungs daily 30 each 5    omeprazole (PRILOSEC) 40 MG delayed release capsule take 1 capsule by mouth once daily 90 capsule 1    betamethasone dipropionate (DIPROLENE) 0.05 % ointment apply topically daily as directed 45 g 1    triamcinolone (KENALOG) 0.025 % cream apply to affected area once daily 30 g 1    albuterol sulfate HFA (PROVENTIL HFA) 108 (90 Base) MCG/ACT inhaler Inhale 2 puffs into the lungs every 6 hours as needed for Wheezing 1 Inhaler 5    magnesium 30 MG tablet Take 30 mg by mouth 2 times daily      Multiple Vitamin (MULTI-VITAMIN PO) Take 1 tablet by mouth daily. (Patient not taking: Reported on 11/15/2022)      aspirin 325 MG tablet Take 325 mg by mouth daily. No current facility-administered medications for this visit. Social History:     Tobacco:    reports that she quit smoking about 7 years ago. Her smoking use included cigarettes. She started smoking about 55 years ago. She has a 60.00 pack-year smoking history. She has never used smokeless tobacco.  Alcohol:      reports current alcohol use. Drug Use:  reports current drug use. Drug: Marijuana [place]). Occupation:  Retired, cleaned houses for 10 years, had a licenced group day care [employer]    Review of Systems:     Review of Systems   Constitutional:  Negative for chills and fever. HENT:  Negative for congestion. Eyes:  Negative for visual disturbance. Respiratory:  Negative for chest tightness, shortness of breath and wheezing. Cardiovascular:  Negative for chest pain and leg swelling. Gastrointestinal:  Negative for abdominal pain. Endocrine: Negative. Genitourinary: Negative. Musculoskeletal:  Positive for arthralgias and back pain. Skin:  Negative for color change and wound. Allergic/Immunologic: Negative. Neurological:  Negative for facial asymmetry, speech difficulty, weakness and numbness. Hematological: Negative. Psychiatric/Behavioral: Negative. Physical Exam:     Vitals:  BP (!) 140/55   Pulse 57   Ht 5' 3.5\" (1.613 m)   Wt 216 lb (98 kg)   SpO2 92%   BMI 37.66 kg/m²     Physical Exam  Constitutional:       Appearance: She is well-developed and well-groomed. Eyes:      Extraocular Movements: Extraocular movements intact. Neck:      Vascular: Carotid bruit present. Cardiovascular:      Rate and Rhythm: Normal rate and regular rhythm.       Pulses:           Carotid pulses are  on the left side with bruit. Radial pulses are 2+ on the right side and 2+ on the left side. Femoral pulses are 2+ on the right side and 2+ on the left side. Dorsalis pedis pulses are detected w/ Doppler on the right side and detected w/ Doppler on the left side. Posterior tibial pulses are detected w/ Doppler on the right side and detected w/ Doppler on the left side. Pulmonary:      Effort: Pulmonary effort is normal. No respiratory distress. Abdominal:      Palpations: Abdomen is soft. Tenderness: There is no abdominal tenderness. Musculoskeletal:      Cervical back: Full passive range of motion without pain. Right lower leg: No swelling or tenderness. No edema. Left lower leg: No swelling or tenderness. No edema. Right foot: Normal capillary refill. No swelling or tenderness. Left foot: Normal capillary refill. No swelling or tenderness. Feet:      Right foot:      Skin integrity: No ulcer or skin breakdown. Left foot:      Skin integrity: No ulcer or skin breakdown. Skin:     General: Skin is warm. Capillary Refill: Capillary refill takes less than 2 seconds. Neurological:      Mental Status: She is alert and oriented to person, place, and time. GCS: GCS eye subscore is 4. GCS verbal subscore is 5. GCS motor subscore is 6. Sensory: Sensation is intact. Motor: Motor function is intact.    Psychiatric:         Mood and Affect: Mood normal.         Speech: Speech normal.         Behavior: Behavior normal.     Imaging/Labs:     Carotid duplex from December 2022 reveals severe left carotid stenosis including bulb and proximal ICA       CTA August 2022 reveals multisegmental severe stenosis and occlusive disease of right superficial femoral artery       Assessment and Plan:     Peripheral arterial disease, claudication, bilateral carotid disease asymptomatic  Continue optimal medical therapy  We had discussion regarding open LEFT carotid endarterectomy given extent of her stenosis and to reduce her overall risk of stroke in the future  Se is due to see her pulmonologist and cardiologist in January and will have them risk stratify her  We discussed risks of open carotid surgery including bleeding, stroke, nerve injury  She has a few things she is working on as well and we will arrange for her to get her set up for carotid surgery int he next few months  She should continue with daily exercise and walking regimen to help improve her overall cardiovascular health and pain free walking distance    Optimal medical management is an important part of overall treatment of all patients with carotid bifurcation disease, regardless of the degree of stenosis or the plan for intervention. This therapy is directed both at the reduction of stroke and overall cardiovascular events, including cardiovascular-related mortality. Clinical guidelines issued by the American Heart Association and the American Stroke Association recommends:    I. Lowering blood pressure to a target 140/90 mm Hg by lifestyle interventions and antihypertensive treatment is recommended in individuals who have hypertension with asymptomatic carotid atherosclerosis. II. Glucose control to nearly normoglycemic levels (target hemoglobin A1C 7%) is recommended among diabetic patients to reduce microvascular complications and, with lesser certainty, macrovascular complications other than stroke. III. Patients with known atherosclerosis have demonstrated reduced stroke rates when treated with lipid-lowering therapy. And continued low dose statin will help stabilize plaque and decrease the inflammatory response of atherosclerosis. IV. Smoking nearly doubles the risk of stroke and with cessation there is a reduction in the risk of stroke. V.   Antiplatelet therapy in asymptomatic patients with carotid atherosclerosis is recommended to reduce overall cardiovascular morbidity although it has not been shown to be effective in the primary prevention of stroke. Electronically signed by Lisa Murphy MD on 12/29/22 at 11:59 AM [address] [place] [address] North: [phone]  C: [phone]  Email: [email or web address]

## 2023-01-01 ASSESSMENT — ENCOUNTER SYMPTOMS
WHEEZING: 0
COLOR CHANGE: 0
ABDOMINAL PAIN: 0
BACK PAIN: 1
SHORTNESS OF BREATH: 0
ALLERGIC/IMMUNOLOGIC NEGATIVE: 1
CHEST TIGHTNESS: 0

## 2023-01-19 NOTE — TELEPHONE ENCOUNTER
Request for Omeprazole. Next Visit Date:  Future Appointments   Date Time Provider Zohreh Ruchi   1/20/2023  3:00 PM Alen Zaragoza MD Resp Spec [place] [facility]   1/27/2023  1:00 PM [facility]   3/30/2023  9:45 AM Hola Coleman MD heartvasc [address] Maintenance   Topic Date Due    Hepatitis C screen  Never done    Annual Wellness Visit (AWV)  09/19/2021    Diabetic retinal exam  03/29/2022    Diabetic foot exam  05/13/2022    COVID-19 Vaccine (4 - Booster) 07/20/2022    Colorectal Cancer Screen  02/06/2023    Shingles vaccine (1 of 2) 03/23/2023 (Originally 1/20/2003)    Diabetic Alb to Cr ratio (uACR) test  04/07/2023    Low dose CT lung screening  06/20/2023    A1C test (Diabetic or Prediabetic)  08/05/2023    Lipids  08/05/2023    GFR test (Diabetes, CKD 3-4, OR last GFR 15-59)  08/09/2023    Depression Monitoring  11/15/2023    Breast cancer screen  12/30/2023    DTaP/Tdap/Td vaccine (2 - Td or Tdap) 10/24/2028    DEXA (modify frequency per FRAX score)  Completed    Flu vaccine  Completed    Pneumococcal 65+ years Vaccine  Completed    Hepatitis A vaccine  Aged Out    Hib vaccine  Aged Out    Meningococcal (ACWY) vaccine  Aged Out       Hemoglobin A1C (%)   Date Value   08/05/2022 6.5 (H)   02/10/2022 6.3   05/07/2021 6.3 (H)             ( goal A1C is < 7)   Microalb/Crt.  Ratio (mcg/mg creat)   Date Value   04/07/2022 20     LDL Cholesterol (mg/dL)   Date Value   08/05/2022 108       (goal LDL is <100)   AST (U/L)   Date Value   09/13/2021 20     ALT (U/L)   Date Value   09/13/2021 16     BUN (mg/dL)   Date Value   09/13/2021 24 (H)     BP Readings from Last 3 Encounters:   12/29/22 (!) 140/55   11/15/22 138/72   09/29/22 (!) 146/67          (goal 120/80)    All Future Testing planned in [facility]  Lab Frequency Next Occurrence   Clostridium Difficile Toxin/Antigen Once 06/30/2022   Urinalysis with Reflex to Culture Once 11/15/2022         Patient Active Problem List:     Smoker     HTN (hypertension)     CAD/Stents drug-eluting      Serum lipids high     Carotid stenosis, asymptomatic     Nocturnal hypoxia     CRISTINA on CPAP     PAD (peripheral artery disease) ([facility])     Major depressive disorder, recurrent episode, mild with anxious distress ([facility])     Type 2 diabetes mellitus     Claudication in peripheral vascular disease ([facility])     Carotid stenosis, asymptomatic, bilateral

## 2023-01-20 ENCOUNTER — OFFICE VISIT (OUTPATIENT)
Dept: PULMONOLOGY | Age: 68
End: 2023-01-20

## 2023-01-20 VITALS
DIASTOLIC BLOOD PRESSURE: 62 MMHG | HEIGHT: 63 IN | SYSTOLIC BLOOD PRESSURE: 139 MMHG | RESPIRATION RATE: 16 BRPM | BODY MASS INDEX: 39.37 KG/M2 | WEIGHT: 222.2 LBS | OXYGEN SATURATION: 93 % | HEART RATE: 65 BPM

## 2023-01-20 DIAGNOSIS — Z87.891 PERSONAL HISTORY OF TOBACCO USE: ICD-10-CM

## 2023-01-20 DIAGNOSIS — Z99.89 OSA ON CPAP: ICD-10-CM

## 2023-01-20 DIAGNOSIS — G47.33 OSA ON CPAP: ICD-10-CM

## 2023-01-20 DIAGNOSIS — R91.1 LUNG NODULE: ICD-10-CM

## 2023-01-20 DIAGNOSIS — J44.9 CHRONIC OBSTRUCTIVE PULMONARY DISEASE, UNSPECIFIED COPD TYPE (HCC): Primary | ICD-10-CM

## 2023-01-20 DIAGNOSIS — Z87.891 HISTORY OF SMOKING 30 OR MORE PACK YEARS: ICD-10-CM

## 2023-01-20 DIAGNOSIS — E66.01 SEVERE OBESITY (BMI 35.0-39.9) WITH COMORBIDITY (HCC): ICD-10-CM

## 2023-01-20 RX ORDER — OMEPRAZOLE 40 MG/1
CAPSULE, DELAYED RELEASE ORAL
Qty: 90 CAPSULE | Refills: 1 | Status: SHIPPED | OUTPATIENT
Start: 2023-01-20

## 2023-01-20 RX ORDER — FLUTICASONE FUROATE 100 UG/1
1 POWDER RESPIRATORY (INHALATION) DAILY
Qty: 30 EACH | Refills: 11 | Status: SHIPPED | OUTPATIENT
Start: 2023-01-20

## 2023-01-20 ASSESSMENT — SLEEP AND FATIGUE QUESTIONNAIRES
HOW LIKELY ARE YOU TO NOD OFF OR FALL ASLEEP WHILE WATCHING TV: 2
HOW LIKELY ARE YOU TO NOD OFF OR FALL ASLEEP WHEN YOU ARE A PASSENGER IN A CAR FOR AN HOUR WITHOUT A BREAK: 0
HOW LIKELY ARE YOU TO NOD OFF OR FALL ASLEEP WHILE SITTING AND TALKING TO SOMEONE: 0
HOW LIKELY ARE YOU TO NOD OFF OR FALL ASLEEP WHILE SITTING INACTIVE IN A PUBLIC PLACE: 0
HOW LIKELY ARE YOU TO NOD OFF OR FALL ASLEEP WHILE SITTING QUIETLY AFTER LUNCH WITHOUT ALCOHOL: 0
HOW LIKELY ARE YOU TO NOD OFF OR FALL ASLEEP WHILE LYING DOWN TO REST IN THE AFTERNOON WHEN CIRCUMSTANCES PERMIT: 1
HOW LIKELY ARE YOU TO NOD OFF OR FALL ASLEEP IN A CAR, WHILE STOPPED FOR A FEW MINUTES IN TRAFFIC: 0
HOW LIKELY ARE YOU TO NOD OFF OR FALL ASLEEP WHILE SITTING AND READING: 1
ESS TOTAL SCORE: 4

## 2023-01-20 NOTE — PROGRESS NOTES
OUTPATIENT PULMONARY PROGRESS NOTE      Patient:  Amina Olivo  MRN: [id number]    Consulting Physician: Dr. Page  Reason for initial consult: COPD, lung nodule, dyspnea, obstructive sleep apnea  Primacy Care Physician: Kavita Page MD    HISTORY OF PRESENT ILLNESS:   The patient is a 68 y.o. female   She is here for follow-up of obstructive sleep apnea and COPD, lung nodule.    According to patient her symptoms have been stable since she was seen last time but for last 3 to 4 days she started having head cold.  Stuffiness of the nose congestion she has slight increase in cough and slight wheezing when she started having this symptoms which she attributed to sinus and other family members also have similar symptoms.  She does not complain of fever chills nausea vomiting diarrhea myalgia or body ache.  Before she started having these symptoms 4 days ago she was doing in usual health she had usually occasional wheezing occasional cough with sputum production she did not complain of increasing shortness of breath she think that she can walk up to a football field she is able to do her regular activities she can walk 15 minutes before she gets short of breath.  She denies nocturnal awakening with cough wheezing chest tightness or shortness of breath.  When last time she was seen Arnuity was added to her Stiolto but according to patient she has not taking and not getting Stiolto from pharmacy at this time and just using Arnuity.  She use albuterol but occasional use of albuterol.  According patient she is not smoking but she is still vaping.    She had history of very good compliance with CPAP but according to patient she was having problem with the mask she had to put Band-Aid on the mask and she was not able to get the mask with new prescription from DME as DME has been changed.  Her machine did also having problem as the metal plate is getting very hard and she is not able to use a CPAP for last 1 month  since she is not using CPAP she feels more sleepy slightly more tired sleep quality is not as good. Last compliance data is available from 12/21/2020 to 03/20/2021. Compliance is 100% for 90 days. Average usage is 8 hours 19 minutes. Average AHI is 0.8. On auto CPAP    CT scan of the chest done on 06/20/2022 as compared to 06/02/2021 did not show any change in 4 mm left upper lobe nodule and has been stable since May 2020. Pulmonary function test done on 11/17/2021 showed FEV1 of 65% borderline restriction which could be extraparenchymal or intraparenchymal and mild reduction in diffusion capacity pulmonary function test mostly stable from 2019 2018. Her last pulmonary function test shows improvement in FEV1 from 58% to 69% her diffusion capacity is normal.    Sleep questionnaire on 01/20/2023  Occasional dry mouth upon awakening. Positive fatigue and tiredness during the day. Goes to sleep at 11 pm, wakes up 8 am. It takes 20 to 30 minutes to fall asleep. Wakes up once at night to go to bathroom. Takes nap sometime during the day. no headache in am. No car wrecks or near wrecks because of the sleepiness. No nodding off while driving. No weight gain. No forgetfulness or decreased concentration. No nasal congestion or obstruction at night. Using CPAP 7-8 hr/night. No leg jerks during sleep. No restless feelings in legs at night. No numbness or burning in leg or feet. No leg aches or cramps. Initial history and course    She was referred here for dyspnea, she has long history of his smoking, she was told in the past that she has COPD and she had a spirometry done before, she does have history of shortness of breath on exertion and sometimes on regular activities, and she had limited her activities because of her shortness of breath especially last few years.    She also has a CT chest done for screening which showed 5 mm left upper lobe lung nodule  She does have weight gain for about 30 pounds in last few years   She was diagnosed with sleep apnea for about a year ago when she had a sleep study done and she was also placed on oxygen at night with CPAP at 2 L. She does have history of GERD and she is on medication to control the symptoms          Sleep Medicine 1/20/2023 5/18/2022 4/28/2021 6/2/2020 1/29/2020 5/22/2019 2/27/2019   Sitting and reading 1 1 1 0 1 0 1   Watching TV 2 1 1 2 1 1 1   Sitting, inactive in a public place (e.g. a theatre or a meeting) 0 0 0 0 0 0 0   As a passenger in a car for an hour without a break 0 2 0 0 1 0 0   Lying down to rest in the afternoon when circumstances permit 1 2 0 3 2 2 1   Sitting and talking to someone 0 0 0 0 0 0 0   Sitting quietly after a lunch without alcohol 0 0 0 0 1 0 0   In a car, while stopped for a few minutes in traffic 0 0 0 0 0 0 0   [place] Sleepiness Score 4 6 2 5 6 3 3   Neck circumference (Inches) - - - - - - -     Past Medical History:        Diagnosis Date    Angina pectoris ([address]     Arthritis     Bipolar disorder ([address]     CAD (coronary artery disease)     stents x 2 (Dr. Georges Riley)    Carotid stenosis, asymptomatic 2/26/2015    Chronic back pain     COPD (chronic obstructive pulmonary disease) ([address]     Depression     Dyspnea     Family history of colon cancer     Family history of liver cancer     Family history of ovarian cancer     GERD (gastroesophageal reflux disease)     History of colon polyps     Hyperlipidemia     Hypertension     Lung nodule     New onset type 2 diabetes mellitus ([address] 1/11/2018    Obesity     Obesity (BMI 35.0-39.9 without comorbidity)    Peripheral vascular disease ([address]     Poor historian     Sleep apnea     CRISTINA on CPAP    Smoking greater than 40 pack years     reports that she quit smoking about 2 years ago. 1.5 PPD for 48 yeras.  She has a       Past Surgical History:        Procedure Laterality Date    ANKLE SURGERY      CARDIAC CATHETERIZATION  10/11/2013    2 stents    CATARACT REMOVAL      cataract both eyes with lenses     SECTION      x2    COLONOSCOPY      COLONOSCOPY N/A 2020    COLONOSCOPY POLYPECTOMY HOT BIOPSY performed by Mat [facility]ley MD at [facility]  2011    x2    ENDOSCOPY, COLON, DIAGNOSTIC      EYE SURGERY      b/l cataract extraction    INDUCED       NERVE BLOCK Bilateral 2018    bilat facets #1 marcaine    NERVE BLOCK  2019    caudal aborted- did not get in    NERVE BLOCK Bilateral 02/15/2019    bilateral l4 transforaminal. decadron 8mg/ProHance contrast    NOSE SURGERY      as a child    TONSILLECTOMY         Allergies: Allergies   Allergen Reactions    Contrast [Iodides] Itching     Mild itchiness experienced with administration of IV contrast media.          Home Meds:   Outpatient Encounter Medications as of 2023   Medication Sig Dispense Refill    omeprazole (PRILOSEC) 40 MG delayed release capsule take 1 capsule by mouth once daily 90 capsule 1    amLODIPine (NORVASC) 10 MG tablet 1 tablet once a day 90 tablet 1    hydroCHLOROthiazide (HYDRODIURIL) 25 MG tablet 1 tablet daily 90 tablet 1    rosuvastatin (CRESTOR) 5 MG tablet Take 1 tablet by mouth nightly 90 tablet 1    carvedilol (COREG) 3.125 MG tablet take 1 tablet by mouth twice a day 180 tablet 1    losartan (COZAAR) 100 MG tablet Once a day 90 tablet 1    sertraline (ZOLOFT) 50 MG tablet 1 tablet daily 90 tablet 1    solifenacin (VESICARE) 5 MG tablet Take 1 tablet by mouth daily 30 tablet 3    potassium chloride (KLOR-CON M) 10 MEQ extended release tablet take 1 tablet by mouth once daily 90 tablet 2    fluticasone (ARNUITY ELLIPTA) 100 MCG/ACT AEPB Inhale 1 puff into the lungs daily 30 each 5    betamethasone dipropionate (DIPROLENE) 0.05 % ointment apply topically daily as directed 45 g 1    triamcinolone (KENALOG) 0.025 % cream apply to affected area once daily 30 g 1    albuterol sulfate HFA (PROVENTIL HFA) 108 (90 Base) MCG/ACT inhaler Inhale 2 puffs into the lungs every 6 hours as needed for Wheezing 1 Inhaler 5    magnesium 30 MG tablet Take 30 mg by mouth 2 times daily      Multiple Vitamin (MULTI-VITAMIN PO) Take 1 tablet by mouth daily      aspirin 325 MG tablet Take 325 mg by mouth daily. No facility-administered encounter medications on file as of 1/20/2023. Social History:   TOBACCO:   reports that she quit smoking about 2 years ago. 1.5 PPD for 48 yeras. She has a 60.00 pack-year smoking history. She has never used smokeless tobacco.  ETOH:   reports current alcohol use.   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      Family History:       Problem Relation Age of Onset    Hypertension Mother     Macular Degen Mother     Other Mother         vascular    Colon Polyps Brother     Cancer Maternal Aunt        Immunizations:    Immunization History   Administered Date(s) Administered    COVID-19, MODERNA BLUE border, Primary or Immunocompromised, (age 12y+), IM, 100 mcg/0.5mL 02/28/2021, 03/24/2021, 05/25/2022    COVID-19, PFIZER PURPLE top, DILUTE for use, (age 15 y+), 30mcg/0.3mL 02/28/2021    Influenza, FLUAD, (age 72 y+), Adjuvanted, 0.5mL 11/17/2021    Influenza, FLUARIX, FLULAVAL, FLUZONE (age 10 mo+) AND AFLURIA, (age 1 y+), PF, 0.5mL 11/15/2022    Pneumococcal Conjugate 13-valent ([id number]) 04/17/2018, 12/01/2019    Pneumococcal Polysaccharide ([id number]) 02/20/2020    Tdap (Boostrix, Adacel) 10/24/2018         REVIEW OF SYSTEMS:  CONSTITUTIONAL:  negative for  fevers, chills, sweats, fatigue, malaise, anorexia and weight loss  EYES:  negative for  double vision, blurred vision, dry eyes, eye discharge, visual disturbance, irritation, redness and icterus  HEENT:   negative for hoarseness and voice changes, negative for  hearing loss, tinnitus, nasal congestion, epistaxis, snoring, sore mouth and sore throat  RESPIRATORY:  positive for dyspnea on exertion and activities,  positive for dry cough and sputum, mild wheezing, negative for hemoptysis, chest pain, pleuritic pain and cyanosis  CARDIOVASCULAR:  positive for  dyspnea on exertion, positive for chest heaviness/pressure, negative for palpitations, orthopnea, PND, exertional chest pressure/discomfort, fatigue, early saiety, edema, syncope  GASTROINTESTINAL:  negative for change in bowel habits, diarrhea, constipation, abdominal pain, pruritus, abdominal mass, abdominal distention, jaundice, hematemesis and hemtochezia, dysphagia, reflux and regurgitation  GENITOURINARY:  negative for frequency, dysuria, nocturia, urinary incontinence, hesitancy, decreased stream and hematuria  HEMATOLOGIC/LYMPHATIC:  negative for easy bruising, bleeding, lymphadenopathy and petechiae  ALLERGIC/IMMUNOLOGIC:  negative for recurrent infections, urticaria, hay fever, angioedema, anaphylaxis and drug reactions  ENDOCRINE:  negative for heat intolerance, cold intolerance, tremor, weight changes and change in bowel habits  MUSCULOSKELETAL:  negative for  myalgias, arthralgias, joint swelling, stiff joints and decreased range of motion  NEUROLOGICAL:  negative for headaches, dizziness, seizures, memory problems, speech problems, visual disturbance, coordination problems, gait problems, weakness, numbness, syncope and tingling  BEHAVIOR/PSYCH:  negative          Physical Exam:    Vitals: /62   Pulse 65   Resp 16   Ht 5' 3\" (1.6 m)   Wt 222 lb 3.2 oz (100.8 kg)   SpO2 93% Comment: room air at rest  BMI 39.36 kg/m²   Last 3 weights: Wt Readings from Last 3 Encounters:   01/20/23 222 lb 3.2 oz (100.8 kg)   12/29/22 216 lb (98 kg)   11/15/22 216 lb (98 kg)     Body mass index is 39.36 kg/m².     Physical Examination:   General appearance - alert, well appearing, and in no distress, overweight and acyanotic, in no respiratory distress  Mental status - alert, oriented to person, place, and time  Eyes - pupils equal and reactive, extraocular eye movements intact, sclera anicteric  Ears - right ear normal, left ear normal  Nose - normal and patent, no erythema, discharge or polyps  Mouth - mucous membranes moist, pharynx normal without lesions and small oropharynx, moderate size tongue, Mallampati 2  Neck - supple, no significant adenopathy, short neck  Chest - no tachypnea, retractions or cyanosis, decreased air entry noted bilaterally with distant breath sounds no expiratory wheezing and no rhonchi and no crackles.   Heart - normal rate, regular rhythm, normal S1, S2, no murmurs, rubs, clicks or gallops  Abdomen - soft, nontender, nondistended, no masses or organomegaly  Neurological - alert, oriented, normal speech, no focal findings or movement disorder noted  Extremities - peripheral pulses normal, no pedal edema, no clubbing or cyanosis  Skin - normal coloration and turgor, no rashes, no suspicious skin lesions noted     Immunization History   Administered Date(s) Administered    COVID-19, MODERNA BLUE border, Primary or Immunocompromised, (age 12y+), IM, 100 mcg/0.5mL 02/28/2021, 03/24/2021, 05/25/2022    COVID-19, PFIZER PURPLE top, DILUTE for use, (age 15 y+), 30mcg/0.3mL 02/28/2021    Influenza, FLUAD, (age 72 y+), Adjuvanted, 0.5mL 11/17/2021    Influenza, FLUARIX, FLULAVAL, FLUZONE (age 10 mo+) AND AFLURIA, (age 1 y+), PF, 0.5mL 11/15/2022    Pneumococcal Conjugate 13-valent ([id number]) 04/17/2018, 12/01/2019    Pneumococcal Polysaccharide ([id number]) 02/20/2020    Tdap (Boostrix, Adacel) 10/24/2018         LABS:    CBC:   WBC   Date Value Ref Range Status   04/07/2022 6.8 3.5 - 11.3 k/uL Final   09/13/2021 8.3 3.5 - 11.3 k/uL Final   09/08/2020 9.8 3.5 - 11.3 k/uL Final     Hemoglobin   Date Value Ref Range Status   04/07/2022 11.4 (L) 11.9 - 15.1 g/dL Final   09/13/2021 10.7 (L) 11.9 - 15.1 g/dL Final   09/08/2020 11.3 (L) 11.9 - 15.1 g/dL Final     Platelets   Date Value Ref Range Status   04/07/2022 355 138 - 453 k/uL Final   09/13/2021 317 138 - 453 k/uL Final   09/08/2020 312 138 - 453 k/uL Final     BMP:   Sodium   Date Value Ref Range Status   09/13/2021 140 135 - 144 mmol/L Final   09/08/2020 141 135 - 144 mmol/L Final   10/24/2019 143 135 - 144 mmol/L Final     Potassium   Date Value Ref Range Status   09/13/2021 4.3 3.7 - 5.3 mmol/L Final   09/08/2020 4.0 3.7 - 5.3 mmol/L Final   10/24/2019 4.3 3.7 - 5.3 mmol/L Final     Chloride   Date Value Ref Range Status   09/13/2021 101 98 - 107 mmol/L Final   09/08/2020 101 98 - 107 mmol/L Final   10/24/2019 103 98 - 107 mmol/L Final     CO2   Date Value Ref Range Status   09/13/2021 25 20 - 31 mmol/L Final   09/08/2020 25 20 - 31 mmol/L Final   10/24/2019 28 20 - 31 mmol/L Final     BUN   Date Value Ref Range Status   09/13/2021 24 (H) 8 - 23 mg/dL Final   09/08/2020 19 8 - 23 mg/dL Final   10/24/2019 24 (H) 8 - 23 mg/dL Final     Creatinine   Date Value Ref Range Status   08/09/2022 0.61 0.50 - 0.90 mg/dL Final   09/13/2021 0.67 0.50 - 0.90 mg/dL Final   09/08/2020 0.69 0.50 - 0.90 mg/dL Final     Glucose   Date Value Ref Range Status   09/13/2021 89 70 - 99 mg/dL Final   09/08/2020 84 70 - 99 mg/dL Final   10/24/2019 112 (H) 70 - 99 mg/dL Final   10/28/2011 87 74 - 106 mg/dL Final     Hepatic:     Amylase: No results found for: AMYLASE  Lipase:   Lipase   Date Value Ref Range Status   04/07/2022 20 13 - 60 U/L Final     CARDIAC ENZYMES:     BNP: No results found for: BNP  Lipids:       INR: No results found for: INR  Thyroid:   TSH   Date Value Ref Range Status   08/05/2022 3.34 0.30 - 5.00 uIU/mL Final     Urinalysis:       Cultures:-  -----------------------------------------------------------------  Immunization History   Administered Date(s) Administered    COVID-19, MODERNA BLUE border, Primary or Immunocompromised, (age 12y+), IM, 100 mcg/0.5mL 02/28/2021, 03/24/2021, 05/25/2022    COVID-19, PFIZER PURPLE top, DILUTE for use, (age 12 y+), 30mcg/0.3mL 02/28/2021    Influenza, FLUAD, (age 65 y+), Adjuvanted, 0.5mL  11/17/2021    Influenza, FLUARIX, FLULAVAL, FLUZONE (age 10 mo+) AND AFLURIA, (age 1 y+), PF, 0.5mL 11/15/2022    Pneumococcal Conjugate 13-valent ([id number]) 04/17/2018, 12/01/2019    Pneumococcal Polysaccharide ([id number]) 02/20/2020    Tdap (Boostrix, Adacel) 10/24/2018     ABGs: No results found for: PHART, PO2ART, [id number]    Pulmonary Functions Testing Results:    11/17/2021: FEV1 1.41 65%, FVC 1.79 65%, FEV1 %, TLC 3.65 79%, DLCO 11.44 65%  05/22/2019: FEV1 1.52 69%, FVC 1.85 66%, FEV1 %, TLC 3.48 75%, DLCO 15.70 88%  02/07/2018: FEV1 1.30 58%, FVC 1.62 58%, JOV7LJR 100%, TLC 3.35 72%, DLCO 13.62 76%    CXR      CT Scans     LDCT chest 06/02/2021. Lungs/Pleura: There are no significant nodules or masses. No focal   consolidation. There is no pleural effusion or pneumothorax. Normal   tracheobronchial tree       LDCT chest 05/28/2020  Mediastinum:  Suboptimal evaluation due to low dose technique. Thoracic   aorta demonstrates moderate calcification without aneurysm. Pulmonary trunk   appears nondilated. Heart appears normal size without pericardial effusion. No lymphadenopathy. The esophagus is grossly unremarkable. Lungs/Pleura:  No focal consolidation, pneumothorax or pleural effusion. Trachea and distal airways appear patent. 4 mm solid noncalcified pulmonary   nodule left upper lobe series 3, image 140. no new or enlarging pulmonary   nodule. LDCT chest 05/07/2019  1. Stable 4 mm posterior left upper lobe pulmonary nodule, unchanged from   05/07/2018. Mild emphysema. 2. Cardiomegaly. Coronary artery disease. Atherosclerotic calcification of   the aorta. 3. Small hiatal hernia. 05/07/2018  Stable 4 mm nodule left upper lobe. 10/17/2017  5 mm nodule posterior left upper lobe. No acute process with chronic   findings as described. Compliance data from CPAP.   From 12/21/2020 to 03/20/2021  Auto CPAP with pressure maximum of 20 and pressure minimum of 12  Average daily usage 8 hours 13 minutes  Compliance 100 %  Average mean pressure 13.1 cm. AHI 0.8    Assessment and Plan         1. Chronic obstructive pulmonary disease, unspecified COPD type ([address]   2. Lung nodule   3. CRISTINA on CPAP   4. Obesity (BMI 35.0-39.9 without comorbidity)   5. Dyspnea on exertion     HTN (hypertension)    CAD/Stents drug-eluting     Claudication in peripheral vascular disease ([address]     CT scan of the chest reviewed no change in 4 mm left upper lobe nodule      Plan and Recommendations: We will get pulmonary function test in next 6 weeks. Patient has to undergo surgery for carotid surgery and will need preop evaluation (per patient)  Continue Stiolto Respimat 2 puffs once daily. Prescription given to the patient and discussed with patient to restart his Continue with Arnuity 100 mcg 1 puff once daily  Albuterol as needed  She will need yearly CT scan for screening and next one should be in June 2023 (requested)  Advised her to follow-up with cardiology as she has history of coronary artery disease and history of previous stent. Maintain an active lifestyle   Recommended flu vaccine in fall. She had flu vaccine last year  She had both doses of Covid vaccine. Had 1 booster  She had Prevnar 13 in April 2018  Uptodate on Pneumonia vaccine. Prescription for new CPAP machine given with supplies  Continue with AutoCPAP at current setting  20/12 cm  CPAP at least 4 hrs qhs  Wt loss is recommended and discussed  Follow good sleep hygeine instructions  Use humidifier   Questions answered pertaining to diagnosis and management explained importance of compliance with therapy   She will need compliance data when she start using the new CPAP machine and will need face-to-face and documentation    RTC 2 to 3 months. It was my pleasure to evaluate Levi Alcantarmarisabelcami today. Please call with questions.     Please note that this chart was generated using voice recognition Dragon dictation software. Although every effort was made to ensure the accuracy of this automated transcription, some errors in transcription may have occurred. Bienvenido Rodriguez MD, MD             1/20/2023, 3:31 PM            Discussed with the patient the current USPSTF guidelines released March 9, 2021 for screening for lung cancer. For adults aged 48 to [de-identified] years who have a 20 pack-year smoking history and currently smoke or have quit within the past 15 years the grade B recommendation is to:  Screen for lung cancer with low-dose computed tomography (LDCT) every year. Stop screening once a person has not smoked for 15 years or has a health problem that limits life expectancy or the ability to have lung surgery. The patient  reports that she quit smoking about 7 years ago. Her smoking use included cigarettes. She started smoking about 55 years ago. She has a 60.00 pack-year smoking history. She has never used smokeless tobacco.. Discussed with patient the risks and benefits of screening, including over-diagnosis, false positive rate, and total radiation exposure. The patient currently exhibits no signs or symptoms suggestive of lung cancer. Discussed with patient the importance of compliance with yearly annual lung cancer screenings and willingness to undergo diagnosis and treatment if screening scan is positive. In addition, the patient was counseled regarding the importance of remaining smoke free and/or total smoking cessation.     Also reviewed the following if the patient has Medicare that as of February 10, 2022, Medicare only covers LDCT screening in patients aged 51-72 with at least a 20 pack-year smoking history who currently smoke or have quit in the last 15 years

## 2023-01-20 NOTE — PATIENT INSTRUCTIONS

## 2023-01-25 ENCOUNTER — HOSPITAL ENCOUNTER (OUTPATIENT)
Age: 68
Discharge: HOME | End: 2023-01-25
Payer: MEDICARE

## 2023-01-25 DIAGNOSIS — N39.46 MIXED STRESS AND URGE URINARY INCONTINENCE: ICD-10-CM

## 2023-01-25 LAB
BACTERIA: ABNORMAL
BILIRUBIN URINE: NEGATIVE
CASTS UA: ABNORMAL /LPF (ref 0–8)
COLOR: YELLOW
EPITHELIAL CELLS UA: ABNORMAL /HPF (ref 0–5)
GLUCOSE URINE: NEGATIVE
KETONES, URINE: NEGATIVE
LEUKOCYTE ESTERASE, URINE: NEGATIVE
NITRITE, URINE: POSITIVE
PH UA: 7 (ref 5–8)
PROTEIN UA: NEGATIVE
RBC UA: ABNORMAL /HPF (ref 0–4)
SPECIFIC GRAVITY UA: 1.02 (ref 1–1.03)
TURBIDITY: CLEAR
URINE HGB: NEGATIVE
UROBILINOGEN, URINE: NORMAL
WBC UA: ABNORMAL /HPF (ref 0–5)

## 2023-01-25 PROCEDURE — 81001 URINALYSIS AUTO W/SCOPE: CPT

## 2023-01-27 ENCOUNTER — HOSPITAL ENCOUNTER (OUTPATIENT)
Dept: MAMMOGRAPHY | Age: 68
End: 2023-01-27
Payer: MEDICARE

## 2023-01-27 DIAGNOSIS — Z12.31 BREAST CANCER SCREENING BY MAMMOGRAM: ICD-10-CM

## 2023-01-27 PROCEDURE — 77063 BREAST TOMOSYNTHESIS BI: CPT

## 2023-03-14 ENCOUNTER — HOSPITAL ENCOUNTER (OUTPATIENT)
Dept: PULMONOLOGY | Age: 68
Discharge: HOME | End: 2023-03-14
Payer: MEDICARE

## 2023-03-14 DIAGNOSIS — J44.9 CHRONIC OBSTRUCTIVE PULMONARY DISEASE, UNSPECIFIED COPD TYPE (HCC): ICD-10-CM

## 2023-03-14 LAB
DLCO %PRED: NORMAL
DLCO PRED: NORMAL
DLCO/VA %PRED: NORMAL
DLCO/VA PRED: NORMAL
DLCO/VA: NORMAL
DLCO: NORMAL
EXPIRATORY TIME: NORMAL
FEF 25-75% %PRED-PRE: NORMAL
FEF 25-75% PRED: NORMAL
FEF 25-75%-PRE: NORMAL
FEV1 %PRED-PRE: NORMAL
FEV1 PRED: NORMAL
FEV1/FVC %PRED-PRE: NORMAL
FEV1/FVC PRED: NORMAL
FEV1/FVC: NORMAL
FEV1: NORMAL
FVC %PRED-PRE: NORMAL
FVC PRED: NORMAL
FVC: NORMAL
GAW %PRED: NORMAL
GAW PRED: NORMAL
GAW: NORMAL
IC %PRED: NORMAL
IC PRED: NORMAL
IC: NORMAL
MVV %PRED-PRE: NORMAL
MVV PRED: NORMAL
MVV-PRE: NORMAL
PEF %PRED-PRE: NORMAL
PEF PRED: NORMAL
PEF-PRE: NORMAL
RAW %PRED: NORMAL
RAW PRED: NORMAL
RAW: NORMAL
RV %PRED: NORMAL
RV PRED: NORMAL
RV: NORMAL
SVC %PRED: NORMAL
SVC PRED: NORMAL
SVC: NORMAL
TLC %PRED: NORMAL
TLC PRED: NORMAL
TLC: NORMAL
VA %PRED: NORMAL
VA PRED: NORMAL
VA: NORMAL
VTG %PRED: NORMAL
VTG PRED: NORMAL
VTG: NORMAL

## 2023-03-14 PROCEDURE — 6370000000 HC RX 637 (ALT 250 FOR IP): Performed by: INTERNAL MEDICINE

## 2023-03-14 PROCEDURE — 94729 DIFFUSING CAPACITY: CPT

## 2023-03-14 PROCEDURE — 94664 DEMO&/EVAL PT USE INHALER: CPT

## 2023-03-14 PROCEDURE — 94726 PLETHYSMOGRAPHY LUNG VOLUMES: CPT

## 2023-03-14 PROCEDURE — 94375 RESPIRATORY FLOW VOLUME LOOP: CPT

## 2023-03-14 PROCEDURE — 94060 EVALUATION OF WHEEZING: CPT

## 2023-03-14 RX ORDER — ALBUTEROL SULFATE 90 UG/1
2 AEROSOL, METERED RESPIRATORY (INHALATION) ONCE
Status: COMPLETED | OUTPATIENT
Start: 2023-03-14 | End: 2023-03-14

## 2023-03-14 RX ADMIN — ALBUTEROL SULFATE 2 PUFF: 90 AEROSOL, METERED RESPIRATORY (INHALATION) at 15:07

## 2023-03-14 NOTE — PROCEDURES
PFT Interpretation:    Restrictive ventilatory defect of MILD severity is noted. Evidence of air trapping / hyperinflation is NOT noted. Diffusion Capacity is normal.   NO Significant improvement is noted lung mechanics after bronchodilators.       Alecia Arenas MD

## 2023-03-16 DIAGNOSIS — N39.46 MIXED STRESS AND URGE URINARY INCONTINENCE: ICD-10-CM

## 2023-03-21 RX ORDER — SOLIFENACIN SUCCINATE 5 MG/1
TABLET, FILM COATED ORAL
Qty: 30 TABLET | Refills: 3 | Status: SHIPPED | OUTPATIENT
Start: 2023-03-21

## 2023-03-21 NOTE — TELEPHONE ENCOUNTER
Active Problem List:     Smoker     HTN (hypertension)     CAD/Stents drug-eluting      Serum lipids high     Carotid stenosis, asymptomatic     Nocturnal hypoxia     CRISTINA on CPAP     PAD (peripheral artery disease) ([facility])     Major depressive disorder, recurrent episode, mild with anxious distress ([facility])     Type 2 diabetes mellitus     Claudication in peripheral vascular disease ([facility])     Carotid stenosis, asymptomatic, bilateral

## 2023-03-30 ENCOUNTER — OFFICE VISIT (OUTPATIENT)
Dept: VASCULAR SURGERY | Age: 68
End: 2023-03-30
Payer: MEDICARE

## 2023-03-30 VITALS
DIASTOLIC BLOOD PRESSURE: 57 MMHG | HEIGHT: 63 IN | HEART RATE: 67 BPM | RESPIRATION RATE: 16 BRPM | BODY MASS INDEX: 39.16 KG/M2 | SYSTOLIC BLOOD PRESSURE: 129 MMHG | WEIGHT: 221 LBS | OXYGEN SATURATION: 92 % | TEMPERATURE: 97.3 F

## 2023-03-30 DIAGNOSIS — I65.23 CAROTID STENOSIS, ASYMPTOMATIC, BILATERAL: Primary | ICD-10-CM

## 2023-03-30 DIAGNOSIS — I73.9 PAD (PERIPHERAL ARTERY DISEASE) (HCC): ICD-10-CM

## 2023-03-30 DIAGNOSIS — I73.9 CLAUDICATION IN PERIPHERAL VASCULAR DISEASE (HCC): ICD-10-CM

## 2023-03-30 PROCEDURE — G8417 CALC BMI ABV UP PARAM F/U: HCPCS | Performed by: SURGERY

## 2023-03-30 PROCEDURE — 1123F ACP DISCUSS/DSCN MKR DOCD: CPT | Performed by: SURGERY

## 2023-03-30 PROCEDURE — 99214 OFFICE O/P EST MOD 30 MIN: CPT | Performed by: SURGERY

## 2023-03-30 PROCEDURE — 3078F DIAST BP <80 MM HG: CPT | Performed by: SURGERY

## 2023-03-30 PROCEDURE — 3017F COLORECTAL CA SCREEN DOC REV: CPT | Performed by: SURGERY

## 2023-03-30 PROCEDURE — 3074F SYST BP LT 130 MM HG: CPT | Performed by: SURGERY

## 2023-03-30 PROCEDURE — 1036F TOBACCO NON-USER: CPT | Performed by: SURGERY

## 2023-03-30 PROCEDURE — G8399 PT W/DXA RESULTS DOCUMENT: HCPCS | Performed by: SURGERY

## 2023-03-30 PROCEDURE — G8427 DOCREV CUR MEDS BY ELIG CLIN: HCPCS | Performed by: SURGERY

## 2023-03-30 PROCEDURE — G8482 FLU IMMUNIZE ORDER/ADMIN: HCPCS | Performed by: SURGERY

## 2023-03-30 PROCEDURE — 1090F PRES/ABSN URINE INCON ASSESS: CPT | Performed by: SURGERY

## 2023-03-30 NOTE — PROGRESS NOTES
Division of Vascular Surgery        Follow Up      Chief Complaint:     PAD, claudication, carotid stenosis    History of Present Illness:      Yunier Stoll is a 77 y.o. woman who presents for follow up to discuss potential carotid surgery due to severe stenosis on her duplex. She denies any acute neurologic events to suggest stroke or TIA, no unilateral weakness, numbness/tingling, facial droop, thick/slurred speech or symptoms suggestive of amaurosis fugax. She did see pulmonologist and had PFTs done. Her claudication symptoms are at baseline, not lifestyle limiting, able to get around and get things done, no ischemic rest pain, no open wounds or sores on her feet. (9/29/22) Yunier Stoll is a 69 y.o. woman who presents for follow up regarding her peripheral arterial and carotid disease. She can walk about 200 feet before back and hips start to hurt. Does not describe claudication type pain in her calves, thighs or buttocks. Does get some cramping at night, right more then left. Numbness in feet turn to feeling cold and burning sensation at times. Quit smoking for 6 years, picked up a little bit recently due to stress. Denies symptoms suggestive of ischemic rest pain, she does not have any open wounds or sores on her feet. Denies any unilateral weakness, numbness/tingling, facial droop, thick/slurred speech or symptoms suggestive of amaurosis fugax. She stopped doing physical therapy for her back, due to things have been hectic taking care of 80year-old mother.     Medical History:     Past Medical History:   Diagnosis Date    Angina pectoris ([facility]     Arthritis     Bipolar disorder ([facility] [address]     CAD (coronary artery disease)     stents x 2 (Dr. Carmen Hennessy)    Carotid stenosis, asymptomatic 2/26/2015    Chronic back pain     COPD (chronic obstructive pulmonary disease) ([facility] [address]     Depression     Dyspnea     Family history of colon cancer     Family history of liver cancer     Family history of

## 2023-04-04 ENCOUNTER — HOSPITAL ENCOUNTER (OUTPATIENT)
Age: 68
Setting detail: SPECIMEN
Discharge: HOME | End: 2023-04-04

## 2023-04-04 LAB
ABSOLUTE EOS #: 0.22 K/UL (ref 0–0.44)
ABSOLUTE IMMATURE GRANULOCYTE: 0.03 K/UL (ref 0–0.3)
ABSOLUTE LYMPH #: 1.76 K/UL (ref 1.1–3.7)
ABSOLUTE MONO #: 0.59 K/UL (ref 0.1–1.2)
ALBUMIN SERPL-MCNC: 4.1 G/DL (ref 3.5–5.2)
ALBUMIN/GLOBULIN RATIO: 1 (ref 1–2.5)
ALP SERPL-CCNC: 100 U/L (ref 35–104)
ALT SERPL-CCNC: 15 U/L (ref 5–33)
ANION GAP SERPL CALCULATED.3IONS-SCNC: 13 MMOL/L (ref 9–17)
AST SERPL-CCNC: 20 U/L
BASOPHILS # BLD: 0 % (ref 0–2)
BASOPHILS ABSOLUTE: 0.04 K/UL (ref 0–0.2)
BILIRUB SERPL-MCNC: 0.3 MG/DL (ref 0.3–1.2)
BUN SERPL-MCNC: 20 MG/DL (ref 8–23)
CALCIUM SERPL-MCNC: 9.1 MG/DL (ref 8.6–10.4)
CHLORIDE SERPL-SCNC: 97 MMOL/L (ref 98–107)
CO2 SERPL-SCNC: 26 MMOL/L (ref 20–31)
CREAT SERPL-MCNC: 0.68 MG/DL (ref 0.5–0.9)
EOSINOPHILS RELATIVE PERCENT: 3 % (ref 1–4)
GFR SERPL CREATININE-BSD FRML MDRD: >60 ML/MIN/1.73M2
GLUCOSE SERPL-MCNC: 120 MG/DL (ref 70–99)
HCT VFR BLD AUTO: 38.1 % (ref 36.3–47.1)
HGB BLD-MCNC: 11.3 G/DL (ref 11.9–15.1)
IMMATURE GRANULOCYTES: 0 %
LYMPHOCYTES # BLD: 20 % (ref 24–43)
MCH RBC QN AUTO: 24.7 PG (ref 25.2–33.5)
MCHC RBC AUTO-ENTMCNC: 29.7 G/DL (ref 28.4–34.8)
MCV RBC AUTO: 83.4 FL (ref 82.6–102.9)
MONOCYTES # BLD: 7 % (ref 3–12)
NRBC AUTOMATED: 0 PER 100 WBC
PDW BLD-RTO: 16.5 % (ref 11.8–14.4)
PLATELET # BLD AUTO: 354 K/UL (ref 138–453)
PMV BLD AUTO: 10.8 FL (ref 8.1–13.5)
POTASSIUM SERPL-SCNC: 4.4 MMOL/L (ref 3.7–5.3)
PROT SERPL-MCNC: 8.2 G/DL (ref 6.4–8.3)
RBC # BLD: 4.57 M/UL (ref 3.95–5.11)
RBC # BLD: ABNORMAL 10*6/UL
SEG NEUTROPHILS: 70 % (ref 36–65)
SEGMENTED NEUTROPHILS ABSOLUTE COUNT: 6.32 K/UL (ref 1.5–8.1)
SODIUM SERPL-SCNC: 136 MMOL/L (ref 135–144)
WBC # BLD AUTO: 9 K/UL (ref 3.5–11.3)

## 2023-04-07 LAB — G-6-PD, QUANT: 18.3 U/G HB (ref 9.9–16.6)

## 2023-04-09 ASSESSMENT — ENCOUNTER SYMPTOMS
ALLERGIC/IMMUNOLOGIC NEGATIVE: 1
COLOR CHANGE: 0
SHORTNESS OF BREATH: 0
WHEEZING: 0
CHEST TIGHTNESS: 0
ABDOMINAL PAIN: 0
BACK PAIN: 1

## 2023-04-21 ENCOUNTER — TELEPHONE (OUTPATIENT)
Dept: PULMONOLOGY | Age: 68
End: 2023-04-21

## 2023-04-21 RX ORDER — FLUTICASONE PROPIONATE 50 MCG
2 SPRAY, SUSPENSION (ML) NASAL DAILY
Qty: 16 G | Refills: 5 | Status: SHIPPED | OUTPATIENT
Start: 2023-04-21

## 2023-04-21 NOTE — TELEPHONE ENCOUNTER
Pt stated she still has a cough after the abx and prednisone. She also stated that she is not a cigarette smoker, but does vape and has been since she quit smoking in 2015. She is more concerned at figuring out the constant cough than the surgery with Dr Belen Ontiveros at this point. She is asking what you recommend be done next to figure the cough out?  Please advise

## 2023-04-24 ENCOUNTER — ANESTHESIA EVENT (OUTPATIENT)
Dept: OPERATING ROOM | Age: 68
DRG: 039 | End: 2023-04-24
Payer: MEDICARE

## 2023-04-24 ENCOUNTER — ANESTHESIA (OUTPATIENT)
Dept: OPERATING ROOM | Age: 68
DRG: 039 | End: 2023-04-24
Payer: MEDICARE

## 2023-04-24 ENCOUNTER — HOSPITAL ENCOUNTER (INPATIENT)
Age: 68
LOS: 1 days | Discharge: HOME | DRG: 039 | End: 2023-04-25
Attending: SURGERY | Admitting: SURGERY
Payer: MEDICARE

## 2023-04-24 DIAGNOSIS — I65.22 STENOSIS OF LEFT CAROTID ARTERY: ICD-10-CM

## 2023-04-24 DIAGNOSIS — G89.18 ACUTE POSTOPERATIVE PAIN: Primary | ICD-10-CM

## 2023-04-24 PROBLEM — Z98.890 S/P CAROTID ENDARTERECTOMY: Status: ACTIVE | Noted: 2023-04-24

## 2023-04-24 LAB
EGFR, POC: >60 ML/MIN/1.73M2
GLUCOSE BLD-MCNC: 133 MG/DL (ref 74–100)
GLUCOSE BLD-MCNC: 158 MG/DL (ref 65–105)
POC BUN: NORMAL MG/DL (ref 8–26)
POC CHLORIDE: 103 MMOL/L (ref 98–107)
POC CREATININE: 0.62 MG/DL (ref 0.51–1.19)
POC HEMATOCRIT: 45 % (ref 36–46)
POC HEMOGLOBIN: 15.4 G/DL (ref 12–16)
POC IONIZED CALCIUM: 1.23 MMOL/L (ref 1.15–1.33)
POC LACTIC ACID: 1.74 MMOL/L (ref 0.56–1.39)
POC POTASSIUM: 3.9 MMOL/L (ref 3.5–4.5)
POC SODIUM: 142 MMOL/L (ref 138–146)

## 2023-04-24 PROCEDURE — 2580000003 HC RX 258: Performed by: NURSE ANESTHETIST, CERTIFIED REGISTERED

## 2023-04-24 PROCEDURE — 94761 N-INVAS EAR/PLS OXIMETRY MLT: CPT

## 2023-04-24 PROCEDURE — 2500000003 HC RX 250 WO HCPCS: Performed by: STUDENT IN AN ORGANIZED HEALTH CARE EDUCATION/TRAINING PROGRAM

## 2023-04-24 PROCEDURE — 2500000003 HC RX 250 WO HCPCS

## 2023-04-24 PROCEDURE — 2580000003 HC RX 258: Performed by: SURGERY

## 2023-04-24 PROCEDURE — 82435 ASSAY OF BLOOD CHLORIDE: CPT

## 2023-04-24 PROCEDURE — 2500000003 HC RX 250 WO HCPCS: Performed by: ANESTHESIOLOGY

## 2023-04-24 PROCEDURE — 2700000000 HC OXYGEN THERAPY PER DAY

## 2023-04-24 PROCEDURE — 6370000000 HC RX 637 (ALT 250 FOR IP): Performed by: SURGERY

## 2023-04-24 PROCEDURE — 84295 ASSAY OF SERUM SODIUM: CPT

## 2023-04-24 PROCEDURE — 82947 ASSAY GLUCOSE BLOOD QUANT: CPT

## 2023-04-24 PROCEDURE — 88304 TISSUE EXAM BY PATHOLOGIST: CPT

## 2023-04-24 PROCEDURE — 6370000000 HC RX 637 (ALT 250 FOR IP): Performed by: STUDENT IN AN ORGANIZED HEALTH CARE EDUCATION/TRAINING PROGRAM

## 2023-04-24 PROCEDURE — 03UL0KZ SUPPLEMENT LEFT INTERNAL CAROTID ARTERY WITH NONAUTOLOGOUS TISSUE SUBSTITUTE, OPEN APPROACH: ICD-10-PCS | Performed by: SURGERY

## 2023-04-24 PROCEDURE — 2580000003 HC RX 258: Performed by: ANESTHESIOLOGY

## 2023-04-24 PROCEDURE — C1763 CONN TISS, NON-HUMAN: HCPCS | Performed by: SURGERY

## 2023-04-24 PROCEDURE — 88311 DECALCIFY TISSUE: CPT

## 2023-04-24 PROCEDURE — 6360000002 HC RX W HCPCS: Performed by: SURGERY

## 2023-04-24 PROCEDURE — 6360000002 HC RX W HCPCS

## 2023-04-24 PROCEDURE — 94640 AIRWAY INHALATION TREATMENT: CPT

## 2023-04-24 PROCEDURE — 3600000005 HC SURGERY LEVEL 5 BASE: Performed by: SURGERY

## 2023-04-24 PROCEDURE — 35301 RECHANNELING OF ARTERY: CPT | Performed by: SURGERY

## 2023-04-24 PROCEDURE — 85014 HEMATOCRIT: CPT

## 2023-04-24 PROCEDURE — 82330 ASSAY OF CALCIUM: CPT

## 2023-04-24 PROCEDURE — 2500000003 HC RX 250 WO HCPCS: Performed by: SURGERY

## 2023-04-24 PROCEDURE — 2709999900 HC NON-CHARGEABLE SUPPLY: Performed by: SURGERY

## 2023-04-24 PROCEDURE — 3700000000 HC ANESTHESIA ATTENDED CARE: Performed by: SURGERY

## 2023-04-24 PROCEDURE — 83605 ASSAY OF LACTIC ACID: CPT

## 2023-04-24 PROCEDURE — 84132 ASSAY OF SERUM POTASSIUM: CPT

## 2023-04-24 PROCEDURE — 3700000001 HC ADD 15 MINUTES (ANESTHESIA): Performed by: SURGERY

## 2023-04-24 PROCEDURE — 03CL0ZZ EXTIRPATION OF MATTER FROM LEFT INTERNAL CAROTID ARTERY, OPEN APPROACH: ICD-10-PCS | Performed by: SURGERY

## 2023-04-24 PROCEDURE — 82565 ASSAY OF CREATININE: CPT

## 2023-04-24 PROCEDURE — 3600000015 HC SURGERY LEVEL 5 ADDTL 15MIN: Performed by: SURGERY

## 2023-04-24 PROCEDURE — 2580000003 HC RX 258: Performed by: STUDENT IN AN ORGANIZED HEALTH CARE EDUCATION/TRAINING PROGRAM

## 2023-04-24 PROCEDURE — 6360000002 HC RX W HCPCS: Performed by: NURSE ANESTHETIST, CERTIFIED REGISTERED

## 2023-04-24 PROCEDURE — 2000000000 HC ICU R&B

## 2023-04-24 PROCEDURE — 2500000003 HC RX 250 WO HCPCS: Performed by: NURSE ANESTHETIST, CERTIFIED REGISTERED

## 2023-04-24 DEVICE — GRAFT BIO TISS W0.8XL8CM DECELLULARIZED BOV PERICARD PTCH: Type: IMPLANTABLE DEVICE | Site: CAROTID | Status: FUNCTIONAL

## 2023-04-24 RX ORDER — LIDOCAINE HYDROCHLORIDE 10 MG/ML
INJECTION, SOLUTION EPIDURAL; INFILTRATION; INTRACAUDAL; PERINEURAL PRN
Status: DISCONTINUED | OUTPATIENT
Start: 2023-04-24 | End: 2023-04-24 | Stop reason: SDUPTHER

## 2023-04-24 RX ORDER — SODIUM CHLORIDE 0.9 % (FLUSH) 0.9 %
5-40 SYRINGE (ML) INJECTION EVERY 12 HOURS SCHEDULED
Status: DISCONTINUED | OUTPATIENT
Start: 2023-04-24 | End: 2023-04-25 | Stop reason: HOSPADM

## 2023-04-24 RX ORDER — SODIUM CHLORIDE 9 MG/ML
INJECTION, SOLUTION INTRAVENOUS PRN
Status: DISCONTINUED | OUTPATIENT
Start: 2023-04-24 | End: 2023-04-25 | Stop reason: HOSPADM

## 2023-04-24 RX ORDER — PHENYLEPHRINE HCL IN 0.9% NACL 1 MG/10 ML
SYRINGE (ML) INTRAVENOUS PRN
Status: DISCONTINUED | OUTPATIENT
Start: 2023-04-24 | End: 2023-04-24 | Stop reason: SDUPTHER

## 2023-04-24 RX ORDER — FENTANYL CITRATE 50 UG/ML
25 INJECTION, SOLUTION INTRAMUSCULAR; INTRAVENOUS EVERY 5 MIN PRN
Status: DISCONTINUED | OUTPATIENT
Start: 2023-04-24 | End: 2023-04-25 | Stop reason: HOSPADM

## 2023-04-24 RX ORDER — MAGNESIUM HYDROXIDE 1200 MG/15ML
LIQUID ORAL CONTINUOUS PRN
Status: COMPLETED | OUTPATIENT
Start: 2023-04-24 | End: 2023-04-24

## 2023-04-24 RX ORDER — LABETALOL HYDROCHLORIDE 5 MG/ML
INJECTION, SOLUTION INTRAVENOUS
Status: COMPLETED
Start: 2023-04-24 | End: 2023-04-24

## 2023-04-24 RX ORDER — SODIUM CHLORIDE 0.9 % (FLUSH) 0.9 %
5-40 SYRINGE (ML) INJECTION PRN
Status: DISCONTINUED | OUTPATIENT
Start: 2023-04-24 | End: 2023-04-25 | Stop reason: HOSPADM

## 2023-04-24 RX ORDER — ALBUTEROL SULFATE 90 UG/1
2 AEROSOL, METERED RESPIRATORY (INHALATION)
Status: DISCONTINUED | OUTPATIENT
Start: 2023-04-24 | End: 2023-04-24

## 2023-04-24 RX ORDER — GLYCOPYRROLATE 0.2 MG/ML
INJECTION INTRAMUSCULAR; INTRAVENOUS PRN
Status: DISCONTINUED | OUTPATIENT
Start: 2023-04-24 | End: 2023-04-24 | Stop reason: SDUPTHER

## 2023-04-24 RX ORDER — ONDANSETRON 2 MG/ML
4 INJECTION INTRAMUSCULAR; INTRAVENOUS
Status: ACTIVE | OUTPATIENT
Start: 2023-04-24 | End: 2023-04-25

## 2023-04-24 RX ORDER — ALBUTEROL SULFATE 90 UG/1
AEROSOL, METERED RESPIRATORY (INHALATION)
Status: DISCONTINUED
Start: 2023-04-24 | End: 2023-04-24

## 2023-04-24 RX ORDER — PROPOFOL 10 MG/ML
INJECTION, EMULSION INTRAVENOUS PRN
Status: DISCONTINUED | OUTPATIENT
Start: 2023-04-24 | End: 2023-04-24 | Stop reason: SDUPTHER

## 2023-04-24 RX ORDER — NICARDIPINE HYDROCHLORIDE 0.1 MG/ML
INJECTION INTRAVENOUS
Status: DISCONTINUED
Start: 2023-04-24 | End: 2023-04-24

## 2023-04-24 RX ORDER — AMLODIPINE BESYLATE 10 MG/1
10 TABLET ORAL DAILY
Status: DISCONTINUED | OUTPATIENT
Start: 2023-04-25 | End: 2023-04-25 | Stop reason: HOSPADM

## 2023-04-24 RX ORDER — LABETALOL HYDROCHLORIDE 5 MG/ML
10 INJECTION, SOLUTION INTRAVENOUS
Status: DISCONTINUED | OUTPATIENT
Start: 2023-04-24 | End: 2023-04-25 | Stop reason: HOSPADM

## 2023-04-24 RX ORDER — FENTANYL CITRATE 50 UG/ML
50 INJECTION, SOLUTION INTRAMUSCULAR; INTRAVENOUS EVERY 5 MIN PRN
Status: DISCONTINUED | OUTPATIENT
Start: 2023-04-24 | End: 2023-04-25 | Stop reason: HOSPADM

## 2023-04-24 RX ORDER — ALBUTEROL SULFATE 90 UG/1
2 AEROSOL, METERED RESPIRATORY (INHALATION) EVERY 6 HOURS PRN
Status: DISCONTINUED | OUTPATIENT
Start: 2023-04-24 | End: 2023-04-24

## 2023-04-24 RX ORDER — LOSARTAN POTASSIUM 50 MG/1
100 TABLET ORAL DAILY
Status: DISCONTINUED | OUTPATIENT
Start: 2023-04-25 | End: 2023-04-25 | Stop reason: HOSPADM

## 2023-04-24 RX ORDER — ROCURONIUM BROMIDE 10 MG/ML
INJECTION, SOLUTION INTRAVENOUS PRN
Status: DISCONTINUED | OUTPATIENT
Start: 2023-04-24 | End: 2023-04-24 | Stop reason: SDUPTHER

## 2023-04-24 RX ORDER — PROTAMINE SULFATE 10 MG/ML
INJECTION, SOLUTION INTRAVENOUS PRN
Status: DISCONTINUED | OUTPATIENT
Start: 2023-04-24 | End: 2023-04-24 | Stop reason: SDUPTHER

## 2023-04-24 RX ORDER — ASPIRIN 81 MG/1
81 TABLET ORAL DAILY
Status: DISCONTINUED | OUTPATIENT
Start: 2023-04-24 | End: 2023-04-25 | Stop reason: HOSPADM

## 2023-04-24 RX ORDER — LABETALOL HYDROCHLORIDE 5 MG/ML
INJECTION, SOLUTION INTRAVENOUS PRN
Status: DISCONTINUED | OUTPATIENT
Start: 2023-04-24 | End: 2023-04-24 | Stop reason: SDUPTHER

## 2023-04-24 RX ORDER — PANTOPRAZOLE SODIUM 40 MG/1
40 TABLET, DELAYED RELEASE ORAL
Status: DISCONTINUED | OUTPATIENT
Start: 2023-04-25 | End: 2023-04-25 | Stop reason: HOSPADM

## 2023-04-24 RX ORDER — ALBUTEROL SULFATE 90 UG/1
2 AEROSOL, METERED RESPIRATORY (INHALATION) 4 TIMES DAILY
Status: DISCONTINUED | OUTPATIENT
Start: 2023-04-25 | End: 2023-04-24

## 2023-04-24 RX ORDER — ONDANSETRON 4 MG/1
4 TABLET, ORALLY DISINTEGRATING ORAL EVERY 8 HOURS PRN
Status: DISCONTINUED | OUTPATIENT
Start: 2023-04-24 | End: 2023-04-25 | Stop reason: HOSPADM

## 2023-04-24 RX ORDER — HEPARIN SODIUM 1000 [USP'U]/ML
INJECTION, SOLUTION INTRAVENOUS; SUBCUTANEOUS PRN
Status: DISCONTINUED | OUTPATIENT
Start: 2023-04-24 | End: 2023-04-24 | Stop reason: SDUPTHER

## 2023-04-24 RX ORDER — LABETALOL HYDROCHLORIDE 5 MG/ML
10 INJECTION, SOLUTION INTRAVENOUS EVERY 4 HOURS PRN
Status: DISCONTINUED | OUTPATIENT
Start: 2023-04-24 | End: 2023-04-24

## 2023-04-24 RX ORDER — SODIUM CHLORIDE, SODIUM LACTATE, POTASSIUM CHLORIDE, CALCIUM CHLORIDE 600; 310; 30; 20 MG/100ML; MG/100ML; MG/100ML; MG/100ML
INJECTION, SOLUTION INTRAVENOUS CONTINUOUS PRN
Status: DISCONTINUED | OUTPATIENT
Start: 2023-04-24 | End: 2023-04-24 | Stop reason: SDUPTHER

## 2023-04-24 RX ORDER — FENTANYL CITRATE 50 UG/ML
INJECTION, SOLUTION INTRAMUSCULAR; INTRAVENOUS PRN
Status: DISCONTINUED | OUTPATIENT
Start: 2023-04-24 | End: 2023-04-24 | Stop reason: SDUPTHER

## 2023-04-24 RX ORDER — OXYCODONE HYDROCHLORIDE AND ACETAMINOPHEN 5; 325 MG/1; MG/1
1 TABLET ORAL EVERY 6 HOURS PRN
Status: DISCONTINUED | OUTPATIENT
Start: 2023-04-24 | End: 2023-04-25 | Stop reason: HOSPADM

## 2023-04-24 RX ORDER — ROSUVASTATIN CALCIUM 5 MG/1
5 TABLET, COATED ORAL NIGHTLY
Status: DISCONTINUED | OUTPATIENT
Start: 2023-04-24 | End: 2023-04-25 | Stop reason: HOSPADM

## 2023-04-24 RX ORDER — PHENYLEPHRINE HCL IN 0.9% NACL 50MG/250ML
PLASTIC BAG, INJECTION (ML) INTRAVENOUS
Status: DISCONTINUED
Start: 2023-04-24 | End: 2023-04-24

## 2023-04-24 RX ORDER — HEPARIN SODIUM 1000 [USP'U]/ML
INJECTION, SOLUTION INTRAVENOUS; SUBCUTANEOUS PRN
Status: DISCONTINUED | OUTPATIENT
Start: 2023-04-24 | End: 2023-04-24 | Stop reason: HOSPADM

## 2023-04-24 RX ORDER — PROPOFOL 10 MG/ML
INJECTION, EMULSION INTRAVENOUS
Status: COMPLETED
Start: 2023-04-24 | End: 2023-04-24

## 2023-04-24 RX ORDER — CARVEDILOL 3.12 MG/1
3.12 TABLET ORAL 2 TIMES DAILY
Status: DISCONTINUED | OUTPATIENT
Start: 2023-04-24 | End: 2023-04-25 | Stop reason: HOSPADM

## 2023-04-24 RX ORDER — ONDANSETRON 2 MG/ML
4 INJECTION INTRAMUSCULAR; INTRAVENOUS EVERY 6 HOURS PRN
Status: DISCONTINUED | OUTPATIENT
Start: 2023-04-24 | End: 2023-04-25 | Stop reason: HOSPADM

## 2023-04-24 RX ORDER — ACETAMINOPHEN 325 MG/1
650 TABLET ORAL EVERY 4 HOURS PRN
Status: DISCONTINUED | OUTPATIENT
Start: 2023-04-24 | End: 2023-04-25 | Stop reason: HOSPADM

## 2023-04-24 RX ORDER — PROPOFOL 10 MG/ML
INJECTION, EMULSION INTRAVENOUS CONTINUOUS PRN
Status: DISCONTINUED | OUTPATIENT
Start: 2023-04-24 | End: 2023-04-24 | Stop reason: SDUPTHER

## 2023-04-24 RX ORDER — SODIUM CHLORIDE 9 MG/ML
INJECTION, SOLUTION INTRAVENOUS CONTINUOUS
Status: DISCONTINUED | OUTPATIENT
Start: 2023-04-24 | End: 2023-04-24

## 2023-04-24 RX ORDER — ALBUTEROL SULFATE 90 UG/1
2 AEROSOL, METERED RESPIRATORY (INHALATION) 4 TIMES DAILY
Status: DISCONTINUED | OUTPATIENT
Start: 2023-04-24 | End: 2023-04-25 | Stop reason: HOSPADM

## 2023-04-24 RX ORDER — ALBUTEROL SULFATE 2.5 MG/3ML
2.5 SOLUTION RESPIRATORY (INHALATION) ONCE
Status: COMPLETED | OUTPATIENT
Start: 2023-04-24 | End: 2023-04-24

## 2023-04-24 RX ORDER — ONDANSETRON 2 MG/ML
INJECTION INTRAMUSCULAR; INTRAVENOUS PRN
Status: DISCONTINUED | OUTPATIENT
Start: 2023-04-24 | End: 2023-04-24 | Stop reason: SDUPTHER

## 2023-04-24 RX ORDER — DIPHENHYDRAMINE HYDROCHLORIDE 50 MG/ML
12.5 INJECTION INTRAMUSCULAR; INTRAVENOUS
Status: ACTIVE | OUTPATIENT
Start: 2023-04-24 | End: 2023-04-25

## 2023-04-24 RX ORDER — DEXAMETHASONE SODIUM PHOSPHATE 10 MG/ML
INJECTION, SOLUTION INTRAMUSCULAR; INTRAVENOUS PRN
Status: DISCONTINUED | OUTPATIENT
Start: 2023-04-24 | End: 2023-04-24 | Stop reason: SDUPTHER

## 2023-04-24 RX ORDER — LIDOCAINE HYDROCHLORIDE 10 MG/ML
INJECTION, SOLUTION INFILTRATION; PERINEURAL PRN
Status: DISCONTINUED | OUTPATIENT
Start: 2023-04-24 | End: 2023-04-24 | Stop reason: HOSPADM

## 2023-04-24 RX ORDER — FLUTICASONE PROPIONATE 110 UG/1
1 AEROSOL, METERED RESPIRATORY (INHALATION) 2 TIMES DAILY
Status: DISCONTINUED | OUTPATIENT
Start: 2023-04-24 | End: 2023-04-25 | Stop reason: HOSPADM

## 2023-04-24 RX ADMIN — PHENYLEPHRINE HYDROCHLORIDE 50 MCG/MIN: 10 INJECTION INTRAVENOUS at 09:30

## 2023-04-24 RX ADMIN — SODIUM CHLORIDE, PRESERVATIVE FREE 10 ML: 5 INJECTION INTRAVENOUS at 11:47

## 2023-04-24 RX ADMIN — SUGAMMADEX 25 MG: 100 INJECTION, SOLUTION INTRAVENOUS at 11:02

## 2023-04-24 RX ADMIN — OXYCODONE HYDROCHLORIDE AND ACETAMINOPHEN 1 TABLET: 5; 325 TABLET ORAL at 15:20

## 2023-04-24 RX ADMIN — SUGAMMADEX 25 MG: 100 INJECTION, SOLUTION INTRAVENOUS at 11:04

## 2023-04-24 RX ADMIN — ASPIRIN 81 MG: 81 TABLET, COATED ORAL at 15:20

## 2023-04-24 RX ADMIN — LABETALOL HYDROCHLORIDE 5 MG: 5 INJECTION, SOLUTION INTRAVENOUS at 11:06

## 2023-04-24 RX ADMIN — Medication 200 MCG: at 09:55

## 2023-04-24 RX ADMIN — Medication 25 MCG: at 10:39

## 2023-04-24 RX ADMIN — LABETALOL HYDROCHLORIDE 10 MG: 5 INJECTION, SOLUTION INTRAVENOUS at 11:37

## 2023-04-24 RX ADMIN — FLUTICASONE PROPIONATE 1 PUFF: 110 AEROSOL, METERED RESPIRATORY (INHALATION) at 19:58

## 2023-04-24 RX ADMIN — ALBUTEROL SULFATE 2.5 MG: 2.5 SOLUTION RESPIRATORY (INHALATION) at 08:20

## 2023-04-24 RX ADMIN — LABETALOL HYDROCHLORIDE 10 MG: 5 INJECTION, SOLUTION INTRAVENOUS at 11:10

## 2023-04-24 RX ADMIN — ROCURONIUM BROMIDE 30 MG: 10 INJECTION, SOLUTION INTRAVENOUS at 08:49

## 2023-04-24 RX ADMIN — Medication 100 MCG: at 09:21

## 2023-04-24 RX ADMIN — REMIFENTANIL HYDROCHLORIDE 0.05 MCG/KG/MIN: 1 INJECTION, POWDER, LYOPHILIZED, FOR SOLUTION INTRAVENOUS at 08:55

## 2023-04-24 RX ADMIN — FENTANYL CITRATE 50 MCG: 50 INJECTION, SOLUTION INTRAMUSCULAR; INTRAVENOUS at 09:09

## 2023-04-24 RX ADMIN — Medication 100 MCG: at 09:57

## 2023-04-24 RX ADMIN — Medication 2 G: at 09:15

## 2023-04-24 RX ADMIN — PROTAMINE SULFATE 20 MG: 10 INJECTION, SOLUTION INTRAVENOUS at 10:37

## 2023-04-24 RX ADMIN — Medication 2 PUFF: at 20:11

## 2023-04-24 RX ADMIN — SODIUM CHLORIDE, POTASSIUM CHLORIDE, SODIUM LACTATE AND CALCIUM CHLORIDE: 600; 310; 30; 20 INJECTION, SOLUTION INTRAVENOUS at 10:11

## 2023-04-24 RX ADMIN — PHENYLEPHRINE HYDROCHLORIDE 20 MCG/MIN: 10 INJECTION INTRAVENOUS at 09:10

## 2023-04-24 RX ADMIN — ROCURONIUM BROMIDE 20 MG: 10 INJECTION, SOLUTION INTRAVENOUS at 09:10

## 2023-04-24 RX ADMIN — Medication 100 MCG: at 09:15

## 2023-04-24 RX ADMIN — SODIUM CHLORIDE, POTASSIUM CHLORIDE, SODIUM LACTATE AND CALCIUM CHLORIDE: 600; 310; 30; 20 INJECTION, SOLUTION INTRAVENOUS at 08:56

## 2023-04-24 RX ADMIN — SODIUM CHLORIDE: 9 INJECTION, SOLUTION INTRAVENOUS at 08:19

## 2023-04-24 RX ADMIN — Medication 100 MCG: at 09:17

## 2023-04-24 RX ADMIN — CARVEDILOL 3.12 MG: 3.12 TABLET, FILM COATED ORAL at 20:27

## 2023-04-24 RX ADMIN — PROTAMINE SULFATE 20 MG: 10 INJECTION, SOLUTION INTRAVENOUS at 10:34

## 2023-04-24 RX ADMIN — GLYCOPYRROLATE 0.2 MG: 0.2 INJECTION INTRAMUSCULAR; INTRAVENOUS at 09:55

## 2023-04-24 RX ADMIN — LABETALOL HYDROCHLORIDE 5 MG: 5 INJECTION, SOLUTION INTRAVENOUS at 11:08

## 2023-04-24 RX ADMIN — PROPOFOL 100 MCG/KG/MIN: 10 INJECTION, EMULSION INTRAVENOUS at 09:00

## 2023-04-24 RX ADMIN — ROSUVASTATIN CALCIUM 5 MG: 5 TABLET, COATED ORAL at 20:28

## 2023-04-24 RX ADMIN — SODIUM CHLORIDE 2.5 MG/HR: 9 INJECTION, SOLUTION INTRAVENOUS at 11:46

## 2023-04-24 RX ADMIN — LIDOCAINE HYDROCHLORIDE 50 MG: 10 INJECTION, SOLUTION EPIDURAL; INFILTRATION; INTRACAUDAL; PERINEURAL at 08:48

## 2023-04-24 RX ADMIN — HEPARIN SODIUM 10000 UNITS: 1000 INJECTION INTRAVENOUS; SUBCUTANEOUS at 09:40

## 2023-04-24 RX ADMIN — FENTANYL CITRATE 50 MCG: 50 INJECTION, SOLUTION INTRAMUSCULAR; INTRAVENOUS at 08:48

## 2023-04-24 RX ADMIN — DEXAMETHASONE SODIUM PHOSPHATE 5 MG: 10 INJECTION, SOLUTION INTRAMUSCULAR; INTRAVENOUS at 09:10

## 2023-04-24 RX ADMIN — PROTAMINE SULFATE 10 MG: 10 INJECTION, SOLUTION INTRAVENOUS at 10:39

## 2023-04-24 RX ADMIN — PROPOFOL 150 MG: 10 INJECTION, EMULSION INTRAVENOUS at 08:48

## 2023-04-24 RX ADMIN — ROCURONIUM BROMIDE 20 MG: 10 INJECTION, SOLUTION INTRAVENOUS at 10:20

## 2023-04-24 RX ADMIN — SUGAMMADEX 150 MG: 100 INJECTION, SOLUTION INTRAVENOUS at 11:08

## 2023-04-24 RX ADMIN — ONDANSETRON 4 MG: 2 INJECTION INTRAMUSCULAR; INTRAVENOUS at 10:56

## 2023-04-24 RX ADMIN — FENTANYL CITRATE 50 MCG: 50 INJECTION, SOLUTION INTRAMUSCULAR; INTRAVENOUS at 09:04

## 2023-04-24 RX ADMIN — PROPOFOL 50 MG: 10 INJECTION, EMULSION INTRAVENOUS at 09:04

## 2023-04-24 ASSESSMENT — ENCOUNTER SYMPTOMS: SHORTNESS OF BREATH: 0

## 2023-04-24 ASSESSMENT — PAIN DESCRIPTION - ORIENTATION: ORIENTATION: LEFT

## 2023-04-24 ASSESSMENT — PAIN SCALES - GENERAL: PAINLEVEL_OUTOF10: 5

## 2023-04-24 ASSESSMENT — PAIN DESCRIPTION - LOCATION: LOCATION: NECK;HEAD

## 2023-04-24 ASSESSMENT — PAIN DESCRIPTION - DESCRIPTORS: DESCRIPTORS: DULL

## 2023-04-24 NOTE — OP NOTE
Operative Note      Patient: Benito Lennox  YOB: 1955  MRN: [id number]    Date of Procedure: 4/24/2023    Procedure: Asymptomatic left severe carotid artery stenosis    Post-Op Diagnosis: Same       Procedure: Left carotid endarterectomy with patch angioplasty, SSEP neuro monitoring     Surgeon(s): Marnie Joyce MD    Assistant: Dr. Neri Night: General    Estimated Blood Loss (mL): 23 ml    Complications: None    Specimens:   ID Type Source Tests Collected by Time Destination   A : LEFT CAROTID PLAQUE Tissue Carotid Arteries SURGICAL PATHOLOGY Marnie Joyce MD 4/24/2023 1035        Implants:  Implant Name Type Inv. Item Serial No.  Lot No. LRB No. Used Action   GRAFT BIO TISS [id number] DECELLULARIZED BOV PERICARD [facility] - [id number]  GRAFT BIO TISS [id number] DECELLULARIZED BOV PERICARD [facility] [id number] [employer] [id number] Left 1 Implanted       Drains: none    Findings: Calcific plaque removed, no EEG or sensory changes during clamp time, moving all 4 extremities at completion of procedure. Detailed Description of Procedure:     Gayle Yates was brought to the operating room for left carotid endarterectomy. After appropriate anesthesia delivered, appropriate lines placed and SSEP neuro monitoring set up. Patient positioned supine with shoulder roll. The carotid bifurcation was marked with ultrasound and the neck and chest prepped and draped in standard sterile fashion. Timeout performed and agreed upon, antibiotics given during this period. I began by making a longitudinal and slightly oblique incision along the border of the sternocleidomastoid muscle and along a skin crease. Electrocautery used to get thru the subcutaneous tissue and platysma. The sternocleidomastoid muscle was mobilized laterally identifying the internal jugular vein.   Sharp dissection performed and the jugular vein dissected posteriorly and medially revealing

## 2023-04-24 NOTE — ANESTHESIA POSTPROCEDURE EVALUATION
Department of Anesthesiology  Postprocedure Note    Patient: Yunier Stoll  MRN: [id number]  YOB: 1955  Date of evaluation: 4/24/2023      Procedure Summary     Date: 04/24/23 Room / Location: [address]    Anesthesia Start: 0665 Anesthesia Stop: 0007    Procedure: CAROTID ENDARTERECTOMY, 0.8CM X 8CM PHOTOFIX PATCH, SSEP MONITIORING (Left: Neck) Diagnosis:       Stenosis of left carotid artery      (LEFT CAROTID STENOSIS)    Surgeons: Chery Gomez MD Responsible Provider: No Escamilla MD    Anesthesia Type: general ASA Status: 3          Anesthesia Type: No value filed.     Gloria Phase I: Gloria Score: 10    Gloria Phase II:        Anesthesia Post Evaluation    Patient location during evaluation: PACU  Patient participation: complete - patient participated  Level of consciousness: awake  Pain score: 1  Airway patency: patent  Nausea & Vomiting: no nausea and no vomiting  Complications: no  Cardiovascular status: blood pressure returned to baseline and hemodynamically stable  Respiratory status: acceptable  Hydration status: euvolemic

## 2023-04-24 NOTE — CARE COORDINATION
Unable to do assessment at this time. Patient is post surgery and medicated for pain. Sleeping soundly. Did not awaken to verbal stimulation.

## 2023-04-24 NOTE — ANESTHESIA PRE PROCEDURE
Department of Anesthesiology  Preprocedure Note       Name:  Harpreet Hansen   Age:  77 y.o.  :  1955                                          MRN:  [id number]         Date:  2023      Surgeon: Ashley Carranza): Waqar Mallory MD    Procedure: CAROTID ENDARTERECTOMY,  [facility]  [id number] (Left)    Medications prior to admission:   Prior to Admission medications    Medication Sig Start Date End Date Taking?  Authorizing Provider   fluticasone (FLONASE) 50 MCG/ACT nasal spray 2 sprays by Each Nostril route daily 23   Mey Sloan MD   solifenacin (VESICARE) 5 MG tablet take 1 tablet by mouth once daily 3/21/23   Lorenzo Rahman MD   omeprazole (PRILOSEC) 40 MG delayed release capsule take 1 capsule by mouth once daily 23   Lorenzo Rahman MD   tiotropium-olodaterol (STIOLTO RESPIMAT) 2.5-2.5 MCG/ACT AERS Inhale 2 puffs into the lungs daily 23   Mey Sloan MD   fluticasone (ARNUITY ELLIPTA) 100 MCG/ACT AEPB Inhale 1 puff into the lungs daily 23   Mey Sloan MD   amLODIPine (NORVASC) 10 MG tablet 1 tablet once a day 11/15/22   Lorenzo Rahman MD   hydroCHLOROthiazide (HYDRODIURIL) 25 MG tablet 1 tablet daily 11/15/22   Lorenzo Rahman MD   rosuvastatin (CRESTOR) 5 MG tablet Take 1 tablet by mouth nightly 11/15/22   Lorenzo Rahman MD   carvedilol (COREG) 3.125 MG tablet take 1 tablet by mouth twice a day 11/15/22   Lorenzo Rahman MD   losartan (COZAAR) 100 MG tablet Once a day 11/15/22   Lorenzo Rahman MD   sertraline (ZOLOFT) 50 MG tablet 1 tablet daily 11/15/22   Lorenzo Rahman MD   potassium chloride (KLOR-CON M) 10 MEQ extended release tablet take 1 tablet by mouth once daily 22   Handy Sen MD   betamethasone dipropionate (DIPROLENE) 0.05 % ointment apply topically daily as directed 21   Lorenzo Rahman MD   triamcinolone (KENALOG) 0.025 % cream apply to affected area once daily 21   Lorenzo Rahman MD   albuterol

## 2023-04-24 NOTE — PROGRESS NOTES
Patient admitted, consent signed and questions answered. Patient ready for procedure. Call light to reach with side rails up 2 of 2. Family at bedside with patient. History and physical completed. Anesthesia in to see / Dr Alva Pisano in to see / site marked.   Aerosol treatment complete

## 2023-04-24 NOTE — H&P
Division of Vascular Surgery        H&P Update    Patient's Office Note from 3/30/23 was reviewed. There are no changes today. Cough is better now, saw pulmonology and received breathing treatments. Risks of bleeding, stroke, nerve injury discussed in details, questions answered and she was consented for LEFT carotid endarterectomy. Plan to proceed with Left carotid endarterectomy with SSEP neuromonitoring. Electronically signed by Ted Dimas MD on 4/24/23 at [address]: [phone]  C: [phone]  Email: [email or web address]           Chief Complaint:      PAD, claudication, carotid stenosis     History of Present Illness:      Joo Gonzales is a 77 y.o. woman who presents for follow up to discuss potential carotid surgery due to severe stenosis on her duplex. She denies any acute neurologic events to suggest stroke or TIA, no unilateral weakness, numbness/tingling, facial droop, thick/slurred speech or symptoms suggestive of amaurosis fugax. She did see pulmonologist and had PFTs done. Her claudication symptoms are at baseline, not lifestyle limiting, able to get around and get things done, no ischemic rest pain, no open wounds or sores on her feet. (9/29/22) Joo Gonzales is a 69 y.o. woman who presents for follow up regarding her peripheral arterial and carotid disease. She can walk about 200 feet before back and hips start to hurt. Does not describe claudication type pain in her calves, thighs or buttocks. Does get some cramping at night, right more then left. Numbness in feet turn to feeling cold and burning sensation at times. Quit smoking for 6 years, picked up a little bit recently due to stress. Denies symptoms suggestive of ischemic rest pain, she does not have any open wounds or sores on her feet.   Denies any unilateral weakness, numbness/tingling, facial droop, thick/slurred speech or symptoms

## 2023-04-25 VITALS
OXYGEN SATURATION: 92 % | DIASTOLIC BLOOD PRESSURE: 51 MMHG | WEIGHT: 220.46 LBS | BODY MASS INDEX: 37.64 KG/M2 | TEMPERATURE: 98.1 F | HEART RATE: 62 BPM | RESPIRATION RATE: 17 BRPM | SYSTOLIC BLOOD PRESSURE: 138 MMHG | HEIGHT: 64 IN

## 2023-04-25 LAB — SURGICAL PATHOLOGY REPORT: NORMAL

## 2023-04-25 PROCEDURE — 94640 AIRWAY INHALATION TREATMENT: CPT

## 2023-04-25 PROCEDURE — 94761 N-INVAS EAR/PLS OXIMETRY MLT: CPT

## 2023-04-25 PROCEDURE — 6370000000 HC RX 637 (ALT 250 FOR IP): Performed by: SURGERY

## 2023-04-25 PROCEDURE — 6370000000 HC RX 637 (ALT 250 FOR IP): Performed by: STUDENT IN AN ORGANIZED HEALTH CARE EDUCATION/TRAINING PROGRAM

## 2023-04-25 PROCEDURE — 2580000003 HC RX 258: Performed by: STUDENT IN AN ORGANIZED HEALTH CARE EDUCATION/TRAINING PROGRAM

## 2023-04-25 RX ORDER — OXYCODONE HYDROCHLORIDE AND ACETAMINOPHEN 5; 325 MG/1; MG/1
1 TABLET ORAL EVERY 6 HOURS PRN
Qty: 5 TABLET | Refills: 0 | Status: SHIPPED | OUTPATIENT
Start: 2023-04-25 | End: 2023-04-28

## 2023-04-25 RX ADMIN — ASPIRIN 81 MG: 81 TABLET, COATED ORAL at 08:27

## 2023-04-25 RX ADMIN — PANTOPRAZOLE SODIUM 40 MG: 40 TABLET, DELAYED RELEASE ORAL at 06:20

## 2023-04-25 RX ADMIN — AMLODIPINE BESYLATE 10 MG: 10 TABLET ORAL at 08:27

## 2023-04-25 RX ADMIN — OXYCODONE HYDROCHLORIDE AND ACETAMINOPHEN 1 TABLET: 5; 325 TABLET ORAL at 01:26

## 2023-04-25 RX ADMIN — TIOTROPIUM BROMIDE AND OLODATEROL 2 PUFF: 3.124; 2.736 SPRAY, METERED RESPIRATORY (INHALATION) at 08:53

## 2023-04-25 RX ADMIN — CARVEDILOL 3.12 MG: 3.12 TABLET, FILM COATED ORAL at 08:27

## 2023-04-25 RX ADMIN — SODIUM CHLORIDE, PRESERVATIVE FREE 10 ML: 5 INJECTION INTRAVENOUS at 08:27

## 2023-04-25 RX ADMIN — FLUTICASONE PROPIONATE 1 PUFF: 110 AEROSOL, METERED RESPIRATORY (INHALATION) at 08:54

## 2023-04-25 RX ADMIN — LOSARTAN POTASSIUM 100 MG: 50 TABLET, FILM COATED ORAL at 08:27

## 2023-04-25 RX ADMIN — Medication 2 PUFF: at 08:54

## 2023-04-25 ASSESSMENT — PAIN SCALES - GENERAL: PAINLEVEL_OUTOF10: 5

## 2023-04-25 NOTE — PROGRESS NOTES
Division of Vascular Surgery  Vascular Consult      Name: Benito Lennox  MRN: [id number]       Chief Complaint:      Neck soreness    Subjective       Benito Lennox is seen and examined. POD#1 s/p L CEA. Afebrile, recent systolics . On 1L NC. Doing well this AM. Endorses minimal Left neck soreness. No HA, no blurry vision. Denies weakness, numbness/tingling. Tolerating a diet. Urinating on own without issues.     Physical Exam:     Vitals:  BP (!) 141/46   Pulse 50   Temp 97.5 °F (36.4 °C) (Oral)   Resp 12   Ht 5' 3.5\" (1.613 m)   Wt 220 lb 7.4 oz (100 kg)   SpO2 94%   BMI 38.44 kg/m²     General appearance - alert, well appearing and in no acute distress  Mental status - oriented to person, place and time with normal affect  Head - normocephalic and atraumatic  Eyes - pupils equal and reactive, extraocular eye movements intact, conjunctiva clear  Ears - hearing appears to be intact  Nose - no drainage noted  Mouth - mucous membranes moist  Neck - supple, L neck incision CDI   Chest - normal effort, no wheezing, no accessory muscle use  Heart - normal rate, regular rhythm  Abdomen - soft, non-tender, non-distended  Neurological - normal speech, no focal findings or movement disorder noted, cranial nerves II through XII grossly intact  Extremities - motor and sensation intact  Skin - no gross lesions, rashes, or induration noted    Assessment:     Benito Lennox is a 77 y.o.  female s/p L carotid endarterectomy     Plan:     Continue neuro checks  Systolic goal <969   Wean O2 to goal >88%, continue inhalers/home COPD meds  Carb control diet  OOB/ambulate   Plan for discharge later today if continues to do well     ---------------------------------------  Lew Libertad, DO  General Surgery PGY-4

## 2023-04-25 NOTE — PLAN OF CARE
Problem: Chronic Conditions and Co-morbidities  Goal: Patient's chronic conditions and co-morbidity symptoms are monitored and maintained or improved  4/24/2023 2219 by Celso Martinez RN  Outcome: Progressing     Problem: Discharge Planning  Goal: Discharge to home or other facility with appropriate resources  Outcome: Progressing     Problem: ABCDS Injury Assessment  Goal: Absence of physical injury  4/24/2023 2219 by Celso Martinez RN  Outcome: Progressing     Problem: Safety - Adult  Goal: Free from fall injury  4/24/2023 2219 by Celso Martinez RN  Outcome: Progressing

## 2023-04-25 NOTE — PROGRESS NOTES
Pt discharged to home. Heplock and monitor removed. Discharge instructions given and explained to pt. Pt verbalized understanding. Pt denies any questions or concerns. Pt assisted to front door in wheelchair with all belongings.

## 2023-04-25 NOTE — DISCHARGE INSTRUCTIONS
Dr. Nadia Pugh office will call you with your follow up appointment    The steri strip on your incision will fall off naturally. Ok to shower. Take tylenol first for minor pain. Take percocet for more severe pain. Take the least amount possible. Do not drive while taking. Do not exceed 4000mg of tylenol daily. Percocet contains tylenol.

## 2023-04-26 ENCOUNTER — CARE COORDINATION (OUTPATIENT)
Dept: CASE MANAGEMENT | Age: 68
End: 2023-04-26

## 2023-04-26 DIAGNOSIS — Z98.890 S/P CAROTID ENDARTERECTOMY: Primary | ICD-10-CM

## 2023-04-26 NOTE — CARE COORDINATION
[facility] Care Transitions Initial Follow Up Call    Call within 2 business days of discharge: Yes    Patient Current Location:  Home: [address] [place] [address] [place]    Care Transition Nurse contacted the patient by telephone to perform post hospital discharge assessment. Verified name and  with patient as identifiers. Provided introduction to self, and explanation of the Care Transition Nurse role. Patient: Papa Curry Patient : 1955   MRN: [id number]  Reason for Admission: S/P Lt carotid endarterectomy  Discharge Date: 23 RARS: Readmission Risk Score: 9      Last Discharge  [address]       Date Complaint Diagnosis Description Type Department Provider    23  Acute postoperative pain . .. Admission (Discharged) Que Chakraborty 1 Paris Li MD            Was this an external facility discharge? No Discharge Facility: [facility]    Challenges to be reviewed by the provider   Additional needs identified to be addressed with provider: No  none               Method of communication with provider: none. Was able to contact Ami Naidu for initial transitional outreach. She stated that she was doing well, just tired. She said that she noticed a little bit more swelling around the incisional site and her Lt ear was numb. She denied any difficulties with swallowing; no headache, no dizziness/lightheadedness, no extremity weakness no pain and no drainage from the incision. She stated that she had all her medications and has post op visit scheduled for . She had no further questions or concerns. Care Transition Nurse reviewed discharge instructions with patient who verbalized understanding. The patient was given an opportunity to ask questions and does not have any further questions or concerns at this time. Were discharge instructions available to patient? Yes.  Reviewed appropriate site of care based on symptoms and resources available to patient including: PCP  Specialist  When

## 2023-04-27 ENCOUNTER — CLINICAL DOCUMENTATION (OUTPATIENT)
Dept: SPIRITUAL SERVICES | Age: 68
End: 2023-04-27

## 2023-04-27 NOTE — ACP (ADVANCE CARE PLANNING)
Advance Care Planning   Ambulatory ACP Specialist Patient Outreach    Date:  4/27/2023    ACP Specialist:  Gi Beatty    Outreach call to patient in follow-up to ACP Specialist referral from:    [x] PCP  [] Provider   [] Ambulatory Care Management [] Other     For:                  [x] Advance Directive Assistance              [] Complete Portable DNR order              [] Complete POST/POLST/MOST              [] Code Status Discussion             [] Discuss Goals of Care             [] Early ACP Decision-Making              [] Other (Specify)    Date Referral Received:4/26/23    Next Step:   [] ACP scheduled conversation  [x] Outreach again in one week               [] Email / Mail [address]  [] Email / Mail Advance Directive   [] Closing referral.  Routing closure to referring provider/staff and to ACP Specialist . [] Closure letter mailed to patient with invitation to contact ACP Specialist if / when ready. [] Other (Specify here): Outreaches:       [x] 1st -  Date:  4/27/23                              Intervention:  [] Spoke with Patient   [x] Left Voice mail [] Email / Mail    [] OrdrItt  [] Other [id number] : Outcomes:Left detailed VM for Patient to return call.            [] 2nd -  Date:                                Intervention:  [] Spoke with Patient  [] Left Voice mail [] Email / Mail    [] OrdrItt  [] Other [id number]) : Outcomes:                [] 3rd -  Date:                               Intervention:  [] Spoke with Patient   [] Left Voice mail [] Email / Mail    [] OrdrItt  [] Other [id number]) : Outcomes:           []  Additional Outreach -  Date:     (Specify Dates & special circumstances): Outcomes:          Thank you for this referral.

## 2023-05-02 ENCOUNTER — CARE COORDINATION (OUTPATIENT)
Dept: CASE MANAGEMENT | Age: 68
End: 2023-05-02

## 2023-05-02 NOTE — CARE COORDINATION
[facility] Care Transitions Follow Up Call    Patient Current Location:  Home: [address] [place] [address] [place]    Care Transition Nurse contacted the patient by telephone to follow up after admission on 23. Verified name and  with patient as identifiers. Patient: Luann Grimm  Patient : 1955   MRN: [id number]  Reason for Admission: S/P Lt carotid endarterectomy  Discharge Date: 23 RARS: Readmission Risk Score: 9      Needs to be reviewed by the provider   Additional needs identified to be addressed with provider: No  none             Method of communication with provider: none. Was able to contact Lg Polo for transitional outreach. She stated that she was doing \"ok\". She said that there still was some swelling at the incisional site, but no redness of drainage. Her Lt ear still had some numbness and only notices it when she touches it. She denied any headaches,, swallowing difficulties, and no shortness of breath. She did ask about if she should have her colonoscopy 5/10. Encouraged her to call the GI office and notify them of her recent surgery and if they would like to continue with the procedure. Reviewed medications to see if anticoagulation was order and only noted full strength ASA. She mentioned that she was taking low dose ASA. Informed her that full strength ASA was on her discharge med list.  No further questions or concerns. Addressed changes since last contact:  none  Discussed follow-up appointments. If no appointment was previously scheduled, appointment scheduling offered: Yes. Is follow up appointment scheduled within 7 days of discharge?  No.    Follow Up  Future Appointments   Date Time Provider Zohreh Hopper   2023 10:45 AM Johann Schaeffer MD heartvasc [facility]   2023  1:30 PM [facility] [place] [facility] RM 2 [facility] PB CT [facility] [place]   8/15/2023 10:30 AM Yi Grey MD Resp Spec [facility]     Non-[facility] follow up appointment(s):     Care

## 2023-05-10 ENCOUNTER — ANESTHESIA (OUTPATIENT)
Dept: OPERATING ROOM | Age: 68
End: 2023-05-10
Payer: MEDICARE

## 2023-05-10 ENCOUNTER — HOSPITAL ENCOUNTER (OUTPATIENT)
Age: 68
Setting detail: OUTPATIENT SURGERY
Discharge: HOME | End: 2023-05-10
Attending: INTERNAL MEDICINE | Admitting: INTERNAL MEDICINE
Payer: MEDICARE

## 2023-05-10 ENCOUNTER — ANESTHESIA EVENT (OUTPATIENT)
Dept: OPERATING ROOM | Age: 68
End: 2023-05-10
Payer: MEDICARE

## 2023-05-10 VITALS
HEART RATE: 76 BPM | BODY MASS INDEX: 39.34 KG/M2 | OXYGEN SATURATION: 92 % | TEMPERATURE: 97 F | DIASTOLIC BLOOD PRESSURE: 55 MMHG | RESPIRATION RATE: 16 BRPM | HEIGHT: 63 IN | SYSTOLIC BLOOD PRESSURE: 161 MMHG | WEIGHT: 222 LBS

## 2023-05-10 DIAGNOSIS — Z86.010 HX OF COLONIC POLYPS: ICD-10-CM

## 2023-05-10 PROCEDURE — 2500000003 HC RX 250 WO HCPCS: Performed by: NURSE ANESTHETIST, CERTIFIED REGISTERED

## 2023-05-10 PROCEDURE — 3700000000 HC ANESTHESIA ATTENDED CARE: Performed by: INTERNAL MEDICINE

## 2023-05-10 PROCEDURE — 6360000002 HC RX W HCPCS: Performed by: NURSE ANESTHETIST, CERTIFIED REGISTERED

## 2023-05-10 PROCEDURE — 2709999900 HC NON-CHARGEABLE SUPPLY: Performed by: INTERNAL MEDICINE

## 2023-05-10 PROCEDURE — 3700000001 HC ADD 15 MINUTES (ANESTHESIA): Performed by: INTERNAL MEDICINE

## 2023-05-10 PROCEDURE — 88305 TISSUE EXAM BY PATHOLOGIST: CPT

## 2023-05-10 PROCEDURE — 3609010600 HC COLONOSCOPY POLYPECTOMY SNARE/COLD BIOPSY: Performed by: INTERNAL MEDICINE

## 2023-05-10 PROCEDURE — 7100000011 HC PHASE II RECOVERY - ADDTL 15 MIN: Performed by: INTERNAL MEDICINE

## 2023-05-10 PROCEDURE — 2580000003 HC RX 258: Performed by: ANESTHESIOLOGY

## 2023-05-10 PROCEDURE — 7100000010 HC PHASE II RECOVERY - FIRST 15 MIN: Performed by: INTERNAL MEDICINE

## 2023-05-10 RX ORDER — FENTANYL CITRATE 50 UG/ML
25 INJECTION, SOLUTION INTRAMUSCULAR; INTRAVENOUS EVERY 5 MIN PRN
Status: CANCELLED | OUTPATIENT
Start: 2023-05-10

## 2023-05-10 RX ORDER — SODIUM CHLORIDE 9 MG/ML
INJECTION, SOLUTION INTRAVENOUS PRN
Status: DISCONTINUED | OUTPATIENT
Start: 2023-05-10 | End: 2023-05-10 | Stop reason: HOSPADM

## 2023-05-10 RX ORDER — SODIUM CHLORIDE 0.9 % (FLUSH) 0.9 %
5-40 SYRINGE (ML) INJECTION PRN
Status: DISCONTINUED | OUTPATIENT
Start: 2023-05-10 | End: 2023-05-10 | Stop reason: HOSPADM

## 2023-05-10 RX ORDER — SODIUM CHLORIDE 0.9 % (FLUSH) 0.9 %
5-40 SYRINGE (ML) INJECTION PRN
Status: CANCELLED | OUTPATIENT
Start: 2023-05-10

## 2023-05-10 RX ORDER — PROPOFOL 10 MG/ML
INJECTION, EMULSION INTRAVENOUS PRN
Status: DISCONTINUED | OUTPATIENT
Start: 2023-05-10 | End: 2023-05-10 | Stop reason: SDUPTHER

## 2023-05-10 RX ORDER — ONDANSETRON 2 MG/ML
4 INJECTION INTRAMUSCULAR; INTRAVENOUS
Status: CANCELLED | OUTPATIENT
Start: 2023-05-10 | End: 2023-05-11

## 2023-05-10 RX ORDER — LIDOCAINE HYDROCHLORIDE 20 MG/ML
INJECTION, SOLUTION EPIDURAL; INFILTRATION; INTRACAUDAL; PERINEURAL PRN
Status: DISCONTINUED | OUTPATIENT
Start: 2023-05-10 | End: 2023-05-10 | Stop reason: SDUPTHER

## 2023-05-10 RX ORDER — SODIUM CHLORIDE 0.9 % (FLUSH) 0.9 %
5-40 SYRINGE (ML) INJECTION EVERY 12 HOURS SCHEDULED
Status: DISCONTINUED | OUTPATIENT
Start: 2023-05-10 | End: 2023-05-10 | Stop reason: HOSPADM

## 2023-05-10 RX ORDER — LIDOCAINE HYDROCHLORIDE 10 MG/ML
1 INJECTION, SOLUTION EPIDURAL; INFILTRATION; INTRACAUDAL; PERINEURAL
Status: DISCONTINUED | OUTPATIENT
Start: 2023-05-10 | End: 2023-05-10 | Stop reason: HOSPADM

## 2023-05-10 RX ORDER — SODIUM CHLORIDE 9 MG/ML
INJECTION, SOLUTION INTRAVENOUS CONTINUOUS
Status: DISCONTINUED | OUTPATIENT
Start: 2023-05-10 | End: 2023-05-10 | Stop reason: HOSPADM

## 2023-05-10 RX ORDER — SODIUM CHLORIDE, SODIUM LACTATE, POTASSIUM CHLORIDE, CALCIUM CHLORIDE 600; 310; 30; 20 MG/100ML; MG/100ML; MG/100ML; MG/100ML
INJECTION, SOLUTION INTRAVENOUS CONTINUOUS
Status: DISCONTINUED | OUTPATIENT
Start: 2023-05-10 | End: 2023-05-10 | Stop reason: HOSPADM

## 2023-05-10 RX ORDER — SODIUM CHLORIDE 0.9 % (FLUSH) 0.9 %
5-40 SYRINGE (ML) INJECTION EVERY 12 HOURS SCHEDULED
Status: CANCELLED | OUTPATIENT
Start: 2023-05-10

## 2023-05-10 RX ORDER — SODIUM CHLORIDE 9 MG/ML
INJECTION, SOLUTION INTRAVENOUS PRN
Status: CANCELLED | OUTPATIENT
Start: 2023-05-10

## 2023-05-10 RX ORDER — HYDROMORPHONE HYDROCHLORIDE 1 MG/ML
0.5 INJECTION, SOLUTION INTRAMUSCULAR; INTRAVENOUS; SUBCUTANEOUS EVERY 5 MIN PRN
Status: CANCELLED | OUTPATIENT
Start: 2023-05-10

## 2023-05-10 RX ORDER — CHLORAL HYDRATE 500 MG
1000 CAPSULE ORAL DAILY
COMMUNITY

## 2023-05-10 RX ORDER — ASCORBIC ACID 1000 MG
1 TABLET ORAL DAILY
COMMUNITY

## 2023-05-10 RX ADMIN — PROPOFOL 50 MG: 10 INJECTION, EMULSION INTRAVENOUS at 08:52

## 2023-05-10 RX ADMIN — PROPOFOL 50 MG: 10 INJECTION, EMULSION INTRAVENOUS at 08:44

## 2023-05-10 RX ADMIN — PROPOFOL 50 MG: 10 INJECTION, EMULSION INTRAVENOUS at 08:21

## 2023-05-10 RX ADMIN — PROPOFOL 50 MG: 10 INJECTION, EMULSION INTRAVENOUS at 08:24

## 2023-05-10 RX ADMIN — SODIUM CHLORIDE, POTASSIUM CHLORIDE, SODIUM LACTATE AND CALCIUM CHLORIDE: 600; 310; 30; 20 INJECTION, SOLUTION INTRAVENOUS at 08:13

## 2023-05-10 RX ADMIN — PROPOFOL 50 MG: 10 INJECTION, EMULSION INTRAVENOUS at 08:37

## 2023-05-10 RX ADMIN — PROPOFOL 30 MG: 10 INJECTION, EMULSION INTRAVENOUS at 08:30

## 2023-05-10 RX ADMIN — PROPOFOL 30 MG: 10 INJECTION, EMULSION INTRAVENOUS at 08:27

## 2023-05-10 RX ADMIN — LIDOCAINE HYDROCHLORIDE 100 MG: 20 INJECTION, SOLUTION EPIDURAL; INFILTRATION; INTRACAUDAL; PERINEURAL at 08:17

## 2023-05-10 RX ADMIN — PROPOFOL 50 MG: 10 INJECTION, EMULSION INTRAVENOUS at 08:19

## 2023-05-10 RX ADMIN — PROPOFOL 50 MG: 10 INJECTION, EMULSION INTRAVENOUS at 08:17

## 2023-05-10 RX ADMIN — PROPOFOL 50 MG: 10 INJECTION, EMULSION INTRAVENOUS at 08:49

## 2023-05-10 RX ADMIN — PROPOFOL 50 MG: 10 INJECTION, EMULSION INTRAVENOUS at 08:40

## 2023-05-10 RX ADMIN — PROPOFOL 30 MG: 10 INJECTION, EMULSION INTRAVENOUS at 08:33

## 2023-05-10 ASSESSMENT — PAIN - FUNCTIONAL ASSESSMENT: PAIN_FUNCTIONAL_ASSESSMENT: 0-10

## 2023-05-10 ASSESSMENT — ENCOUNTER SYMPTOMS: SHORTNESS OF BREATH: 0

## 2023-05-10 ASSESSMENT — PAIN DESCRIPTION - DESCRIPTORS: DESCRIPTORS: DISCOMFORT;CRAMPING

## 2023-05-10 ASSESSMENT — LIFESTYLE VARIABLES: SMOKING_STATUS: 1

## 2023-05-10 NOTE — OP NOTE
[facility] Endoscopy  COLONOSCOPY    Patient: Svetlana Rosas   Date:  5/10/2023   : 1955       Med Rec#: [id number]     Procedure:  Colonoscopy with hot snare polypectomy    Indication: History of polyps    Findings   Hemorrhoids, internal  Diverticulosis, pancolonic with left-sided predominance  Multiple polyps, small to medium sized and sessile. Ascending colon, transverse colon, descending colon. Resected with cold and hot snare    Recommendations  Await pathology. Repeat colonoscopy in 3 years. TWO DAY PREP      Appropriate Photos Taken:  y  Specimen(s) Removed: as above  Previous Colonoscopy:  2020  Withdrawal Time: 24 minutes  Estimated Blood Loss: Minimal  Anesthesia:  MAC    Complications:    Sub-optimal bowel prep  Description of Procedure:  Informed consent was obtained after explanation of the procedure including indications, description of the procedure,  benefits and possible risks and complications of the procedure, and alternatives. All questions were answered. The patient's history was reviewed and a directed physical examination was performed prior to the procedure. The patient was monitored throughout the procedure with pulse oximetry and periodic assessment of vital signs. With the patient initially in the left lateral decubitus position, a digital rectal examination was performed. The video endoscope was placed in the patient's rectum and advanced without difficulty  to the cecum, which was identified by the ileocecal valve and appendiceal orifice. The prep was adequate. Examination of the mucosa and the anatomy was performed during both introduction and withdrawal of the endoscope.        Karol Carrion M.D.  5/10/2023 at 8:11 AM

## 2023-05-10 NOTE — ANESTHESIA POSTPROCEDURE EVALUATION
Department of Anesthesiology  Postprocedure Note    Patient: Nelida Leija  MRN: [id number]  YOB: 1955  Date of evaluation: 5/10/2023      Procedure Summary     Date: 05/10/23 Room / Location: [address]    Anesthesia Start: 2005 Anesthesia Stop: 0902    Procedure: COLONOSCOPY POLYPECTOMY HOT AND COLD SNARE Diagnosis:       Hx of colonic polyps      (Hx of colonic polyps [Z86.010])    Surgeons: Sonja Wills MD Responsible Provider: Mando Stern MD    Anesthesia Type: MAC ASA Status: 3          Anesthesia Type: MAC    Gloria Phase I:      Gloria Phase II: Gloria Score: 10      Anesthesia Post Evaluation    Patient location during evaluation: PACU  Patient participation: complete - patient participated  Level of consciousness: awake  Airway patency: patent  Nausea & Vomiting: no nausea  Complications: no  Cardiovascular status: blood pressure returned to baseline  Respiratory status: acceptable  Hydration status: euvolemic  Comments: Multimodal analgesia pain management as indicated by procedure  Multimodal analgesia pain management approach

## 2023-05-10 NOTE — H&P
GASTROENTEROLOGY  CONSULTATION    Patient: Karena Perea   : 1955  Med Rec#: [id number]      REASON FOR CONSULTATION:  Colonoscopy    HISTORY OF PRESENT ILLNESS:     This is a 77 y.o. White female who presents with no GI Sx. She's here for surveillance colonoscopy. .  No anorexia or weight loss. No pyrosis or regurgitation. No early satiety or post prandial bloating. No dysphagia or odynophagia. No nausea or vomiting. No hematemesis or coffee ground emesis. No other abdominal pain. No diarrhea or constipation. No melena or hematochezia. No fecal incontinence. Alexandria Alcala PAST MEDICAL HISTORY:  Past Medical History:   Diagnosis Date    Angina pectoris (HCC)     Arthritis     Bipolar disorder ([facility] [address]     CAD (coronary artery disease)     stents x 2 (Dr. Joseph Alicea)    Carotid stenosis     Carotid stenosis, asymptomatic 2015    Chronic back pain     COPD (chronic obstructive pulmonary disease) ([facility] [address]     Current every day vaping     Depression     Dyspnea     Family history of colon cancer     Family history of liver cancer     Family history of ovarian cancer     Former smoker     GERD (gastroesophageal reflux disease)     History of colon polyps     Hyperlipidemia     Hypertension     Lung nodule     New onset type 2 diabetes mellitus ([facility] [address] 2018    Obesity     Obesity (BMI 35.0-39.9 without comorbidity)    Peripheral vascular disease (HCC)     Poor historian     Sleep apnea     CRISTINA on CPAP    Smoking greater than 40 pack years     reports that she quit smoking about 2 years ago. 1.5 PPD for 48 yeras.  She has a     Past Surgical History:   Procedure Laterality Date    ANKLE SURGERY      CARDIAC CATHETERIZATION  10/11/2013    2 stents    CAROTID ENDARTERECTOMY Left 2023    DR Kianna Flores    CAROTID ENDARTERECTOMY Left 2023    CAROTID ENDARTERECTOMY, 0.8CM X 8CM PHOTOFIX PATCH, SSEP MONITIORING performed by Tess House MD at [address]      cataract both

## 2023-05-10 NOTE — ANESTHESIA PRE PROCEDURE
Department of Anesthesiology  Preprocedure Note       Name:  Vanita Ruff   Age:  77 y.o.  :  1955                                          MRN:  [id number]         Date:  5/10/2023      Surgeon: Harshal Fortune):  Billie Velazquez MD    Procedure: COLONOSCOPY DIAGNOSTIC    Medications prior to admission:   Prior to Admission medications    Medication Sig Start Date End Date Taking?  Authorizing Provider   Omega-3 Fatty Acids (FISH OIL) 1000 MG capsule Take 1 capsule by mouth daily    Historical Provider, MD   Coenzyme Q10 (CO Q 10) 10 MG CAPS Take 1 mg by mouth daily    Historical Provider, MD   fluticasone (FLONASE) 50 MCG/ACT nasal spray 2 sprays by Each Nostril route daily 23   Fabiano Matute MD   solifenacin (VESICARE) 5 MG tablet take 1 tablet by mouth once daily 3/21/23   Nancy Valles, MD   omeprazole (PRILOSEC) 40 MG delayed release capsule take 1 capsule by mouth once daily 23   Nancy Valles, MD   tiotropium-olodaterol (STIOLTO RESPIMAT) 2.5-2.5 MCG/ACT AERS Inhale 2 puffs into the lungs daily 23   Fabiano Matute MD   fluticasone (ARNUITY ELLIPTA) 100 MCG/ACT AEPB Inhale 1 puff into the lungs daily 23   Fabiano Matute MD   amLODIPine (NORVASC) 10 MG tablet 1 tablet once a day 11/15/22   Arva Peak, MD   hydroCHLOROthiazide (HYDRODIURIL) 25 MG tablet 1 tablet daily 11/15/22   Arva Peak, MD   rosuvastatin (CRESTOR) 5 MG tablet Take 1 tablet by mouth nightly 11/15/22   Arva Peak, MD   carvedilol (COREG) 3.125 MG tablet take 1 tablet by mouth twice a day 11/15/22   Arva Peak, MD   losartan (COZAAR) 100 MG tablet Once a day 11/15/22   Arva Peak, MD   sertraline (ZOLOFT) 50 MG tablet 1 tablet daily 11/15/22   Arva Peak, MD   potassium chloride (KLOR-CON M) 10 MEQ extended release tablet take 1 tablet by mouth once daily  Patient taking differently: at bedtime 22   Adelfo Chaudhary MD   betamethasone dipropionate (DIPROLENE) 0.05 % ointment apply

## 2023-05-11 ENCOUNTER — CARE COORDINATION (OUTPATIENT)
Dept: CASE MANAGEMENT | Age: 68
End: 2023-05-11

## 2023-05-11 LAB — SURGICAL PATHOLOGY REPORT: NORMAL

## 2023-05-11 NOTE — CARE COORDINATION
[facility] Care Transitions Follow Up Call    Patient Current Location:  Home: [address] [place] [address] [place]    Care Transition Nurse contacted the patient by telephone to follow up after admission on 23. Verified name and  with patient as identifiers. Patient: Lady Paz  Patient : 1955   MRN: [id number]  Reason for Admission: S/P Lt carotid endarterectomy  Discharge Date: 5/10/23 RARS: Readmission Risk Score: 9      Needs to be reviewed by the provider   Additional needs identified to be addressed with provider: No  none             Method of communication with provider: none. Was able to contact Cari Jones for transitional outreach. She said that she was doing all right. She said that the incision was healing and no issues from the surgery. She did say that she had a colonoscopy yesterday and she was having some trouble. She said that after the procedure she went to eat breakfast and she developed Lt sided pain, that would intensify with deep breathing. It was so bad that she could not finish per meal.  She said that the pain was a little better today. She also said that yesterday her oxygen saturation was down to 75% at home. She said that she did some deep breathing and it went up. She said today it was 95%. She said that she has to call the GI for follow up from the procedure. Encouraged her to inform them of the pain that she is experiencing. VU. No further questions or concerns. Addressed changes since last contact:  none  Discussed follow-up appointments. If no appointment was previously scheduled, appointment scheduling offered: Yes. Is follow up appointment scheduled within 7 days of discharge?  No.    Follow Up  Future Appointments   Date Time Provider Zohreh Hopper   2023 10:45 AM [facility]   2023  1:30 PM [facility] [place] [facility] RM 2 [facility]B PB CT [facility] [place]   8/15/2023 10:30 AM Luiz Goldstein MD Resp Spec [facility]

## 2023-05-15 ENCOUNTER — OFFICE VISIT (OUTPATIENT)
Dept: FAMILY MEDICINE CLINIC | Age: 68
End: 2023-05-15
Payer: MEDICARE

## 2023-05-15 ENCOUNTER — HOSPITAL ENCOUNTER (OUTPATIENT)
Age: 68
Discharge: HOME | End: 2023-05-15

## 2023-05-15 ENCOUNTER — CARE COORDINATION (OUTPATIENT)
Dept: CASE MANAGEMENT | Age: 68
End: 2023-05-15

## 2023-05-15 ENCOUNTER — HOSPITAL ENCOUNTER (OUTPATIENT)
Dept: GENERAL RADIOLOGY | Age: 68
Discharge: HOME | End: 2023-05-17
Payer: MEDICARE

## 2023-05-15 VITALS
SYSTOLIC BLOOD PRESSURE: 130 MMHG | BODY MASS INDEX: 39.87 KG/M2 | RESPIRATION RATE: 14 BRPM | DIASTOLIC BLOOD PRESSURE: 60 MMHG | HEART RATE: 60 BPM | WEIGHT: 225 LBS | HEIGHT: 63 IN | OXYGEN SATURATION: 99 %

## 2023-05-15 DIAGNOSIS — R10.12 LEFT UPPER QUADRANT ABDOMINAL PAIN: Primary | ICD-10-CM

## 2023-05-15 DIAGNOSIS — K59.00 CONSTIPATION, UNSPECIFIED CONSTIPATION TYPE: ICD-10-CM

## 2023-05-15 DIAGNOSIS — R10.12 LEFT UPPER QUADRANT ABDOMINAL PAIN: ICD-10-CM

## 2023-05-15 PROCEDURE — 1111F DSCHRG MED/CURRENT MED MERGE: CPT | Performed by: NURSE PRACTITIONER

## 2023-05-15 PROCEDURE — 3017F COLORECTAL CA SCREEN DOC REV: CPT | Performed by: NURSE PRACTITIONER

## 2023-05-15 PROCEDURE — G8427 DOCREV CUR MEDS BY ELIG CLIN: HCPCS | Performed by: NURSE PRACTITIONER

## 2023-05-15 PROCEDURE — G8417 CALC BMI ABV UP PARAM F/U: HCPCS | Performed by: NURSE PRACTITIONER

## 2023-05-15 PROCEDURE — 74019 RADEX ABDOMEN 2 VIEWS: CPT

## 2023-05-15 PROCEDURE — 3075F SYST BP GE 130 - 139MM HG: CPT | Performed by: NURSE PRACTITIONER

## 2023-05-15 PROCEDURE — 1123F ACP DISCUSS/DSCN MKR DOCD: CPT | Performed by: NURSE PRACTITIONER

## 2023-05-15 PROCEDURE — 99213 OFFICE O/P EST LOW 20 MIN: CPT | Performed by: NURSE PRACTITIONER

## 2023-05-15 PROCEDURE — G8399 PT W/DXA RESULTS DOCUMENT: HCPCS | Performed by: NURSE PRACTITIONER

## 2023-05-15 PROCEDURE — 1090F PRES/ABSN URINE INCON ASSESS: CPT | Performed by: NURSE PRACTITIONER

## 2023-05-15 PROCEDURE — 1036F TOBACCO NON-USER: CPT | Performed by: NURSE PRACTITIONER

## 2023-05-15 PROCEDURE — 3078F DIAST BP <80 MM HG: CPT | Performed by: NURSE PRACTITIONER

## 2023-05-15 RX ORDER — POLYETHYLENE GLYCOL 3350 17 G/17G
17 POWDER, FOR SOLUTION ORAL DAILY
Qty: 238 G | Refills: 0 | COMMUNITY
Start: 2023-05-15 | End: 2023-05-22

## 2023-05-15 SDOH — ECONOMIC STABILITY: FOOD INSECURITY: WITHIN THE PAST 12 MONTHS, YOU WORRIED THAT YOUR FOOD WOULD RUN OUT BEFORE YOU GOT MONEY TO BUY MORE.: NEVER TRUE

## 2023-05-15 SDOH — ECONOMIC STABILITY: HOUSING INSECURITY
IN THE LAST 12 MONTHS, WAS THERE A TIME WHEN YOU DID NOT HAVE A STEADY PLACE TO SLEEP OR SLEPT IN A SHELTER (INCLUDING NOW)?: NO

## 2023-05-15 SDOH — ECONOMIC STABILITY: FOOD INSECURITY: WITHIN THE PAST 12 MONTHS, THE FOOD YOU BOUGHT JUST DIDN'T LAST AND YOU DIDN'T HAVE MONEY TO GET MORE.: NEVER TRUE

## 2023-05-15 SDOH — ECONOMIC STABILITY: INCOME INSECURITY: HOW HARD IS IT FOR YOU TO PAY FOR THE VERY BASICS LIKE FOOD, HOUSING, MEDICAL CARE, AND HEATING?: NOT HARD AT ALL

## 2023-05-15 ASSESSMENT — ENCOUNTER SYMPTOMS
NAUSEA: 1
ABDOMINAL DISTENTION: 0
CONSTIPATION: 1
COUGH: 0
VOMITING: 0
SHORTNESS OF BREATH: 0
ABDOMINAL PAIN: 1

## 2023-05-15 ASSESSMENT — PATIENT HEALTH QUESTIONNAIRE - PHQ9
7. TROUBLE CONCENTRATING ON THINGS, SUCH AS READING THE NEWSPAPER OR WATCHING TELEVISION: 0
1. LITTLE INTEREST OR PLEASURE IN DOING THINGS: 0
5. POOR APPETITE OR OVEREATING: 0
10. IF YOU CHECKED OFF ANY PROBLEMS, HOW DIFFICULT HAVE THESE PROBLEMS MADE IT FOR YOU TO DO YOUR WORK, TAKE CARE OF THINGS AT HOME, OR GET ALONG WITH OTHER PEOPLE: 0
SUM OF ALL RESPONSES TO PHQ QUESTIONS 1-9: 0
2. FEELING DOWN, DEPRESSED OR HOPELESS: 0
SUM OF ALL RESPONSES TO PHQ QUESTIONS 1-9: 0
3. TROUBLE FALLING OR STAYING ASLEEP: 0
SUM OF ALL RESPONSES TO PHQ QUESTIONS 1-9: 0
9. THOUGHTS THAT YOU WOULD BE BETTER OFF DEAD, OR OF HURTING YOURSELF: 0
4. FEELING TIRED OR HAVING LITTLE ENERGY: 0
8. MOVING OR SPEAKING SO SLOWLY THAT OTHER PEOPLE COULD HAVE NOTICED. OR THE OPPOSITE, BEING SO FIGETY OR RESTLESS THAT YOU HAVE BEEN MOVING AROUND A LOT MORE THAN USUAL: 0
6. FEELING BAD ABOUT YOURSELF - OR THAT YOU ARE A FAILURE OR HAVE LET YOURSELF OR YOUR FAMILY DOWN: 0
SUM OF ALL RESPONSES TO PHQ9 QUESTIONS 1 & 2: 0
SUM OF ALL RESPONSES TO PHQ QUESTIONS 1-9: 0

## 2023-05-15 NOTE — CARE COORDINATION
[facility] Care Transitions Follow Up Call    Patient Current Location:  Home: [address] [place] [address] [place]    Care Transition Nurse contacted the patient by telephone to follow up after admission on 23. Verified name and  with patient as identifiers. Patient: Florencio Mckinney  Patient : 1955   MRN: [id number]  Reason for Admission: S/P Lt carotid endarterectomy 5/10 colonoscopy   Discharge Date: 5/10/23 RARS: Readmission Risk Score: 9      Needs to be reviewed by the provider   Additional needs identified to be addressed with provider: Yes  none             Method of communication with provider: none. Was able to contact Luisana Melchor for transitional outreach. She stated that she is still having problems with her Lt sided pain. She said that the pain starts at her Lt waist and shoots up to her shoulder. She said that she did call the GI office and was told that it could be from trapped gas. She said that she is having bowel movements, of small soft stool that is difficult to expel. She has had the chills, no fevers and her oxygen saturations have be up and down. (Mid 70'-90's.)  She does use her albuterol inhaler. She said that her abdomen was bloated and when she has involuntary short quick breaths, the pain intensifies. She is unable to walk much due to shortness of breath and chronic back pain. .  Encouraged her to get checked out at the urgent care. VU. She said that she will go after this call. Will follow up on visit to urgent care tomorrow. Addressed changes since last contact:  none  Discussed follow-up appointments. If no appointment was previously scheduled, appointment scheduling offered: Yes. Is follow up appointment scheduled within 7 days of discharge?  No.    Follow Up  Future Appointments   Date Time Provider Zohreh Hopper   2023 10:45 AM [facility]   2023  1:30 PM STV [place] CT RM 2 MHPB PB CT STV [place]

## 2023-05-15 NOTE — PROGRESS NOTES
[address] [facility] WALK-IN  [address] [facility]  [address] [address]  Dept: [phone]  Dept Fax: [phone]    Harpreet Hansen is a 77 y.o. female who presents today for her medical conditions/complaints of   Chief Complaint   Patient presents with    Other     Left side pain from colonoscopy done on 5/10, involuntary breaths. HPI:     /60 (Site: Right Upper Arm, Position: Sitting, Cuff Size: Large Adult)   Pulse 60   Resp 14   Ht 5' 3\" (1.6 m)   Wt 225 lb (102.1 kg)   SpO2 99%   BMI 39.86 kg/m²       HPI  Pt presented to the clinic today with c/o left sided upper abdominal pain. This is a new problem which started 5 days ago after her colonoscopy. Pain is intermittent, sharp and radiates to the left shoulder. The problem has been waxing and waning since onset. Associated symptoms include: involuntary breaths, decreased BM's, nausea. Pertinent negatives include: No injury, fever, chills, pain with BM, vomiting, URI symptoms . Pt has tried Tylenol with little improvement. BM is soft but passing in little pieces.           Past Medical History:   Diagnosis Date    Angina pectoris ([address]     Arthritis     Bipolar disorder ([address]     CAD (coronary artery disease)     stents x 2 (Dr. Demarco Bermudez)    Carotid stenosis     Carotid stenosis, asymptomatic 2015    Chronic back pain     COPD (chronic obstructive pulmonary disease) ([address]     Current every day vaping     Depression     Dyspnea     Family history of colon cancer     Family history of liver cancer     Family history of ovarian cancer     Former smoker     GERD (gastroesophageal reflux disease)     History of colon polyps     Hyperlipidemia     Hypertension     Lung nodule     New onset type 2 diabetes mellitus ([address] 2018    Obesity     Obesity (BMI 35.0-39.9 without comorbidity)    Peripheral vascular disease (HCC)     Poor historian     Sleep apnea

## 2023-05-16 ENCOUNTER — CARE COORDINATION (OUTPATIENT)
Dept: CASE MANAGEMENT | Age: 68
End: 2023-05-16

## 2023-05-16 NOTE — CARE COORDINATION
[facility] Care Transitions Follow Up Call    Patient Current Location: [address] Transition Nurse contacted the patient by telephone to follow up after admission on 23. Verified name and  with patient as identifiers. Patient: Joo Gonzales  Patient : 1955   MRN: [id number]  Reason for Admission: S/P Lt carotid endarterectomy 5/10 colonoscopy   Discharge Date: 5/10/23 RARS: Readmission Risk Score: 9      Needs to be reviewed by the provider   Additional needs identified to be addressed with provider: No  none             Method of communication with provider: none. Was able to contact Umang Posey for post Urgent care follow up. She stated that when she went there they said that she still had a lot of stool in her colon and that she should increase her fiber intake, take the Miralax and increase her fluid intake. She said that she still does not understand it. She said that the pain started after her colonoscopy and that she was cleaned out from the bowel prep that she took. Explained that he Xray did also say that she had bowel gas also. She said that she will do as she was directed to do and she what will happen. She did say that her pain was slightly better today. She had no further questions or concerns. Addressed changes since last contact:   follow up on urgent care visit  Discussed follow-up appointments. If no appointment was previously scheduled, appointment scheduling offered: Yes. Is follow up appointment scheduled within 7 days of discharge?  No.    Follow Up  Future Appointments   Date Time Provider Zohreh Hopper   2023 10:45 AM Ted Dimas MD heartvasc [facility]   2023  1:30 PM [facility] [place] [facility] RM 2 MHPB PB CT [facility] [place]   8/15/2023 10:30 AM Marium Coto MD Resp Spec [facility]     [facility] follow up appointment(s):     Care Transition Nurse reviewed medical action plan with patient and discussed any barriers to care and/or understanding of plan of care

## 2023-05-21 DIAGNOSIS — E78.5 DYSLIPIDEMIA: ICD-10-CM

## 2023-05-21 DIAGNOSIS — I73.9 PAD (PERIPHERAL ARTERY DISEASE) (HCC): ICD-10-CM

## 2023-05-21 DIAGNOSIS — I25.10 CORONARY ARTERY DISEASE INVOLVING NATIVE CORONARY ARTERY OF NATIVE HEART WITHOUT ANGINA PECTORIS: ICD-10-CM

## 2023-05-22 DIAGNOSIS — I65.23 CAROTID STENOSIS, ASYMPTOMATIC, BILATERAL: Primary | ICD-10-CM

## 2023-05-23 NOTE — TELEPHONE ENCOUNTER
Last visit: 11/15/22  Last Med refill:   Does patient have enough medication for 72 hours: No:     Next Visit Date:  Future Appointments   Date Time Provider Zohreh Ruchi   6/2/2023  3:30 PM [facility] [place] [facility] RM [facility]   6/8/2023 10:30 AM Vj Glover MD heartvasc [facility]   6/22/2023  1:30 PM [facility] [place] [facility] RM 2 MHPB PB CT [facility] [place]   6/28/2023  3:40 PM Fernando Camejo MD [address] [place] [address] [address] IM [facility]   8/15/2023 10:30 AM Alen Naidu  [address] Maintenance   Topic Date Due    Hepatitis C screen  Never done    Shingles vaccine (1 of 2) Never done    Annual Wellness Visit (AWV)  09/19/2021    Diabetic retinal exam  03/29/2022    Diabetic foot exam  05/13/2022    COVID-19 Vaccine (5 - Booster) 07/20/2022    Diabetic Alb to Cr ratio (uACR) test  04/07/2023    Low dose CT lung screening  06/20/2023    A1C test (Diabetic or Prediabetic)  08/05/2023    Lipids  08/05/2023    GFR test (Diabetes, CKD 3-4, OR last GFR 15-59)  04/04/2024    Depression Monitoring  05/15/2024    Breast cancer screen  01/27/2025    Colorectal Cancer Screen  05/10/2026    DTaP/Tdap/Td vaccine (2 - Td or Tdap) 10/24/2028    DEXA (modify frequency per FRAX score)  Completed    Flu vaccine  Completed    Pneumococcal 65+ years Vaccine  Completed    Hepatitis A vaccine  Aged Out    Hib vaccine  Aged Out    Meningococcal (ACWY) vaccine  Aged Out       Hemoglobin A1C (%)   Date Value   08/05/2022 6.5 (H)   02/10/2022 6.3   05/07/2021 6.3 (H)             ( goal A1C is < 7)   Microalb/Crt.  Ratio (mcg/mg creat)   Date Value   04/07/2022 20     LDL Cholesterol (mg/dL)   Date Value   08/05/2022 108   04/07/2022 108       (goal LDL is <100)   AST (U/L)   Date Value   04/04/2023 20     ALT (U/L)   Date Value   04/04/2023 15     BUN (mg/dL)   Date Value   04/04/2023 20     BP Readings from Last 3 Encounters:   05/15/23 130/60   05/10/23 (!) 161/55   04/25/23 (!) 138/51          (goal 120/80)    All Future

## 2023-05-24 ENCOUNTER — CARE COORDINATION (OUTPATIENT)
Dept: CASE MANAGEMENT | Age: 68
End: 2023-05-24

## 2023-05-24 RX ORDER — ROSUVASTATIN CALCIUM 5 MG/1
5 TABLET, COATED ORAL NIGHTLY
Qty: 90 TABLET | Refills: 1 | Status: SHIPPED | OUTPATIENT
Start: 2023-05-24

## 2023-05-24 NOTE — CARE COORDINATION
[facility] Care Transitions Follow Up Call      Patient: Kerry Luciano  Patient : 1955   MRN: [id number]  Reason for Admission: S/P Lt carotid endarterectomy 5/10 colonoscopy   Discharge Date: 5/10/23 RARS: Readmission Risk Score: 9      Needs to be reviewed by the provider   Additional needs identified to be addressed with provider: No  none             Method of communication with provider: none. 1st attempt to reach patient for Care Transitions. [facility] requesting return call. Contact information provided.   [phone]      Follow Up  Future Appointments   Date Time Provider Zohreh Hopper   2023  3:30 PM [facility] VASCULAR RM [facility]   2023 10:30 AM Vu Amaro MD heartvas [facility]PP   2023  1:30 PM [facility] [place] [facility] RM 2 MHPB PB CT [facility] [place]   2023  3:40 PM Ross Agarwal MD [address] [place] [address] 305  [facility]OLPP   8/15/2023 10:30 AM Alen Armenta MD Resp Spec [place] Transitions Subsequent and Final Call    Subsequent and Final Calls  Care Transitions Interventions     Other Services: Completed (Comment: ACP facilitator)   Other Interventions:               Kalina Menendez RN

## 2023-05-25 ENCOUNTER — CARE COORDINATION (OUTPATIENT)
Dept: CASE MANAGEMENT | Age: 68
End: 2023-05-25

## 2023-05-25 NOTE — CARE COORDINATION
[facility] Care Transitions Follow Up Call    Patient Current Location: [address]  Transition Nurse contacted the patient by telephone to follow up after admission on 23. Verified name and  with patient as identifiers. Patient: Torin Jackson  Patient : 1955   MRN: [id number]  Reason for Admission: S/P Lt carotid endarterectomy 5/10 colonoscopy   Discharge Date: 5/10/23 RARS: Readmission Risk Score: 9      Needs to be reviewed by the provider   Additional needs identified to be addressed with provider: No  none             Method of communication with provider: none. Final attempt to reach patient for care transitions. LM requesting return call. Contact information provided. Episode resolved at this time. Philip Stenier returned call. She stated that her abdominal pain has resolved and she will has some soreness and numbness around the incision and into her ear. She will be seeing the vascular surgeon on 23. She said that they called about her colonoscopy and she was told that all the polyps were benign . She had no further questions or concerns. Informed of final outreach. Addressed changes since last contact:  none  Discussed follow-up appointments. If no appointment was previously scheduled, appointment scheduling offered: Yes. Follow Up  Future Appointments   Date Time Provider Zohreh Tianisti   2023  3:30 PM [facility] VASCULAR RM [facility]   2023 10:30 [facility]   [facility]   2023  3:40 PM Erica Mills MD [facility]   8/15/2023 10:30 AM Kiara Odom MD Resp Spec [address] Transition Nurse reviewed medical action plan with patient and discussed any barriers to care and/or understanding of plan of care after discharge.  Discussed appropriate site of care based on symptoms and resources available to patient including: PCP  Specialist  When

## 2023-06-08 ENCOUNTER — TELEPHONE (OUTPATIENT)
Dept: ONCOLOGY | Age: 68
End: 2023-06-08

## 2023-06-24 NOTE — DISCHARGE INSTRUCTIONS
3 [facility]      Subjective     No acute events. Patient denies any further n/v overnight but also no flatus or stool. NPO for surgery today. Objective     Patient Vitals for the past 24 hrs:   Temp Pulse Resp BP SpO2   06/21/23 1148 98.3 °F (36.8 °C) 75 16 (!) 164/73 99 %   06/21/23 0859 98.4 °F (36.9 °C) 77 18 (!) 178/82 97 %   06/20/23 2007 98.7 °F (37.1 °C) 80 16 (!) 178/84 98 %         No intake or output data in the 24 hours ending 06/21/23 1413      PE  GEN - Awake, alert, communicating appropriately. NAD  Pulm - CTAB  CV - RRR  Abd - soft, NT, ND  Ext - warm, well perfused. Labs  No results found for this or any previous visit (from the past 24 hour(s)). Assessment     Rosy Reece is a 69 y. o.yr old female with history of robotic subtotal gastrectomy with kari-en Y reconstruction in 2/2023 for gastric adenocarcinoma now with signs of progressively worsening partial SBO. Plan     - Plan for diagnostic laparoscopy today to rule out internal hernia, adhesions or tumor recurrence as cause of her partial SBO. A complete discussion of the risks, benefits and alternatives to surgery were discussed with the patient who was keen to proceed. We discussed doing additional operative interventions such as small bowel resection, conversion to open or other necessary procedures depending on the intraoperative findings.     - Home meds  - PRN pain/nausea control  - Further plan depending upon OR findings      Rudy Turner MD  6/21/2023  2:13 PM Attempted to contact patient's family member to inform them of start of surgery and patient's well being but was unsuccessful. [facility]    POST-ENDOSCOPY INSTRUCTIONS    1. ACTIVITY     No driving, operating machinery, or making important decisions for 24 hours. Resume normal activity after 24 hours. You may return to work after 24 hours. 2. DIET      Resume your usual diet unless specified. Diet Modification: none    3. MEDICATIONS       (Do not consume alcohol, tranquilizers, or sleeping medications for 24 hours unless advised by your physician)                 Resume your usual medications. 4. PHYSICIAN FOLLOW-UP                 Please call the office, at [phone], for:  pathology results / appointment / instructions. See your primary care physician as planned. You will need your next colonoscopy in 3 years. 5. NORMAL CHANGES YOU MAY EXPERIENCE AFTER ENDOSCOPY:       UPPER ENDOSCOPY (EGD or ERCP): Sore throat. COLONOSCOPY:  Passing of gas for several hours after. Bloating and mild abdominal cramping. 6. CALL YOUR PHYSICIAN IF YOU EXPERIENCE ANY OF THE FOLLOWING      A. Passing blood rectally or vomiting blood (color may be red or black)      B. Severe abdominal pain or tenderness (that is not relieved by passing air)      C.   Fever, chills, or excessive sweating      D.  Persistent nausea or vomiting      E.  Redness or swelling at the IV site    If you have additional questions, PLEASE call your doctor or the [place] [address] [facility] ([phone]) Report received pt laying in bed watching TV voiced no needs call bell within reach     2054  Pt given med without difficulty     2335  IVF hung pt voiced no needs call bell within reach     0220  Pt resting quietly no distress noted call bell within reach     0305  Pt given med without difficulty     0452  Rounds made pt resting with eyes closed no distress noted call bell within reach     0605  Rounds made no distress noted call bell within reach     0753  Report given to Radha Amezquita including [facility]    Daily Progress Note    Admit Date: 2023  Post-Operative Day: 1 Day Post-Op from Procedure(s):  DIAGNOSTIC LAPAROSCOPY, LYSIS OF ADHESIONS AND PERITONEAL BIOPSIES     Subjective:     Last 24 hrs: Feels pretty well this AM  Tolerating liquids--denies NV  Reports minimal post-op pain   No flatus    Objective:     Blood pressure (!) 169/93, pulse 80, temperature 98.3 °F (36.8 °C), temperature source Oral, resp. rate 14, height 5' 5.5\" (1.664 m), weight 169 lb 5 oz (76.8 kg), SpO2 99 %. Temp (24hrs), Av °F (36.7 °C), Min:97.4 °F (36.3 °C), Max:98.5 °F (36.9 °C)      _____________________  Physical Exam:     Alert and Oriented, pleasant, in no acute distress. Cardiovascular: RRR  Lungs:CTAB on RA  Abdomen: soft, flat, NTND  Incisions c/d/i      Assessment:   Principal Problem:    Small bowel obstruction (HCC)  Active Problems:    Gastric adenocarcinoma (HCC)    Generalized postprandial abdominal pain    History of gastric cancer    Peritoneal carcinomatosis ([address]  Resolved Problems:    * No resolved hospital problems. *          Plan:     Surgical findings discussed with patient by Dr. Mary Carmen Ramos  Full liquid diet  OOB, ambulation w assistance  Daily PT  Daily labs  IV Reglan  BP meds        Data Review:    Recent Labs     23  1155 23  0434   WBC 7.6 7.1   HGB 10.4* 10.5*   HCT 34.3* 34.1*    367     Recent Labs     23  1155 23  0434    142   K 3.9 3.6    107   CO2 29 26   BUN 24* 23*   ALT 12 11*     No results for input(s): AML in the last 72 hours.     Invalid input(s): LPSE        ______________________  Medications:    Current Facility-Administered Medications   Medication Dose Route Frequency    metoclopramide (REGLAN) injection 5 mg  5 mg IntraVENous Q6H    dextrose 5 % with KCl 20 mEq infusion   IntraVENous Continuous    acetaminophen (TYLENOL) tablet 650 mg  650 mg Oral Q4H PRN    morphine (PF) injection 2 mg  2 mg IntraVENous Q4H PRN    Or Surgical Oncology    Daily Progress Note    Admit Date: 2023  Post-Operative Day: 2 Days Post-Op from Procedure(s):  DIAGNOSTIC LAPAROSCOPY, LYSIS OF ADHESIONS AND PERITONEAL BIOPSIES     Subjective:     Last 24 hrs: Feels pretty well this AM  Tolerating liquids--denies NV. Passing flatus. No cramping  Reports minimal post-op pain   Has been OOB to bathroom  Worked with PT yesterday    Objective:     Blood pressure 117/73, pulse 88, temperature 97.6 °F (36.4 °C), temperature source Oral, resp. rate 16, height 5' 5.5\" (1.664 m), weight 180 lb 12.4 oz (82 kg), SpO2 95 %. Temp (24hrs), Av.2 °F (36.8 °C), Min:97.6 °F (36.4 °C), Max:98.9 °F (37.2 °C)      _____________________  Physical Exam:     Alert and Oriented, pleasant, in no acute distress. Cardiovascular: RRR  Lungs:CTAB on RA  Abdomen: soft, flat, NTND  Incisions c/d/i      Assessment:   Principal Problem:    Small bowel obstruction (HCC)  Active Problems:    Gastric adenocarcinoma (HCC)    Generalized postprandial abdominal pain    History of gastric cancer    Peritoneal carcinomatosis ([facility] [address]  Resolved Problems:    * No resolved hospital problems. *          Plan:     Advance diet  OOB, ambulation w assistance  Daily labs  IV Reglan  BP meds  Hopefully home later today if tolerating diet      Data Review:    Recent Labs     23  0252   WBC 8.1   HGB 10.2*   HCT 33.4*          Recent Labs     23  0252      K 3.9      CO2 28   BUN 18       No results for input(s): AML in the last 72 hours.     Invalid input(s): LPSE        ______________________  Medications:    Current Facility-Administered Medications   Medication Dose Route Frequency    metoclopramide (REGLAN) injection 5 mg  5 mg IntraVENous Q6H    dextrose 5 % with KCl 20 mEq infusion   IntraVENous Continuous    acetaminophen (TYLENOL) tablet 650 mg  650 mg Oral Q4H PRN    morphine (PF) injection 2 mg  2 mg IntraVENous Q4H PRN    Or    morphine (PF) injection 4 mg  4 mg Surgical Oncology    Daily Progress Note    Admit Date: 2023  Post-Operative Day: 3 Days Post-Op from Procedure(s):  DIAGNOSTIC LAPAROSCOPY, LYSIS OF ADHESIONS AND PERITONEAL BIOPSIES     Subjective:     Last 24 hrs: Feels pretty well this AM  Tolerating regular diet--denies NV. Passing flatus. No pain or cramping with eating. Reports minimal post-op pain   Ready to go home    Objective:     Blood pressure (!) 150/89, pulse 79, temperature 99.8 °F (37.7 °C), temperature source Oral, resp. rate 18, height 5' 5.5\" (1.664 m), weight 180 lb 12.4 oz (82 kg), SpO2 95 %. Temp (24hrs), Av.7 °F (37.1 °C), Min:97.6 °F (36.4 °C), Max:99.8 °F (37.7 °C)      _____________________  Physical Exam:     Alert and Oriented, pleasant, in no acute distress. Cardiovascular: RRR  Lungs:CTAB on RA  Abdomen: soft, flat, NTND  Incisions c/d/i      Assessment:   Principal Problem:    Small bowel obstruction (HCC)  Active Problems:    Gastric adenocarcinoma (HCC)    Generalized postprandial abdominal pain    History of gastric cancer    Peritoneal carcinomatosis ([address]  Resolved Problems:    * No resolved hospital problems. *          Plan:     D/c to home  F/up in office    Future Appointments   Date Time Provider Srinivasan Yoanna   2023 11:40 AM REESE Lai [facility] BS AMB   2023  3:00 PM LIU Souza MD [facility] Cristina Sched           Data Review:    Recent Labs     23  0252   WBC 8.1   HGB 10.2*   HCT 33.4*          Recent Labs     23  0252      K 3.9      CO2 28   BUN 18       No results for input(s): AML in the last 72 hours.     Invalid input(s): LPSE        ______________________  Medications:    Current Facility-Administered Medications   Medication Dose Route Frequency    metoclopramide (REGLAN) injection 5 mg  5 mg IntraVENous Q6H    dextrose 5 % with KCl 20 mEq infusion   IntraVENous Continuous    acetaminophen (TYLENOL) tablet 650 mg  650 mg Oral Q4H PRN    morphine Balance  Sitting: Intact  Standing: Intact  Ambulation/Gait Training:                       Gait  Overall Level of Assistance: Independent  Distance (ft): [address] AM-PAC®      Basic Mobility Inpatient Short Form (6-Clicks) Version 2  How much HELP from another person do you currently need. .. (If the patient hasn't done an activity recently, how much help from another person do you think they would need if they tried?) Total A Lot A Little None   1. Turning from your back to your side while in a flat bed without using bedrails? []  1 []  2 []  3  [x]  4   2. Moving from lying on your back to sitting on the side of a flat bed without using bedrails? []  1 []  2 []  3  [x]  4   3. Moving to and from a bed to a chair (including a wheelchair)? []  1 []  2 []  3  [x]  4   4. Standing up from a chair using your arms (e.g. wheelchair or bedside chair)? []  1 []  2 []  3  []  4   5. Walking in hospital room? []  1 []  2 []  3  [x]  4   6. Climbing 3-5 steps with a railing? []  1 []  2 []  3  [x]  4     Raw Score: 24/24                            Cutoff score ?171,2,3 had higher odds of discharging home with home health or need of SNF/IPR. [address], Meseret Miller, Briana Hooker. Validity of the AM-PAC 6-Clicks Inpatient Daily Activity and Basic Mobility Short Forms. Physical Therapy Mar 2014, [phone]) [phone]; DOI: 10.2522/ptj.73836624  2. Richard Sesay. Association of AM-PAC \"6-Clicks\" Basic Mobility and Daily Activity Scores With Discharge Destination. Phys Ther. 2021 Apr 4;101(4):luoy481. doi: 10.1093/ptj/ivmy440. PMID: 41344326.   3. Claudy VALDIVIA

## 2023-06-27 ENCOUNTER — HOSPITAL ENCOUNTER (OUTPATIENT)
Dept: CT IMAGING | Age: 68
Discharge: HOME | End: 2023-06-29
Attending: INTERNAL MEDICINE
Payer: MEDICARE

## 2023-06-27 DIAGNOSIS — Z87.891 PERSONAL HISTORY OF TOBACCO USE: ICD-10-CM

## 2023-06-27 PROCEDURE — 71271 CT THORAX LUNG CANCER SCR C-: CPT

## 2023-06-28 ENCOUNTER — OFFICE VISIT (OUTPATIENT)
Dept: INTERNAL MEDICINE | Age: 68
End: 2023-06-28
Payer: MEDICARE

## 2023-06-28 VITALS
TEMPERATURE: 97 F | HEIGHT: 63 IN | WEIGHT: 223 LBS | HEART RATE: 72 BPM | SYSTOLIC BLOOD PRESSURE: 138 MMHG | BODY MASS INDEX: 39.51 KG/M2 | OXYGEN SATURATION: 93 % | DIASTOLIC BLOOD PRESSURE: 60 MMHG

## 2023-06-28 DIAGNOSIS — G47.33 OSA ON CPAP: ICD-10-CM

## 2023-06-28 DIAGNOSIS — E78.5 DYSLIPIDEMIA: ICD-10-CM

## 2023-06-28 DIAGNOSIS — M54.50 CHRONIC BILATERAL LOW BACK PAIN WITHOUT SCIATICA: ICD-10-CM

## 2023-06-28 DIAGNOSIS — G89.29 CHRONIC BILATERAL LOW BACK PAIN WITHOUT SCIATICA: ICD-10-CM

## 2023-06-28 DIAGNOSIS — I73.9 PAD (PERIPHERAL ARTERY DISEASE) (HCC): ICD-10-CM

## 2023-06-28 DIAGNOSIS — I10 ESSENTIAL HYPERTENSION: ICD-10-CM

## 2023-06-28 DIAGNOSIS — I25.10 CORONARY ARTERY DISEASE INVOLVING NATIVE CORONARY ARTERY OF NATIVE HEART WITHOUT ANGINA PECTORIS: ICD-10-CM

## 2023-06-28 DIAGNOSIS — E11.8 TYPE 2 DIABETES MELLITUS WITH COMPLICATION, WITHOUT LONG-TERM CURRENT USE OF INSULIN (HCC): Primary | ICD-10-CM

## 2023-06-28 DIAGNOSIS — Z99.89 OSA ON CPAP: ICD-10-CM

## 2023-06-28 DIAGNOSIS — F33.1 MODERATE RECURRENT MAJOR DEPRESSION (HCC): ICD-10-CM

## 2023-06-28 PROCEDURE — 3046F HEMOGLOBIN A1C LEVEL >9.0%: CPT | Performed by: INTERNAL MEDICINE

## 2023-06-28 PROCEDURE — 3078F DIAST BP <80 MM HG: CPT | Performed by: INTERNAL MEDICINE

## 2023-06-28 PROCEDURE — G8417 CALC BMI ABV UP PARAM F/U: HCPCS | Performed by: INTERNAL MEDICINE

## 2023-06-28 PROCEDURE — 1090F PRES/ABSN URINE INCON ASSESS: CPT | Performed by: INTERNAL MEDICINE

## 2023-06-28 PROCEDURE — G8427 DOCREV CUR MEDS BY ELIG CLIN: HCPCS | Performed by: INTERNAL MEDICINE

## 2023-06-28 PROCEDURE — 3074F SYST BP LT 130 MM HG: CPT | Performed by: INTERNAL MEDICINE

## 2023-06-28 PROCEDURE — 3017F COLORECTAL CA SCREEN DOC REV: CPT | Performed by: INTERNAL MEDICINE

## 2023-06-28 PROCEDURE — 2022F DILAT RTA XM EVC RTNOPTHY: CPT | Performed by: INTERNAL MEDICINE

## 2023-06-28 PROCEDURE — 1123F ACP DISCUSS/DSCN MKR DOCD: CPT | Performed by: INTERNAL MEDICINE

## 2023-06-28 PROCEDURE — G8399 PT W/DXA RESULTS DOCUMENT: HCPCS | Performed by: INTERNAL MEDICINE

## 2023-06-28 PROCEDURE — 1036F TOBACCO NON-USER: CPT | Performed by: INTERNAL MEDICINE

## 2023-06-28 PROCEDURE — 99214 OFFICE O/P EST MOD 30 MIN: CPT | Performed by: INTERNAL MEDICINE

## 2023-06-28 RX ORDER — LOSARTAN POTASSIUM 100 MG/1
TABLET ORAL
Qty: 90 TABLET | Refills: 1 | Status: SHIPPED | OUTPATIENT
Start: 2023-06-28

## 2023-06-28 RX ORDER — AMLODIPINE BESYLATE 10 MG/1
TABLET ORAL
Qty: 90 TABLET | Refills: 1 | Status: SHIPPED | OUTPATIENT
Start: 2023-06-28

## 2023-06-28 RX ORDER — CARVEDILOL 3.12 MG/1
TABLET ORAL
Qty: 180 TABLET | Refills: 1 | Status: SHIPPED | OUTPATIENT
Start: 2023-06-28

## 2023-06-28 RX ORDER — HYDROCHLOROTHIAZIDE 25 MG/1
TABLET ORAL
Qty: 90 TABLET | Refills: 1 | Status: SHIPPED | OUTPATIENT
Start: 2023-06-28

## 2023-06-28 ASSESSMENT — ENCOUNTER SYMPTOMS
COUGH: 0
CONSTIPATION: 1
SHORTNESS OF BREATH: 0
BACK PAIN: 1
PHOTOPHOBIA: 0
ABDOMINAL PAIN: 0
WHEEZING: 0

## 2023-07-10 RX ORDER — OMEPRAZOLE 40 MG/1
CAPSULE, DELAYED RELEASE ORAL
Qty: 90 CAPSULE | Refills: 1 | Status: SHIPPED | OUTPATIENT
Start: 2023-07-10

## 2023-07-10 NOTE — TELEPHONE ENCOUNTER
E-scribe request from [address] [place] [address] for Omeprazole 40 mg. Patient has an appt on 11/21/23. Health Maintenance   Topic Date Due    Hepatitis C screen  Never done    Shingles vaccine (1 of 2) Never done    Annual Wellness Visit (AWV)  09/19/2021    Diabetic retinal exam  03/29/2022    Diabetic foot exam  05/13/2022    COVID-19 Vaccine (2 - Pfizer series) 06/15/2022    Diabetic Alb to Cr ratio (uACR) test  04/07/2023    A1C test (Diabetic or Prediabetic)  08/05/2023    Lipids  08/05/2023    Flu vaccine (1) 08/01/2023    GFR test (Diabetes, CKD 3-4, OR last GFR 15-59)  04/04/2024    Depression Monitoring  05/15/2024    Low dose CT lung screening &/or counseling  06/27/2024    Breast cancer screen  01/27/2025    Colorectal Cancer Screen  05/10/2026    DTaP/Tdap/Td vaccine (2 - Td or Tdap) 10/24/2028    DEXA (modify frequency per FRAX score)  Completed    Pneumococcal 65+ years Vaccine  Completed    Hepatitis A vaccine  Aged Out    Hib vaccine  Aged Out    Meningococcal (ACWY) vaccine  Aged Out             (applicable per patient's age: Cancer Screenings, Depression Screening, Fall Risk Screening, Immunizations)    Hemoglobin A1C (%)   Date Value   08/05/2022 6.5 (H)   02/10/2022 6.3   05/07/2021 6.3 (H)     Microalb/Crt.  Ratio (mcg/mg creat)   Date Value   04/07/2022 20     LDL Cholesterol (mg/dL)   Date Value   08/05/2022 108     AST (U/L)   Date Value   04/04/2023 20     ALT (U/L)   Date Value   04/04/2023 15     BUN (mg/dL)   Date Value   04/04/2023 20      (goal A1C is < 7)   (goal LDL is <100) need 30-50% reduction from baseline     BP Readings from Last 3 Encounters:   06/28/23 138/60   06/15/23 (!) 144/66   05/15/23 130/60    (goal /80)      All Future Testing planned in [facility]:  Lab Frequency Next Occurrence   VL DUP CAROTID BILATERAL Once 06/13/2024   Lipid Panel Once 09/29/2023   Hemoglobin A1C Once 09/29/2023   Protein / Creatinine Ratio, Urine Once 06/28/2023       Next Visit Date:  Future

## 2023-07-13 ENCOUNTER — HOSPITAL ENCOUNTER (OUTPATIENT)
Age: 68
Discharge: HOME | End: 2023-07-13
Payer: MEDICARE

## 2023-07-13 DIAGNOSIS — E78.5 DYSLIPIDEMIA: ICD-10-CM

## 2023-07-13 DIAGNOSIS — E11.8 TYPE 2 DIABETES MELLITUS WITH COMPLICATION, WITHOUT LONG-TERM CURRENT USE OF INSULIN (HCC): ICD-10-CM

## 2023-07-13 DIAGNOSIS — I73.9 PAD (PERIPHERAL ARTERY DISEASE) (HCC): ICD-10-CM

## 2023-07-13 LAB
CHOLEST SERPL-MCNC: 159 MG/DL
CHOLESTEROL/HDL RATIO: 3.6
CREAT UR-MCNC: 99.2 MG/DL (ref 28–217)
HDLC SERPL-MCNC: 44 MG/DL
LDLC SERPL CALC-MCNC: 92 MG/DL (ref 0–130)
TOTAL PROTEIN, URINE: 20 MG/DL
TRIGL SERPL-MCNC: 113 MG/DL
URINE TOTAL PROTEIN CREATININE RATIO: 0.2 (ref 0–0.2)

## 2023-07-13 PROCEDURE — 84156 ASSAY OF PROTEIN URINE: CPT

## 2023-07-13 PROCEDURE — 82570 ASSAY OF URINE CREATININE: CPT

## 2023-07-13 PROCEDURE — 36415 COLL VENOUS BLD VENIPUNCTURE: CPT

## 2023-07-13 PROCEDURE — 80061 LIPID PANEL: CPT

## 2023-07-13 PROCEDURE — 83036 HEMOGLOBIN GLYCOSYLATED A1C: CPT

## 2023-07-14 LAB
EST. AVERAGE GLUCOSE BLD GHB EST-MCNC: 134 MG/DL
HBA1C MFR BLD: 6.3 % (ref 4–6)

## 2023-07-18 ENCOUNTER — TELEPHONE (OUTPATIENT)
Dept: INTERNAL MEDICINE | Age: 68
End: 2023-07-18

## 2023-07-18 DIAGNOSIS — E11.8 TYPE 2 DIABETES MELLITUS WITH COMPLICATION, WITHOUT LONG-TERM CURRENT USE OF INSULIN (HCC): Primary | ICD-10-CM

## 2023-07-18 NOTE — TELEPHONE ENCOUNTER
Patient calling for prescription for Ozempic due to recently labs showing prediabetes. Patient is interested in weight loss.

## 2023-08-04 ENCOUNTER — TELEPHONE (OUTPATIENT)
Dept: INTERNAL MEDICINE | Age: 68
End: 2023-08-04

## 2023-08-04 DIAGNOSIS — G89.29 CHRONIC BILATERAL LOW BACK PAIN WITHOUT SCIATICA: Primary | ICD-10-CM

## 2023-08-04 DIAGNOSIS — M54.50 CHRONIC BILATERAL LOW BACK PAIN WITHOUT SCIATICA: Primary | ICD-10-CM

## 2023-08-04 RX ORDER — SEMAGLUTIDE 1.34 MG/ML
INJECTION, SOLUTION SUBCUTANEOUS
Qty: 4 ADJUSTABLE DOSE PRE-FILLED PEN SYRINGE | Refills: 1 | Status: SHIPPED | OUTPATIENT
Start: 2023-08-04

## 2023-08-04 NOTE — TELEPHONE ENCOUNTER
Pt asking if she can have an external referral to PT. Pt says she wants to see if she can have covered to do at the [facility]. Pt wants to do therapy involving water aerobics instead of going to [facility].

## 2023-08-04 NOTE — TELEPHONE ENCOUNTER
Ordered but make aware it may not be covered by her insurance and may cause GI side effects including nausea, diarrhea.

## 2023-08-13 DIAGNOSIS — N39.46 MIXED STRESS AND URGE URINARY INCONTINENCE: ICD-10-CM

## 2023-08-15 ENCOUNTER — OFFICE VISIT (OUTPATIENT)
Dept: PULMONOLOGY | Age: 68
End: 2023-08-15

## 2023-08-15 VITALS
SYSTOLIC BLOOD PRESSURE: 137 MMHG | HEART RATE: 66 BPM | RESPIRATION RATE: 16 BRPM | DIASTOLIC BLOOD PRESSURE: 77 MMHG | BODY MASS INDEX: 39.51 KG/M2 | HEIGHT: 63 IN | OXYGEN SATURATION: 86 % | WEIGHT: 223 LBS

## 2023-08-15 DIAGNOSIS — Z99.89 OSA ON CPAP: ICD-10-CM

## 2023-08-15 DIAGNOSIS — R09.82 POST-NASAL DRIP: ICD-10-CM

## 2023-08-15 DIAGNOSIS — J44.9 CHRONIC OBSTRUCTIVE PULMONARY DISEASE, UNSPECIFIED COPD TYPE (HCC): Primary | ICD-10-CM

## 2023-08-15 DIAGNOSIS — Z87.891 HISTORY OF SMOKING 30 OR MORE PACK YEARS: ICD-10-CM

## 2023-08-15 DIAGNOSIS — G47.33 OSA ON CPAP: ICD-10-CM

## 2023-08-15 DIAGNOSIS — R91.1 LUNG NODULE: ICD-10-CM

## 2023-08-15 DIAGNOSIS — E66.01 SEVERE OBESITY (BMI 35.0-39.9) WITH COMORBIDITY (HCC): ICD-10-CM

## 2023-08-15 RX ORDER — SOLIFENACIN SUCCINATE 5 MG/1
TABLET, FILM COATED ORAL
Qty: 30 TABLET | Refills: 3 | Status: SHIPPED | OUTPATIENT
Start: 2023-08-15

## 2023-08-15 ASSESSMENT — SLEEP AND FATIGUE QUESTIONNAIRES
HOW LIKELY ARE YOU TO NOD OFF OR FALL ASLEEP WHILE SITTING AND READING: 0
ESS TOTAL SCORE: 3
HOW LIKELY ARE YOU TO NOD OFF OR FALL ASLEEP WHILE LYING DOWN TO REST IN THE AFTERNOON WHEN CIRCUMSTANCES PERMIT: 2
HOW LIKELY ARE YOU TO NOD OFF OR FALL ASLEEP WHILE SITTING QUIETLY AFTER LUNCH WITHOUT ALCOHOL: 0
HOW LIKELY ARE YOU TO NOD OFF OR FALL ASLEEP WHILE SITTING AND TALKING TO SOMEONE: 0
HOW LIKELY ARE YOU TO NOD OFF OR FALL ASLEEP WHEN YOU ARE A PASSENGER IN A CAR FOR AN HOUR WITHOUT A BREAK: 0
HOW LIKELY ARE YOU TO NOD OFF OR FALL ASLEEP WHILE SITTING INACTIVE IN A PUBLIC PLACE: 0
HOW LIKELY ARE YOU TO NOD OFF OR FALL ASLEEP IN A CAR, WHILE STOPPED FOR A FEW MINUTES IN TRAFFIC: 0
HOW LIKELY ARE YOU TO NOD OFF OR FALL ASLEEP WHILE WATCHING TV: 1

## 2023-08-15 NOTE — PROGRESS NOTES
Patient room air at rest 87%  Patient recovered to 95% on 2 pulse dose  Patient walked for 1000 ft and maintained her SpO2 above 90% on 2 L pulse dose. Placed order for oxygen and sent to [place] per patient.
patient was noted to be hypoxic on walking from the outside the office to office to 86% and I have discussed with her that she need home O2 evaluation and will need O2 as her oxygen saturation dropped by walking a small distance. Home O2 evaluation done in the office and she was prescribed 2 L of oxygen and advised her to use oxygen 24 hours continuously and at night with CPAP. Advised patient to continue with current bronchodilators. Advised to stop smoking/vaping completely. Her last pulmonary function test in May 2023 was consistent with restrictive ventilatory impairment with extraparenchymal restriction and normal diffusion capacity. CT scan of the chest reviewed no change in 4 mm left upper lobe nodule on 06/27/2023 as compared to previous CT scan since May 2020. I have advised her during the last visits and today again because of increasing dyspnea with history of coronary stent to follow-up with cardiology. I have also discussed with her about getting pulmonary rehabilitation and refer her for pulmonary rehab. Plan and Recommendations:      Home O2 evaluation done in the office today. Start O2 at 2 L nasal cannula discussed with patient. She will need oxygen during the daytime and at night with CPAP. Pulmonary function test reviewed and her FEV1 is 75% consistent with mild obstruction there is concomitant moderate restriction present diffusion capacity is normal.    Continue Stiolto Respimat 2 puffs once daily. \  Continue with Arnuity 100 mcg 1 puff once daily  Albuterol as needed  She will need yearly CT scan for screening and next one should be in June 2024  CT scan of the chest from June 2023 seen  Advised her to follow-up with cardiology as she has history of coronary artery disease and history of previous stent. Maintain an active lifestyle   Recommended flu vaccine in fall. She had flu vaccine last year  She had both doses of Covid vaccine. Had 1 booster.   Booster recommended  She

## 2023-08-15 NOTE — TELEPHONE ENCOUNTER
Pharmacy requesting refills for Solifenacin 5mg tablets daily. Please review and e-scribe to pharmacy listed in chart if appropriate. Thank you.       Next Visit Date: 11/21/23  Last Visit Date: 6/28/23    Future Appointments   Date Time Provider [address]   8/15/2023 10:30 AM Sofiya Kumar MD Resp Spec Ankit Raymundo   11/21/2023  1:40 PM Jonathan Garay MD [id number]   6/20/2024 11:00 AM Hola Pink MD [facility] [address] Maintenance   Topic Date Due    Hepatitis C screen  Never done    Shingles vaccine (1 of 2) Never done    Annual Wellness Visit (AWV)  09/19/2021    Diabetic retinal exam  03/29/2022    Diabetic foot exam  05/13/2022    COVID-19 Vaccine (2 - Pfizer series) 06/15/2022    Flu vaccine (1) 08/01/2023    GFR test (Diabetes, CKD 3-4, OR last GFR 15-59)  04/04/2024    Depression Monitoring  05/15/2024    Low dose CT lung screening &/or counseling  06/27/2024    A1C test (Diabetic or Prediabetic)  07/13/2024    Diabetic Alb to Cr ratio (uACR) test  07/13/2024    Lipids  07/13/2024    Breast cancer screen  01/27/2025    Colorectal Cancer Screen  05/10/2026    DTaP/Tdap/Td vaccine (2 - Td or Tdap) 10/24/2028    DEXA (modify frequency per FRAX score)  Completed    Pneumococcal 65+ years Vaccine  Completed    Hepatitis A vaccine  Aged Out    Hib vaccine  Aged Out    Meningococcal (ACWY) vaccine  Aged Out       Hemoglobin A1C (%)   Date Value   07/13/2023 6.3 (H)   08/05/2022 6.5 (H)   02/10/2022 6.3             ( goal A1C is < 7)   No components found for: LABMICR  LDL Cholesterol (mg/dL)   Date Value   07/13/2023 92       (goal LDL is <100)   AST (U/L)   Date Value   04/04/2023 20     ALT (U/L)   Date Value   04/04/2023 15     BUN (mg/dL)   Date Value   04/04/2023 20     BP Readings from Last 3 Encounters:   06/28/23 138/60   06/15/23 (!) 144/66   05/15/23 130/60          (goal 120/80)    All Future Testing planned in [facility]  Lab Frequency Next Occurrence   VL DUP CAROTID

## 2023-08-18 DIAGNOSIS — I10 ESSENTIAL HYPERTENSION: ICD-10-CM

## 2023-08-18 RX ORDER — POTASSIUM CHLORIDE 750 MG/1
TABLET, EXTENDED RELEASE ORAL
Qty: 90 TABLET | Refills: 2 | Status: SHIPPED | OUTPATIENT
Start: 2023-08-18

## 2023-08-18 NOTE — TELEPHONE ENCOUNTER
Cinda Khan is calling to request a refill on the following medication(s):    Medication Request:  Requested Prescriptions     Pending Prescriptions Disp Refills    potassium chloride (KLOR-CON M) 10 MEQ extended release tablet [Pharmacy Med Name: POTASSIUM CL ER 10 MEQ TABLET] 90 tablet 2     Sig: take 1 tablet by mouth once daily       Last Visit Date (If Applicable):  Visit date not found    Next Visit Date:    Visit date not found

## 2023-08-21 ENCOUNTER — TELEPHONE (OUTPATIENT)
Dept: PULMONOLOGY | Age: 68
End: 2023-08-21

## 2023-08-21 RX ORDER — ALBUTEROL SULFATE 90 UG/1
2 AEROSOL, METERED RESPIRATORY (INHALATION) 4 TIMES DAILY PRN
Qty: 3 EACH | Refills: 3 | Status: SHIPPED | OUTPATIENT
Start: 2023-08-21 | End: 2023-11-19

## 2023-08-21 NOTE — TELEPHONE ENCOUNTER
Patient is calling requesting a refill for her Albuterol HFA. Please sign off on the refill if you agree.

## 2023-09-05 ENCOUNTER — HOSPITAL ENCOUNTER (OUTPATIENT)
Dept: PULMONOLOGY | Age: 68
Setting detail: THERAPIES SERIES
Discharge: HOME | End: 2023-09-05

## 2023-09-05 VITALS — BODY MASS INDEX: 39.68 KG/M2 | OXYGEN SATURATION: 95 % | WEIGHT: 224 LBS

## 2023-09-05 ASSESSMENT — PULMONARY FUNCTION TESTS
FEV1/FVC: 78
FEV1 (%PREDICTED): 75
FVC (LITERS): 1.93

## 2023-09-05 ASSESSMENT — PATIENT HEALTH QUESTIONNAIRE - PHQ9
SUM OF ALL RESPONSES TO PHQ9 QUESTIONS 1 & 2: 5
SUM OF ALL RESPONSES TO PHQ QUESTIONS 1-9: 12
SUM OF ALL RESPONSES TO PHQ QUESTIONS 1-9: 12
4. FEELING TIRED OR HAVING LITTLE ENERGY: 3
2. FEELING DOWN, DEPRESSED OR HOPELESS: 3
10. IF YOU CHECKED OFF ANY PROBLEMS, HOW DIFFICULT HAVE THESE PROBLEMS MADE IT FOR YOU TO DO YOUR WORK, TAKE CARE OF THINGS AT HOME, OR GET ALONG WITH OTHER PEOPLE: 1
9. THOUGHTS THAT YOU WOULD BE BETTER OFF DEAD, OR OF HURTING YOURSELF: 0
5. POOR APPETITE OR OVEREATING: 0
SUM OF ALL RESPONSES TO PHQ QUESTIONS 1-9: 12
6. FEELING BAD ABOUT YOURSELF - OR THAT YOU ARE A FAILURE OR HAVE LET YOURSELF OR YOUR FAMILY DOWN: 1
3. TROUBLE FALLING OR STAYING ASLEEP: 0
SUM OF ALL RESPONSES TO PHQ QUESTIONS 1-9: 12
1. LITTLE INTEREST OR PLEASURE IN DOING THINGS: 2
7. TROUBLE CONCENTRATING ON THINGS, SUCH AS READING THE NEWSPAPER OR WATCHING TELEVISION: 3
8. MOVING OR SPEAKING SO SLOWLY THAT OTHER PEOPLE COULD HAVE NOTICED. OR THE OPPOSITE, BEING SO FIGETY OR RESTLESS THAT YOU HAVE BEEN MOVING AROUND A LOT MORE THAN USUAL: 0

## 2023-09-05 ASSESSMENT — EXERCISE STRESS TEST
PEAK_RPE: 8
PEAK_RPE: 8
PEAK_BP: 138/80
PEAK_HR: 88
PEAK_BP: 138/80
PEAK_BP: 138/80
PEAK_HR: 88

## 2023-09-05 ASSESSMENT — LIFESTYLE VARIABLES
AMOUNT_OF_TOBACCO_USED: VAPING
SMOKELESS_TOBACCO: YES

## 2023-09-05 NOTE — PLAN OF CARE
Hospital Based Pulmonary Rehab: [facility], 77 y.o. female, -1955 Today's Date: 2023    Qualifying Diagnosis-   Treatment Diagnosis 1: COPD      No data recorded     COPD severity (if applicable): Unspecified    Sessions Completed To-Date:      Treatment Diagnosis   Treatment Diagnosis  Treatment Diagnosis 1: COPD  Significant Cardiovascular History: Peripheral arterial disease (HTN)  Co-morbidities: Diabetes, Pulmonary disease     ITP Visit Type  ITP Visit Type: Initial assessment         Exercise   Exercise  Reyna Total Score: 20  Stages of Change: Preparation  Exercise Test: Six minute walk test  Distance Walked in : ft  Distance Walked (ft): 725 ft  Peak HR: 88  Peak BP: 138/80  Peak RPE: 8  O2 Saturation: 95 (2L)  O2 Flow Rate (l/min): 2 l/min  Stops: 1  SPO2 Range: 86-97  BMI (Calculated): 39.51  FEV1 (%PRED): 75       Exercise Prescription   Exercise Prescription  Mode: Treadmill; Stepper; Ergometer; Other (comment) (free weights)  Frequency per week: 2  Duration Per Session: up to 60 minuites  Intensity METS      : RPE/RPD 1-5  Progression: Pt to start exercising next week  SpO2: 95 %  O2 Device: Nasal cannula  O2 Flow Rate (l/min): 2 l/min  Symptoms with Exercise: Shortness of breath; Leg pain  Target Heart Rate: 137 (Max HR)  Resistance Training: Yes  Weight: 1  Reps: 1/5  Assisted Devices: None       Exercise Blood Pressures   Exercise Blood Pressures  Resting BP: 134/92  Peak BP: 138/80  Is BP WDL: Yes       Exercise Intervention   No data recorded   Exercise Education   Exercise Education  Education: Blood pressure medications; Energy conservation; O2 therapy; RPE/RPD scale; Understand blood pressure; Signs/symptoms to report       Exercise target group   Exercise Target Group  Target Goal(s):  Individual exercise RX; SaO2>89%         Nutrition   Nutrition  Stages of Change: Maintenance  Diabetes: Yes (type 2)  Diabetes Med(s):

## 2023-09-11 ENCOUNTER — TELEPHONE (OUTPATIENT)
Dept: PULMONOLOGY | Age: 68
End: 2023-09-11

## 2023-09-11 ENCOUNTER — HOSPITAL ENCOUNTER (OUTPATIENT)
Dept: PULMONOLOGY | Age: 68
Setting detail: THERAPIES SERIES
Discharge: HOME | End: 2023-09-11
Payer: MEDICARE

## 2023-09-11 VITALS — WEIGHT: 222 LBS | BODY MASS INDEX: 39.33 KG/M2

## 2023-09-11 PROCEDURE — 94625 PHY/QHP OP PULM RHB W/O MNTR: CPT

## 2023-09-11 ASSESSMENT — EXERCISE STRESS TEST
PEAK_HR: 67
PEAK_METS: 2
PEAK_RPE: 3

## 2023-09-11 NOTE — TELEPHONE ENCOUNTER
[facility] rehab called regarding Rehab referral.   PFT REPORT indicates pt has MILD copd - Insurance will not cover. CMN is required in order for pt to continue. Routing to [facility] for assistance.

## 2023-09-13 ENCOUNTER — HOSPITAL ENCOUNTER (OUTPATIENT)
Dept: PULMONOLOGY | Age: 68
Setting detail: THERAPIES SERIES
Discharge: HOME | End: 2023-09-13
Payer: MEDICARE

## 2023-09-13 VITALS — BODY MASS INDEX: 38.62 KG/M2 | WEIGHT: 218 LBS

## 2023-09-13 PROCEDURE — 94625 PHY/QHP OP PULM RHB W/O MNTR: CPT

## 2023-09-13 ASSESSMENT — EXERCISE STRESS TEST
PEAK_BP: 142/92
PEAK_HR: 69
PEAK_RPE: 4
PEAK_METS: 1.7

## 2023-09-18 ENCOUNTER — HOSPITAL ENCOUNTER (OUTPATIENT)
Dept: PULMONOLOGY | Age: 68
Setting detail: THERAPIES SERIES
Discharge: HOME | End: 2023-09-18
Payer: MEDICARE

## 2023-09-18 VITALS — WEIGHT: 224 LBS | BODY MASS INDEX: 39.68 KG/M2

## 2023-09-18 PROCEDURE — 94625 PHY/QHP OP PULM RHB W/O MNTR: CPT

## 2023-09-18 ASSESSMENT — EXERCISE STRESS TEST
PEAK_METS: 2.1
PEAK_BP: 130/82
PEAK_RPE: 1
PEAK_HR: 62

## 2023-09-20 ENCOUNTER — HOSPITAL ENCOUNTER (OUTPATIENT)
Dept: PULMONOLOGY | Age: 68
Setting detail: THERAPIES SERIES
Discharge: HOME | End: 2023-09-20
Payer: MEDICARE

## 2023-09-25 ENCOUNTER — HOSPITAL ENCOUNTER (OUTPATIENT)
Dept: PULMONOLOGY | Age: 68
Setting detail: THERAPIES SERIES
Discharge: HOME | End: 2023-09-25
Payer: MEDICARE

## 2023-09-25 VITALS — BODY MASS INDEX: 38.97 KG/M2 | WEIGHT: 220 LBS

## 2023-09-25 PROCEDURE — 94625 PHY/QHP OP PULM RHB W/O MNTR: CPT

## 2023-09-25 ASSESSMENT — EXERCISE STRESS TEST
PEAK_METS: 2.6
PEAK_RPE: 1
PEAK_HR: 72
PEAK_BP: 130/80

## 2023-09-27 ENCOUNTER — HOSPITAL ENCOUNTER (OUTPATIENT)
Dept: PULMONOLOGY | Age: 68
Setting detail: THERAPIES SERIES
Discharge: HOME | End: 2023-09-27
Payer: MEDICARE

## 2023-09-27 PROCEDURE — 94625 PHY/QHP OP PULM RHB W/O MNTR: CPT

## 2023-09-27 ASSESSMENT — EXERCISE STRESS TEST
PEAK_RPE: 1
PEAK_METS: 2.8
PEAK_HR: 78
PEAK_BP: 134/84

## 2023-09-28 VITALS — BODY MASS INDEX: 38.9 KG/M2 | OXYGEN SATURATION: 93 % | WEIGHT: 219.6 LBS

## 2023-09-28 ASSESSMENT — LIFESTYLE VARIABLES
SMOKELESS_TOBACCO: YES
AMOUNT_OF_TOBACCO_USED: VAPING

## 2023-09-28 ASSESSMENT — EXERCISE STRESS TEST
PEAK_BP: 140/78
PEAK_METS: 2.8
PEAK_HR: 88
PEAK_RPE: 4
PEAK_BP: 140/78

## 2023-09-28 NOTE — PLAN OF CARE
Hospital Based Pulmonary Rehab: [address] [facility], 77 y.o. female, -1955 Today's Date: 2023    Qualifying Diagnosis-   Treatment Diagnosis 1: COPD      No data recorded     COPD severity (if applicable):  unspecified        Treatment Diagnosis   Treatment Diagnosis  Treatment Diagnosis 1: COPD  Significant Cardiovascular History: Peripheral arterial disease (HTN)  Co-morbidities: Diabetes, Pulmonary disease     ITP Visit Type  ITP Visit Type: Re-assessment         Exercise   Exercise  Reyna Total Score: 20  Stages of Change: Action  Exercise Test: Six minute walk test  Distance Walked in : ft  Distance Walked (ft): 725 ft  Peak HR: 88  Peak BP: 140/78  Peak RPE: 4  Peak Mets: 2.8  O2 Saturation: 93 (Room Air)  O2 Flow Rate (l/min): 2 l/min  Stops: 1  SPO2 Range: 91-95  BMI (Calculated): 39.51  FEV1 (%PRED): 75       Exercise Prescription   Exercise Prescription  Mode: Treadmill; Stepper; Ergometer;  Other (comment) (free weights)  Frequency per week: 2  Duration Per Session: up to 60 minuites  Intensity METS      : RPE/RPD 1-5  Progression: Patient is up to 3 sets of 7 minutes with a wl of 1 and 2 minutes on the treadmill  SpO2: 93 %  O2 Device: Room air  O2 Flow Rate (l/min): 2 l/min  Comments: Patient is doing well with exercise and is already beginning to show improvement  Symptoms with Exercise: Shortness of breath  Target Heart Rate: 137 (Max HR)  Resistance Training: Yes  Weight: 2  Reps: 1/5  Assisted Devices: None       Exercise Blood Pressures   Exercise Blood Pressures  Resting BP: 124/80  Peak BP: 140/78  Is BP WDL: Yes       Exercise Intervention   Exercise Intervention  Type: Nustep, Treadmill, Ergometer, Free weights  Frequency: 2x per week  Duration: 60 minutes  Resistance Training: Yes       Exercise Education   Exercise Education  Education: Exercise safety; O2 therapy; Purse lip/abdominal breathing; RPE/RPD scale; Signs/symptoms

## 2023-10-02 ENCOUNTER — HOSPITAL ENCOUNTER (OUTPATIENT)
Dept: PULMONOLOGY | Age: 68
Setting detail: THERAPIES SERIES
Discharge: HOME | End: 2023-10-02
Payer: MEDICARE

## 2023-10-02 VITALS — WEIGHT: 223 LBS | BODY MASS INDEX: 39.5 KG/M2

## 2023-10-02 PROCEDURE — 94625 PHY/QHP OP PULM RHB W/O MNTR: CPT

## 2023-10-02 ASSESSMENT — EXERCISE STRESS TEST
PEAK_BP: 130/80
PEAK_METS: 2.7
PEAK_HR: 76
PEAK_RPE: 2

## 2023-10-04 ENCOUNTER — HOSPITAL ENCOUNTER (OUTPATIENT)
Dept: PULMONOLOGY | Age: 68
Setting detail: THERAPIES SERIES
Discharge: HOME | End: 2023-10-04
Payer: MEDICARE

## 2023-10-04 VITALS — WEIGHT: 221.2 LBS | BODY MASS INDEX: 39.18 KG/M2

## 2023-10-04 PROCEDURE — 94625 PHY/QHP OP PULM RHB W/O MNTR: CPT

## 2023-10-04 ASSESSMENT — EXERCISE STRESS TEST
PEAK_METS: 2.4
PEAK_RPE: 3
PEAK_HR: 83
PEAK_BP: 134/78

## 2023-10-06 DIAGNOSIS — E11.8 TYPE 2 DIABETES MELLITUS WITH COMPLICATION, WITHOUT LONG-TERM CURRENT USE OF INSULIN (HCC): ICD-10-CM

## 2023-10-06 RX ORDER — SEMAGLUTIDE 1.34 MG/ML
INJECTION, SOLUTION SUBCUTANEOUS
Qty: 4 ADJUSTABLE DOSE PRE-FILLED PEN SYRINGE | Refills: 1 | Status: SHIPPED | OUTPATIENT
Start: 2023-10-06

## 2023-10-06 NOTE — TELEPHONE ENCOUNTER
Patient called and stated that she is having trouble getting her Ozempic. She stated that the pharmacy told her that it has to be clarified as to whether the dosage is 0.25mg or 0.5mg. Patient stated that she has been taking 0.25mg and it has been working great for her. She reported that she has lost about 6 pounds and her blood sugar has been good. Patient is going out of town this Sunday and needs this sent in right away, as she is due for next dose. Please advise!

## 2023-10-09 ENCOUNTER — APPOINTMENT (OUTPATIENT)
Dept: PULMONOLOGY | Age: 68
End: 2023-10-09
Payer: MEDICARE

## 2023-10-11 ENCOUNTER — APPOINTMENT (OUTPATIENT)
Dept: PULMONOLOGY | Age: 68
End: 2023-10-11
Payer: MEDICARE

## 2023-10-16 ENCOUNTER — APPOINTMENT (OUTPATIENT)
Dept: PULMONOLOGY | Age: 68
End: 2023-10-16
Payer: MEDICARE

## 2023-10-18 ENCOUNTER — HOSPITAL ENCOUNTER (OUTPATIENT)
Dept: PULMONOLOGY | Age: 68
Setting detail: THERAPIES SERIES
Discharge: HOME | End: 2023-10-18
Payer: MEDICARE

## 2023-10-18 VITALS — WEIGHT: 218 LBS | BODY MASS INDEX: 38.62 KG/M2

## 2023-10-18 PROCEDURE — 94625 PHY/QHP OP PULM RHB W/O MNTR: CPT

## 2023-10-18 ASSESSMENT — EXERCISE STRESS TEST
PEAK_HR: 75
PEAK_METS: 2.8
PEAK_BP: 124/74
PEAK_RPE: 4

## 2023-10-23 ENCOUNTER — HOSPITAL ENCOUNTER (OUTPATIENT)
Dept: PULMONOLOGY | Age: 68
Setting detail: THERAPIES SERIES
Discharge: HOME | End: 2023-10-23
Payer: MEDICARE

## 2023-10-23 VITALS — BODY MASS INDEX: 39.15 KG/M2 | WEIGHT: 221 LBS

## 2023-10-23 PROCEDURE — 94625 PHY/QHP OP PULM RHB W/O MNTR: CPT

## 2023-10-23 ASSESSMENT — EXERCISE STRESS TEST
PEAK_HR: 78
PEAK_BP: 134/68
PEAK_RPE: 1
PEAK_METS: 2.6

## 2023-10-25 ENCOUNTER — HOSPITAL ENCOUNTER (OUTPATIENT)
Dept: PULMONOLOGY | Age: 68
Setting detail: THERAPIES SERIES
Discharge: HOME | End: 2023-10-25
Payer: MEDICARE

## 2023-10-25 PROCEDURE — 94625 PHY/QHP OP PULM RHB W/O MNTR: CPT

## 2023-10-25 ASSESSMENT — EXERCISE STRESS TEST
PEAK_BP: 112/64
PEAK_RPE: 2
PEAK_HR: 70
PEAK_METS: 2.9

## 2023-10-26 VITALS — BODY MASS INDEX: 38.97 KG/M2 | OXYGEN SATURATION: 94 % | WEIGHT: 220 LBS

## 2023-10-26 ASSESSMENT — EXERCISE STRESS TEST
PEAK_BP: 132/80
PEAK_RPE: 2
PEAK_BP: 132/84
PEAK_METS: 2.9
PEAK_HR: 81

## 2023-10-26 ASSESSMENT — LIFESTYLE VARIABLES
AMOUNT_OF_TOBACCO_USED: VAPING
SMOKELESS_TOBACCO: YES

## 2023-10-26 NOTE — PLAN OF CARE
Signs/symptoms to report       Exercise target group   Exercise Target Group  Target Goal(s): Individual exercise RX; SaO2>89%  Patient Stated Exercise Goals: to continue working toward being more active         Nutrition   Nutrition  Stages of Change: Maintenance  Diabetes: Yes  Diabetes Med(s): Ozempic  BG in Range: Yes       Lipids   No data recorded   Weight Management   Weight Management  Weight : 99.8 kg (220 lb)  Height : 1.6 m (5' 3\")  BMI: 39.05       Nutrition Intervention   No data recorded   Nutrition Education   Nutrition Education  Education: Reduced calorie/portion controlled diet       Nutrition Target Goals   Nutrition Target Goals  Target Goals: Weight loss of 5-10%         Psychosocial   Psychosocial  Stages of Change: Maintenance  Family Support: Yes  Comments: Pt is being treated for depression       Psychosocial intervention   Psychosocial Intervention  Interventions: Currently under treatment for depression  Currently Taking Psychotropic Meds: Yes  Medication Changes: No       Psychosocial education   Psychosocial Education  Education: Coping techniques;  Environmental triggers; Relaxation techniques       Psychosocial target goals   Psychosocial Target Goals  Target Goal(s): Demonstrates appropriate interaction with others  Uses Stress Mgmt Techniques: Yes       Tobacco   Tobacco  Stages of Change: Maintenance  Tobacco Use: Yes  Quit: Greater than 6 month  Smokeless Tobacco Use: Yes  Amount: vaping      Tobacco Cessation Intervention   Tobacco Cessation Intervention  Smoking Cessation Referral: No  Tobacco Adjunct: Yes       Tobacco Education   Tobacco Education  Resource Information Provided: Yes       Target Goal   No data recorded     Oxygen Saturation Titration   Oxygen Saturation / Titration   Stages of Change : Action       Oxygen Intervention   Oxygen Intervention  Oxygen Use: Yes (PRN)  Oxygen Type : Nasal canula  Patients Liter Flow : 2 PRN  O2 Sat Greater Than 90%: Yes  Nurse/Patient

## 2023-10-30 ENCOUNTER — HOSPITAL ENCOUNTER (OUTPATIENT)
Dept: PULMONOLOGY | Age: 68
Setting detail: THERAPIES SERIES
Discharge: HOME | End: 2023-10-30
Payer: MEDICARE

## 2023-10-30 VITALS — WEIGHT: 221 LBS | BODY MASS INDEX: 39.15 KG/M2

## 2023-10-30 PROCEDURE — 94625 PHY/QHP OP PULM RHB W/O MNTR: CPT

## 2023-10-30 ASSESSMENT — EXERCISE STRESS TEST
PEAK_HR: 71
PEAK_BP: 124/70
PEAK_RPE: 2
PEAK_METS: 2.4

## 2023-11-01 ENCOUNTER — HOSPITAL ENCOUNTER (OUTPATIENT)
Dept: PULMONOLOGY | Age: 68
Setting detail: THERAPIES SERIES
Discharge: HOME | End: 2023-11-01
Payer: MEDICARE

## 2023-11-01 VITALS — WEIGHT: 224 LBS | BODY MASS INDEX: 39.68 KG/M2

## 2023-11-01 PROCEDURE — 94625 PHY/QHP OP PULM RHB W/O MNTR: CPT

## 2023-11-01 ASSESSMENT — EXERCISE STRESS TEST
PEAK_HR: 78
PEAK_METS: 3
PEAK_RPE: 2
PEAK_BP: 122/78

## 2023-11-06 ENCOUNTER — APPOINTMENT (OUTPATIENT)
Dept: PULMONOLOGY | Age: 68
End: 2023-11-06
Payer: MEDICARE

## 2023-11-08 ENCOUNTER — HOSPITAL ENCOUNTER (OUTPATIENT)
Dept: PULMONOLOGY | Age: 68
Setting detail: THERAPIES SERIES
Discharge: HOME | End: 2023-11-08
Payer: MEDICARE

## 2023-11-08 VITALS — WEIGHT: 220 LBS | BODY MASS INDEX: 38.97 KG/M2

## 2023-11-08 PROCEDURE — 94625 PHY/QHP OP PULM RHB W/O MNTR: CPT

## 2023-11-08 ASSESSMENT — EXERCISE STRESS TEST
PEAK_HR: 75
PEAK_METS: 2.3
PEAK_BP: 138/78
PEAK_RPE: 2

## 2023-11-12 DIAGNOSIS — I25.10 CORONARY ARTERY DISEASE INVOLVING NATIVE CORONARY ARTERY OF NATIVE HEART WITHOUT ANGINA PECTORIS: ICD-10-CM

## 2023-11-12 DIAGNOSIS — E78.5 DYSLIPIDEMIA: ICD-10-CM

## 2023-11-12 DIAGNOSIS — I73.9 PAD (PERIPHERAL ARTERY DISEASE) (HCC): ICD-10-CM

## 2023-11-13 ENCOUNTER — HOSPITAL ENCOUNTER (OUTPATIENT)
Dept: PULMONOLOGY | Age: 68
Setting detail: THERAPIES SERIES
Discharge: HOME | End: 2023-11-13
Payer: MEDICARE

## 2023-11-13 VITALS — BODY MASS INDEX: 38.97 KG/M2 | WEIGHT: 220 LBS

## 2023-11-13 PROCEDURE — 94625 PHY/QHP OP PULM RHB W/O MNTR: CPT

## 2023-11-13 ASSESSMENT — EXERCISE STRESS TEST
PEAK_RPE: 4
PEAK_METS: 2.8
PEAK_BP: 130/78
PEAK_HR: 83

## 2023-11-13 NOTE — TELEPHONE ENCOUNTER
Request for   Requested Prescriptions     Pending Prescriptions Disp Refills    rosuvastatin (CRESTOR) 5 MG tablet [Pharmacy Med Name: ROSUVASTATIN CALCIUM 5 MG TAB] 90 tablet 1     Sig: take 1 tablet by mouth nightly    . Please review and e-scribe to pharmacy listed in chart if appropriate. Thank you.       Last Visit Date: 6/28/2023  Next Visit Date: 11/21/2023

## 2023-11-14 RX ORDER — ROSUVASTATIN CALCIUM 5 MG/1
5 TABLET, COATED ORAL NIGHTLY
Qty: 90 TABLET | Refills: 1 | Status: SHIPPED | OUTPATIENT
Start: 2023-11-14

## 2023-11-15 ENCOUNTER — HOSPITAL ENCOUNTER (OUTPATIENT)
Dept: PULMONOLOGY | Age: 68
Setting detail: THERAPIES SERIES
End: 2023-11-15
Payer: MEDICARE

## 2023-11-20 ENCOUNTER — HOSPITAL ENCOUNTER (OUTPATIENT)
Dept: PULMONOLOGY | Age: 68
Setting detail: THERAPIES SERIES
Discharge: HOME | End: 2023-11-20
Payer: MEDICARE

## 2023-11-20 VITALS — WEIGHT: 222 LBS | BODY MASS INDEX: 39.33 KG/M2

## 2023-11-20 PROCEDURE — 94625 PHY/QHP OP PULM RHB W/O MNTR: CPT

## 2023-11-20 ASSESSMENT — EXERCISE STRESS TEST
PEAK_RPE: 1
PEAK_HR: 66
PEAK_BP: 146/74
PEAK_METS: 2.4

## 2023-11-21 ENCOUNTER — OFFICE VISIT (OUTPATIENT)
Dept: INTERNAL MEDICINE | Age: 68
End: 2023-11-21
Payer: MEDICARE

## 2023-11-21 VITALS
TEMPERATURE: 97.2 F | HEIGHT: 63 IN | DIASTOLIC BLOOD PRESSURE: 80 MMHG | SYSTOLIC BLOOD PRESSURE: 139 MMHG | OXYGEN SATURATION: 92 % | WEIGHT: 219.8 LBS | HEART RATE: 70 BPM | BODY MASS INDEX: 38.95 KG/M2

## 2023-11-21 DIAGNOSIS — I10 ESSENTIAL HYPERTENSION: ICD-10-CM

## 2023-11-21 DIAGNOSIS — I25.10 CORONARY ARTERY DISEASE INVOLVING NATIVE CORONARY ARTERY OF NATIVE HEART WITHOUT ANGINA PECTORIS: ICD-10-CM

## 2023-11-21 DIAGNOSIS — Z78.9 STATIN INTOLERANCE: ICD-10-CM

## 2023-11-21 DIAGNOSIS — F33.0 MAJOR DEPRESSIVE DISORDER, RECURRENT EPISODE, MILD WITH ANXIOUS DISTRESS (HCC): ICD-10-CM

## 2023-11-21 DIAGNOSIS — J44.9 CHRONIC OBSTRUCTIVE PULMONARY DISEASE, UNSPECIFIED COPD TYPE (HCC): ICD-10-CM

## 2023-11-21 DIAGNOSIS — Z23 NEED FOR INFLUENZA VACCINATION: ICD-10-CM

## 2023-11-21 DIAGNOSIS — F41.9 ANXIETY: ICD-10-CM

## 2023-11-21 DIAGNOSIS — I73.9 PAD (PERIPHERAL ARTERY DISEASE) (HCC): ICD-10-CM

## 2023-11-21 DIAGNOSIS — E11.8 TYPE 2 DIABETES MELLITUS WITH COMPLICATION, WITHOUT LONG-TERM CURRENT USE OF INSULIN (HCC): ICD-10-CM

## 2023-11-21 DIAGNOSIS — Z00.00 ANNUAL PHYSICAL EXAM: Primary | ICD-10-CM

## 2023-11-21 DIAGNOSIS — E78.5 DYSLIPIDEMIA: ICD-10-CM

## 2023-11-21 LAB — HBA1C MFR BLD: 6.1 %

## 2023-11-21 PROCEDURE — 3079F DIAST BP 80-89 MM HG: CPT | Performed by: INTERNAL MEDICINE

## 2023-11-21 PROCEDURE — 3075F SYST BP GE 130 - 139MM HG: CPT | Performed by: INTERNAL MEDICINE

## 2023-11-21 PROCEDURE — G8484 FLU IMMUNIZE NO ADMIN: HCPCS | Performed by: INTERNAL MEDICINE

## 2023-11-21 PROCEDURE — 83036 HEMOGLOBIN GLYCOSYLATED A1C: CPT | Performed by: INTERNAL MEDICINE

## 2023-11-21 PROCEDURE — 99397 PER PM REEVAL EST PAT 65+ YR: CPT | Performed by: INTERNAL MEDICINE

## 2023-11-21 PROCEDURE — G0008 ADMIN INFLUENZA VIRUS VAC: HCPCS | Performed by: INTERNAL MEDICINE

## 2023-11-21 RX ORDER — CLOBETASOL PROPIONATE 0.5 MG/G
CREAM TOPICAL
COMMUNITY
Start: 2023-09-01

## 2023-11-21 RX ORDER — LOSARTAN POTASSIUM 100 MG/1
TABLET ORAL
Qty: 90 TABLET | Refills: 1 | Status: SHIPPED | OUTPATIENT
Start: 2023-11-21

## 2023-11-21 RX ORDER — HYDROCHLOROTHIAZIDE 25 MG/1
TABLET ORAL
Qty: 90 TABLET | Refills: 1 | Status: SHIPPED | OUTPATIENT
Start: 2023-11-21

## 2023-11-21 RX ORDER — AMLODIPINE BESYLATE 10 MG/1
TABLET ORAL
Qty: 90 TABLET | Refills: 1 | Status: SHIPPED | OUTPATIENT
Start: 2023-11-21

## 2023-11-21 RX ORDER — CARVEDILOL 3.12 MG/1
TABLET ORAL
Qty: 180 TABLET | Refills: 1 | Status: SHIPPED | OUTPATIENT
Start: 2023-11-21

## 2023-11-21 NOTE — PATIENT INSTRUCTIONS
Patient was put on a wait list and will be contacted to schedule their next follow up appointment once the schedule is available. If the patient is in need of an appointment before their next visit please call the office at [phone]. After Visit Summary  given and reviewed. Labs given to patient, they will have them done before their next visit.

## 2023-11-22 ENCOUNTER — HOSPITAL ENCOUNTER (OUTPATIENT)
Dept: PULMONOLOGY | Age: 68
Setting detail: THERAPIES SERIES
Discharge: HOME | End: 2023-11-22
Payer: MEDICARE

## 2023-11-22 VITALS — OXYGEN SATURATION: 91 %

## 2023-11-22 ASSESSMENT — LIFESTYLE VARIABLES
SMOKELESS_TOBACCO: YES
AMOUNT_OF_TOBACCO_USED: VAPING

## 2023-11-22 ASSESSMENT — EXERCISE STRESS TEST
PEAK_BP: 122/64
PEAK_METS: 2.4
PEAK_RPE: 1
PEAK_BP: 122/64
PEAK_HR: 66

## 2023-11-22 NOTE — PLAN OF CARE
Hospital Based Pulmonary Rehab: [facility], 77 y.o. female, -1955 Today's Date: 2023    Qualifying Diagnosis-   Treatment Diagnosis 1: COPD      No data recorded     Sessions Completed To-Date: 15      Treatment Diagnosis   Treatment Diagnosis  Treatment Diagnosis 1: COPD  Significant Cardiovascular History: Peripheral arterial disease (HTN)  Co-morbidities: Diabetes, Pulmonary disease     ITP Visit Type  ITP Visit Type: Re-assessment         Exercise   Exercise  Reyna Total Score: 20  Stages of Change: Action  Exercise Test: Six minute walk test  Distance Walked in : ft  Distance Walked (ft): 725 ft  Peak HR: 66  Peak BP: 122/64  Peak RPE: 1  Peak Mets: 2.4  O2 Saturation: 91 (Room Air)  O2 Flow Rate (l/min): 2 l/min  Stops: 1  SPO2 Range: 91-97  BMI (Calculated): 38.95  FEV1 (%PRED): 75       Exercise Prescription   Exercise Prescription  Mode: Treadmill; Stepper; Ergometer;  Other (comment) (free weights)  Frequency per week: 2  Duration Per Session: up to 60 minuites  Intensity METS      : RPE/RPD 1-5  Progression: Patient is doing 1 set on the Nustep of 10 minutes, 2 sets of 6 minutes with a workload of 2 at METS of 2.4 and  Treadmill at 1 mph for 10 minutes  SpO2: 91 %  O2 Device: Room air  O2 Flow Rate (l/min): 2 l/min  Comments: Patient continues to show improvement with exercise  Symptoms with Exercise: Shortness of breath  Target Heart Rate: 137 (Max HR)  Resistance Training: Yes  Weight: 2  Reps: 1/10  Assisted Devices: None       Exercise Blood Pressures   Exercise Blood Pressures  Resting BP: 128/74  Peak BP: 122/64  Is BP WDL: Yes       Exercise Intervention   Exercise Intervention  Type: Nustep, Treadmill, Ergometer, Free weights  Frequency: 2x per week  Duration: 60 minutes  Resistance Training: Yes  Comments: Patient continues to progress with exercise       Exercise Education   Exercise Education  Education: Energy

## 2023-11-22 NOTE — PLAN OF CARE
Hospital Based Pulmonary Rehab: [address] [facility], 77 y.o. female, -1955 Today's Date: 2023    Qualifying Diagnosis-   Treatment Diagnosis 1: COPD      No data recorded     Sessions Completed To-Date: 15      Treatment Diagnosis   Treatment Diagnosis  Treatment Diagnosis 1: COPD  Significant Cardiovascular History: Peripheral arterial disease (HTN)  Co-morbidities: Diabetes, Pulmonary disease     ITP Visit Type  ITP Visit Type: Re-assessment         Exercise   Exercise  Reyna Total Score: 20  Stages of Change: Action  Exercise Test: Six minute walk test  Distance Walked in : ft  Distance Walked (ft): 725 ft  Peak HR: 66  Peak BP: 122/64  Peak RPE: 1  Peak Mets: 2.4  O2 Saturation: 91 (Room Air)  O2 Flow Rate (l/min): 2 l/min  Stops: 1  SPO2 Range: 91-97  BMI (Calculated): 38.95  FEV1 (%PRED): 75       Exercise Prescription   Exercise Prescription  Mode: Treadmill; Stepper; Ergometer;  Other (comment) (free weights)  Frequency per week: 2  Duration Per Session: up to 60 minuites  Intensity METS      : RPE/RPD 1-5  Progression: Patient is doing 1 set on the Nustep of 10 minutes, 2 sets of 6 minutes with a workload of 2 at METS of 2.4 and  Treadmill at 1 mph for 10 minutes  SpO2: 91 %  O2 Device: Room air  O2 Flow Rate (l/min): 2 l/min  Comments: Patient continues to show improvement with exercise  Symptoms with Exercise: Shortness of breath  Target Heart Rate: 137 (Max HR)  Resistance Training: Yes  Weight: 2  Reps: 1/10  Assisted Devices: None       Exercise Blood Pressures   Exercise Blood Pressures  Resting BP: 128/74  Peak BP: 122/64  Is BP WDL: Yes       Exercise Intervention   Exercise Intervention  Type: Nustep, Treadmill, Ergometer, Free weights  Frequency: 2x per week  Duration: 60 minutes  Resistance Training: Yes  Comments: Patient continues to progress with exercise       Exercise Education   Exercise Education  Education: Energy

## 2023-11-27 ENCOUNTER — HOSPITAL ENCOUNTER (OUTPATIENT)
Dept: PULMONOLOGY | Age: 68
Setting detail: THERAPIES SERIES
Discharge: HOME | End: 2023-11-27
Payer: MEDICARE

## 2023-11-27 NOTE — PROGRESS NOTES
Pulmonary Rehab    Patient left message stating she would not be able to attend rehab session today. Writer to reschedule.     Electronically signed by Jennifer Farfan RN on 11/27/2023 at 9:09 AM

## 2023-11-29 ENCOUNTER — HOSPITAL ENCOUNTER (OUTPATIENT)
Dept: PULMONOLOGY | Age: 68
Setting detail: THERAPIES SERIES
Discharge: HOME | End: 2023-11-29
Payer: MEDICARE

## 2023-11-29 VITALS — WEIGHT: 221 LBS | BODY MASS INDEX: 39.16 KG/M2

## 2023-11-29 PROCEDURE — G0239 OTH RESP PROC, GROUP: HCPCS

## 2023-11-29 ASSESSMENT — EXERCISE STRESS TEST
PEAK_HR: 78
PEAK_RPE: 1
PEAK_BP: 116/68
PEAK_METS: 2.5

## 2023-12-04 ENCOUNTER — HOSPITAL ENCOUNTER (OUTPATIENT)
Dept: PULMONOLOGY | Age: 68
Setting detail: THERAPIES SERIES
Discharge: HOME | End: 2023-12-04
Payer: MEDICARE

## 2023-12-04 VITALS — WEIGHT: 220.4 LBS | BODY MASS INDEX: 39.05 KG/M2

## 2023-12-04 PROCEDURE — G0239 OTH RESP PROC, GROUP: HCPCS

## 2023-12-04 PROCEDURE — G0238 OTH RESP PROC, INDIV: HCPCS

## 2023-12-04 ASSESSMENT — EXERCISE STRESS TEST
PEAK_RPE: 0.5
PEAK_BP: 126/72
PEAK_HR: 70
PEAK_METS: 2.3

## 2023-12-04 NOTE — PROGRESS NOTES
*Reviewed reconditioning exercises w/ application of PLB DB &RB  *Started exercise w/ use of proper breathing techniques ,LEO scales & pacing work. .  *Instruct on lung diagnosis & disease process. O2 use & respiratory medications dose,    frequency & tech. To help alleviate symptoms Reviewed patient's MDI's and rinsing mouth after steroid inhaler. Discussed energy conservation. Discussed signs of infection and ways to prevent infection. Discussed CPAP usage and cleaning.

## 2023-12-05 ENCOUNTER — TELEPHONE (OUTPATIENT)
Dept: PULMONOLOGY | Age: 68
End: 2023-12-05

## 2023-12-05 DIAGNOSIS — J98.4 OTHER DISORDERS OF LUNG: Primary | ICD-10-CM

## 2023-12-06 ENCOUNTER — HOSPITAL ENCOUNTER (OUTPATIENT)
Dept: PULMONOLOGY | Age: 68
Setting detail: THERAPIES SERIES
Discharge: HOME | End: 2023-12-06
Payer: MEDICARE

## 2023-12-11 ENCOUNTER — HOSPITAL ENCOUNTER (OUTPATIENT)
Dept: PULMONOLOGY | Age: 68
Setting detail: THERAPIES SERIES
End: 2023-12-11
Payer: MEDICARE

## 2023-12-13 ENCOUNTER — HOSPITAL ENCOUNTER (OUTPATIENT)
Dept: PULMONOLOGY | Age: 68
Setting detail: THERAPIES SERIES
Discharge: HOME | End: 2023-12-13
Payer: MEDICARE

## 2023-12-13 VITALS — WEIGHT: 220.6 LBS | BODY MASS INDEX: 39.09 KG/M2

## 2023-12-13 PROCEDURE — G0239 OTH RESP PROC, GROUP: HCPCS

## 2023-12-13 ASSESSMENT — EXERCISE STRESS TEST
PEAK_BP: 140/80
PEAK_RPE: 0.5
PEAK_HR: 70
PEAK_METS: 2.9

## 2023-12-20 ENCOUNTER — APPOINTMENT (OUTPATIENT)
Dept: PULMONOLOGY | Age: 68
End: 2023-12-20
Payer: MEDICARE

## 2023-12-25 ENCOUNTER — APPOINTMENT (OUTPATIENT)
Dept: PULMONOLOGY | Age: 68
End: 2023-12-25
Payer: MEDICARE

## 2023-12-27 ENCOUNTER — APPOINTMENT (OUTPATIENT)
Dept: PULMONOLOGY | Age: 68
End: 2023-12-27
Payer: MEDICARE

## 2023-12-30 DIAGNOSIS — N39.46 MIXED STRESS AND URGE URINARY INCONTINENCE: ICD-10-CM

## 2024-01-01 ENCOUNTER — APPOINTMENT (OUTPATIENT)
Dept: PULMONOLOGY | Age: 69
End: 2024-01-01
Payer: MEDICARE

## 2024-01-02 ENCOUNTER — HOSPITAL ENCOUNTER (OUTPATIENT)
Dept: PULMONOLOGY | Age: 69
Setting detail: THERAPIES SERIES
Discharge: HOME | End: 2024-01-02
Payer: MEDICARE

## 2024-01-02 VITALS — WEIGHT: 217 LBS | BODY MASS INDEX: 38.45 KG/M2

## 2024-01-02 PROCEDURE — 94625 PHY/QHP OP PULM RHB W/O MNTR: CPT

## 2024-01-02 ASSESSMENT — EXERCISE STRESS TEST
PEAK_HR: 86
PEAK_BP: 158/68
PEAK_RPE: 1
PEAK_METS: 2.4

## 2024-01-02 NOTE — TELEPHONE ENCOUNTER
A Refill Has Been Requested for Amina Olivo    Medication Requested  Requested Prescriptions     Pending Prescriptions Disp Refills    solifenacin (VESICARE) 5 MG tablet [Pharmacy Med Name: SOLIFENACIN 5 MG TABLET] 30 tablet 3     Sig: take 1 tablet by mouth once daily       Last Visit Date (If Applicable)  Visit date not found    Next Visit Date (If Applicable)  Visit date not found

## 2024-01-03 ENCOUNTER — APPOINTMENT (OUTPATIENT)
Dept: PULMONOLOGY | Age: 69
End: 2024-01-03
Payer: MEDICARE

## 2024-01-03 RX ORDER — SOLIFENACIN SUCCINATE 5 MG/1
TABLET, FILM COATED ORAL
Qty: 30 TABLET | Refills: 3 | Status: SHIPPED | OUTPATIENT
Start: 2024-01-03

## 2024-01-04 ENCOUNTER — HOSPITAL ENCOUNTER (OUTPATIENT)
Dept: PULMONOLOGY | Age: 69
Setting detail: THERAPIES SERIES
Discharge: HOME | End: 2024-01-04
Payer: MEDICARE

## 2024-01-04 VITALS — WEIGHT: 218.4 LBS | BODY MASS INDEX: 38.7 KG/M2

## 2024-01-04 PROCEDURE — G0239 OTH RESP PROC, GROUP: HCPCS

## 2024-01-04 ASSESSMENT — EXERCISE STRESS TEST
PEAK_HR: 105
PEAK_METS: 2.5
PEAK_RPE: 3

## 2024-01-08 ENCOUNTER — APPOINTMENT (OUTPATIENT)
Dept: PULMONOLOGY | Age: 69
End: 2024-01-08
Payer: MEDICARE

## 2024-01-09 ENCOUNTER — HOSPITAL ENCOUNTER (OUTPATIENT)
Dept: PULMONOLOGY | Age: 69
Setting detail: THERAPIES SERIES
Discharge: HOME | End: 2024-01-09
Payer: MEDICARE

## 2024-01-09 VITALS — OXYGEN SATURATION: 93 %

## 2024-01-09 ASSESSMENT — EXERCISE STRESS TEST
PEAK_BP: 138/68
PEAK_METS: 2.5
PEAK_HR: 105
PEAK_RPE: 1
PEAK_BP: 138/68

## 2024-01-09 ASSESSMENT — LIFESTYLE VARIABLES: SMOKELESS_TOBACCO: NO

## 2024-01-09 NOTE — PLAN OF CARE
Tobacco Use: No  Amount: Patient denies vaping      Tobacco Cessation Intervention   Tobacco Cessation Intervention  Smoking Cessation Referral: No  Tobacco Adjunct: Yes       Tobacco Education   Tobacco Education  Resource Information Provided: Yes       Target Goal   No data recorded     Oxygen Saturation Titration     Agree with above findings and plan.    Esteban Alexis MD    January 10, 2024

## 2024-01-10 ENCOUNTER — APPOINTMENT (OUTPATIENT)
Dept: PULMONOLOGY | Age: 69
End: 2024-01-10
Payer: MEDICARE

## 2024-01-11 ENCOUNTER — HOSPITAL ENCOUNTER (OUTPATIENT)
Dept: PULMONOLOGY | Age: 69
Setting detail: THERAPIES SERIES
Discharge: HOME | End: 2024-01-11
Payer: MEDICARE

## 2024-01-11 VITALS — WEIGHT: 215.2 LBS | BODY MASS INDEX: 38.13 KG/M2

## 2024-01-11 PROCEDURE — G0239 OTH RESP PROC, GROUP: HCPCS

## 2024-01-11 ASSESSMENT — EXERCISE STRESS TEST
PEAK_METS: 2.7
PEAK_HR: 93
PEAK_RPE: 4

## 2024-01-15 ENCOUNTER — APPOINTMENT (OUTPATIENT)
Dept: PULMONOLOGY | Age: 69
End: 2024-01-15
Payer: MEDICARE

## 2024-01-16 ENCOUNTER — APPOINTMENT (OUTPATIENT)
Dept: PULMONOLOGY | Age: 69
End: 2024-01-16
Payer: MEDICARE

## 2024-01-17 ENCOUNTER — APPOINTMENT (OUTPATIENT)
Dept: PULMONOLOGY | Age: 69
End: 2024-01-17
Payer: MEDICARE

## 2024-01-18 ENCOUNTER — HOSPITAL ENCOUNTER (OUTPATIENT)
Dept: PULMONOLOGY | Age: 69
Setting detail: THERAPIES SERIES
Discharge: HOME | End: 2024-01-18
Payer: MEDICARE

## 2024-01-18 VITALS — BODY MASS INDEX: 38.98 KG/M2 | WEIGHT: 220 LBS

## 2024-01-18 PROCEDURE — G0239 OTH RESP PROC, GROUP: HCPCS

## 2024-01-18 ASSESSMENT — EXERCISE STRESS TEST
PEAK_BP: 138/80
PEAK_HR: 85
PEAK_METS: 2.7
PEAK_RPE: 3

## 2024-01-22 ENCOUNTER — APPOINTMENT (OUTPATIENT)
Dept: PULMONOLOGY | Age: 69
End: 2024-01-22
Payer: MEDICARE

## 2024-01-23 ENCOUNTER — HOSPITAL ENCOUNTER (OUTPATIENT)
Dept: PULMONOLOGY | Age: 69
Setting detail: THERAPIES SERIES
Discharge: HOME | End: 2024-01-23
Payer: MEDICARE

## 2024-01-23 VITALS — BODY MASS INDEX: 38.41 KG/M2 | WEIGHT: 216.8 LBS

## 2024-01-23 PROCEDURE — G0239 OTH RESP PROC, GROUP: HCPCS

## 2024-01-23 ASSESSMENT — EXERCISE STRESS TEST
PEAK_BP: 140/70
PEAK_METS: 2.7
PEAK_RPE: 3
PEAK_HR: 74

## 2024-01-25 ENCOUNTER — HOSPITAL ENCOUNTER (OUTPATIENT)
Dept: PULMONOLOGY | Age: 69
Setting detail: THERAPIES SERIES
Discharge: HOME | End: 2024-01-25
Payer: MEDICARE

## 2024-01-25 VITALS — WEIGHT: 217 LBS | BODY MASS INDEX: 38.45 KG/M2

## 2024-01-25 PROCEDURE — G0239 OTH RESP PROC, GROUP: HCPCS

## 2024-01-25 ASSESSMENT — EXERCISE STRESS TEST
PEAK_METS: 2.8
PEAK_BP: 140/70
PEAK_RPE: 3
PEAK_HR: 90

## 2024-01-29 ENCOUNTER — APPOINTMENT (OUTPATIENT)
Dept: PULMONOLOGY | Age: 69
End: 2024-01-29
Payer: MEDICARE

## 2024-01-30 ENCOUNTER — HOSPITAL ENCOUNTER (OUTPATIENT)
Dept: PULMONOLOGY | Age: 69
Setting detail: THERAPIES SERIES
Discharge: HOME | End: 2024-01-30
Payer: MEDICARE

## 2024-01-31 ENCOUNTER — APPOINTMENT (OUTPATIENT)
Dept: PULMONOLOGY | Age: 69
End: 2024-01-31
Payer: MEDICARE

## 2024-02-01 ENCOUNTER — HOSPITAL ENCOUNTER (OUTPATIENT)
Dept: PULMONOLOGY | Age: 69
Setting detail: THERAPIES SERIES
Discharge: HOME | End: 2024-02-01

## 2024-02-05 ENCOUNTER — APPOINTMENT (OUTPATIENT)
Dept: PULMONOLOGY | Age: 69
End: 2024-02-05
Payer: MEDICARE

## 2024-02-06 ENCOUNTER — HOSPITAL ENCOUNTER (OUTPATIENT)
Dept: PULMONOLOGY | Age: 69
Setting detail: THERAPIES SERIES
Discharge: HOME | End: 2024-02-06
Payer: MEDICARE

## 2024-02-06 VITALS — BODY MASS INDEX: 38.73 KG/M2 | OXYGEN SATURATION: 92 % | WEIGHT: 218.6 LBS

## 2024-02-06 PROCEDURE — G0239 OTH RESP PROC, GROUP: HCPCS

## 2024-02-06 ASSESSMENT — EXERCISE STRESS TEST
PEAK_BP: 146/70
PEAK_HR: 85
PEAK_METS: 2.8
PEAK_RPE: 2
PEAK_HR: 93
PEAK_BP: 140/70
PEAK_BP: 140/70
PEAK_METS: 2.7

## 2024-02-06 ASSESSMENT — LIFESTYLE VARIABLES: SMOKELESS_TOBACCO: NO

## 2024-02-06 NOTE — PLAN OF CARE
Saturation Titration     Agree with above findings and plan    Esteban MD Reuben.    February 6, 2024

## 2024-02-07 ENCOUNTER — APPOINTMENT (OUTPATIENT)
Dept: PULMONOLOGY | Age: 69
End: 2024-02-07
Payer: MEDICARE

## 2024-02-08 ENCOUNTER — HOSPITAL ENCOUNTER (OUTPATIENT)
Dept: PULMONOLOGY | Age: 69
Setting detail: THERAPIES SERIES
Discharge: HOME | End: 2024-02-08
Payer: MEDICARE

## 2024-02-08 VITALS — WEIGHT: 217.2 LBS | BODY MASS INDEX: 38.49 KG/M2

## 2024-02-08 PROCEDURE — G0239 OTH RESP PROC, GROUP: HCPCS

## 2024-02-08 ASSESSMENT — EXERCISE STRESS TEST
PEAK_RPE: 1
PEAK_HR: 85
PEAK_METS: 2.5
PEAK_BP: 140/72

## 2024-02-12 ENCOUNTER — APPOINTMENT (OUTPATIENT)
Dept: PULMONOLOGY | Age: 69
End: 2024-02-12
Payer: MEDICARE

## 2024-02-13 ENCOUNTER — HOSPITAL ENCOUNTER (OUTPATIENT)
Dept: PULMONOLOGY | Age: 69
Setting detail: THERAPIES SERIES
Discharge: HOME | End: 2024-02-13
Payer: MEDICARE

## 2024-02-13 VITALS — WEIGHT: 214.8 LBS | BODY MASS INDEX: 38.06 KG/M2

## 2024-02-13 PROCEDURE — G0239 OTH RESP PROC, GROUP: HCPCS

## 2024-02-14 ENCOUNTER — APPOINTMENT (OUTPATIENT)
Dept: PULMONOLOGY | Age: 69
End: 2024-02-14
Payer: MEDICARE

## 2024-02-15 ENCOUNTER — HOSPITAL ENCOUNTER (OUTPATIENT)
Dept: PULMONOLOGY | Age: 69
Setting detail: THERAPIES SERIES
Discharge: HOME | End: 2024-02-15
Payer: MEDICARE

## 2024-02-15 VITALS — BODY MASS INDEX: 38.02 KG/M2 | WEIGHT: 214.6 LBS

## 2024-02-15 PROCEDURE — G0239 OTH RESP PROC, GROUP: HCPCS

## 2024-02-20 ENCOUNTER — APPOINTMENT (OUTPATIENT)
Dept: PULMONOLOGY | Age: 69
End: 2024-02-20
Payer: MEDICARE

## 2024-02-22 ENCOUNTER — HOSPITAL ENCOUNTER (OUTPATIENT)
Dept: PULMONOLOGY | Age: 69
Setting detail: THERAPIES SERIES
Discharge: HOME | End: 2024-02-22
Payer: MEDICARE

## 2024-02-22 VITALS — BODY MASS INDEX: 37.92 KG/M2 | WEIGHT: 214 LBS

## 2024-02-22 PROCEDURE — G0239 OTH RESP PROC, GROUP: HCPCS

## 2024-02-22 ASSESSMENT — EXERCISE STRESS TEST
PEAK_METS: 2.8
PEAK_RPE: 2
PEAK_BP: 138/68
PEAK_HR: 79

## 2024-02-26 RX ORDER — FLUTICASONE FUROATE 100 UG/1
POWDER RESPIRATORY (INHALATION)
Qty: 3 EACH | Refills: 1 | Status: SHIPPED | OUTPATIENT
Start: 2024-02-26

## 2024-02-27 ENCOUNTER — HOSPITAL ENCOUNTER (OUTPATIENT)
Dept: PULMONOLOGY | Age: 69
Setting detail: THERAPIES SERIES
End: 2024-02-27
Payer: MEDICARE

## 2024-02-29 ENCOUNTER — HOSPITAL ENCOUNTER (OUTPATIENT)
Dept: PULMONOLOGY | Age: 69
Setting detail: THERAPIES SERIES
Discharge: HOME | End: 2024-02-29
Payer: MEDICARE

## 2024-03-05 ENCOUNTER — HOSPITAL ENCOUNTER (OUTPATIENT)
Dept: PULMONOLOGY | Age: 69
Setting detail: THERAPIES SERIES
Discharge: HOME | End: 2024-03-05
Payer: MEDICARE

## 2024-03-05 VITALS — OXYGEN SATURATION: 95 % | WEIGHT: 214.9 LBS | BODY MASS INDEX: 38.08 KG/M2

## 2024-03-05 PROCEDURE — G0239 OTH RESP PROC, GROUP: HCPCS

## 2024-03-05 ASSESSMENT — EXERCISE STRESS TEST
PEAK_HR: 74
PEAK_HR: 95
PEAK_METS: 2.8
PEAK_BP: 146/70
PEAK_RPE: 2
PEAK_METS: 3.1
PEAK_BP: 138/70
PEAK_BP: 150/70
PEAK_RPE: 1

## 2024-03-05 ASSESSMENT — LIFESTYLE VARIABLES: SMOKELESS_TOBACCO: NO

## 2024-03-05 NOTE — PLAN OF CARE
Patient is doing 25 minutes on the nustep with a wl of 2 and 20 minutes on the treadmill at 1mph       Exercise Education   Exercise Education  Education: Energy conservation; Exercise safety; O2 therapy; Purse lip/abdominal breathing; RPE scale; Signs/symptoms to report       Exercise target group   Exercise Target Group  Target Goal(s): Individual exercise RX; SaO2>89%  Patient Stated Exercise Goals: to increase strength and endurance         Nutrition   Nutrition  Stages of Change: Maintenance  Diabetes: Yes  Diabetes Med(s): Ozempic  BG in Range: Yes       Lipids   No data recorded   Weight Management   Weight Management  Weight : 97.1 kg (214 lb)  Height : 1.6 m (5' 3\")  BMI: 37.99       Nutrition Intervention   No data recorded   Nutrition Education   Nutrition Education  Education: Reduced calorie/portion controlled diet       Nutrition Target Goals   Nutrition Target Goals  Target Goals: Weight loss of 5-10%         Psychosocial   Psychosocial  Stages of Change: Maintenance  Family Support: Yes  Comments: Pt is being treated for depression       Psychosocial intervention   Psychosocial Intervention  Interventions: Currently under treatment for depression  Currently Taking Psychotropic Meds: Yes  Medication Changes: No       Psychosocial education   Psychosocial Education  Education: Coping techniques; Environmental triggers; Relaxation techniques       Psychosocial target goals   Psychosocial Target Goals  Target Goal(s): Demonstrates appropriate interaction with others  Uses Stress Mgmt Techniques: Yes       Tobacco   Tobacco  Stages of Change: Maintenance  Tobacco Use: Yes  Quit: Greater than 6 month  Smokeless Tobacco Use: No  Amount: Patient denies vaping      Tobacco Cessation Intervention   Tobacco Cessation Intervention  Smoking Cessation Referral: No  Tobacco Adjunct: Yes       Tobacco Education   Tobacco Education  Resource Information Provided: Yes       Target Goal   No data recorded     Oxygen

## 2024-03-07 ENCOUNTER — HOSPITAL ENCOUNTER (OUTPATIENT)
Dept: PULMONOLOGY | Age: 69
Setting detail: THERAPIES SERIES
Discharge: HOME | End: 2024-03-07
Payer: MEDICARE

## 2024-03-07 VITALS — BODY MASS INDEX: 37.83 KG/M2 | WEIGHT: 213.5 LBS

## 2024-03-07 PROCEDURE — G0239 OTH RESP PROC, GROUP: HCPCS

## 2024-03-07 ASSESSMENT — EXERCISE STRESS TEST
PEAK_BP: 138/78
PEAK_RPE: 1
PEAK_METS: 2.9
PEAK_HR: 78

## 2024-03-12 ENCOUNTER — HOSPITAL ENCOUNTER (OUTPATIENT)
Dept: PULMONOLOGY | Age: 69
Setting detail: THERAPIES SERIES
Discharge: HOME | End: 2024-03-12
Payer: MEDICARE

## 2024-03-14 ENCOUNTER — HOSPITAL ENCOUNTER (OUTPATIENT)
Dept: PULMONOLOGY | Age: 69
Setting detail: THERAPIES SERIES
Discharge: HOME | End: 2024-03-14
Payer: MEDICARE

## 2024-03-14 VITALS — BODY MASS INDEX: 37.92 KG/M2 | WEIGHT: 214 LBS

## 2024-03-14 PROCEDURE — G0239 OTH RESP PROC, GROUP: HCPCS

## 2024-03-14 ASSESSMENT — EXERCISE STRESS TEST
PEAK_BP: 136/62
PEAK_METS: 2.6
PEAK_RPE: 1
PEAK_HR: 83

## 2024-03-19 ENCOUNTER — HOSPITAL ENCOUNTER (OUTPATIENT)
Dept: PULMONOLOGY | Age: 69
Setting detail: THERAPIES SERIES
Discharge: HOME | End: 2024-03-19
Payer: MEDICARE

## 2024-03-19 VITALS — BODY MASS INDEX: 37.56 KG/M2 | WEIGHT: 212 LBS

## 2024-03-19 PROCEDURE — G0239 OTH RESP PROC, GROUP: HCPCS

## 2024-03-19 ASSESSMENT — EXERCISE STRESS TEST
PEAK_HR: 77
PEAK_RPE: 3
PEAK_BP: 150/64
PEAK_METS: 2.8

## 2024-03-21 ENCOUNTER — HOSPITAL ENCOUNTER (OUTPATIENT)
Dept: PULMONOLOGY | Age: 69
Setting detail: THERAPIES SERIES
Discharge: HOME | End: 2024-03-21
Payer: MEDICARE

## 2024-03-21 VITALS — BODY MASS INDEX: 37.63 KG/M2 | WEIGHT: 212.4 LBS

## 2024-03-21 PROCEDURE — G0239 OTH RESP PROC, GROUP: HCPCS

## 2024-03-21 ASSESSMENT — EXERCISE STRESS TEST
PEAK_RPE: 1
PEAK_HR: 70
PEAK_BP: 132/72
PEAK_METS: 2.6

## 2024-03-26 ENCOUNTER — HOSPITAL ENCOUNTER (OUTPATIENT)
Dept: PULMONOLOGY | Age: 69
Setting detail: THERAPIES SERIES
Discharge: HOME | End: 2024-03-26
Payer: MEDICARE

## 2024-03-26 VITALS — OXYGEN SATURATION: 93 % | WEIGHT: 208.9 LBS | BODY MASS INDEX: 37.01 KG/M2

## 2024-03-26 PROCEDURE — G0239 OTH RESP PROC, GROUP: HCPCS

## 2024-03-26 ASSESSMENT — EXERCISE STRESS TEST
PEAK_RPE: 3
PEAK_RPE: 3
PEAK_BP: 136/62
PEAK_HR: 77
PEAK_BP: 170/70
PEAK_METS: 2.9
PEAK_BP: 150/64

## 2024-03-26 ASSESSMENT — PATIENT HEALTH QUESTIONNAIRE - PHQ9
10. IF YOU CHECKED OFF ANY PROBLEMS, HOW DIFFICULT HAVE THESE PROBLEMS MADE IT FOR YOU TO DO YOUR WORK, TAKE CARE OF THINGS AT HOME, OR GET ALONG WITH OTHER PEOPLE: NOT DIFFICULT AT ALL
SUM OF ALL RESPONSES TO PHQ QUESTIONS 1-9: 7
SUM OF ALL RESPONSES TO PHQ QUESTIONS 1-9: 7
7. TROUBLE CONCENTRATING ON THINGS, SUCH AS READING THE NEWSPAPER OR WATCHING TELEVISION: MORE THAN HALF THE DAYS
5. POOR APPETITE OR OVEREATING: NOT AT ALL
SUM OF ALL RESPONSES TO PHQ QUESTIONS 1-9: 7
SUM OF ALL RESPONSES TO PHQ9 QUESTIONS 1 & 2: 1
SUM OF ALL RESPONSES TO PHQ QUESTIONS 1-9: 7
2. FEELING DOWN, DEPRESSED OR HOPELESS: SEVERAL DAYS
4. FEELING TIRED OR HAVING LITTLE ENERGY: MORE THAN HALF THE DAYS
3. TROUBLE FALLING OR STAYING ASLEEP: SEVERAL DAYS
1. LITTLE INTEREST OR PLEASURE IN DOING THINGS: NOT AT ALL
8. MOVING OR SPEAKING SO SLOWLY THAT OTHER PEOPLE COULD HAVE NOTICED. OR THE OPPOSITE, BEING SO FIGETY OR RESTLESS THAT YOU HAVE BEEN MOVING AROUND A LOT MORE THAN USUAL: NOT AT ALL
6. FEELING BAD ABOUT YOURSELF - OR THAT YOU ARE A FAILURE OR HAVE LET YOURSELF OR YOUR FAMILY DOWN: SEVERAL DAYS
9. THOUGHTS THAT YOU WOULD BE BETTER OFF DEAD, OR OF HURTING YOURSELF: NOT AT ALL

## 2024-03-26 ASSESSMENT — LIFESTYLE VARIABLES: SMOKELESS_TOBACCO: NO

## 2024-03-26 NOTE — PLAN OF CARE
Hospital Based Pulmonary Rehab: Individual Treatment Plan  [facility] Edgar Olivo, 69 y.o. female, -1955 Today's Date: 3/26/2024    Qualifying Diagnosis-   Treatment Diagnosis 1: Other (Comments)      Treatment Diagnosis 2: -- (Other diseases of the lung, mild COPD)       COPD severity (if applicable): Mild     Patient completed and discharged from pulmonary rehab program. PHQ9 score 7, Dyspnea test 31. Patient improved 6 minute walk test by 125 feet, walking a total of 825 feet. Patient was informed of Phase 3 pulmonary rehab class to continue wellness.       Treatment Diagnosis   Treatment Diagnosis  Treatment Diagnosis 1: Other (Comments)  Treatment Diagnosis 2:  (Other diseases of the lung, mild COPD)  Significant Cardiovascular History: Peripheral arterial disease (HTN)  Co-morbidities: Diabetes, Pulmonary disease     ITP Visit Type  ITP Visit Type: Discharge, completed program         Exercise   Exercise  Reyna Total Score: 20  Stages of Change: Maintenance  Exercise Test: Six minute walk test  Distance Walked in : ft  Distance Walked (ft): 850 ft  Peak HR: 77  Peak BP: 136/62  Peak RPE: 3  Peak Mets: 2.9  O2 Saturation: 1  O2 Flow Rate (l/min): 0 l/min (room air)  Stops: 1 (37 seconds due to hip pain)  SPO2 Range: 90-93  BMI (Calculated): 38.95  FEV1 (%PRED): 75       Exercise Prescription   Exercise Prescription  Mode: Treadmill; Stepper; Ergometer (free weights)  Frequency per week: 2  Duration Per Session: up to 60 minuites  Intensity METS      : RPE/RPD <5  Progression: Increase aerobic, resistance and weight training gradually based on Orthopedic restrictions, pain and established parameters for Sp02, Max HR, BP, RPD and RPE.  SpO2: 93 %  O2 Device: Room air  O2 Flow Rate (l/min): 0 l/min (room air)  Comments: Patient notices increased endurance with ADL's at home  Symptoms with Exercise: Other (comment) (hip pain)  Target Heart Rate: 137 (max)  Resistance Training:

## 2024-03-29 NOTE — TELEPHONE ENCOUNTER
A Refill Has Been Requested for Amina Olivo    Medication Requested  Requested Prescriptions     Pending Prescriptions Disp Refills    omeprazole (PRILOSEC) 40 MG delayed release capsule [Pharmacy Med Name: OMEPRAZOLE DR 40 MG CAPSULE] 90 capsule 1     Sig: take 1 capsule by mouth once daily       Last Visit Date (If Applicable)  11/21/2023    Next Visit Date (If Applicable)  5/7/2024

## 2024-04-01 RX ORDER — OMEPRAZOLE 40 MG/1
CAPSULE, DELAYED RELEASE ORAL
Qty: 90 CAPSULE | Refills: 1 | Status: SHIPPED | OUTPATIENT
Start: 2024-04-01

## 2024-04-11 ENCOUNTER — TELEPHONE (OUTPATIENT)
Dept: PULMONOLOGY | Age: 69
End: 2024-04-11

## 2024-04-11 ENCOUNTER — OFFICE VISIT (OUTPATIENT)
Dept: PULMONOLOGY | Age: 69
End: 2024-04-11
Payer: MEDICARE

## 2024-04-11 VITALS
HEIGHT: 63 IN | HEART RATE: 63 BPM | DIASTOLIC BLOOD PRESSURE: 61 MMHG | RESPIRATION RATE: 13 BRPM | WEIGHT: 207 LBS | BODY MASS INDEX: 36.68 KG/M2 | SYSTOLIC BLOOD PRESSURE: 117 MMHG | OXYGEN SATURATION: 95 % | TEMPERATURE: 98 F

## 2024-04-11 DIAGNOSIS — Z87.891 HISTORY OF SMOKING 30 OR MORE PACK YEARS: ICD-10-CM

## 2024-04-11 DIAGNOSIS — E66.01 SEVERE OBESITY (BMI 35.0-39.9) WITH COMORBIDITY (HCC): ICD-10-CM

## 2024-04-11 DIAGNOSIS — Z87.891 PERSONAL HISTORY OF TOBACCO USE: ICD-10-CM

## 2024-04-11 DIAGNOSIS — J44.9 CHRONIC OBSTRUCTIVE PULMONARY DISEASE, UNSPECIFIED COPD TYPE (HCC): Primary | ICD-10-CM

## 2024-04-11 DIAGNOSIS — G47.33 OSA ON CPAP: ICD-10-CM

## 2024-04-11 DIAGNOSIS — R91.1 LUNG NODULE: ICD-10-CM

## 2024-04-11 DIAGNOSIS — R09.82 POST-NASAL DRIP: ICD-10-CM

## 2024-04-11 PROCEDURE — G8427 DOCREV CUR MEDS BY ELIG CLIN: HCPCS | Performed by: INTERNAL MEDICINE

## 2024-04-11 PROCEDURE — 3017F COLORECTAL CA SCREEN DOC REV: CPT | Performed by: INTERNAL MEDICINE

## 2024-04-11 PROCEDURE — 1090F PRES/ABSN URINE INCON ASSESS: CPT | Performed by: INTERNAL MEDICINE

## 2024-04-11 PROCEDURE — 3078F DIAST BP <80 MM HG: CPT | Performed by: INTERNAL MEDICINE

## 2024-04-11 PROCEDURE — G8417 CALC BMI ABV UP PARAM F/U: HCPCS | Performed by: INTERNAL MEDICINE

## 2024-04-11 PROCEDURE — 1036F TOBACCO NON-USER: CPT | Performed by: INTERNAL MEDICINE

## 2024-04-11 PROCEDURE — 3023F SPIROM DOC REV: CPT | Performed by: INTERNAL MEDICINE

## 2024-04-11 PROCEDURE — 1123F ACP DISCUSS/DSCN MKR DOCD: CPT | Performed by: INTERNAL MEDICINE

## 2024-04-11 PROCEDURE — G8399 PT W/DXA RESULTS DOCUMENT: HCPCS | Performed by: INTERNAL MEDICINE

## 2024-04-11 PROCEDURE — 3074F SYST BP LT 130 MM HG: CPT | Performed by: INTERNAL MEDICINE

## 2024-04-11 PROCEDURE — 99214 OFFICE O/P EST MOD 30 MIN: CPT | Performed by: INTERNAL MEDICINE

## 2024-04-11 PROCEDURE — G0296 VISIT TO DETERM LDCT ELIG: HCPCS | Performed by: INTERNAL MEDICINE

## 2024-04-11 ASSESSMENT — SLEEP AND FATIGUE QUESTIONNAIRES
HOW LIKELY ARE YOU TO NOD OFF OR FALL ASLEEP WHILE SITTING AND TALKING TO SOMEONE: WOULD NEVER DOZE
HOW LIKELY ARE YOU TO NOD OFF OR FALL ASLEEP WHILE SITTING AND READING: WOULD NEVER DOZE
HOW LIKELY ARE YOU TO NOD OFF OR FALL ASLEEP WHILE SITTING INACTIVE IN A PUBLIC PLACE: WOULD NEVER DOZE
HOW LIKELY ARE YOU TO NOD OFF OR FALL ASLEEP WHILE SITTING QUIETLY AFTER LUNCH WITHOUT ALCOHOL: WOULD NEVER DOZE
HOW LIKELY ARE YOU TO NOD OFF OR FALL ASLEEP WHILE LYING DOWN TO REST IN THE AFTERNOON WHEN CIRCUMSTANCES PERMIT: HIGH CHANCE OF DOZING
HOW LIKELY ARE YOU TO NOD OFF OR FALL ASLEEP WHILE WATCHING TV: MODERATE CHANCE OF DOZING
HOW LIKELY ARE YOU TO NOD OFF OR FALL ASLEEP IN A CAR, WHILE STOPPED FOR A FEW MINUTES IN TRAFFIC: WOULD NEVER DOZE
ESS TOTAL SCORE: 5

## 2024-04-11 NOTE — PATIENT INSTRUCTIONS
Kristine will fax order to [facility].  Kristine will f/u with RT Christina, about O2 order. If we need to we will schedule the 6 mwt at the main office.   Kristine will schedule the lung screen at [place], after 1p.    4/12/24mm: Per [facility], there is no need for pt to do another 6 mwt. They will reach out to the pt to discuss O2.    Kristine faxed order to [facility].   Learning About Lung Cancer Screening  What is screening for lung cancer?     Lung cancer screening is a way to find some lung cancers early, before a person has any symptoms of the cancer.  Lung cancer screening may help those who have the highest risk for lung cancer--people age 50 and older who are or were heavy smokers. For most people, who aren't at increased risk, screening for lung cancer probably isn't helpful.  Screening won't prevent cancer. And it may not find all lung cancers. Lung cancer screening may lower the risk of dying from lung cancer in a small number of people.  How is it done?  Lung cancer screening is done with a low-dose CT (computed tomography) scan. A CT scan uses X-rays, or radiation, to make detailed pictures of your body. Experts recommend that screening be done in medical centers that focus on finding and treating lung cancer.  Who is screening recommended for?  Lung cancer screening is recommended for people age 50 and older who are or were heavy smokers. That means people with a smoking history of at least 20 pack years. A pack year is a way to measure how heavy a smoker you are or were.  To figure out your pack years, multiply how many packs a day on average (assuming 20 cigarettes per pack) you have smoked by how many years you have smoked. For example:  If you smoked 1 pack a day for 20 years, that's 1 times 20. So you have a smoking history of 20 pack years.  If you smoked 2 packs a day for 10 years, that's 2 times 10. So you have a smoking history of 20 pack years.  Experts agree that screening is for people who have a high risk

## 2024-04-11 NOTE — TELEPHONE ENCOUNTER
Ant Vasquez-  Dr. Hernandez ordered O2 for this pt in 8/2023. You documented you faxed order to [facility]. Pt hasn't heard from them. Dr. Hernandez ordered another walk test. I have not scheduled the pt for this yet. I am unsure if a new one is needed. Please advise. Thank you.

## 2024-04-11 NOTE — PROGRESS NOTES
getting pulmonary rehabilitation and refer her for pulmonary rehab.        Plan and Recommendations:    I have advised the patient that she should follow-up with cardiology.  According patient she has an appointment next week with cardiology.  I have advised her that if she has recurrent chest pain again or increase chest pain she should go to emergency room if it happened before her cardiology appointment next week.  She does have history of previous stent and coronary artery disease  6 min walk test on next visit  Pulmonary function test reviewed and her FEV1 is 75% consistent with mild obstruction there is concomitant moderate restriction present diffusion capacity is normal.    Continue Stiolto Respimat 2 puffs once daily.  Refills given  Continue with Arnuity 100 mcg 1 puff once daily  Albuterol as needed  She will need yearly CT scan for screening and next one should be in June 2024.  Requested and discussed with patient  CT scan of the chest from June 2023 seen    Maintain an active lifestyle   Recommended flu vaccine in fall. She had flu vaccine last year  She had both doses of Covid vaccine. Had 1 booster.  Booster recommended.  She does not want any more COVID booster  She had Prevnar 13 in April 2018  Uptodate on Pneumonia vaccine.      Continue with AutoCPAP at current setting  20/12 cm  CPAP at least 4 hrs qhs  Wt loss is recommended and discussed  Follow good sleep hygeine instructions  Use humidifier   Questions answered pertaining to diagnosis and management explained importance of compliance with therapy   Compliance data reviewed.      RTC 4 months.    It was my pleasure to evaluate Amina Olivo today.  Please call with questions.    Please note that this chart was generated using voice recognition Dragon dictation software. Although every effort was made to ensure the accuracy of this automated transcription, some errors in transcription may have occurred.    Alen Hernandez MD, MD

## 2024-04-12 NOTE — TELEPHONE ENCOUNTER
Writer spoke with RT Christina. She was informed by [facility], that a new 6 MWT is not needed. [facility] has all the information she needs to call pt to set up O2. LMOM informing pt to expect a call from [facility].

## 2024-04-12 NOTE — TELEPHONE ENCOUNTER
[facility] stated pt will need to have a current 6 mwt if she would like home O2. Writer will call pt to schedule.

## 2024-04-25 DIAGNOSIS — N39.46 MIXED STRESS AND URGE URINARY INCONTINENCE: ICD-10-CM

## 2024-04-25 RX ORDER — SOLIFENACIN SUCCINATE 5 MG/1
TABLET, FILM COATED ORAL
Qty: 30 TABLET | Refills: 0 | Status: SHIPPED | OUTPATIENT
Start: 2024-04-25

## 2024-04-30 ENCOUNTER — OFFICE VISIT (OUTPATIENT)
Dept: PULMONOLOGY | Age: 69
End: 2024-04-30

## 2024-04-30 VITALS — HEIGHT: 63 IN | BODY MASS INDEX: 36.68 KG/M2 | WEIGHT: 207 LBS | HEART RATE: 75 BPM | OXYGEN SATURATION: 95 %

## 2024-04-30 DIAGNOSIS — J44.9 CHRONIC OBSTRUCTIVE PULMONARY DISEASE, UNSPECIFIED COPD TYPE (HCC): Primary | ICD-10-CM

## 2024-04-30 NOTE — PROGRESS NOTES
[facility], [facility].  [address]  [facility] [address]    Oximetry Report  Testing Date: 04/30/24      Name: Amina Olivo    Age: 69 y.o.     Gender: female   Diagnosis:   1. Chronic obstructive pulmonary disease, unspecified COPD type (HCC)                                              Weight: 207                                                  Height: 63 in    Smoke HX:  reports that she quit smoking about 8 years ago. Her smoking use included cigarettes. She started smoking about 56 years ago. She has a 71.3 pack-year smoking history. She has never used smokeless tobacco.                                                            Max - Target  Heart Rate 183 / 146  Inspired O2 SP02% Max Heart Rate Assist Device Activity Pace Distance Walked (ft) Time (min.)   R/A 95 75  rest      R/A 86 100  Walk  Normal  750 4   N/C-2 95 102  Walk  Normal  250 2   N/C-3          N/C          R/A= Roomed Air, NC = Oxygen by Nasal Cannula    Distance Walk Predicted Distance LLN** Predicted % Duration (min) Duration of Stops Sec Devika Dyspnea Devika Fatigue   1000 1250 793 79 6 30 sec 3 3   **LLN= Lower limits of normal **LLN= Lower limits of normal  (from Radha and Simin, American Journal of Respiratory and Critical Care Medicine;158:1384-87)       Comments: The patient walked for 6 minutes at a slow normal pace. She walked for 750 ft on room air and her oxygen saturation 86%. She was started an oxygen at flow of 2 with a conserving device to keep her saturation above 90%. She  did exhibit signs of dyspnea and did complain.

## 2024-05-07 ENCOUNTER — OFFICE VISIT (OUTPATIENT)
Dept: INTERNAL MEDICINE | Age: 69
End: 2024-05-07
Payer: MEDICARE

## 2024-05-07 VITALS
OXYGEN SATURATION: 94 % | HEIGHT: 63 IN | DIASTOLIC BLOOD PRESSURE: 80 MMHG | SYSTOLIC BLOOD PRESSURE: 136 MMHG | HEART RATE: 63 BPM | WEIGHT: 212 LBS | BODY MASS INDEX: 37.56 KG/M2 | TEMPERATURE: 97.3 F

## 2024-05-07 DIAGNOSIS — E78.5 DYSLIPIDEMIA: ICD-10-CM

## 2024-05-07 DIAGNOSIS — I73.9 PAD (PERIPHERAL ARTERY DISEASE) (HCC): ICD-10-CM

## 2024-05-07 DIAGNOSIS — F17.200 SMOKER: ICD-10-CM

## 2024-05-07 DIAGNOSIS — E11.8 TYPE 2 DIABETES MELLITUS WITH COMPLICATION, WITHOUT LONG-TERM CURRENT USE OF INSULIN (HCC): Primary | ICD-10-CM

## 2024-05-07 DIAGNOSIS — I1A.0 RESISTANT HYPERTENSION: ICD-10-CM

## 2024-05-07 DIAGNOSIS — G47.33 OSA ON CPAP: ICD-10-CM

## 2024-05-07 DIAGNOSIS — I25.10 CORONARY ARTERY DISEASE INVOLVING NATIVE CORONARY ARTERY OF NATIVE HEART WITHOUT ANGINA PECTORIS: ICD-10-CM

## 2024-05-07 DIAGNOSIS — J44.9 STAGE 2 MODERATE COPD BY GOLD CLASSIFICATION (HCC): ICD-10-CM

## 2024-05-07 DIAGNOSIS — F33.1 MODERATE RECURRENT MAJOR DEPRESSION (HCC): ICD-10-CM

## 2024-05-07 DIAGNOSIS — F41.9 ANXIETY: ICD-10-CM

## 2024-05-07 PROBLEM — I20.9 ANGINA PECTORIS, UNSPECIFIED (HCC): Status: ACTIVE | Noted: 2024-05-07

## 2024-05-07 PROBLEM — I25.119 ATHEROSCLEROTIC HEART DISEASE OF NATIVE CORONARY ARTERY WITH UNSPECIFIED ANGINA PECTORIS (HCC): Status: ACTIVE | Noted: 2024-05-07

## 2024-05-07 PROBLEM — I20.9 ANGINA PECTORIS, UNSPECIFIED (HCC): Status: RESOLVED | Noted: 2024-05-07 | Resolved: 2024-05-07

## 2024-05-07 PROBLEM — F33.0 MAJOR DEPRESSIVE DISORDER, RECURRENT EPISODE, MILD WITH ANXIOUS DISTRESS (HCC): Status: RESOLVED | Noted: 2022-08-16 | Resolved: 2024-05-07

## 2024-05-07 LAB — HBA1C MFR BLD: 6.1 %

## 2024-05-07 PROCEDURE — G8399 PT W/DXA RESULTS DOCUMENT: HCPCS | Performed by: INTERNAL MEDICINE

## 2024-05-07 PROCEDURE — 3023F SPIROM DOC REV: CPT | Performed by: INTERNAL MEDICINE

## 2024-05-07 PROCEDURE — 1123F ACP DISCUSS/DSCN MKR DOCD: CPT | Performed by: INTERNAL MEDICINE

## 2024-05-07 PROCEDURE — 2022F DILAT RTA XM EVC RTNOPTHY: CPT | Performed by: INTERNAL MEDICINE

## 2024-05-07 PROCEDURE — 1036F TOBACCO NON-USER: CPT | Performed by: INTERNAL MEDICINE

## 2024-05-07 PROCEDURE — 3017F COLORECTAL CA SCREEN DOC REV: CPT | Performed by: INTERNAL MEDICINE

## 2024-05-07 PROCEDURE — 83036 HEMOGLOBIN GLYCOSYLATED A1C: CPT | Performed by: INTERNAL MEDICINE

## 2024-05-07 PROCEDURE — 3075F SYST BP GE 130 - 139MM HG: CPT | Performed by: INTERNAL MEDICINE

## 2024-05-07 PROCEDURE — 99214 OFFICE O/P EST MOD 30 MIN: CPT | Performed by: INTERNAL MEDICINE

## 2024-05-07 PROCEDURE — G8417 CALC BMI ABV UP PARAM F/U: HCPCS | Performed by: INTERNAL MEDICINE

## 2024-05-07 PROCEDURE — 3078F DIAST BP <80 MM HG: CPT | Performed by: INTERNAL MEDICINE

## 2024-05-07 PROCEDURE — 3044F HG A1C LEVEL LT 7.0%: CPT | Performed by: INTERNAL MEDICINE

## 2024-05-07 PROCEDURE — 1090F PRES/ABSN URINE INCON ASSESS: CPT | Performed by: INTERNAL MEDICINE

## 2024-05-07 PROCEDURE — G8427 DOCREV CUR MEDS BY ELIG CLIN: HCPCS | Performed by: INTERNAL MEDICINE

## 2024-05-07 PROCEDURE — 99213 OFFICE O/P EST LOW 20 MIN: CPT | Performed by: INTERNAL MEDICINE

## 2024-05-07 RX ORDER — AMLODIPINE BESYLATE 10 MG/1
TABLET ORAL
Qty: 90 TABLET | Refills: 1 | Status: SHIPPED | OUTPATIENT
Start: 2024-05-07

## 2024-05-07 RX ORDER — HYDROCHLOROTHIAZIDE 25 MG/1
TABLET ORAL
Qty: 90 TABLET | Refills: 1 | Status: SHIPPED | OUTPATIENT
Start: 2024-05-07

## 2024-05-07 RX ORDER — LOSARTAN POTASSIUM 100 MG/1
TABLET ORAL
Qty: 90 TABLET | Refills: 1 | Status: SHIPPED | OUTPATIENT
Start: 2024-05-07

## 2024-05-07 RX ORDER — SEMAGLUTIDE 1.34 MG/ML
0.25 INJECTION, SOLUTION SUBCUTANEOUS WEEKLY
Qty: 4 ML | Refills: 2 | Status: SHIPPED | OUTPATIENT
Start: 2024-05-07

## 2024-05-07 RX ORDER — ROSUVASTATIN CALCIUM 5 MG/1
5 TABLET, COATED ORAL NIGHTLY
Qty: 90 TABLET | Refills: 1 | Status: SHIPPED | OUTPATIENT
Start: 2024-05-07

## 2024-05-07 RX ORDER — ALBUTEROL SULFATE 90 UG/1
2 AEROSOL, METERED RESPIRATORY (INHALATION) EVERY 6 HOURS PRN
Qty: 1 EACH | Refills: 5 | Status: SHIPPED | OUTPATIENT
Start: 2024-05-07

## 2024-05-07 RX ORDER — CARVEDILOL 3.12 MG/1
TABLET ORAL
Qty: 180 TABLET | Refills: 1 | Status: SHIPPED | OUTPATIENT
Start: 2024-05-07

## 2024-05-07 ASSESSMENT — ENCOUNTER SYMPTOMS
WHEEZING: 0
BACK PAIN: 1

## 2024-05-07 NOTE — PROGRESS NOTES
[facility]    Progress Note    Date of patient's visit: 5/7/2024    Patient's Name:  Amina Olivo    YOB: 1955            Patient Care Team:  Kavita Page MD as PCP - General (Internal Medicine)  Kavita Page MD as PCP - Empaneled Provider  Alen Hernandez MD as Consulting Physician (Pulmonology)    REASON FOR VISIT: Routine outpatient follow     Chief Complaint   Patient presents with    Diabetes     7 month f/u     Medication Refill     Med pended         HISTORY OF PRESENT ILLNESS:    History was obtained from the patient. Amina Olivo is a 69 y.o. is here for follow-up of her chronic medical problems.  She is under a lot of stress as her mother was recently admitted to the hospital but is probably going to be readmitted today due to seizures or CVA.  Blood pressure is a little high.  She is anxious.  She went to the cardiologist recently as she was having chest pain.  On that day her blood pressure was okay.  Stress test is pending for next week.  She is on aspirin and statins.  She continues to vape with nicotine products.  She is trying to cut down.  She follows up with pulmonologist for her COPD.  Low-dose CT was done.  She denies any cough.  She has chronic shortness of breath.  She has severe peripheral vascular disease.  She had carotid artery ultrasounds done last year.  She follows once a year with vascular surgeon.  She had a left carotid endarterectomy in the past and has 50 to 60% obstruction of her right carotid.  She also has peripheral vascular disease with her lower extremities.  She takes Repatha and statin for her peripheral vascular disease and hyperlipidemia.  She is not very compliant with diet but semaglutide seems to be helping with early satiety and she is eating smaller portions.  Food is not healthy though.  She has depression and anxiety.  She is on sertraline.      Results for POC orders placed in visit on

## 2024-05-10 ASSESSMENT — ENCOUNTER SYMPTOMS
SHORTNESS OF BREATH: 1
CHEST TIGHTNESS: 0
COUGH: 0

## 2024-05-18 DIAGNOSIS — I10 ESSENTIAL HYPERTENSION: ICD-10-CM

## 2024-05-20 NOTE — TELEPHONE ENCOUNTER
Request for   Requested Prescriptions     Pending Prescriptions Disp Refills    potassium chloride (KLOR-CON M) 10 MEQ extended release tablet [Pharmacy Med Name: POTASSIUM CL ER 10 MEQ TABLET] 90 tablet 2     Sig: take 1 tablet by mouth once daily    .      Please review and e-scribe to pharmacy listed in chart if appropriate. Thank you.      Last Visit Date: 5/7/2024  Next Visit Date: 6/25/2024    Future Appointments   Date Time Provider Department Center   6[facility]20/2024 11:00 [facility]   6/25/2024 11:30 AM SCHEDULE, P [facility] IM NURSE [facility]   6/27/2024  1:30 PM [place] CT RM 2 [facility] PB CT [facility]   8/8/2024  2:30 PM Alen Hernandez MD Resp [facility]       Health Maintenance   Topic Date Due    Hepatitis C screen  Never done    Shingles vaccine (1 of 2) Never done    Respiratory Syncytial Virus (RSV) Pregnant or age 60 yrs+ (1 - 1-dose 60+ series) Never done    Diabetic foot exam  05/13/2022    Diabetic Alb to Cr ratio (uACR) test  04/07/2023    COVID-19 Vaccine (5 - 2023-24 season) 09/01/2023    Annual Wellness Visit (Medicare Advantage)  01/01/2024    GFR test (Diabetes, CKD 3-4, OR last GFR 15-59)  04/04/2024    Low dose CT lung screening &/or counseling  06/27/2024    Lipids  07/13/2024    Breast cancer screen  01/27/2025    Diabetic retinal exam  02/22/2025    Depression Monitoring  03/26/2025    A1C test (Diabetic or Prediabetic)  05/07/2025    Colorectal Cancer Screen  05/10/2026    DTaP/Tdap/Td vaccine (2 - Td or Tdap) 10/24/2028    DEXA (modify frequency per FRAX score)  Completed    Flu vaccine  Completed    Pneumococcal 65+ years Vaccine  Completed    Hepatitis A vaccine  Aged Out    Hepatitis B vaccine  Aged Out    Hib vaccine  Aged Out    Polio vaccine  Aged Out    Meningococcal (ACWY) vaccine  Aged Out       Hemoglobin A1C (%)   Date Value   05/07/2024 6.1   11/21/2023 6.1   07/13/2023 6.3 (H)             ( goal A1C is < 7)   No components found

## 2024-05-21 ENCOUNTER — TELEPHONE (OUTPATIENT)
Dept: CARDIOTHORACIC SURGERY | Age: 69
End: 2024-05-21

## 2024-05-21 RX ORDER — POTASSIUM CHLORIDE 750 MG/1
TABLET, EXTENDED RELEASE ORAL
Qty: 90 TABLET | Refills: 2 | Status: SHIPPED | OUTPATIENT
Start: 2024-05-21

## 2024-06-01 DIAGNOSIS — N39.46 MIXED STRESS AND URGE URINARY INCONTINENCE: ICD-10-CM

## 2024-06-03 RX ORDER — SOLIFENACIN SUCCINATE 5 MG/1
TABLET, FILM COATED ORAL
Qty: 30 TABLET | Refills: 0 | Status: SHIPPED | OUTPATIENT
Start: 2024-06-03

## 2024-06-03 NOTE — TELEPHONE ENCOUNTER
Amina Olivo is calling to request a refill on the following medication(s):    Medication Request:  Requested Prescriptions     Pending Prescriptions Disp Refills    solifenacin (VESICARE) 5 MG tablet [Pharmacy Med Name: SOLIFENACIN 5 MG TABLET] 30 tablet 0     Sig: take 1 tablet by mouth once daily       Last Visit Date (If Applicable):  5/7/2024    Next Visit Date:    6/25/2024

## 2024-06-05 ENCOUNTER — APPOINTMENT (OUTPATIENT)
Dept: VASCULAR LAB | Age: 69
End: 2024-06-05
Attending: EMERGENCY MEDICINE
Payer: MEDICARE

## 2024-06-05 ENCOUNTER — HOSPITAL ENCOUNTER (EMERGENCY)
Age: 69
Discharge: HOME | End: 2024-06-05
Attending: EMERGENCY MEDICINE
Payer: MEDICARE

## 2024-06-05 VITALS
RESPIRATION RATE: 18 BRPM | BODY MASS INDEX: 35.08 KG/M2 | HEIGHT: 63 IN | TEMPERATURE: 98.2 F | HEART RATE: 77 BPM | SYSTOLIC BLOOD PRESSURE: 146 MMHG | WEIGHT: 198 LBS | OXYGEN SATURATION: 93 % | DIASTOLIC BLOOD PRESSURE: 50 MMHG

## 2024-06-05 DIAGNOSIS — L03.116 CELLULITIS OF LEFT LOWER EXTREMITY: Primary | ICD-10-CM

## 2024-06-05 LAB
ANION GAP SERPL CALCULATED.3IONS-SCNC: 13 MMOL/L (ref 9–17)
BASOPHILS # BLD: 0.1 K/UL (ref 0–0.2)
BASOPHILS NFR BLD: 1 % (ref 0–2)
BUN SERPL-MCNC: 22 MG/DL (ref 8–23)
CALCIUM SERPL-MCNC: 8.6 MG/DL (ref 8.6–10.4)
CHLORIDE SERPL-SCNC: 98 MMOL/L (ref 98–107)
CO2 SERPL-SCNC: 26 MMOL/L (ref 20–31)
CREAT SERPL-MCNC: 0.8 MG/DL (ref 0.5–0.9)
EOSINOPHIL # BLD: 0.1 K/UL (ref 0–0.4)
EOSINOPHILS RELATIVE PERCENT: 1 % (ref 1–4)
ERYTHROCYTE [DISTWIDTH] IN BLOOD BY AUTOMATED COUNT: 17.3 % (ref 12.5–15.4)
GFR, ESTIMATED: 79 ML/MIN/1.73M2
GLUCOSE SERPL-MCNC: 104 MG/DL (ref 70–99)
HCT VFR BLD AUTO: 29.7 % (ref 36–46)
HGB BLD-MCNC: 9.4 G/DL (ref 12–16)
LYMPHOCYTES NFR BLD: 2 K/UL (ref 1–4.8)
LYMPHOCYTES RELATIVE PERCENT: 15 % (ref 24–44)
MCH RBC QN AUTO: 22.9 PG (ref 26–34)
MCHC RBC AUTO-ENTMCNC: 31.6 G/DL (ref 31–37)
MCV RBC AUTO: 72.2 FL (ref 80–100)
MONOCYTES NFR BLD: 1.2 K/UL (ref 0.1–1.2)
MONOCYTES NFR BLD: 10 % (ref 2–11)
NEUTROPHILS NFR BLD: 73 % (ref 36–66)
NEUTS SEG NFR BLD: 9.5 K/UL (ref 1.8–7.7)
PLATELET # BLD AUTO: 397 K/UL (ref 140–450)
PMV BLD AUTO: 7.6 FL (ref 6–12)
POTASSIUM SERPL-SCNC: 3.5 MMOL/L (ref 3.7–5.3)
RBC # BLD AUTO: 4.11 M/UL (ref 4–5.2)
SODIUM SERPL-SCNC: 137 MMOL/L (ref 135–144)
WBC OTHER # BLD: 12.8 K/UL (ref 3.5–11)

## 2024-06-05 PROCEDURE — 96365 THER/PROPH/DIAG IV INF INIT: CPT

## 2024-06-05 PROCEDURE — 80048 BASIC METABOLIC PNL TOTAL CA: CPT

## 2024-06-05 PROCEDURE — 87040 BLOOD CULTURE FOR BACTERIA: CPT

## 2024-06-05 PROCEDURE — 93971 EXTREMITY STUDY: CPT

## 2024-06-05 PROCEDURE — 85025 COMPLETE CBC W/AUTO DIFF WBC: CPT

## 2024-06-05 PROCEDURE — 99284 EMERGENCY DEPT VISIT MOD MDM: CPT

## 2024-06-05 PROCEDURE — 2580000003 HC RX 258: Performed by: EMERGENCY MEDICINE

## 2024-06-05 PROCEDURE — 36415 COLL VENOUS BLD VENIPUNCTURE: CPT

## 2024-06-05 PROCEDURE — 6360000002 HC RX W HCPCS: Performed by: EMERGENCY MEDICINE

## 2024-06-05 RX ORDER — DOXYCYCLINE HYCLATE 100 MG
100 TABLET ORAL 2 TIMES DAILY
Qty: 20 TABLET | Refills: 0 | Status: SHIPPED | OUTPATIENT
Start: 2024-06-05 | End: 2024-06-15

## 2024-06-05 RX ADMIN — VANCOMYCIN HYDROCHLORIDE 1000 MG: 1 INJECTION, POWDER, LYOPHILIZED, FOR SOLUTION INTRAVENOUS at 19:14

## 2024-06-05 ASSESSMENT — PAIN - FUNCTIONAL ASSESSMENT: PAIN_FUNCTIONAL_ASSESSMENT: 0-10

## 2024-06-05 ASSESSMENT — PAIN SCALES - GENERAL: PAINLEVEL_OUTOF10: 8

## 2024-06-05 NOTE — ED PROVIDER NOTES
[facility] Emergency Department  [address]  [place] [address]  Phone: [phone]  Fax: [phone]  EMERGENCY DEPARTMENT ENCOUNTER      Pt Name: Amina Olivo  MRN: [id number]  Birthdate 1955  Date of evaluation: 6/5/2024    CHIEF COMPLAINT       Chief Complaint   Patient presents with    Leg Swelling     Pt c/o increased redness and swelling in her left lower leg. Patient states she has a skin infection called NLD.        HISTORY OF PRESENT ILLNESS    Amina Olivo is a 69 y.o. female who presents to the emergency department complaining of increasing left leg swelling and redness.  Chronic redness sees dermatology and has been seen at [facility] for NLD with the swelling in the past she has had cellulitis and has needed antibiotics.  She denies any chest pain or shortness of breath.  No fevers but has had chills.  Swelling is worsened over the past 4 days.  Denies injury    REVIEW OF SYSTEMS       Constitutional: No fevers or chills   HENT: No sore throat, rhinorrhea, or earache   Eyes: No blurry vision or double vision no drainage   Cardiovascular: No chest pain or tachycardia   Respiratory: No wheezing or shortness of breath no cough   Gastrointestinal: No nausea, vomiting, diarrhea, constipation, or abdominal pain   : No hematuria or dysuria   Musculoskeletal: Right leg swelling  Skin: Chronic right leg redness  Neurological: No focal neurologic complaints, paresthesias, weakness, or headache     PAST MEDICAL HISTORY    has a past medical history of Angina pectoris ([facility]), Arthritis, Bipolar disorder (HCC), CAD (coronary artery disease), Carotid stenosis, Carotid stenosis, asymptomatic, Chronic back pain, COPD (chronic obstructive pulmonary disease) ([facility]), Current every day vaping, Depression, Dyspnea, Family history of colon cancer, Family history of liver cancer, Family history of ovarian cancer, Former smoker, GERD (gastroesophageal reflux disease), History of

## 2024-06-05 NOTE — DISCHARGE INSTRUCTIONS
Take medications as prescribed    Follow-up very closely with your doctor within the next 24 to 48 hours  Return immediately if any worsening symptoms including worsening pain, worsening swelling, fevers or any other concerns    Please understand that early in the process of an illness or injury, an emergency department workup can be falsely reassuring.      Tell us how we did visit: [email or web address]   and let us know about your experience

## 2024-06-06 LAB — ECHO BSA: 2 M2

## 2024-06-06 PROCEDURE — 93971 EXTREMITY STUDY: CPT | Performed by: SURGERY

## 2024-06-09 LAB
MICROORGANISM SPEC CULT: NORMAL
MICROORGANISM SPEC CULT: NORMAL
SERVICE CMNT-IMP: NORMAL
SERVICE CMNT-IMP: NORMAL
SPECIMEN DESCRIPTION: NORMAL
SPECIMEN DESCRIPTION: NORMAL

## 2024-06-13 ENCOUNTER — HOSPITAL ENCOUNTER (OUTPATIENT)
Age: 69
Setting detail: OUTPATIENT SURGERY
Discharge: HOME | End: 2024-06-13
Attending: INTERNAL MEDICINE | Admitting: INTERNAL MEDICINE
Payer: MEDICARE

## 2024-06-13 VITALS
RESPIRATION RATE: 15 BRPM | BODY MASS INDEX: 34.15 KG/M2 | TEMPERATURE: 97.6 F | SYSTOLIC BLOOD PRESSURE: 122 MMHG | OXYGEN SATURATION: 84 % | DIASTOLIC BLOOD PRESSURE: 56 MMHG | WEIGHT: 200 LBS | HEART RATE: 66 BPM | HEIGHT: 64 IN

## 2024-06-13 DIAGNOSIS — R94.39 ABNORMAL STRESS TEST: ICD-10-CM

## 2024-06-13 LAB — ECHO BSA: 2.02 M2

## 2024-06-13 PROCEDURE — 93458 L HRT ARTERY/VENTRICLE ANGIO: CPT | Performed by: INTERNAL MEDICINE

## 2024-06-13 PROCEDURE — C1894 INTRO/SHEATH, NON-LASER: HCPCS | Performed by: INTERNAL MEDICINE

## 2024-06-13 PROCEDURE — 2580000003 HC RX 258: Performed by: INTERNAL MEDICINE

## 2024-06-13 PROCEDURE — C1769 GUIDE WIRE: HCPCS | Performed by: INTERNAL MEDICINE

## 2024-06-13 PROCEDURE — 6360000002 HC RX W HCPCS: Performed by: INTERNAL MEDICINE

## 2024-06-13 PROCEDURE — 2709999900 HC NON-CHARGEABLE SUPPLY: Performed by: INTERNAL MEDICINE

## 2024-06-13 PROCEDURE — 7100000011 HC PHASE II RECOVERY - ADDTL 15 MIN: Performed by: INTERNAL MEDICINE

## 2024-06-13 PROCEDURE — 7100000010 HC PHASE II RECOVERY - FIRST 15 MIN: Performed by: INTERNAL MEDICINE

## 2024-06-13 PROCEDURE — 2500000003 HC RX 250 WO HCPCS: Performed by: INTERNAL MEDICINE

## 2024-06-13 PROCEDURE — 6360000004 HC RX CONTRAST MEDICATION: Performed by: INTERNAL MEDICINE

## 2024-06-13 PROCEDURE — 99152 MOD SED SAME PHYS/QHP 5/>YRS: CPT | Performed by: INTERNAL MEDICINE

## 2024-06-13 PROCEDURE — 99222 1ST HOSP IP/OBS MODERATE 55: CPT | Performed by: INTERNAL MEDICINE

## 2024-06-13 RX ORDER — METHYLPREDNISOLONE SODIUM SUCCINATE 125 MG/2ML
INJECTION, POWDER, LYOPHILIZED, FOR SOLUTION INTRAMUSCULAR; INTRAVENOUS PRN
Status: DISCONTINUED | OUTPATIENT
Start: 2024-06-13 | End: 2024-06-13 | Stop reason: HOSPADM

## 2024-06-13 RX ORDER — SODIUM CHLORIDE 9 MG/ML
INJECTION, SOLUTION INTRAVENOUS CONTINUOUS
Status: DISCONTINUED | OUTPATIENT
Start: 2024-06-13 | End: 2024-06-13 | Stop reason: HOSPADM

## 2024-06-13 RX ORDER — MIDAZOLAM HYDROCHLORIDE 1 MG/ML
INJECTION INTRAMUSCULAR; INTRAVENOUS PRN
Status: DISCONTINUED | OUTPATIENT
Start: 2024-06-13 | End: 2024-06-13 | Stop reason: HOSPADM

## 2024-06-13 RX ORDER — DIPHENHYDRAMINE HYDROCHLORIDE 50 MG/ML
INJECTION INTRAMUSCULAR; INTRAVENOUS PRN
Status: DISCONTINUED | OUTPATIENT
Start: 2024-06-13 | End: 2024-06-13 | Stop reason: HOSPADM

## 2024-06-13 RX ORDER — DIPHENHYDRAMINE HYDROCHLORIDE 50 MG/ML
50 INJECTION INTRAMUSCULAR; INTRAVENOUS ONCE
Status: DISCONTINUED | OUTPATIENT
Start: 2024-06-13 | End: 2024-06-13 | Stop reason: HOSPADM

## 2024-06-13 RX ADMIN — SODIUM CHLORIDE: 9 INJECTION, SOLUTION INTRAVENOUS at 09:02

## 2024-06-13 NOTE — PROCEDURES
[facility]        Date:   6/13/2024  Patient name: Amina Olivo  Date of admission:  6/13/2024  8:10 AM  MRN:   [id number]  YOB: 1955    CARDIAC CATHETERIZATION    Operators:  Primary: Marcella Reece MD.  Assistant:     Indications for cath: [place], Abnormal nuclear stress test.    Procedure performed: Cardiac cath.    Access: Right Radial artery      Procedure: After informed consent was obtained with explanation of the risks and benefits, patient was brought to the cath lab. The right wrist was prepped and draped in sterile fashion. 1% lidocaine was used for local block. The Radial artery was cannulated with 6  Fr sheath with brisk arterial blood return. The side port was frequently flushed and aspirated with normal saline.    EBL is 5 mL    Dominance is     Findings:    Left main: Normal with 0% stenosis.    LAD: Luminal irregularities of 30 to 40% in the proximal section    LCX: Luminal irregularities of 20 to 30%  OM1 patent stent with 10 to 20% luminal irregularities    RCA: Patent mid stent with luminal irregularities of 30 to 40%    The LV gram was performed in the PANDEY 30 position.   LVEF: 60%. LV Wall Motion: Normal    Conclusions:  Patent RCA and OM1 stents  Otherwise minimal nonobstructive CAD  Overall preserved LV function  No change from last cardiac cath in 2017    Recommendation:  Medical treatments.  Risk factors modifications.        Electronically signed by Marcella Reece MD on 6/13/2024 at 10:29 AM      [facility]  [phone]

## 2024-06-13 NOTE — PROGRESS NOTES
Patient admitted, consent signed and questions answered. Patient ready for procedure. Call light to reach with side rails up 2 of 2. Bilat groin hairs  clipped with writer and Johanne present.  Friend  at bedside with patient.  History and physical needed.

## 2024-06-13 NOTE — PROGRESS NOTES
Patient received post cath to pcc room 5. Assessment obtained.  Post cath pathway initiated. Right radial site with VASC BAND intact / 12 ml air. No hematoma noted. Restrictions reviewed with patient and friend.  Patient without complaints.  Fluids offered.  Resting quietly.

## 2024-06-13 NOTE — PROGRESS NOTES
All discharge instructions reviewed with friend and patient, questions answered.  Patient discharged per wheelchair with friend Bill and belongings.

## 2024-06-13 NOTE — PROGRESS NOTES
Remaining air removed from VASC BAND and taken off.  Site viewed by patient and dressing applied.  Site clean soft and dry.  Ambulates in halls / restroom without distress.

## 2024-06-13 NOTE — H&P
normal perfusion no ischemia no infarct LVEF 76%  14. Patient has been vaping  15. Carotid ultrasound on 2022 right carotid artery has a moderate 50-69% stenosis, left, carotid double more than 50%, interval carotid artery has a severe 70-99 % stenosis  16. 2022, total cholesterol 181 HDL 41      Past Surgical History:   has a past surgical history that includes  section; Nose surgery; Tonsillectomy; Ankle surgery (Left); Induced ; Coronary angioplasty with stent (); Colonoscopy; Endoscopy, colon, diagnostic; Nerve Block (Bilateral, 2018); Nerve Block (2019); Nerve Block (Bilateral, 02/15/2019); Cardiac catheterization (10/11/2013); Cataract removal; Colonoscopy (N/A, 2020); eye surgery; Carotid endarterectomy (Left, 2023); Colonoscopy (N/A, 05/10/2023); Tubal ligation;  section; Cardiac catheterization (2024); and Cardiac catheterization (2017).     Home Medications:    Prior to Admission medications    Medication Sig Start Date End Date Taking? Authorizing Provider   doxycycline hyclate (VIBRA-TABS) 100 MG tablet Take 1 tablet by mouth 2 times daily for 10 days 6/5/24 6/15/24  Ismael Celis DO   solifenacin (VESICARE) 5 MG tablet take 1 tablet by mouth once daily 6/3/24   Kavita Page MD   potassium chloride (KLOR-CON M) 10 MEQ extended release tablet take 1 tablet by mouth once daily  Patient taking differently: nightly 24   Lizzie Ramirez MD   Semaglutide,0.25 or 0.5MG/DOS, (OZEMPIC, 0.25 OR 0.5 MG/DOSE,) 2 MG/1.5ML SOPN Inject 0.25 mg into the skin once a week Use weekly 24   Kavita Page MD   amLODIPine (NORVASC) 10 MG tablet 1 tablet once a day 24   Kavita Page MD   albuterol sulfate HFA (PROVENTIL HFA) 108 (90 Base) MCG/ACT inhaler Inhale 2 puffs into the lungs every 6 hours as needed for Wheezing 24   Kavita Page MD   carvedilol (COREG) 3.125 MG tablet take 1 tablet by mouth twice a  pruritis.  Neurological: No headache, diplopia, change in muscle strength, numbness or tingling. No change in gait, balance, coordination, mood, affect, memory, mentation, behavior.  Psychiatric: No anxiety, or depression.  Endocrine: No temperature intolerance. No excessive thirst, fluid intake, or urination. No tremor.  Hematologic/Lymphatic: No abnormal bruising or bleeding, blood clots or swollen lymph nodes.  Allergic/Immunologic: No nasal congestion or hives.      PHYSICAL EXAM:      BP (!) 130/40   Pulse 58   Temp 97.6 °F (36.4 °C) (Oral)   Resp 16   Ht 1.613 m (5' 3.5\")   Wt 90.7 kg (200 lb)   SpO2 93%   BMI 34.87 kg/m²    Constitutional and General Appearance: alert, cooperative, no distress and appears stated age  HEENT: PERRL, no cervical lymphadenopathy. No masses palpable. Normal oral mucosa  Respiratory:  Normal excursion and expansion without use of accessory muscles  Resp Auscultation: Good respiratory effort. No for increased work of breathing. On auscultation: clear to auscultation bilaterally  Cardiovascular:  The apical impulse is not displaced  Heart tones are crisp and normal. regular S1 and S2.  Jugular venous pulsation Normal  The carotid upstroke is normal in amplitude and contour without delay or bruit  Peripheral pulses are symmetrical and full   Abdomen:   No masses or tenderness  Bowel sounds present  Extremities:   No Cyanosis or Clubbing   Lower extremity edema: No   Skin: Warm and dry  Neurological:  Alert and oriented.  Moves all extremities well  No abnormalities of mood, affect, memory, mentation, or behavior are noted    DATA:    Diagnostics:    Stress Test: 4/24/2024  Stress Combined Conclusion: The study is positive for myocardial ischemia and is negative for myocardial infarction. Findings suggest a moderate risk of cardiac events.  See bottom of report for risk stratification details.    Stress Function: Left ventricular function post-stress is normal. Post-stress

## 2024-06-13 NOTE — DISCHARGE INSTRUCTIONS
symptoms such as chest pain or discomfort. If blood flow is completely blocked, you could have a heart attack.  You can slow CAD and reduce the risk of future problems by making changes in your lifestyle. These include quitting smoking and eating heart-healthy foods.  Treatments for CAD, along with changes in your lifestyle, can help you live a longer and healthier life.  How can you prevent coronary artery disease?  Do not smoke. It may be the best thing you can do to prevent heart disease. If you need help quitting, talk to your doctor about stop-smoking programs and medicines. These can increase your chances of quitting for good.  Be active. Get at least 30 minutes of exercise on most days of the week. Walking is a good choice. You also may want to do other activities, such as running, swimming, cycling, or playing tennis or team sports.  Eat heart-healthy foods. Eat more fruits and vegetables and less foods that contain saturated and trans fats. Limit alcohol, sodium, and sweets.  Stay at a healthy weight. Lose weight if you need to.  Manage other health problems such as diabetes, high blood pressure, and high cholesterol.  Talk to your doctor about taking a daily aspirin.  Manage stress. Stress can hurt your heart. To keep stress low, talk about your problems and feelings. Don't keep your feelings hidden.  How is coronary artery disease treated?  Your doctor will suggest that you make lifestyle changes. For example, your doctor may ask you to eat healthy foods, quit smoking, lose extra weight, and be more active.  You will have to take medicines.  Your doctor may suggest a procedure to open narrowed or blocked arteries. This is called angioplasty. Or your doctor may suggest using healthy blood vessels to create detours around narrowed or blocked arteries. This is called bypass surgery.  Follow-up care is a key part of your treatment and safety. Be sure to make and go to all appointments, and call your doctor if  you are having problems. It's also a good idea to know your test results and keep a list of the medicines you take.    Where can you learn more?    Go to [email or web address] and sign in to your [employer] account. Enter C643 in the Search Health Information box to learn more about “Learning About Coronary Artery Disease (CAD).”    If you do not have an account, please click on the “Sign Up Now” link.

## 2024-06-13 NOTE — PROGRESS NOTES
2 CC AIR successfully removed from VASC BAND  / site clean soft and dry.  O2 sats continue to drop then back to baseline / patient sleeping prn with oxygen applied 2 liters,  no distress.

## 2024-06-17 DIAGNOSIS — I65.23 CAROTID STENOSIS, BILATERAL: Primary | ICD-10-CM

## 2024-06-25 ENCOUNTER — OFFICE VISIT (OUTPATIENT)
Dept: INTERNAL MEDICINE | Age: 69
End: 2024-06-25
Payer: MEDICARE

## 2024-06-25 VITALS
DIASTOLIC BLOOD PRESSURE: 56 MMHG | HEART RATE: 60 BPM | HEIGHT: 63 IN | BODY MASS INDEX: 35.79 KG/M2 | OXYGEN SATURATION: 95 % | WEIGHT: 202 LBS | SYSTOLIC BLOOD PRESSURE: 126 MMHG

## 2024-06-25 DIAGNOSIS — Z00.00 MEDICARE ANNUAL WELLNESS VISIT, SUBSEQUENT: Primary | ICD-10-CM

## 2024-06-25 PROCEDURE — 3074F SYST BP LT 130 MM HG: CPT | Performed by: INTERNAL MEDICINE

## 2024-06-25 PROCEDURE — G0439 PPPS, SUBSEQ VISIT: HCPCS | Performed by: INTERNAL MEDICINE

## 2024-06-25 PROCEDURE — 3078F DIAST BP <80 MM HG: CPT | Performed by: INTERNAL MEDICINE

## 2024-06-25 PROCEDURE — 1123F ACP DISCUSS/DSCN MKR DOCD: CPT | Performed by: INTERNAL MEDICINE

## 2024-06-25 PROCEDURE — 3017F COLORECTAL CA SCREEN DOC REV: CPT | Performed by: INTERNAL MEDICINE

## 2024-06-25 RX ORDER — SEMAGLUTIDE 0.68 MG/ML
INJECTION, SOLUTION SUBCUTANEOUS
COMMUNITY
Start: 2024-06-10 | End: 2024-06-25 | Stop reason: SDUPTHER

## 2024-06-25 SDOH — ECONOMIC STABILITY: FOOD INSECURITY: WITHIN THE PAST 12 MONTHS, THE FOOD YOU BOUGHT JUST DIDN'T LAST AND YOU DIDN'T HAVE MONEY TO GET MORE.: NEVER TRUE

## 2024-06-25 SDOH — ECONOMIC STABILITY: FOOD INSECURITY: WITHIN THE PAST 12 MONTHS, YOU WORRIED THAT YOUR FOOD WOULD RUN OUT BEFORE YOU GOT MONEY TO BUY MORE.: NEVER TRUE

## 2024-06-25 SDOH — ECONOMIC STABILITY: INCOME INSECURITY: HOW HARD IS IT FOR YOU TO PAY FOR THE VERY BASICS LIKE FOOD, HOUSING, MEDICAL CARE, AND HEATING?: NOT HARD AT ALL

## 2024-06-25 ASSESSMENT — LIFESTYLE VARIABLES
HOW OFTEN DO YOU HAVE A DRINK CONTAINING ALCOHOL: MONTHLY OR LESS
HOW MANY STANDARD DRINKS CONTAINING ALCOHOL DO YOU HAVE ON A TYPICAL DAY: 1 OR 2

## 2024-06-25 ASSESSMENT — PATIENT HEALTH QUESTIONNAIRE - PHQ9
SUM OF ALL RESPONSES TO PHQ QUESTIONS 1-9: 3
1. LITTLE INTEREST OR PLEASURE IN DOING THINGS: NOT AT ALL
3. TROUBLE FALLING OR STAYING ASLEEP: NOT AT ALL
7. TROUBLE CONCENTRATING ON THINGS, SUCH AS READING THE NEWSPAPER OR WATCHING TELEVISION: NOT AT ALL
SUM OF ALL RESPONSES TO PHQ QUESTIONS 1-9: 3
5. POOR APPETITE OR OVEREATING: SEVERAL DAYS
2. FEELING DOWN, DEPRESSED OR HOPELESS: NOT AT ALL
8. MOVING OR SPEAKING SO SLOWLY THAT OTHER PEOPLE COULD HAVE NOTICED. OR THE OPPOSITE, BEING SO FIGETY OR RESTLESS THAT YOU HAVE BEEN MOVING AROUND A LOT MORE THAN USUAL: NOT AT ALL
4. FEELING TIRED OR HAVING LITTLE ENERGY: SEVERAL DAYS
SUM OF ALL RESPONSES TO PHQ9 QUESTIONS 1 & 2: 0
SUM OF ALL RESPONSES TO PHQ QUESTIONS 1-9: 3
SUM OF ALL RESPONSES TO PHQ QUESTIONS 1-9: 3
6. FEELING BAD ABOUT YOURSELF - OR THAT YOU ARE A FAILURE OR HAVE LET YOURSELF OR YOUR FAMILY DOWN: SEVERAL DAYS
10. IF YOU CHECKED OFF ANY PROBLEMS, HOW DIFFICULT HAVE THESE PROBLEMS MADE IT FOR YOU TO DO YOUR WORK, TAKE CARE OF THINGS AT HOME, OR GET ALONG WITH OTHER PEOPLE: SOMEWHAT DIFFICULT
9. THOUGHTS THAT YOU WOULD BE BETTER OFF DEAD, OR OF HURTING YOURSELF: NOT AT ALL

## 2024-06-25 NOTE — PROGRESS NOTES
Medicare Annual Wellness Visit    Amina Olivo is here for Medicare AWV (Last AWV 9.18.2020)    Assessment & Plan   Medicare annual wellness visit, subsequent  Recommendations for Preventive Services Due: see orders and patient instructions/AVS.  Recommended screening schedule for the next 5-10 years is provided to the patient in written form: see Patient Instructions/AVS.     No follow-ups on file.     Subjective       Patient's complete Health Risk Assessment and screening values have been reviewed and are found in Flowsheets. The following problems were reviewed today and where indicated follow up appointments were made and/or referrals ordered.    Positive Risk Factor Screenings with Interventions:    Fall Risk:  Do you feel unsteady or are you worried about falling? : (!) yes (pt states she does worry about falling)  2 or more falls in past year?: no (one fall, recently, got tangled up in her dog's leash)  Fall with injury in past year?: no     Interventions:    Reviewed medications, home hazards, visual acuity, and co-morbidities that can increase risk for falls  Patient declines any further evaluation or treatment       Drug Use:   Substance and Sexual Activity   Drug Use Yes    Types: Marijuana (Weed)    Comment: marijuana weekly  LAST USED 6-11-24      DAST-10 Score: 0   Interpretation:  1-2: Low level - Monitor, re-assess at a later date  3-5: Moderate level - Further Investigation  6-8: Substantial level - Intensive Assessment  9-10: Severe level - Intensive Assessment  Interventions:  Patient declined any further intervention or treatment         Activity, Diet, and Weight:  On average, how many days per week do you engage in moderate to strenuous exercise (like a brisk walk)?: 0 days  On average, how many minutes do you engage in exercise at this level?: 0 min    Do you eat balanced/healthy meals regularly?: Yes    Body mass index is 36.36 kg/m². (!) Abnormal      Inactivity

## 2024-07-01 DIAGNOSIS — N39.46 MIXED STRESS AND URGE URINARY INCONTINENCE: ICD-10-CM

## 2024-07-01 RX ORDER — SOLIFENACIN SUCCINATE 5 MG/1
TABLET, FILM COATED ORAL
Qty: 30 TABLET | Refills: 0 | Status: SHIPPED | OUTPATIENT
Start: 2024-07-01

## 2024-07-01 NOTE — TELEPHONE ENCOUNTER
Amina Olivo is calling to request a refill on the following medication(s):    Medication Request:  Requested Prescriptions     Pending Prescriptions Disp Refills    solifenacin (VESICARE) 5 MG tablet [Pharmacy Med Name: SOLIFENACIN 5 MG TABLET] 30 tablet 0     Sig: take 1 tablet by mouth once daily       Last Visit Date (If Applicable):  6/25/2024    Next Visit Date:    Visit date not found

## 2024-07-02 ENCOUNTER — HOSPITAL ENCOUNTER (OUTPATIENT)
Dept: MAMMOGRAPHY | Age: 69
Discharge: HOME | End: 2024-07-04
Payer: MEDICARE

## 2024-07-02 VITALS — HEIGHT: 62 IN | BODY MASS INDEX: 37.17 KG/M2 | WEIGHT: 202 LBS

## 2024-07-02 DIAGNOSIS — Z12.31 VISIT FOR SCREENING MAMMOGRAM: ICD-10-CM

## 2024-07-02 PROCEDURE — 77063 BREAST TOMOSYNTHESIS BI: CPT

## 2024-07-11 ENCOUNTER — HOSPITAL ENCOUNTER (OUTPATIENT)
Dept: CT IMAGING | Age: 69
Discharge: HOME | End: 2024-07-13
Attending: INTERNAL MEDICINE
Payer: MEDICARE

## 2024-07-11 ENCOUNTER — HOSPITAL ENCOUNTER (OUTPATIENT)
Dept: VASCULAR LAB | Age: 69
Discharge: HOME | End: 2024-07-13
Attending: SURGERY
Payer: MEDICARE

## 2024-07-11 DIAGNOSIS — Z87.891 PERSONAL HISTORY OF TOBACCO USE: ICD-10-CM

## 2024-07-11 DIAGNOSIS — I65.23 CAROTID STENOSIS, BILATERAL: ICD-10-CM

## 2024-07-11 PROCEDURE — 71271 CT THORAX LUNG CANCER SCR C-: CPT

## 2024-07-11 PROCEDURE — 93880 EXTRACRANIAL BILAT STUDY: CPT

## 2024-07-12 LAB
VAS LEFT BULB EDV: 13.1 CM/S
VAS LEFT BULB PSV: 53.8 CM/S
VAS LEFT CCA DIST EDV: 21.7 CM/S
VAS LEFT CCA DIST PSV: 93 CM/S
VAS LEFT CCA PROX EDV: 23.6 CM/S
VAS LEFT CCA PROX PSV: 114.9 CM/S
VAS LEFT ECA EDV: 12.6 CM/S
VAS LEFT ECA PSV: 235.6 CM/S
VAS LEFT ICA DIST EDV: 30.9 CM/S
VAS LEFT ICA DIST PSV: 144 CM/S
VAS LEFT ICA PROX EDV: 27.2 CM/S
VAS LEFT ICA PROX PSV: 109.4 CM/S
VAS LEFT ICA/CCA PSV: 1.5 NO UNITS
VAS LEFT VERTEBRAL EDV: 13 CM/S
VAS LEFT VERTEBRAL PSV: 33.9 CM/S
VAS RIGHT BULB EDV: 14.6 CM/S
VAS RIGHT BULB PSV: 72.3 CM/S
VAS RIGHT CCA DIST EDV: 19.5 CM/S
VAS RIGHT CCA DIST PSV: 77.2 CM/S
VAS RIGHT CCA PROX EDV: 18.1 CM/S
VAS RIGHT CCA PROX PSV: 111.2 CM/S
VAS RIGHT ECA EDV: 0 CM/S
VAS RIGHT ECA PSV: 173.4 CM/S
VAS RIGHT ICA DIST EDV: 30.8 CM/S
VAS RIGHT ICA DIST PSV: 160.3 CM/S
VAS RIGHT ICA PROX EDV: 28.2 CM/S
VAS RIGHT ICA PROX PSV: 157.7 CM/S
VAS RIGHT ICA/CCA PSV: 2.1 NO UNITS
VAS RIGHT VERTEBRAL EDV: 16 CM/S
VAS RIGHT VERTEBRAL PSV: 50.9 CM/S

## 2024-08-05 DIAGNOSIS — N39.46 MIXED STRESS AND URGE URINARY INCONTINENCE: ICD-10-CM

## 2024-08-05 NOTE — TELEPHONE ENCOUNTER
Amina Olivo is calling to request a refill on the following medication(s):    Medication Request:  Requested Prescriptions     Pending Prescriptions Disp Refills    solifenacin (VESICARE) 5 MG tablet 30 tablet 0     Sig: Take 1 tablet by mouth daily       Last Visit Date (If Applicable):  6/25/2024    Next Visit Date:    Visit date not found

## 2024-08-06 RX ORDER — SOLIFENACIN SUCCINATE 5 MG/1
5 TABLET, FILM COATED ORAL DAILY
Qty: 30 TABLET | Refills: 0 | Status: SHIPPED | OUTPATIENT
Start: 2024-08-06

## 2024-08-07 RX ORDER — FLUTICASONE FUROATE 100 UG/1
POWDER RESPIRATORY (INHALATION)
Qty: 30 EACH | Refills: 5 | Status: SHIPPED | OUTPATIENT
Start: 2024-08-07

## 2024-08-07 NOTE — TELEPHONE ENCOUNTER
Last visit: 4/11/24  Next visit: 8/8/24    Refill request for Arnuity. Per last dictation, patient advised to continue Arnuity. Please see pended script and advise.

## 2024-08-08 ENCOUNTER — OFFICE VISIT (OUTPATIENT)
Dept: PULMONOLOGY | Age: 69
End: 2024-08-08
Payer: MEDICARE

## 2024-08-08 ENCOUNTER — OFFICE VISIT (OUTPATIENT)
Dept: VASCULAR SURGERY | Age: 69
End: 2024-08-08
Payer: MEDICARE

## 2024-08-08 VITALS
OXYGEN SATURATION: 97 % | HEIGHT: 62 IN | HEART RATE: 92 BPM | BODY MASS INDEX: 36.99 KG/M2 | SYSTOLIC BLOOD PRESSURE: 157 MMHG | WEIGHT: 201 LBS | RESPIRATION RATE: 18 BRPM | DIASTOLIC BLOOD PRESSURE: 70 MMHG

## 2024-08-08 VITALS
WEIGHT: 202 LBS | OXYGEN SATURATION: 99 % | BODY MASS INDEX: 35.79 KG/M2 | RESPIRATION RATE: 18 BRPM | HEIGHT: 63 IN | HEART RATE: 71 BPM | SYSTOLIC BLOOD PRESSURE: 167 MMHG | DIASTOLIC BLOOD PRESSURE: 72 MMHG

## 2024-08-08 DIAGNOSIS — I87.2 VENOUS INSUFFICIENCY OF BOTH LOWER EXTREMITIES: ICD-10-CM

## 2024-08-08 DIAGNOSIS — G47.33 OSA ON CPAP: ICD-10-CM

## 2024-08-08 DIAGNOSIS — Z87.891 PERSONAL HISTORY OF TOBACCO USE: ICD-10-CM

## 2024-08-08 DIAGNOSIS — E66.9 OBESITY (BMI 35.0-39.9 WITHOUT COMORBIDITY): ICD-10-CM

## 2024-08-08 DIAGNOSIS — I73.9 PAD (PERIPHERAL ARTERY DISEASE) (HCC): ICD-10-CM

## 2024-08-08 DIAGNOSIS — R91.1 LUNG NODULE: ICD-10-CM

## 2024-08-08 DIAGNOSIS — I65.23 CAROTID STENOSIS, ASYMPTOMATIC, BILATERAL: Primary | ICD-10-CM

## 2024-08-08 DIAGNOSIS — R06.02 SHORTNESS OF BREATH: ICD-10-CM

## 2024-08-08 DIAGNOSIS — Z98.890 S/P CAROTID ENDARTERECTOMY: ICD-10-CM

## 2024-08-08 DIAGNOSIS — J96.11 CHRONIC RESPIRATORY FAILURE WITH HYPOXIA (HCC): ICD-10-CM

## 2024-08-08 DIAGNOSIS — J44.9 CHRONIC OBSTRUCTIVE PULMONARY DISEASE, UNSPECIFIED COPD TYPE (HCC): Primary | ICD-10-CM

## 2024-08-08 DIAGNOSIS — M79.89 LEG SWELLING: ICD-10-CM

## 2024-08-08 DIAGNOSIS — R07.9 CHEST PAIN, UNSPECIFIED TYPE: ICD-10-CM

## 2024-08-08 PROCEDURE — G8417 CALC BMI ABV UP PARAM F/U: HCPCS | Performed by: SURGERY

## 2024-08-08 PROCEDURE — G8399 PT W/DXA RESULTS DOCUMENT: HCPCS | Performed by: INTERNAL MEDICINE

## 2024-08-08 PROCEDURE — G8427 DOCREV CUR MEDS BY ELIG CLIN: HCPCS | Performed by: INTERNAL MEDICINE

## 2024-08-08 PROCEDURE — 3017F COLORECTAL CA SCREEN DOC REV: CPT | Performed by: INTERNAL MEDICINE

## 2024-08-08 PROCEDURE — 3078F DIAST BP <80 MM HG: CPT | Performed by: INTERNAL MEDICINE

## 2024-08-08 PROCEDURE — 1036F TOBACCO NON-USER: CPT | Performed by: SURGERY

## 2024-08-08 PROCEDURE — 1123F ACP DISCUSS/DSCN MKR DOCD: CPT | Performed by: INTERNAL MEDICINE

## 2024-08-08 PROCEDURE — 3023F SPIROM DOC REV: CPT | Performed by: INTERNAL MEDICINE

## 2024-08-08 PROCEDURE — 1036F TOBACCO NON-USER: CPT | Performed by: INTERNAL MEDICINE

## 2024-08-08 PROCEDURE — 99215 OFFICE O/P EST HI 40 MIN: CPT | Performed by: INTERNAL MEDICINE

## 2024-08-08 PROCEDURE — G8399 PT W/DXA RESULTS DOCUMENT: HCPCS | Performed by: SURGERY

## 2024-08-08 PROCEDURE — 1123F ACP DISCUSS/DSCN MKR DOCD: CPT | Performed by: SURGERY

## 2024-08-08 PROCEDURE — G8427 DOCREV CUR MEDS BY ELIG CLIN: HCPCS | Performed by: SURGERY

## 2024-08-08 PROCEDURE — 3078F DIAST BP <80 MM HG: CPT | Performed by: SURGERY

## 2024-08-08 PROCEDURE — 3077F SYST BP >= 140 MM HG: CPT | Performed by: SURGERY

## 2024-08-08 PROCEDURE — 1090F PRES/ABSN URINE INCON ASSESS: CPT | Performed by: SURGERY

## 2024-08-08 PROCEDURE — 3077F SYST BP >= 140 MM HG: CPT | Performed by: INTERNAL MEDICINE

## 2024-08-08 PROCEDURE — 99213 OFFICE O/P EST LOW 20 MIN: CPT | Performed by: SURGERY

## 2024-08-08 PROCEDURE — 3017F COLORECTAL CA SCREEN DOC REV: CPT | Performed by: SURGERY

## 2024-08-08 PROCEDURE — 1090F PRES/ABSN URINE INCON ASSESS: CPT | Performed by: INTERNAL MEDICINE

## 2024-08-08 PROCEDURE — G8417 CALC BMI ABV UP PARAM F/U: HCPCS | Performed by: INTERNAL MEDICINE

## 2024-08-08 NOTE — PROGRESS NOTES
Division of Vascular Surgery        Follow Up    Left carotid endarterectomy with patch angioplasty (4/24/23)     Chief Complaint:      Follow up on carotid disease    History of Present Illness:      Amina Olivo is a 69 y.o. woman who presents for her yearly surveillance regarding her carotid disease.  She underwent left carotid endarterectomy with patch angioplasty last year for asymptomatic severe stenosis.  She is doing well, denies any unilateral weakness, facial droop, thick/slurred speech, symptoms of amaurosis fugax to suggest stroke or TIA.  She had some numbness along her ear and face after surgery but that has subsided to just her earlobe and is much better now.  She is compliant with her medications and does not smoke.  She describes cramping and restless legs int he evenings.  She has evidence of venous insufficiency on exam with varicose and reticular veins, mild swelling.  She does not wear compression stockings as often as she should.   Having left sided chest pain still, cardiac stress test and catherization negative.  Seeing pulmonologist today for re-evaluation.    (6/15/23) Amina Olivo is a 68 y.o. woman presents for postoperative follow up after undergoing left carotid endarterectomy with patch angioplasty for asymptomatic severe stenosis.  She is doing well, has noticed numbness along her incision and her ear, which has improved.  I recall she had nerve draped around carotid and this was dissected out of the way.   Otherwise she denies symptoms of stroke/TIA, no unilateral weakness, facial droop, thick/slurred speech or symptoms of amaurosis fugax.  Her incision has healed nicely, she denies any headaches and is taking her medications.     She continues to have cramping and some swelling in her legs, worse int he evenings.  She has not worn compression stockings like we have discussed in the past, but says she will order them and start.     (4/9/23) Amina Olivo is a 68 y.o.

## 2024-08-08 NOTE — PROGRESS NOTES
05/07/2019  1. Stable 4 mm posterior left upper lobe pulmonary nodule, unchanged from   05/07/2018.  Mild emphysema.   2. Cardiomegaly.  Coronary artery disease.  Atherosclerotic calcification of   the aorta.   3. Small hiatal hernia.       05/07/2018  Stable 4 mm nodule left upper lobe.    10/17/2017  5 mm nodule posterior left upper lobe.  No acute process with chronic   findings as described.         Assessment and Plan         1. Chronic obstructive pulmonary disease, unspecified COPD type (HCC)   2. Lung nodule   3. CRISTINA on CPAP   4. Obesity (BMI 35.0-39.9 without comorbidity)   5.      Dyspnea on exertion     HTN (hypertension)    CAD/Stents drug-eluting     Claudication in peripheral vascular disease (HCC)     Assessment:    Patient complain of chest pain which has been present for some time cardiac workup was done patent stent was seen on the cardiac catheterization she apparently had stress test was done before cardiac catheterization.  She does not have an echocardiogram at this time.  She has exercise-induced hypoxia and she was prescribed oxygen she does have home O2 discussed with patient that she need home oxygen if she does not use it all the time she should use it oxygen on any activities walking exertion and at night not sure why she is not using oxygen.  She also complain of chest pain still very nonspecific present for some time last for short time and then spontaneously resolved.  I will get VQ scan as apparently she has iodine allergies.  On the last pulmonary function test shows normal diffusion capacity although previously she had diffusion capacity which was reduced secondary to emphysema and has lower FEV1 but last FEV1 was 75% she will likely need follow-up pulmonary function test as last pulmonary function test was out of range as far as FEV1 and DLCO is considered as compared to previous multiple PFTs which showed moderate obstruction and moderate reduction diffusion capacity.  Her last

## 2024-08-10 ENCOUNTER — HOSPITAL ENCOUNTER (OUTPATIENT)
Dept: GENERAL RADIOLOGY | Age: 69
End: 2024-08-10
Payer: MEDICARE

## 2024-08-10 ENCOUNTER — HOSPITAL ENCOUNTER (OUTPATIENT)
Age: 69
End: 2024-08-10
Payer: MEDICARE

## 2024-08-10 DIAGNOSIS — R07.9 CHEST PAIN, UNSPECIFIED TYPE: ICD-10-CM

## 2024-08-10 PROCEDURE — 71046 X-RAY EXAM CHEST 2 VIEWS: CPT

## 2024-08-14 ENCOUNTER — TELEPHONE (OUTPATIENT)
Dept: PULMONOLOGY | Age: 69
End: 2024-08-14

## 2024-08-14 NOTE — TELEPHONE ENCOUNTER
A call was made to Amina to inform her of the VQ order Dr. Hernandez added and the need for a chest xray within 24 hours of that test. We went over the scheduled testing which will be done on 9/3/24 at [facility]. Writer stated it is all in her Mychart to refer to this for prep and timing. Amina expressed understanding and took down writer's number to call if questions arise.   
no

## 2024-09-03 ENCOUNTER — HOSPITAL ENCOUNTER (OUTPATIENT)
Age: 69
Discharge: HOME | End: 2024-09-03
Attending: INTERNAL MEDICINE
Payer: MEDICARE

## 2024-09-03 ENCOUNTER — HOSPITAL ENCOUNTER (OUTPATIENT)
Dept: VASCULAR LAB | Age: 69
Discharge: HOME | End: 2024-09-05
Attending: SURGERY
Payer: MEDICARE

## 2024-09-03 ENCOUNTER — HOSPITAL ENCOUNTER (OUTPATIENT)
Age: 69
Discharge: HOME | End: 2024-09-05
Attending: INTERNAL MEDICINE
Payer: MEDICARE

## 2024-09-03 ENCOUNTER — HOSPITAL ENCOUNTER (OUTPATIENT)
Dept: PULMONOLOGY | Age: 69
Discharge: HOME | End: 2024-09-03
Attending: INTERNAL MEDICINE
Payer: MEDICARE

## 2024-09-03 ENCOUNTER — TELEPHONE (OUTPATIENT)
Dept: PULMONOLOGY | Age: 69
End: 2024-09-03

## 2024-09-03 ENCOUNTER — HOSPITAL ENCOUNTER (OUTPATIENT)
Dept: GENERAL RADIOLOGY | Age: 69
Discharge: HOME | End: 2024-09-05
Attending: INTERNAL MEDICINE
Payer: MEDICARE

## 2024-09-03 ENCOUNTER — HOSPITAL ENCOUNTER (OUTPATIENT)
Dept: NUCLEAR MEDICINE | Age: 69
Discharge: HOME | End: 2024-09-05
Attending: INTERNAL MEDICINE
Payer: MEDICARE

## 2024-09-03 DIAGNOSIS — E11.8 TYPE 2 DIABETES MELLITUS WITH COMPLICATION, WITHOUT LONG-TERM CURRENT USE OF INSULIN (HCC): ICD-10-CM

## 2024-09-03 DIAGNOSIS — R07.9 CHEST PAIN, UNSPECIFIED TYPE: ICD-10-CM

## 2024-09-03 DIAGNOSIS — E78.5 DYSLIPIDEMIA: ICD-10-CM

## 2024-09-03 DIAGNOSIS — I25.10 CORONARY ARTERY DISEASE INVOLVING NATIVE CORONARY ARTERY OF NATIVE HEART WITHOUT ANGINA PECTORIS: ICD-10-CM

## 2024-09-03 DIAGNOSIS — J44.9 CHRONIC OBSTRUCTIVE PULMONARY DISEASE, UNSPECIFIED COPD TYPE (HCC): ICD-10-CM

## 2024-09-03 DIAGNOSIS — I10 ESSENTIAL HYPERTENSION: ICD-10-CM

## 2024-09-03 DIAGNOSIS — I73.9 PAD (PERIPHERAL ARTERY DISEASE) (HCC): ICD-10-CM

## 2024-09-03 DIAGNOSIS — R06.02 SHORTNESS OF BREATH: ICD-10-CM

## 2024-09-03 DIAGNOSIS — Z98.890 S/P CAROTID ENDARTERECTOMY: ICD-10-CM

## 2024-09-03 DIAGNOSIS — I65.23 CAROTID STENOSIS, ASYMPTOMATIC, BILATERAL: ICD-10-CM

## 2024-09-03 DIAGNOSIS — R06.02 SHORTNESS OF BREATH: Primary | ICD-10-CM

## 2024-09-03 LAB
ALBUMIN SERPL-MCNC: 3.9 G/DL (ref 3.5–5.2)
ALP SERPL-CCNC: 93 U/L (ref 35–104)
ALT SERPL-CCNC: 11 U/L (ref 5–33)
ANION GAP SERPL CALCULATED.3IONS-SCNC: 8 MMOL/L (ref 9–17)
AST SERPL-CCNC: 16 U/L
BILIRUB DIRECT SERPL-MCNC: 0.1 MG/DL
BILIRUB INDIRECT SERPL-MCNC: 0.2 MG/DL (ref 0–1)
BILIRUB SERPL-MCNC: 0.3 MG/DL (ref 0.3–1.2)
BUN SERPL-MCNC: 19 MG/DL (ref 8–23)
CALCIUM SERPL-MCNC: 9.2 MG/DL (ref 8.6–10.4)
CHLORIDE SERPL-SCNC: 102 MMOL/L (ref 98–107)
CHOLEST SERPL-MCNC: 145 MG/DL
CHOLESTEROL/HDL RATIO: 3.6
CO2 SERPL-SCNC: 30 MMOL/L (ref 20–31)
CREAT SERPL-MCNC: 0.6 MG/DL (ref 0.5–0.9)
DLCO %PRED: NORMAL
DLCO PRED: NORMAL
DLCO/VA %PRED: NORMAL
DLCO/VA PRED: NORMAL
DLCO/VA: NORMAL
DLCO: NORMAL
ECHO AO ROOT DIAM: 3.1 CM
ECHO AV AREA PEAK VELOCITY: 2.1 CM2
ECHO AV AREA VTI: 2.3 CM2
ECHO AV MEAN GRADIENT: 5 MMHG
ECHO AV MEAN VELOCITY: 1.1 M/S
ECHO AV PEAK GRADIENT: 9 MMHG
ECHO AV PEAK VELOCITY: 1.5 M/S
ECHO AV VELOCITY RATIO: 0.67
ECHO AV VTI: 44.2 CM
ECHO EST RA PRESSURE: 8 MMHG
ECHO LA AREA 2C: 22.3 CM2
ECHO LA AREA 4C: 22.2 CM2
ECHO LA DIAMETER: 3.9 CM
ECHO LA MAJOR AXIS: 6.2 CM
ECHO LA MINOR AXIS: 6.3 CM
ECHO LA TO AORTIC ROOT RATIO: 1.26
ECHO LA VOL BP: 64 ML (ref 22–52)
ECHO LA VOL MOD A2C: 65 ML (ref 22–52)
ECHO LA VOL MOD A4C: 62 ML (ref 22–52)
ECHO LV E' LATERAL VELOCITY: 6 CM/S
ECHO LV E' SEPTAL VELOCITY: 6 CM/S
ECHO LV EF PHYSICIAN: 60 %
ECHO LV FRACTIONAL SHORTENING: 33 % (ref 28–44)
ECHO LV INTERNAL DIMENSION DIASTOLIC: 5.2 CM (ref 3.9–5.3)
ECHO LV INTERNAL DIMENSION SYSTOLIC: 3.5 CM
ECHO LV IVSD: 1.1 CM (ref 0.6–0.9)
ECHO LV MASS 2D: 220.8 G (ref 67–162)
ECHO LV POSTERIOR WALL DIASTOLIC: 1.1 CM (ref 0.6–0.9)
ECHO LV RELATIVE WALL THICKNESS RATIO: 0.42
ECHO LVOT AREA: 3.1 CM2
ECHO LVOT AV VTI INDEX: 0.73
ECHO LVOT DIAM: 2 CM
ECHO LVOT MEAN GRADIENT: 3 MMHG
ECHO LVOT PEAK GRADIENT: 4 MMHG
ECHO LVOT PEAK VELOCITY: 1 M/S
ECHO LVOT SV: 100.8 ML
ECHO LVOT VTI: 32.1 CM
ECHO MV A VELOCITY: 1.14 M/S
ECHO MV AREA VTI: 1.9 CM2
ECHO MV E DECELERATION TIME (DT): 230 MS
ECHO MV E VELOCITY: 1.08 M/S
ECHO MV E/A RATIO: 0.95
ECHO MV E/E' LATERAL: 18
ECHO MV E/E' RATIO (AVERAGED): 18
ECHO MV E/E' SEPTAL: 18
ECHO MV LVOT VTI INDEX: 1.69
ECHO MV MAX VELOCITY: 1.2 M/S
ECHO MV MEAN GRADIENT: 2 MMHG
ECHO MV MEAN VELOCITY: 0.7 M/S
ECHO MV PEAK GRADIENT: 6 MMHG
ECHO MV VTI: 54.3 CM
ECHO RA AREA 4C: 14.5 CM2
ECHO RA VOLUME: 32 ML
ECHO RIGHT VENTRICULAR SYSTOLIC PRESSURE (RVSP): 12 MMHG
ECHO RV TAPSE: 2.9 CM (ref 1.7–?)
ECHO TV REGURGITANT MAX VELOCITY: 0.95 M/S
ECHO TV REGURGITANT PEAK GRADIENT: 4 MMHG
EXPIRATORY TIME: NORMAL
FEF 25-75% %PRED-PRE: NORMAL
FEF 25-75% PRED: NORMAL
FEF 25-75-PRE: NORMAL
FEV1 %PRED-PRE: NORMAL
FEV1 PRED: NORMAL
FEV1/FVC %PRED-PRE: NORMAL
FEV1/FVC PRED: NORMAL
FEV1/FVC: NORMAL
FEV1: NORMAL
FVC %PRED-PRE: NORMAL
FVC PRED: NORMAL
FVC: NORMAL
GAW %PRED: NORMAL
GAW PRED: NORMAL
GAW: NORMAL
GFR, ESTIMATED: >90 ML/MIN/1.73M2
GLUCOSE SERPL-MCNC: 113 MG/DL (ref 70–99)
HDLC SERPL-MCNC: 40 MG/DL
IC PRE %PRED: NORMAL
IC PRED: NORMAL
IC: NORMAL
LDLC SERPL CALC-MCNC: 74 MG/DL (ref 0–130)
MVV %PRED-PRE: NORMAL
MVV PRED: NORMAL
MVV-PRE: NORMAL
PEF %PRED-PRE: NORMAL
PEF PRED: NORMAL
PEF-PRE: NORMAL
POTASSIUM SERPL-SCNC: 4.4 MMOL/L (ref 3.7–5.3)
PROT SERPL-MCNC: 8.1 G/DL (ref 6.4–8.3)
RAW %PRED: NORMAL
RAW PRED: NORMAL
RAW: NORMAL
RV PRE %PRED: NORMAL
RV PRED: NORMAL
RV: NORMAL
SODIUM SERPL-SCNC: 140 MMOL/L (ref 135–144)
SVC %PRED: NORMAL
SVC PRED: NORMAL
SVC: NORMAL
TLC PRE %PRED: NORMAL
TLC PRED: NORMAL
TLC: NORMAL
TRIGL SERPL-MCNC: 154 MG/DL
VA %PRED: NORMAL
VA PRED: NORMAL
VA: NORMAL
VTG %PRED: NORMAL
VTG PRED: NORMAL
VTG: NORMAL

## 2024-09-03 PROCEDURE — 78582 LUNG VENTILAT&PERFUS IMAGING: CPT

## 2024-09-03 PROCEDURE — 94664 DEMO&/EVAL PT USE INHALER: CPT

## 2024-09-03 PROCEDURE — 94060 EVALUATION OF WHEEZING: CPT

## 2024-09-03 PROCEDURE — 2580000003 HC RX 258: Performed by: INTERNAL MEDICINE

## 2024-09-03 PROCEDURE — 71046 X-RAY EXAM CHEST 2 VIEWS: CPT

## 2024-09-03 PROCEDURE — 93306 TTE W/DOPPLER COMPLETE: CPT | Performed by: INTERNAL MEDICINE

## 2024-09-03 PROCEDURE — 80076 HEPATIC FUNCTION PANEL: CPT

## 2024-09-03 PROCEDURE — 80048 BASIC METABOLIC PNL TOTAL CA: CPT

## 2024-09-03 PROCEDURE — 80061 LIPID PANEL: CPT

## 2024-09-03 PROCEDURE — 3430000000 HC RX DIAGNOSTIC RADIOPHARMACEUTICAL: Performed by: INTERNAL MEDICINE

## 2024-09-03 PROCEDURE — 93306 TTE W/DOPPLER COMPLETE: CPT

## 2024-09-03 PROCEDURE — 94729 DIFFUSING CAPACITY: CPT

## 2024-09-03 PROCEDURE — 94726 PLETHYSMOGRAPHY LUNG VOLUMES: CPT

## 2024-09-03 PROCEDURE — A9540 TC99M MAA: HCPCS | Performed by: INTERNAL MEDICINE

## 2024-09-03 PROCEDURE — 6370000000 HC RX 637 (ALT 250 FOR IP): Performed by: INTERNAL MEDICINE

## 2024-09-03 PROCEDURE — A9539 TC99M PENTETATE: HCPCS | Performed by: INTERNAL MEDICINE

## 2024-09-03 PROCEDURE — 36415 COLL VENOUS BLD VENIPUNCTURE: CPT

## 2024-09-03 RX ORDER — ALBUTEROL SULFATE 90 UG/1
2 AEROSOL, METERED RESPIRATORY (INHALATION) ONCE
Status: COMPLETED | OUTPATIENT
Start: 2024-09-03 | End: 2024-09-03

## 2024-09-03 RX ORDER — SODIUM CHLORIDE 0.9 % (FLUSH) 0.9 %
10 SYRINGE (ML) INJECTION PRN
Status: DISCONTINUED | OUTPATIENT
Start: 2024-09-03 | End: 2024-09-06 | Stop reason: HOSPADM

## 2024-09-03 RX ORDER — KIT FOR THE PREPARATION OF TECHNETIUM TC 99M PENTETATE 20 MG/1
44 INJECTION, POWDER, LYOPHILIZED, FOR SOLUTION INTRAVENOUS; RESPIRATORY (INHALATION)
Status: COMPLETED | OUTPATIENT
Start: 2024-09-03 | End: 2024-09-03

## 2024-09-03 RX ADMIN — KIT FOR THE PREPARATION OF TECHNETIUM TC 99M PENTETATE 41.2 MILLICURIE: 20 INJECTION, POWDER, LYOPHILIZED, FOR SOLUTION INTRAVENOUS; RESPIRATORY (INHALATION) at 10:41

## 2024-09-03 RX ADMIN — ALBUTEROL SULFATE 2 PUFF: 90 AEROSOL, METERED RESPIRATORY (INHALATION) at 14:01

## 2024-09-03 RX ADMIN — Medication 8.6 MILLICURIE: at 11:09

## 2024-09-03 RX ADMIN — SODIUM CHLORIDE, PRESERVATIVE FREE 10 ML: 5 INJECTION INTRAVENOUS at 10:41

## 2024-09-03 RX ADMIN — SODIUM CHLORIDE, PRESERVATIVE FREE 10 ML: 5 INJECTION INTRAVENOUS at 11:10

## 2024-09-03 NOTE — PROCEDURES
Spirometry suggest a very mild restrictive defect at best.  There is no improvement with bronchodilator therapy.      The static lung volumes do not support the restrictive defect as the total lung capacity is within lower limits of normal.      The DLCO is normal.        In summary, this may be a normal study.      Esteban Alexis MD

## 2024-09-03 NOTE — PROGRESS NOTES
PT to arrive at 1045 for NM Lung scan with echo to follow. At writer's request, PT will arrive to echo department with IV access d/t additional diagnostic needs, per physician order (agitated saline/bubble study). IV will be removed by echo department, unless other needs arise.     Electronically signed by Cee Mello on 9/3/2024 at 9:23 AM

## 2024-09-03 NOTE — TELEPHONE ENCOUNTER
[facility] called, patient is there for a Lung Perfusion Scan and a chest xray is required to go along with those.  Please sign the xray order.

## 2024-09-04 ENCOUNTER — HOSPITAL ENCOUNTER (OUTPATIENT)
Age: 69
Setting detail: SPECIMEN
Discharge: HOME | End: 2024-09-04

## 2024-09-05 PROBLEM — M79.89 LEG SWELLING: Status: ACTIVE | Noted: 2024-09-05

## 2024-09-05 PROBLEM — I87.2 VENOUS INSUFFICIENCY OF BOTH LOWER EXTREMITIES: Status: ACTIVE | Noted: 2024-09-05

## 2024-09-05 ASSESSMENT — ENCOUNTER SYMPTOMS
CHEST TIGHTNESS: 0
BACK PAIN: 1
WHEEZING: 0
ABDOMINAL PAIN: 0
SHORTNESS OF BREATH: 0
COLOR CHANGE: 0
ALLERGIC/IMMUNOLOGIC NEGATIVE: 1

## 2024-09-07 DIAGNOSIS — N39.46 MIXED STRESS AND URGE URINARY INCONTINENCE: ICD-10-CM

## 2024-09-09 ENCOUNTER — OFFICE VISIT (OUTPATIENT)
Dept: PULMONOLOGY | Age: 69
End: 2024-09-09
Payer: MEDICARE

## 2024-09-09 VITALS
WEIGHT: 200 LBS | TEMPERATURE: 97.1 F | HEART RATE: 56 BPM | RESPIRATION RATE: 18 BRPM | DIASTOLIC BLOOD PRESSURE: 67 MMHG | BODY MASS INDEX: 35.44 KG/M2 | SYSTOLIC BLOOD PRESSURE: 139 MMHG | HEIGHT: 63 IN

## 2024-09-09 DIAGNOSIS — Z87.891 PERSONAL HISTORY OF TOBACCO USE: ICD-10-CM

## 2024-09-09 DIAGNOSIS — J44.9 CHRONIC OBSTRUCTIVE PULMONARY DISEASE, UNSPECIFIED COPD TYPE (HCC): Primary | ICD-10-CM

## 2024-09-09 DIAGNOSIS — G47.33 OSA ON CPAP: ICD-10-CM

## 2024-09-09 DIAGNOSIS — R91.1 LUNG NODULE: ICD-10-CM

## 2024-09-09 DIAGNOSIS — E66.9 OBESITY (BMI 35.0-39.9 WITHOUT COMORBIDITY): ICD-10-CM

## 2024-09-09 DIAGNOSIS — R09.82 POST-NASAL DRIP: ICD-10-CM

## 2024-09-09 DIAGNOSIS — J96.11 CHRONIC RESPIRATORY FAILURE WITH HYPOXIA (HCC): ICD-10-CM

## 2024-09-09 PROCEDURE — 1090F PRES/ABSN URINE INCON ASSESS: CPT | Performed by: INTERNAL MEDICINE

## 2024-09-09 PROCEDURE — G8417 CALC BMI ABV UP PARAM F/U: HCPCS | Performed by: INTERNAL MEDICINE

## 2024-09-09 PROCEDURE — G8427 DOCREV CUR MEDS BY ELIG CLIN: HCPCS | Performed by: INTERNAL MEDICINE

## 2024-09-09 PROCEDURE — 1123F ACP DISCUSS/DSCN MKR DOCD: CPT | Performed by: INTERNAL MEDICINE

## 2024-09-09 PROCEDURE — 3078F DIAST BP <80 MM HG: CPT | Performed by: INTERNAL MEDICINE

## 2024-09-09 PROCEDURE — 1036F TOBACCO NON-USER: CPT | Performed by: INTERNAL MEDICINE

## 2024-09-09 PROCEDURE — 3017F COLORECTAL CA SCREEN DOC REV: CPT | Performed by: INTERNAL MEDICINE

## 2024-09-09 PROCEDURE — G8399 PT W/DXA RESULTS DOCUMENT: HCPCS | Performed by: INTERNAL MEDICINE

## 2024-09-09 PROCEDURE — 99214 OFFICE O/P EST MOD 30 MIN: CPT | Performed by: INTERNAL MEDICINE

## 2024-09-09 PROCEDURE — 3023F SPIROM DOC REV: CPT | Performed by: INTERNAL MEDICINE

## 2024-09-09 PROCEDURE — 3075F SYST BP GE 130 - 139MM HG: CPT | Performed by: INTERNAL MEDICINE

## 2024-09-09 RX ORDER — SOLIFENACIN SUCCINATE 5 MG/1
5 TABLET, FILM COATED ORAL DAILY
Qty: 30 TABLET | Refills: 0 | Status: SHIPPED | OUTPATIENT
Start: 2024-09-09

## 2024-10-15 ENCOUNTER — TELEPHONE (OUTPATIENT)
Dept: PHARMACY | Facility: CLINIC | Age: 69
End: 2024-10-15

## 2024-10-15 ENCOUNTER — TELEPHONE (OUTPATIENT)
Dept: INTERNAL MEDICINE | Age: 69
End: 2024-10-15

## 2024-10-15 NOTE — TELEPHONE ENCOUNTER
Mailed notification received of pt having had albumin-creatinine ratio and eGFR tests completed through insurance ("[employer]), scanned into media.    Dr. Page, please advise. Pt's uACR result was 41 mg/g, eGFR was 62 mL/min/1.73 m2.

## 2024-10-15 NOTE — TELEPHONE ENCOUNTER
[facility] CLINICAL PHARMACY REVIEW: ADHERENCE  Identified care gap per [employer]: fills at [facility]: Statin adherence    Patient also appears to be prescribed: ACE/ARB and Diabetes (failed 2024 measure)    ASSESSMENT  ACE/ARB ADHERENCE    Insurance Records claims through 10/15/24 (Prior Year PDC = 99% - PASSED; YTD PDC = 88%; Potential Fail Date: 11/16/2024):   losartan 100mg daily last filled on 06/18/2024 for 90 day supply. Next refill due: 09/16/2024    Last Rx sent on 05/07/2024 for 90ds with 1 refill    BP Readings from Last 3 Encounters:   09/09/24 139/67   08/08/24 (!) 157/70   08/08/24 (!) 167/72     Lab Results   Component Value Date/Time    LABGLOM >90 09/03/2024 10:19 AM    LABGLOM >60 04/04/2023 01:40 PM     Lab Results   Component Value Date    CREATININE 0.6 09/03/2024     Estimated Creatinine Clearance: 91 mL/min (based on SCr of 0.6 mg/dL).    Lab Results   Component Value Date    K 4.4 09/03/2024      STATIN ADHERENCE    Insurance Records claims through 10/15/24 (Prior Year PDC = 100% - PASSED; YTD PDC = 74%; Potential Fail Date: 10/16/2024):   rosuvastatin 5mg daily last filled on 05/07/2024 for 90 day supply. Next refill due: 08/05/2024    Last Rx sent on 08/07/2024 for 90ds with 1 refill    Lab Results   Component Value Date/Time    CHOL 145 09/03/2024 10:19 AM    TRIG 154 09/03/2024 10:19 AM    HDL 40 09/03/2024 10:19 AM    CHOLFAST 127 09/18/2018 08:18 AM    TRIGLYCFAST 118 09/18/2018 08:18 AM    LDL 74 09/03/2024 10:19 AM    VLDL NOT REPORTED 05/07/2021 10:39 AM    ALT 11 09/03/2024 10:19 AM    AST 16 09/03/2024 10:19 AM       PLAN  The following are interventions that have been identified:   Patient OVERDUE refilling losartan, rosuvastatin and active on home medication list    Appears patient most recently filled a medication at [facility] - unsure if she wants to continue there.     Reached patient to review the above. She does want to fill at "[facility]'s. Patient reports that she would

## 2024-10-18 DIAGNOSIS — N39.46 MIXED STRESS AND URGE URINARY INCONTINENCE: ICD-10-CM

## 2024-10-18 DIAGNOSIS — F41.9 ANXIETY: ICD-10-CM

## 2024-10-18 DIAGNOSIS — I1A.0 RESISTANT HYPERTENSION: ICD-10-CM

## 2024-10-18 RX ORDER — SOLIFENACIN SUCCINATE 5 MG/1
5 TABLET, FILM COATED ORAL DAILY
Qty: 30 TABLET | Refills: 0 | Status: SHIPPED | OUTPATIENT
Start: 2024-10-18

## 2024-10-18 RX ORDER — OMEPRAZOLE 40 MG/1
CAPSULE, DELAYED RELEASE ORAL
Qty: 90 CAPSULE | Refills: 1 | Status: SHIPPED | OUTPATIENT
Start: 2024-10-18

## 2024-10-18 RX ORDER — LOSARTAN POTASSIUM 100 MG/1
TABLET ORAL
Qty: 90 TABLET | Refills: 1 | Status: SHIPPED | OUTPATIENT
Start: 2024-10-18

## 2024-10-18 NOTE — TELEPHONE ENCOUNTER
Request for Pt states she need her Losartan ASAP please, due to Pt is out. Told Pt 48 hour return for refills.   Requested Prescriptions     Pending Prescriptions Disp Refills    solifenacin (VESICARE) 5 MG tablet 30 tablet 0     Sig: Take 1 tablet by mouth daily    omeprazole (PRILOSEC) 40 MG delayed release capsule 90 capsule 1     Sig: take 1 capsule by mouth once daily    losartan (COZAAR) 100 MG tablet 90 tablet 1     Sig: Once a day    sertraline (ZOLOFT) 50 MG tablet 90 tablet 1     Sig: take 1 tablet by mouth once daily    .      Please review and e-scribe to pharmacy listed in chart if appropriate. Thank you.      Last Visit Date: 6/25/2024  Next Visit Date: Visit date not found    Future Appointments   Date Time Provider Department Center   3/10/2025  2:30 PM Alen Hernandez MD Resp Spec [id number]       Health Maintenance   Topic Date Due    Hepatitis C screen  Never done    Shingles vaccine (1 of 2) Never done    Respiratory Syncytial Virus (RSV) Pregnant or age 60 yrs+ (1 - 1-dose 60+ series) Never done    Diabetic foot exam  05/13/2022    Diabetic Alb to Cr ratio (uACR) test  04/07/2023    Flu vaccine (1) 08/01/2024    COVID-19 Vaccine (5 - 2023-24 season) 09/01/2024    Diabetic retinal exam  02/22/2025    A1C test (Diabetic or Prediabetic)  05/07/2025    Depression Monitoring  06/25/2025    Lung Cancer Screening &/or Counseling  07/11/2025    Lipids  09/03/2025    GFR test (Diabetes, CKD 3-4, OR last GFR 15-59)  09/03/2025    Colorectal Cancer Screen  05/10/2026    Breast cancer screen  07/02/2026    DTaP/Tdap/Td vaccine (2 - Td or Tdap) 10/24/2028    DEXA (modify frequency per FRAX score)  Completed    Annual Wellness Visit (Medicare Advantage)  Completed    Pneumococcal 65+ years Vaccine  Completed    Hepatitis A vaccine  Aged Out    Hepatitis B vaccine  Aged Out    Hib vaccine  Aged Out    Polio vaccine  Aged Out    Meningococcal (ACWY) vaccine  Aged Out       Hemoglobin A1C (%)   Date Value

## 2024-10-30 ENCOUNTER — ENROLLMENT (OUTPATIENT)
Dept: PHARMACY | Facility: CLINIC | Age: 69
End: 2024-10-30

## 2024-11-15 DIAGNOSIS — N39.46 MIXED STRESS AND URGE URINARY INCONTINENCE: ICD-10-CM

## 2024-11-15 RX ORDER — SOLIFENACIN SUCCINATE 5 MG/1
5 TABLET, FILM COATED ORAL DAILY
Qty: 90 TABLET | Refills: 0 | Status: SHIPPED | OUTPATIENT
Start: 2024-11-15

## 2024-11-15 NOTE — TELEPHONE ENCOUNTER
Amina Olivo is calling to request a refill on the following medication(s):    Medication Request:  Requested Prescriptions     Pending Prescriptions Disp Refills    solifenacin (VESICARE) 5 MG tablet [Pharmacy Med Name: SOLIFENACIN 5MG TABLETS] 90 tablet      Sig: TAKE 1 TABLET BY MOUTH DAILY       Last Visit Date (If Applicable):  6/25/2024    Next Visit Date:    2/11/2025

## 2024-11-22 ENCOUNTER — OFFICE VISIT (OUTPATIENT)
Dept: INTERNAL MEDICINE | Age: 69
End: 2024-11-22

## 2024-11-22 VITALS
HEIGHT: 63 IN | BODY MASS INDEX: 35.44 KG/M2 | HEART RATE: 60 BPM | TEMPERATURE: 96.8 F | SYSTOLIC BLOOD PRESSURE: 138 MMHG | OXYGEN SATURATION: 91 % | DIASTOLIC BLOOD PRESSURE: 80 MMHG | WEIGHT: 200 LBS

## 2024-11-22 DIAGNOSIS — E11.8 TYPE 2 DIABETES MELLITUS WITH COMPLICATION, WITHOUT LONG-TERM CURRENT USE OF INSULIN (HCC): Primary | ICD-10-CM

## 2024-11-22 DIAGNOSIS — I25.10 CORONARY ARTERY DISEASE INVOLVING NATIVE CORONARY ARTERY OF NATIVE HEART WITHOUT ANGINA PECTORIS: ICD-10-CM

## 2024-11-22 DIAGNOSIS — D50.9 MICROCYTIC ANEMIA: ICD-10-CM

## 2024-11-22 DIAGNOSIS — I1A.0 RESISTANT HYPERTENSION: ICD-10-CM

## 2024-11-22 DIAGNOSIS — Z78.9 STATIN INTOLERANCE: ICD-10-CM

## 2024-11-22 DIAGNOSIS — E78.5 DYSLIPIDEMIA: ICD-10-CM

## 2024-11-22 DIAGNOSIS — I73.9 PAD (PERIPHERAL ARTERY DISEASE) (HCC): ICD-10-CM

## 2024-11-22 DIAGNOSIS — Z23 FLU VACCINE NEED: ICD-10-CM

## 2024-11-22 LAB — HBA1C MFR BLD: 5.9 %

## 2024-11-22 RX ORDER — SEMAGLUTIDE 1.34 MG/ML
0.5 INJECTION, SOLUTION SUBCUTANEOUS WEEKLY
Qty: 4 ML | Refills: 2 | Status: SHIPPED | OUTPATIENT
Start: 2024-11-22

## 2024-11-22 RX ORDER — SEMAGLUTIDE 1.34 MG/ML
0.25 INJECTION, SOLUTION SUBCUTANEOUS WEEKLY
Qty: 4 ML | Refills: 5 | Status: CANCELLED | OUTPATIENT
Start: 2024-11-22

## 2024-11-22 RX ORDER — AMLODIPINE BESYLATE 10 MG/1
TABLET ORAL
Qty: 90 TABLET | Refills: 1 | Status: SHIPPED | OUTPATIENT
Start: 2024-11-22

## 2024-11-22 RX ORDER — CARVEDILOL 3.12 MG/1
TABLET ORAL
Qty: 180 TABLET | Refills: 1 | Status: SHIPPED | OUTPATIENT
Start: 2024-11-22

## 2024-11-22 RX ORDER — HYDROCHLOROTHIAZIDE 25 MG/1
TABLET ORAL
Qty: 90 TABLET | Refills: 1 | Status: SHIPPED | OUTPATIENT
Start: 2024-11-22

## 2024-11-22 ASSESSMENT — ENCOUNTER SYMPTOMS
BLOOD IN STOOL: 0
WHEEZING: 0
BACK PAIN: 1
COUGH: 0
SHORTNESS OF BREATH: 1
ABDOMINAL PAIN: 0

## 2024-11-22 NOTE — PROGRESS NOTES
Component Value Date/Time    ALT 11 09/03/2024 10:19 AM    AST 16 09/03/2024 10:19 AM    BILITOT 0.3 09/03/2024 10:19 AM    BILIDIR 0.1 09/03/2024 10:19 AM      THYROID FUNCTION:   Lab Results   Component Value Date/Time    TSH 3.34 08/05/2022 10:44 AM      URINEANALYSIS: No results found for: \"LABURIN\"  ASSESSMENT AND PLAN:    1. Type 2 diabetes mellitus with complication, without long-term current use of insulin ([facility])  Increase Ozempic  Labs  eye exam    - POCT glycosylated hemoglobin (Hb A1C)  - Semaglutide,0.25 or 0.5MG/DOS, (OZEMPIC, 0.25 OR 0.5 MG/DOSE,) 2 MG/1.5ML SOPN; Inject 0.5 mg into the skin once a week Use weekly  Dispense: 4 mL; Refill: 2    2. Resistant hypertension  - amLODIPine (NORVASC) 10 MG tablet; 1 tablet once a day  Dispense: 90 tablet; Refill: 1  - carvedilol (COREG) 3.125 MG tablet; take 1 tablet by mouth twice a day  Dispense: 180 tablet; Refill: 1  - hydroCHLOROthiazide (HYDRODIURIL) 25 MG tablet; 1 tablet daily  Dispense: 90 tablet; Refill: 1    3. Coronary artery disease involving native coronary artery of native heart without angina pectoris    - Evolocumab (REPATHA) SOSY syringe; Inject 1 mL into the skin every 14 days  Dispense: 2.1 mL; Refill: 5  - carvedilol (COREG) 3.125 MG tablet; take 1 tablet by mouth twice a day  Dispense: 180 tablet; Refill: 1    4. Dyslipidemia  Intolerant to statin    - Evolocumab (REPATHA) SOSY syringe; Inject 1 mL into the skin every 14 days  Dispense: 2.1 mL; Refill: 5    5. PAD (peripheral artery disease) (HCC)    - Evolocumab (REPATHA) SOSY syringe; Inject 1 mL into the skin every 14 days  Dispense: 2.1 mL; Refill: 5    6. Statin intolerance    - Evolocumab (REPATHA) SOSY syringe; Inject 1 mL into the skin every 14 days  Dispense: 2.1 mL; Refill: 5    7. Microcytic anemia    - CBC; Future  - Iron and TIBC; Future  - Ferritin; Future  - Vitamin B12 & Folate; Future    8. BMI 35.0-35.9,adult  Ozempic           FOLLOW UP AND INSTRUCTIONS:   No

## 2024-11-25 ENCOUNTER — TELEPHONE (OUTPATIENT)
Dept: INTERNAL MEDICINE | Age: 69
End: 2024-11-25

## 2024-11-25 NOTE — TELEPHONE ENCOUNTER
Received request for PA in/on [email or web address] for Repatha.    Pt was on statin therapy, per chart notes has hx CAD and PAD - pt not tolerating statin therapy. Added to pt's allergies. Rosuvastatin d/c'd in chart.    PA completed via [employer], received response: Request Reference Number: [id number]. REPATHA INJ 140MG/ML is approved through 05/25/2025. Your patient may now fill this prescription and it will be covered.  Authorization Expiration Date: 5/25/2025    PC to pharmacy to notify, left secure VM with approval information.

## 2024-12-04 RX ORDER — FLUTICASONE FUROATE 100 UG/1
POWDER RESPIRATORY (INHALATION)
Qty: 90 EACH | OUTPATIENT
Start: 2024-12-04

## 2024-12-04 NOTE — TELEPHONE ENCOUNTER
Patient called requesting a refill on arnuity.  Patient was last seen 9/9/24 follow up 4/2/25.  I did pend script for your approval if you agree please sign

## 2024-12-05 RX ORDER — FLUTICASONE FUROATE 100 UG/1
1 POWDER RESPIRATORY (INHALATION) DAILY
Qty: 1 EACH | Refills: 5 | Status: SHIPPED | OUTPATIENT
Start: 2024-12-05

## 2024-12-15 DIAGNOSIS — N39.46 MIXED STRESS AND URGE URINARY INCONTINENCE: ICD-10-CM

## 2024-12-16 RX ORDER — SOLIFENACIN SUCCINATE 5 MG/1
5 TABLET, FILM COATED ORAL DAILY
Qty: 90 TABLET | Refills: 0 | Status: SHIPPED | OUTPATIENT
Start: 2024-12-16

## 2024-12-16 NOTE — TELEPHONE ENCOUNTER
Amina Olivo is calling to request a refill on the following medication(s):    Medication Request:  Requested Prescriptions     Pending Prescriptions Disp Refills    solifenacin (VESICARE) 5 MG tablet [Pharmacy Med Name: SOLIFENACIN 5MG TABLETS] 90 tablet 0     Sig: TAKE 1 TABLET BY MOUTH DAILY       Last Visit Date (If Applicable):  11/22/2024    Next Visit Date:    2/11/2025

## 2025-01-15 NOTE — TELEPHONE ENCOUNTER
LAST VISIT: 8/15/23  NEXT VISIT: 4/11/24    Per last dictation patient is on this medication. Please sign for refill if ok. Thank you.        Alert-The patient is alert, awake and responds to voice. The patient is oriented to time, place, and person. The triage nurse is able to obtain subjective information.

## 2025-02-05 ENCOUNTER — HOSPITAL ENCOUNTER (OUTPATIENT)
Age: 70
Discharge: HOME | End: 2025-02-05
Payer: MEDICARE

## 2025-02-05 DIAGNOSIS — D50.9 MICROCYTIC ANEMIA: ICD-10-CM

## 2025-02-05 LAB
ERYTHROCYTE [DISTWIDTH] IN BLOOD BY AUTOMATED COUNT: 17.2 % (ref 11.8–14.4)
FERRITIN SERPL-MCNC: 16 NG/ML
FOLATE SERPL-MCNC: 9.4 NG/ML (ref 4.8–24.2)
HCT VFR BLD AUTO: 33 % (ref 36.3–47.1)
HGB BLD-MCNC: 9.9 G/DL (ref 11.9–15.1)
IRON SATN MFR SERPL: 7 % (ref 20–55)
IRON SERPL-MCNC: 25 UG/DL (ref 37–145)
MCH RBC QN AUTO: 23.2 PG (ref 25.2–33.5)
MCHC RBC AUTO-ENTMCNC: 30 G/DL (ref 28.4–34.8)
MCV RBC AUTO: 77.3 FL (ref 82.6–102.9)
NRBC BLD-RTO: 0 PER 100 WBC
PLATELET # BLD AUTO: 396 K/UL (ref 138–453)
PMV BLD AUTO: 10.8 FL (ref 8.1–13.5)
RBC # BLD AUTO: 4.27 M/UL (ref 3.95–5.11)
TIBC SERPL-MCNC: 378 UG/DL (ref 250–450)
UNSATURATED IRON BINDING CAPACITY: 353 UG/DL (ref 112–347)
VIT B12 SERPL-MCNC: 1153 PG/ML (ref 232–1245)
WBC OTHER # BLD: 7.7 K/UL (ref 3.5–11.3)

## 2025-02-05 PROCEDURE — 36415 COLL VENOUS BLD VENIPUNCTURE: CPT

## 2025-02-05 PROCEDURE — 82728 ASSAY OF FERRITIN: CPT

## 2025-02-05 PROCEDURE — 82746 ASSAY OF FOLIC ACID SERUM: CPT

## 2025-02-05 PROCEDURE — 83550 IRON BINDING TEST: CPT

## 2025-02-05 PROCEDURE — 85027 COMPLETE CBC AUTOMATED: CPT

## 2025-02-05 PROCEDURE — 83540 ASSAY OF IRON: CPT

## 2025-02-05 PROCEDURE — 82607 VITAMIN B-12: CPT

## 2025-02-11 ENCOUNTER — HOSPITAL ENCOUNTER (OUTPATIENT)
Age: 70
Setting detail: SPECIMEN
Discharge: HOME | End: 2025-02-11

## 2025-02-11 ENCOUNTER — OFFICE VISIT (OUTPATIENT)
Dept: INTERNAL MEDICINE | Age: 70
End: 2025-02-11
Payer: MEDICARE

## 2025-02-11 VITALS
DIASTOLIC BLOOD PRESSURE: 62 MMHG | HEIGHT: 63 IN | SYSTOLIC BLOOD PRESSURE: 136 MMHG | HEART RATE: 62 BPM | BODY MASS INDEX: 33.66 KG/M2 | WEIGHT: 190 LBS

## 2025-02-11 DIAGNOSIS — N39.46 MIXED STRESS AND URGE URINARY INCONTINENCE: ICD-10-CM

## 2025-02-11 DIAGNOSIS — I49.9 IRREGULAR CARDIAC RHYTHM: ICD-10-CM

## 2025-02-11 DIAGNOSIS — R42 DIZZINESS: ICD-10-CM

## 2025-02-11 DIAGNOSIS — F33.1 MODERATE RECURRENT MAJOR DEPRESSION (HCC): ICD-10-CM

## 2025-02-11 DIAGNOSIS — I44.0 AV BLOCK, 1ST DEGREE: ICD-10-CM

## 2025-02-11 DIAGNOSIS — Z11.59 NEED FOR HEPATITIS C SCREENING TEST: ICD-10-CM

## 2025-02-11 DIAGNOSIS — Z00.00 MEDICARE ANNUAL WELLNESS VISIT, SUBSEQUENT: Primary | ICD-10-CM

## 2025-02-11 DIAGNOSIS — I1A.0 RESISTANT HYPERTENSION: ICD-10-CM

## 2025-02-11 DIAGNOSIS — I25.10 CORONARY ARTERY DISEASE INVOLVING NATIVE CORONARY ARTERY OF NATIVE HEART WITHOUT ANGINA PECTORIS: ICD-10-CM

## 2025-02-11 DIAGNOSIS — E11.8 TYPE 2 DIABETES MELLITUS WITH COMPLICATION, WITHOUT LONG-TERM CURRENT USE OF INSULIN (HCC): ICD-10-CM

## 2025-02-11 DIAGNOSIS — Z88.8 ALLERGY TO STATIN MEDICATION: ICD-10-CM

## 2025-02-11 PROCEDURE — 93005 ELECTROCARDIOGRAM TRACING: CPT | Performed by: INTERNAL MEDICINE

## 2025-02-11 RX ORDER — POTASSIUM CHLORIDE 750 MG/1
10 TABLET, EXTENDED RELEASE ORAL NIGHTLY
Qty: 90 TABLET | Refills: 1 | Status: SHIPPED | OUTPATIENT
Start: 2025-02-11

## 2025-02-11 RX ORDER — AMLODIPINE BESYLATE 10 MG/1
TABLET ORAL
Qty: 90 TABLET | Refills: 1 | Status: SHIPPED | OUTPATIENT
Start: 2025-02-11

## 2025-02-11 RX ORDER — SEMAGLUTIDE 1.34 MG/ML
0.5 INJECTION, SOLUTION SUBCUTANEOUS WEEKLY
Qty: 2 ML | Refills: 3 | Status: SHIPPED | OUTPATIENT
Start: 2025-02-11

## 2025-02-11 RX ORDER — SOLIFENACIN SUCCINATE 5 MG/1
5 TABLET, FILM COATED ORAL DAILY
Qty: 90 TABLET | Refills: 0 | Status: SHIPPED | OUTPATIENT
Start: 2025-02-11

## 2025-02-11 RX ORDER — LOSARTAN POTASSIUM 100 MG/1
TABLET ORAL
Qty: 90 TABLET | Refills: 1 | Status: SHIPPED | OUTPATIENT
Start: 2025-02-11

## 2025-02-11 RX ORDER — EVOLOCUMAB 140 MG/ML
140 INJECTION, SOLUTION SUBCUTANEOUS
Qty: 2 ADJUSTABLE DOSE PRE-FILLED PEN SYRINGE | Refills: 5 | Status: SHIPPED | OUTPATIENT
Start: 2025-02-11

## 2025-02-11 RX ORDER — ROSUVASTATIN CALCIUM 10 MG/1
10 TABLET, COATED ORAL EVERY OTHER DAY
Qty: 45 TABLET | Refills: 1 | Status: CANCELLED | OUTPATIENT
Start: 2025-02-11

## 2025-02-11 SDOH — ECONOMIC STABILITY: FOOD INSECURITY: WITHIN THE PAST 12 MONTHS, YOU WORRIED THAT YOUR FOOD WOULD RUN OUT BEFORE YOU GOT MONEY TO BUY MORE.: SOMETIMES TRUE

## 2025-02-11 SDOH — ECONOMIC STABILITY: FOOD INSECURITY: WITHIN THE PAST 12 MONTHS, THE FOOD YOU BOUGHT JUST DIDN'T LAST AND YOU DIDN'T HAVE MONEY TO GET MORE.: SOMETIMES TRUE

## 2025-02-11 ASSESSMENT — PATIENT HEALTH QUESTIONNAIRE - PHQ9
9. THOUGHTS THAT YOU WOULD BE BETTER OFF DEAD, OR OF HURTING YOURSELF: NOT AT ALL
5. POOR APPETITE OR OVEREATING: NOT AT ALL
8. MOVING OR SPEAKING SO SLOWLY THAT OTHER PEOPLE COULD HAVE NOTICED. OR THE OPPOSITE, BEING SO FIGETY OR RESTLESS THAT YOU HAVE BEEN MOVING AROUND A LOT MORE THAN USUAL: NOT AT ALL
4. FEELING TIRED OR HAVING LITTLE ENERGY: SEVERAL DAYS
SUM OF ALL RESPONSES TO PHQ QUESTIONS 1-9: 3
7. TROUBLE CONCENTRATING ON THINGS, SUCH AS READING THE NEWSPAPER OR WATCHING TELEVISION: NOT AT ALL
6. FEELING BAD ABOUT YOURSELF - OR THAT YOU ARE A FAILURE OR HAVE LET YOURSELF OR YOUR FAMILY DOWN: NOT AT ALL
SUM OF ALL RESPONSES TO PHQ9 QUESTIONS 1 & 2: 2
2. FEELING DOWN, DEPRESSED OR HOPELESS: SEVERAL DAYS
1. LITTLE INTEREST OR PLEASURE IN DOING THINGS: SEVERAL DAYS
SUM OF ALL RESPONSES TO PHQ QUESTIONS 1-9: 3
3. TROUBLE FALLING OR STAYING ASLEEP: NOT AT ALL
10. IF YOU CHECKED OFF ANY PROBLEMS, HOW DIFFICULT HAVE THESE PROBLEMS MADE IT FOR YOU TO DO YOUR WORK, TAKE CARE OF THINGS AT HOME, OR GET ALONG WITH OTHER PEOPLE: SOMEWHAT DIFFICULT
SUM OF ALL RESPONSES TO PHQ QUESTIONS 1-9: 3
SUM OF ALL RESPONSES TO PHQ QUESTIONS 1-9: 3

## 2025-02-11 ASSESSMENT — ENCOUNTER SYMPTOMS
CONSTIPATION: 0
ABDOMINAL PAIN: 0
COUGH: 0
WHEEZING: 0
SHORTNESS OF BREATH: 0

## 2025-02-11 NOTE — PATIENT INSTRUCTIONS
and vegetables are especially good for you. Examples include spinach, carrots, peaches, and berries.     Foods high in fiber can reduce your cholesterol and provide important vitamins and minerals. High-fiber foods include whole-grain cereals and breads, oatmeal, beans, brown rice, citrus fruits, and apples.     Eat lean proteins. Heart-healthy proteins include seafood, lean meats and poultry, eggs, beans, peas, nuts, seeds, and soy products.     Limit drinks and foods with added sugar. These include candy, desserts, and soda pop.   Heart-healthy lifestyle    If your doctor recommends it, get more exercise. For many people, walking is a good choice. Or you may want to swim, bike, or do other activities. Bit by bit, increase the time you're active every day. Try for at least 30 minutes on most days of the week.     Try to quit or cut back on using tobacco and other nicotine products. This includes smoking and vaping. If you need help quitting, talk to your doctor about stop-smoking programs and medicines. These can increase your chances of quitting for good. Quitting is one of the most important things you can do to protect your heart. It is never too late to quit. Try to avoid secondhand smoke too.     Stay at a weight that's healthy for you. Talk to your doctor if you need help losing weight.     Try to get 7 to 9 hours of sleep each night.     Limit alcohol to 2 drinks a day for men and 1 drink a day for women. Too much alcohol can cause health problems.     Manage other health problems such as diabetes, high blood pressure, and high cholesterol. If you think you may have a problem with alcohol or drug use, talk to your doctor.   Medicines    Take your medicines exactly as prescribed. Call your doctor if you think you are having a problem with your medicine.     If your doctor recommends aspirin, take the amount directed each day. Make sure you take aspirin and not another kind of pain reliever, such as

## 2025-02-11 NOTE — PROGRESS NOTES
Medicare Annual Wellness Visit    Amina Olivo is here for Medicare AWV (Last AWV 6.25.2024), Results (Low iron, elevated UIBC), Hypertension (Brought new wrist cuff to check accuracy, cuff indicates irregular heartbeat, pt states she is having dizzy spells first thing in morning and sometimes when she goes to lie down, endorses headaches more often in the last couple months), and Health Maintenance (Labs pended)    Assessment & Plan   Need for hepatitis C screening test  Type 2 diabetes mellitus without complication, without long-term current use of insulin (HCC)  Resistant hypertension  The following orders have not been finalized:  -     losartan (COZAAR) 100 MG tablet  -     amLODIPine (NORVASC) 10 MG tablet  Essential hypertension  The following orders have not been finalized:  -     potassium chloride (KLOR-CON M) 10 MEQ extended release tablet  Type 2 diabetes mellitus with complication, without long-term current use of insulin (HCC)  The following orders have not been finalized:  -     Semaglutide,0.25 or 0.5MG/DOS, (OZEMPIC, 0.25 OR 0.5 MG/DOSE,) 2 MG/1.5ML SOPN  Mixed stress and urge urinary incontinence  The following orders have not been finalized:  -     solifenacin (VESICARE) 5 MG tablet  Medicare annual wellness visit, subsequent       No follow-ups on file.     Subjective     Patient's complete Health Risk Assessment and screening values have been reviewed and are found in Flowsheets. The following problems were reviewed today and where indicated follow up appointments were made and/or referrals ordered.    Positive Risk Factor Screenings with Interventions:        Drug Use:   Substance and Sexual Activity   Drug Use Yes    Types: Marijuana (Weed)    Comment: marijuana weekly  LAST USED 6-11-24     DAST-10 Score: 0     Interventions:  Patient declined any further intervention or treatment         Inactivity:  On average, how many days per week do you engage in moderate to strenuous exercise (like 
normal.      Left Ear: Tympanic membrane and ear canal normal.      Mouth/Throat:      Mouth: Mucous membranes are moist.   Eyes:      Extraocular Movements: Extraocular movements intact.      Conjunctiva/sclera: Conjunctivae normal.      Pupils: Pupils are equal, round, and reactive to light.   Cardiovascular:      Rate and Rhythm: Normal rate. Rhythm irregular.      Heart sounds: No murmur heard.  Pulmonary:      Effort: Pulmonary effort is normal.      Breath sounds: Normal breath sounds. No wheezing or rales.   Abdominal:      Palpations: Abdomen is soft.      Tenderness: There is no abdominal tenderness.   Musculoskeletal:      Right lower leg: No edema.      Left lower leg: No edema.   Neurological:      General: No focal deficit present.      Mental Status: She is alert and oriented to person, place, and time.      Cranial Nerves: No cranial nerve deficit.      Motor: No weakness.             LABORATORY FINDINGS:    CBC:  Lab Results   Component Value Date/Time    WBC 7.7 02/05/2025 10:58 AM    HGB 9.9 02/05/2025 10:58 AM     02/05/2025 10:58 AM     BMP:    Lab Results   Component Value Date/Time     09/03/2024 10:19 AM    K 4.4 09/03/2024 10:19 AM     09/03/2024 10:19 AM    CO2 30 09/03/2024 10:19 AM    BUN 19 09/03/2024 10:19 AM    CREATININE 0.6 09/03/2024 10:19 AM    GLUCOSE 113 09/03/2024 10:19 AM    GLUCOSE 87 10/28/2011 08:57 AM     HEMOGLOBIN A1C:   Lab Results   Component Value Date/Time    LABA1C 5.9 11/22/2024 12:17 PM     MICROALBUMIN URINE: No results found for: \"MICROALBUR\"  FASTING LIPID PANEL:  Lab Results   Component Value Date    CHOL 145 09/03/2024    HDL 40 (L) 09/03/2024    TRIG 154 (H) 09/03/2024     No results found for: \"LDLDIRECT\"    LIVER PROFILE:  Lab Results   Component Value Date/Time    ALT 11 09/03/2024 10:19 AM    AST 16 09/03/2024 10:19 AM    BILITOT 0.3 09/03/2024 10:19 AM    BILIDIR 0.1 09/03/2024 10:19 AM      THYROID FUNCTION:   Lab Results   Component

## 2025-02-12 ENCOUNTER — TELEPHONE (OUTPATIENT)
Age: 70
End: 2025-02-12

## 2025-02-12 LAB
ANION GAP SERPL CALCULATED.3IONS-SCNC: 11 MMOL/L (ref 9–16)
BUN SERPL-MCNC: 23 MG/DL (ref 8–23)
CALCIUM SERPL-MCNC: 9.9 MG/DL (ref 8.6–10.4)
CHLORIDE SERPL-SCNC: 97 MMOL/L (ref 98–107)
CO2 SERPL-SCNC: 29 MMOL/L (ref 20–31)
CREAT SERPL-MCNC: 0.8 MG/DL (ref 0.6–0.9)
GFR, ESTIMATED: 78 ML/MIN/1.73M2
GLUCOSE SERPL-MCNC: 89 MG/DL (ref 74–99)
MAGNESIUM SERPL-MCNC: 2.2 MG/DL (ref 1.6–2.4)
POTASSIUM SERPL-SCNC: 4.4 MMOL/L (ref 3.7–5.3)
SODIUM SERPL-SCNC: 137 MMOL/L (ref 136–145)
TSH SERPL DL<=0.05 MIU/L-ACNC: 3.02 UIU/ML (ref 0.27–4.2)

## 2025-02-12 NOTE — TELEPHONE ENCOUNTER
Pt has an urgent referral for Irregular cardiac rhythm    - Coronary artery disease involving native coronary artery of native heart without angina pectoris    - Pre-syncope

## 2025-02-13 ENCOUNTER — HOSPITAL ENCOUNTER (OUTPATIENT)
Age: 70
Discharge: HOME | End: 2025-02-15
Attending: INTERNAL MEDICINE
Payer: MEDICARE

## 2025-02-13 DIAGNOSIS — I25.10 CORONARY ARTERY DISEASE INVOLVING NATIVE CORONARY ARTERY OF NATIVE HEART WITHOUT ANGINA PECTORIS: ICD-10-CM

## 2025-02-13 DIAGNOSIS — I44.0 AV BLOCK, 1ST DEGREE: ICD-10-CM

## 2025-02-13 DIAGNOSIS — R42 DIZZINESS: ICD-10-CM

## 2025-02-13 PROCEDURE — 93242 EXT ECG>48HR<7D RECORDING: CPT

## 2025-02-28 ENCOUNTER — OFFICE VISIT (OUTPATIENT)
Age: 70
End: 2025-02-28
Payer: MEDICARE

## 2025-02-28 VITALS
DIASTOLIC BLOOD PRESSURE: 63 MMHG | WEIGHT: 193 LBS | SYSTOLIC BLOOD PRESSURE: 165 MMHG | BODY MASS INDEX: 35.51 KG/M2 | HEART RATE: 68 BPM | HEIGHT: 62 IN | RESPIRATION RATE: 18 BRPM

## 2025-02-28 DIAGNOSIS — I25.10 ATHEROSCLEROSIS OF NATIVE CORONARY ARTERY OF NATIVE HEART WITHOUT ANGINA PECTORIS: Primary | ICD-10-CM

## 2025-02-28 PROCEDURE — 1090F PRES/ABSN URINE INCON ASSESS: CPT | Performed by: INTERNAL MEDICINE

## 2025-02-28 PROCEDURE — 3017F COLORECTAL CA SCREEN DOC REV: CPT | Performed by: INTERNAL MEDICINE

## 2025-02-28 PROCEDURE — G8417 CALC BMI ABV UP PARAM F/U: HCPCS | Performed by: INTERNAL MEDICINE

## 2025-02-28 PROCEDURE — 1123F ACP DISCUSS/DSCN MKR DOCD: CPT | Performed by: INTERNAL MEDICINE

## 2025-02-28 PROCEDURE — 99205 OFFICE O/P NEW HI 60 MIN: CPT | Performed by: INTERNAL MEDICINE

## 2025-02-28 PROCEDURE — 3077F SYST BP >= 140 MM HG: CPT | Performed by: INTERNAL MEDICINE

## 2025-02-28 PROCEDURE — G8399 PT W/DXA RESULTS DOCUMENT: HCPCS | Performed by: INTERNAL MEDICINE

## 2025-02-28 PROCEDURE — 3078F DIAST BP <80 MM HG: CPT | Performed by: INTERNAL MEDICINE

## 2025-02-28 PROCEDURE — 1160F RVW MEDS BY RX/DR IN RCRD: CPT | Performed by: INTERNAL MEDICINE

## 2025-02-28 PROCEDURE — 1036F TOBACCO NON-USER: CPT | Performed by: INTERNAL MEDICINE

## 2025-02-28 PROCEDURE — 1159F MED LIST DOCD IN RCRD: CPT | Performed by: INTERNAL MEDICINE

## 2025-02-28 PROCEDURE — G8427 DOCREV CUR MEDS BY ELIG CLIN: HCPCS | Performed by: INTERNAL MEDICINE

## 2025-02-28 RX ORDER — AMLODIPINE BESYLATE 10 MG/1
10 TABLET ORAL DAILY
Qty: 90 TABLET | Refills: 3 | Status: SHIPPED | OUTPATIENT
Start: 2025-02-28 | End: 2026-01-26

## 2025-02-28 RX ORDER — ASPIRIN 81 MG/1
81 TABLET ORAL DAILY
Qty: 90 TABLET | Refills: 3 | Status: SHIPPED | OUTPATIENT
Start: 2025-02-28 | End: 2026-01-26

## 2025-02-28 RX ORDER — EZETIMIBE 10 MG/1
10 TABLET ORAL DAILY
Qty: 90 TABLET | Refills: 3 | Status: SHIPPED | OUTPATIENT
Start: 2025-02-28 | End: 2026-01-26

## 2025-03-12 ENCOUNTER — TELEPHONE (OUTPATIENT)
Dept: INTERNAL MEDICINE | Age: 70
End: 2025-03-12

## 2025-03-12 NOTE — TELEPHONE ENCOUNTER
Patient calling asking for cardiac Holter monitor  test    Patient asking why is she to stop amlodipine and start zetia       Pt has been taking her blood pressure since march 1st    118 /60 today     7th 70/56     Most times its runny very different

## 2025-03-14 NOTE — TELEPHONE ENCOUNTER
Patient scheduled. Patient states that she was told by her Cardiologist not to stop the Amlodipine but to take 2 tablets of the Amlodipine at night.

## 2025-03-14 NOTE — TELEPHONE ENCOUNTER
Zetia is for cholesterol.  Schedule office visit to discuss all meds and holter.  Who stopped amlodipine?

## 2025-03-16 DIAGNOSIS — I1A.0 RESISTANT HYPERTENSION: ICD-10-CM

## 2025-03-17 NOTE — TELEPHONE ENCOUNTER
Amina Olivo is calling to request a refill on the following medication(s):    Medication Request:  Requested Prescriptions     Pending Prescriptions Disp Refills    hydroCHLOROthiazide (HYDRODIURIL) 25 MG tablet [Pharmacy Med Name: HYDROCHLOROTHIAZIDE 25MG TABLETS] 90 tablet 1     Sig: TAKE 1 TABLET BY MOUTH DAILY       Last Visit Date (If Applicable):  2/11/2025    Next Visit Date:    3/24/2025

## 2025-03-18 RX ORDER — HYDROCHLOROTHIAZIDE 25 MG/1
25 TABLET ORAL DAILY
Qty: 90 TABLET | Refills: 1 | Status: SHIPPED | OUTPATIENT
Start: 2025-03-18

## 2025-03-24 ENCOUNTER — OFFICE VISIT (OUTPATIENT)
Dept: INTERNAL MEDICINE | Age: 70
End: 2025-03-24
Payer: MEDICARE

## 2025-03-24 VITALS
OXYGEN SATURATION: 97 % | DIASTOLIC BLOOD PRESSURE: 70 MMHG | HEIGHT: 63 IN | WEIGHT: 211.4 LBS | SYSTOLIC BLOOD PRESSURE: 150 MMHG | HEART RATE: 60 BPM | BODY MASS INDEX: 37.46 KG/M2

## 2025-03-24 DIAGNOSIS — D50.9 MICROCYTIC ANEMIA: ICD-10-CM

## 2025-03-24 DIAGNOSIS — E11.8 TYPE 2 DIABETES MELLITUS WITH COMPLICATION, WITHOUT LONG-TERM CURRENT USE OF INSULIN (HCC): ICD-10-CM

## 2025-03-24 DIAGNOSIS — I44.0 AV BLOCK, 1ST DEGREE: ICD-10-CM

## 2025-03-24 DIAGNOSIS — I1A.0 RESISTANT HYPERTENSION: Primary | ICD-10-CM

## 2025-03-24 DIAGNOSIS — I25.10 CORONARY ARTERY DISEASE INVOLVING NATIVE CORONARY ARTERY OF NATIVE HEART WITHOUT ANGINA PECTORIS: ICD-10-CM

## 2025-03-24 PROCEDURE — 3077F SYST BP >= 140 MM HG: CPT | Performed by: INTERNAL MEDICINE

## 2025-03-24 PROCEDURE — 1036F TOBACCO NON-USER: CPT | Performed by: INTERNAL MEDICINE

## 2025-03-24 PROCEDURE — 1090F PRES/ABSN URINE INCON ASSESS: CPT | Performed by: INTERNAL MEDICINE

## 2025-03-24 PROCEDURE — 3017F COLORECTAL CA SCREEN DOC REV: CPT | Performed by: INTERNAL MEDICINE

## 2025-03-24 PROCEDURE — 2022F DILAT RTA XM EVC RTNOPTHY: CPT | Performed by: INTERNAL MEDICINE

## 2025-03-24 PROCEDURE — G8399 PT W/DXA RESULTS DOCUMENT: HCPCS | Performed by: INTERNAL MEDICINE

## 2025-03-24 PROCEDURE — G8427 DOCREV CUR MEDS BY ELIG CLIN: HCPCS | Performed by: INTERNAL MEDICINE

## 2025-03-24 PROCEDURE — G8417 CALC BMI ABV UP PARAM F/U: HCPCS | Performed by: INTERNAL MEDICINE

## 2025-03-24 PROCEDURE — 3046F HEMOGLOBIN A1C LEVEL >9.0%: CPT | Performed by: INTERNAL MEDICINE

## 2025-03-24 PROCEDURE — 99214 OFFICE O/P EST MOD 30 MIN: CPT | Performed by: INTERNAL MEDICINE

## 2025-03-24 PROCEDURE — 3078F DIAST BP <80 MM HG: CPT | Performed by: INTERNAL MEDICINE

## 2025-03-24 PROCEDURE — 1123F ACP DISCUSS/DSCN MKR DOCD: CPT | Performed by: INTERNAL MEDICINE

## 2025-03-24 RX ORDER — CARVEDILOL 3.12 MG/1
3.12 TABLET ORAL 2 TIMES DAILY
Qty: 60 TABLET | Refills: 3 | Status: CANCELLED | OUTPATIENT
Start: 2025-03-24

## 2025-03-24 RX ORDER — FERROUS SULFATE 325(65) MG
325 TABLET ORAL
Qty: 90 TABLET | Refills: 0 | Status: SHIPPED | OUTPATIENT
Start: 2025-03-24

## 2025-03-24 NOTE — PROGRESS NOTES
1,200 mg by mouth every evening)      zinc gluconate 50 MG tablet Take 1 tablet by mouth daily      nitroGLYCERIN (NITROSTAT) 0.4 MG SL tablet Place 1 tablet under the tongue every 5 minutes as needed for Chest pain up to max of 3 total doses. If no relief after 1 dose, call 911. 25 tablet 3    tiotropium-olodaterol (STIOLTO RESPIMAT) 2.5-2.5 MCG/ACT AERS Inhale 2 puffs into the lungs daily 4 g 11    clobetasol (TEMOVATE) 0.05 % cream apply to affected area twice a day for up to 2 WEEKS then 1 WEEK OFF. REPEAT AS NEEDED      Omega-3 Fatty Acids (FISH OIL) 1000 MG capsule Take 1,400 mg by mouth at bedtime      fluticasone (FLONASE) 50 MCG/ACT nasal spray 2 sprays by Each Nostril route daily 16 g 5    magnesium 30 MG tablet Take 500 mg by mouth at bedtime      solifenacin (VESICARE) 5 MG tablet Take 1 tablet by mouth daily 90 tablet 0    betamethasone dipropionate (DIPROLENE) 0.05 % ointment apply topically daily as directed (Patient not taking: Reported on 3/24/2025) 45 g 1    triamcinolone (KENALOG) 0.025 % cream apply to affected area once daily (Patient not taking: Reported on 3/24/2025) 30 g 1     No current facility-administered medications on file prior to visit.       HISTORY    Reviewed and no change from previous record. Amina  reports that she quit smoking about 9 years ago. Her smoking use included cigarettes. She started smoking about 57 years ago. She has a 71.3 pack-year smoking history. She has never used smokeless tobacco.    FAMILY HISTORY:    Reviewed and No change from previous visit    HEALTH MAINTENANCE DUE:      Health Maintenance Due   Topic Date Due    Hepatitis C screen  Never done    Respiratory Syncytial Virus (RSV) Pregnant or age 60 yrs+ (1 - Risk 60-74 years 1-dose series) Never done    Diabetic foot exam  05/13/2022    Diabetic Alb to Cr ratio (uACR) test  04/07/2023    COVID-19 Vaccine (5 - 2024-25 season) 09/01/2024    Diabetic retinal exam  02/22/2025       REVIEW OF SYSTEMS:    12

## 2025-03-28 ENCOUNTER — OFFICE VISIT (OUTPATIENT)
Age: 70
End: 2025-03-28
Payer: MEDICARE

## 2025-03-28 VITALS
WEIGHT: 193 LBS | OXYGEN SATURATION: 98 % | DIASTOLIC BLOOD PRESSURE: 39 MMHG | SYSTOLIC BLOOD PRESSURE: 124 MMHG | HEIGHT: 63 IN | BODY MASS INDEX: 34.2 KG/M2 | HEART RATE: 66 BPM

## 2025-03-28 DIAGNOSIS — I10 PRIMARY HYPERTENSION: ICD-10-CM

## 2025-03-28 DIAGNOSIS — I25.10 ATHEROSCLEROSIS OF NATIVE CORONARY ARTERY OF NATIVE HEART WITHOUT ANGINA PECTORIS: Primary | ICD-10-CM

## 2025-03-28 DIAGNOSIS — E78.5 DYSLIPIDEMIA: ICD-10-CM

## 2025-03-28 DIAGNOSIS — G47.33 OSA ON CPAP: ICD-10-CM

## 2025-03-28 PROCEDURE — 1159F MED LIST DOCD IN RCRD: CPT | Performed by: INTERNAL MEDICINE

## 2025-03-28 PROCEDURE — 3074F SYST BP LT 130 MM HG: CPT | Performed by: INTERNAL MEDICINE

## 2025-03-28 PROCEDURE — 3017F COLORECTAL CA SCREEN DOC REV: CPT | Performed by: INTERNAL MEDICINE

## 2025-03-28 PROCEDURE — 1090F PRES/ABSN URINE INCON ASSESS: CPT | Performed by: INTERNAL MEDICINE

## 2025-03-28 PROCEDURE — G8427 DOCREV CUR MEDS BY ELIG CLIN: HCPCS | Performed by: INTERNAL MEDICINE

## 2025-03-28 PROCEDURE — 1036F TOBACCO NON-USER: CPT | Performed by: INTERNAL MEDICINE

## 2025-03-28 PROCEDURE — 3078F DIAST BP <80 MM HG: CPT | Performed by: INTERNAL MEDICINE

## 2025-03-28 PROCEDURE — G8417 CALC BMI ABV UP PARAM F/U: HCPCS | Performed by: INTERNAL MEDICINE

## 2025-03-28 PROCEDURE — G8399 PT W/DXA RESULTS DOCUMENT: HCPCS | Performed by: INTERNAL MEDICINE

## 2025-03-28 PROCEDURE — 1160F RVW MEDS BY RX/DR IN RCRD: CPT | Performed by: INTERNAL MEDICINE

## 2025-03-28 PROCEDURE — 99214 OFFICE O/P EST MOD 30 MIN: CPT | Performed by: INTERNAL MEDICINE

## 2025-03-28 PROCEDURE — 1123F ACP DISCUSS/DSCN MKR DOCD: CPT | Performed by: INTERNAL MEDICINE

## 2025-03-28 RX ORDER — LOSARTAN POTASSIUM 50 MG/1
50 TABLET ORAL DAILY
Qty: 90 TABLET | Refills: 3 | Status: SHIPPED | OUTPATIENT
Start: 2025-03-28 | End: 2026-03-23

## 2025-03-28 NOTE — PROGRESS NOTES
(2/11/2025)    Exercise Vital Sign     Days of Exercise per Week: 0 days     Minutes of Exercise per Session: 0 min   Stress: Not on file   Social Connections: Not on file   Intimate Partner Violence: Not on file   Housing Stability: Low Risk  (2/11/2025)    Housing Stability Vital Sign     Unable to Pay for Housing in the Last Year: No     Number of Times Moved in the Last Year: 0     Homeless in the Last Year: No        REVIEW OF SYSTEMS:    Constitutional: there has been no unanticipated weight loss. There's been No change in energy level, No change in activity level.     Eyes: No visual changes or diplopia. No scleral icterus.  ENT: No Headaches, hearing loss or vertigo. No mouth sores or sore throat.  Cardiovascular: AS HPI  Respiratory: AS HPI  Gastrointestinal: No abdominal pain, appetite loss, blood in stools. No change in bowel or bladder habits.  Genitourinary: No dysuria, trouble voiding, or hematuria.  Musculoskeletal:  No gait disturbance, No weakness or joint complaints.  Integumentary: No rash or pruritis.  Neurological: No headache, diplopia, change in muscle strength, numbness or tingling. No change in gait, balance, coordination, mood, affect, memory, mentation, behavior.  Psychiatric: No new anxiety or depression.  Endocrine: No temperature intolerance. No excessive thirst, fluid intake, or urination. No tremor.  Hematologic/Lymphatic: No abnormal bruising or bleeding, blood clots or swollen lymph nodes.  Allergic/Immunologic: No nasal congestion or hives.    Medications:    Current Outpatient Medications:     losartan (COZAAR) 50 MG tablet, Take 1 tablet by mouth daily, Disp: 90 tablet, Rfl: 3    Multiple Vitamins-Minerals (PRESERVISION AREDS 2 PO), Take by mouth, Disp: , Rfl:     ferrous sulfate (IRON 325) 325 (65 Fe) MG tablet, Take 1 tablet by mouth daily (with breakfast), Disp: 90 tablet, Rfl: 0    ezetimibe (ZETIA) 10 MG tablet, Take 1 tablet by mouth daily, Disp: 90 tablet, Rfl: 3

## 2025-04-02 ENCOUNTER — OFFICE VISIT (OUTPATIENT)
Dept: PULMONOLOGY | Age: 70
End: 2025-04-02
Payer: MEDICARE

## 2025-04-02 VITALS
SYSTOLIC BLOOD PRESSURE: 143 MMHG | HEART RATE: 83 BPM | OXYGEN SATURATION: 98 % | BODY MASS INDEX: 34.02 KG/M2 | RESPIRATION RATE: 18 BRPM | WEIGHT: 192 LBS | HEIGHT: 63 IN | DIASTOLIC BLOOD PRESSURE: 63 MMHG

## 2025-04-02 DIAGNOSIS — Z87.891 PERSONAL HISTORY OF TOBACCO USE: ICD-10-CM

## 2025-04-02 DIAGNOSIS — G47.33 OSA ON CPAP: ICD-10-CM

## 2025-04-02 DIAGNOSIS — J44.9 CHRONIC OBSTRUCTIVE PULMONARY DISEASE, UNSPECIFIED COPD TYPE (HCC): Primary | ICD-10-CM

## 2025-04-02 DIAGNOSIS — E66.9 OBESITY (BMI 35.0-39.9 WITHOUT COMORBIDITY): ICD-10-CM

## 2025-04-02 DIAGNOSIS — R09.82 POST-NASAL DRIP: ICD-10-CM

## 2025-04-02 DIAGNOSIS — R91.1 LUNG NODULE: ICD-10-CM

## 2025-04-02 PROCEDURE — 3017F COLORECTAL CA SCREEN DOC REV: CPT | Performed by: INTERNAL MEDICINE

## 2025-04-02 PROCEDURE — 99214 OFFICE O/P EST MOD 30 MIN: CPT | Performed by: INTERNAL MEDICINE

## 2025-04-02 PROCEDURE — G8399 PT W/DXA RESULTS DOCUMENT: HCPCS | Performed by: INTERNAL MEDICINE

## 2025-04-02 PROCEDURE — 3023F SPIROM DOC REV: CPT | Performed by: INTERNAL MEDICINE

## 2025-04-02 PROCEDURE — 3078F DIAST BP <80 MM HG: CPT | Performed by: INTERNAL MEDICINE

## 2025-04-02 PROCEDURE — G8417 CALC BMI ABV UP PARAM F/U: HCPCS | Performed by: INTERNAL MEDICINE

## 2025-04-02 PROCEDURE — 1036F TOBACCO NON-USER: CPT | Performed by: INTERNAL MEDICINE

## 2025-04-02 PROCEDURE — 1090F PRES/ABSN URINE INCON ASSESS: CPT | Performed by: INTERNAL MEDICINE

## 2025-04-02 PROCEDURE — G0296 VISIT TO DETERM LDCT ELIG: HCPCS | Performed by: INTERNAL MEDICINE

## 2025-04-02 PROCEDURE — 1159F MED LIST DOCD IN RCRD: CPT | Performed by: INTERNAL MEDICINE

## 2025-04-02 PROCEDURE — 1123F ACP DISCUSS/DSCN MKR DOCD: CPT | Performed by: INTERNAL MEDICINE

## 2025-04-02 PROCEDURE — G8427 DOCREV CUR MEDS BY ELIG CLIN: HCPCS | Performed by: INTERNAL MEDICINE

## 2025-04-02 PROCEDURE — 3077F SYST BP >= 140 MM HG: CPT | Performed by: INTERNAL MEDICINE

## 2025-04-02 RX ORDER — FLUTICASONE FUROATE 100 UG/1
1 POWDER RESPIRATORY (INHALATION) DAILY
Qty: 30 EACH | Refills: 11 | Status: SHIPPED | OUTPATIENT
Start: 2025-04-02

## 2025-04-02 RX ORDER — POTASSIUM CHLORIDE 750 MG/1
10 TABLET, EXTENDED RELEASE ORAL DAILY
COMMUNITY
Start: 2025-01-17

## 2025-04-02 ASSESSMENT — SLEEP AND FATIGUE QUESTIONNAIRES
HOW LIKELY ARE YOU TO NOD OFF OR FALL ASLEEP WHILE SITTING AND READING: WOULD NEVER DOZE
HOW LIKELY ARE YOU TO NOD OFF OR FALL ASLEEP WHILE WATCHING TV: WOULD NEVER DOZE
HOW LIKELY ARE YOU TO NOD OFF OR FALL ASLEEP WHILE SITTING QUIETLY AFTER LUNCH WITHOUT ALCOHOL: WOULD NEVER DOZE
HOW LIKELY ARE YOU TO NOD OFF OR FALL ASLEEP WHILE SITTING AND TALKING TO SOMEONE: WOULD NEVER DOZE
ESS TOTAL SCORE: 2
HOW LIKELY ARE YOU TO NOD OFF OR FALL ASLEEP WHILE LYING DOWN TO REST IN THE AFTERNOON WHEN CIRCUMSTANCES PERMIT: MODERATE CHANCE OF DOZING
HOW LIKELY ARE YOU TO NOD OFF OR FALL ASLEEP IN A CAR, WHILE STOPPED FOR A FEW MINUTES IN TRAFFIC: WOULD NEVER DOZE
HOW LIKELY ARE YOU TO NOD OFF OR FALL ASLEEP WHILE SITTING INACTIVE IN A PUBLIC PLACE: WOULD NEVER DOZE
HOW LIKELY ARE YOU TO NOD OFF OR FALL ASLEEP WHEN YOU ARE A PASSENGER IN A CAR FOR AN HOUR WITHOUT A BREAK: WOULD NEVER DOZE

## 2025-04-02 NOTE — PROGRESS NOTES
Alen Hernandez MD, MD             4/2/2025, 3:31 PM    Discussed with the patient the current USPSTF guidelines released March 9, 2021 for screening for lung cancer.    For adults aged 50 to 80 years who have a 20 pack-year smoking history and currently smoke or have quit within the past 15 years the grade B recommendation is to:  Screen for lung cancer with low-dose computed tomography (LDCT) every year.  Stop screening once a person has not smoked for 15 years or has a health problem that limits life expectancy or the ability to have lung surgery.    The patient  reports that she quit smoking about 9 years ago. Her smoking use included cigarettes. She started smoking about 57 years ago. She has a 71.3 pack-year smoking history. She has never used smokeless tobacco.. Discussed with patient the risks and benefits of screening, including over-diagnosis, false positive rate, and total radiation exposure.  The patient currently exhibits no signs or symptoms suggestive of lung cancer.  Discussed with patient the importance of compliance with yearly annual lung cancer screenings and willingness to undergo diagnosis and treatment if screening scan is positive.  In addition, the patient was counseled regarding the importance of remaining smoke free and/or total smoking cessation.    Also reviewed the following if the patient has Medicare that as of February 10, 2022, Medicare only covers LDCT screening in patients aged 50-77 with at least a 20 pack-year smoking history who currently smoke or have quit in the last 15 years

## 2025-04-14 RX ORDER — POTASSIUM CHLORIDE 750 MG/1
10 TABLET, EXTENDED RELEASE ORAL DAILY
Qty: 90 TABLET | Refills: 0 | Status: SHIPPED | OUTPATIENT
Start: 2025-04-14

## 2025-04-14 RX ORDER — OMEPRAZOLE 40 MG/1
40 CAPSULE, DELAYED RELEASE ORAL DAILY
Qty: 90 CAPSULE | Refills: 1 | Status: SHIPPED | OUTPATIENT
Start: 2025-04-14

## 2025-04-14 NOTE — TELEPHONE ENCOUNTER
Amina Olivo is calling to request a refill on the following medication(s):    Medication Request:  Requested Prescriptions     Pending Prescriptions Disp Refills    omeprazole (PRILOSEC) 40 MG delayed release capsule [Pharmacy Med Name: OMEPRAZOLE 40MG CAPSULES] 90 capsule 1     Sig: TAKE 1 CAPSULE BY MOUTH DAILY       Last Visit Date (If Applicable):  3/24/2025    Next Visit Date:    6/9/2025

## 2025-04-14 NOTE — TELEPHONE ENCOUNTER
Amina Olivo is calling to request a refill on the following medication(s):    Medication Request:  Requested Prescriptions     Pending Prescriptions Disp Refills    potassium chloride (KLOR-CON) 10 MEQ extended release tablet [Pharmacy Med Name: POTASSIUM CL 10MEQ ER TABLETS] 90 tablet      Sig: TAKE 1 TABLET BY MOUTH ONCE DAILY       Last Visit Date (If Applicable):  3/24/2025    Next Visit Date:    4/12/2025

## 2025-06-09 ENCOUNTER — OFFICE VISIT (OUTPATIENT)
Age: 70
End: 2025-06-09
Payer: MEDICARE

## 2025-06-09 VITALS
HEIGHT: 63 IN | BODY MASS INDEX: 33.49 KG/M2 | DIASTOLIC BLOOD PRESSURE: 82 MMHG | WEIGHT: 189 LBS | SYSTOLIC BLOOD PRESSURE: 128 MMHG

## 2025-06-09 DIAGNOSIS — I73.9 PAD (PERIPHERAL ARTERY DISEASE): ICD-10-CM

## 2025-06-09 DIAGNOSIS — I1A.0 RESISTANT HYPERTENSION: ICD-10-CM

## 2025-06-09 DIAGNOSIS — D50.9 MICROCYTIC ANEMIA: ICD-10-CM

## 2025-06-09 DIAGNOSIS — G47.33 OSA ON CPAP: ICD-10-CM

## 2025-06-09 DIAGNOSIS — I25.10 CORONARY ARTERY DISEASE INVOLVING NATIVE CORONARY ARTERY OF NATIVE HEART WITHOUT ANGINA PECTORIS: ICD-10-CM

## 2025-06-09 DIAGNOSIS — F33.1 MODERATE RECURRENT MAJOR DEPRESSION (HCC): ICD-10-CM

## 2025-06-09 DIAGNOSIS — E78.5 DYSLIPIDEMIA: ICD-10-CM

## 2025-06-09 DIAGNOSIS — Z11.59 NEED FOR HEPATITIS C SCREENING TEST: ICD-10-CM

## 2025-06-09 DIAGNOSIS — N39.46 MIXED STRESS AND URGE URINARY INCONTINENCE: ICD-10-CM

## 2025-06-09 DIAGNOSIS — I44.0 AV BLOCK, 1ST DEGREE: ICD-10-CM

## 2025-06-09 DIAGNOSIS — J44.9 STAGE 2 MODERATE COPD BY GOLD CLASSIFICATION (HCC): ICD-10-CM

## 2025-06-09 DIAGNOSIS — E11.8 TYPE 2 DIABETES MELLITUS WITH COMPLICATION, WITHOUT LONG-TERM CURRENT USE OF INSULIN (HCC): Primary | ICD-10-CM

## 2025-06-09 DIAGNOSIS — F17.200 SMOKER: ICD-10-CM

## 2025-06-09 PROCEDURE — G8417 CALC BMI ABV UP PARAM F/U: HCPCS | Performed by: HOSPITALIST

## 2025-06-09 PROCEDURE — G8399 PT W/DXA RESULTS DOCUMENT: HCPCS | Performed by: HOSPITALIST

## 2025-06-09 PROCEDURE — 3046F HEMOGLOBIN A1C LEVEL >9.0%: CPT | Performed by: HOSPITALIST

## 2025-06-09 PROCEDURE — 1159F MED LIST DOCD IN RCRD: CPT | Performed by: HOSPITALIST

## 2025-06-09 PROCEDURE — 1090F PRES/ABSN URINE INCON ASSESS: CPT | Performed by: HOSPITALIST

## 2025-06-09 PROCEDURE — 1123F ACP DISCUSS/DSCN MKR DOCD: CPT | Performed by: HOSPITALIST

## 2025-06-09 PROCEDURE — 3074F SYST BP LT 130 MM HG: CPT | Performed by: HOSPITALIST

## 2025-06-09 PROCEDURE — 1036F TOBACCO NON-USER: CPT | Performed by: HOSPITALIST

## 2025-06-09 PROCEDURE — 2022F DILAT RTA XM EVC RTNOPTHY: CPT | Performed by: HOSPITALIST

## 2025-06-09 PROCEDURE — 99214 OFFICE O/P EST MOD 30 MIN: CPT | Performed by: HOSPITALIST

## 2025-06-09 PROCEDURE — 3023F SPIROM DOC REV: CPT | Performed by: HOSPITALIST

## 2025-06-09 PROCEDURE — G8427 DOCREV CUR MEDS BY ELIG CLIN: HCPCS | Performed by: HOSPITALIST

## 2025-06-09 PROCEDURE — 3079F DIAST BP 80-89 MM HG: CPT | Performed by: HOSPITALIST

## 2025-06-09 PROCEDURE — 3017F COLORECTAL CA SCREEN DOC REV: CPT | Performed by: HOSPITALIST

## 2025-06-09 PROCEDURE — 0509F URINE INCON PLAN DOCD: CPT | Performed by: HOSPITALIST

## 2025-06-09 ASSESSMENT — ENCOUNTER SYMPTOMS
GASTROINTESTINAL NEGATIVE: 1
RESPIRATORY NEGATIVE: 1
EYES NEGATIVE: 1
ALLERGIC/IMMUNOLOGIC NEGATIVE: 1

## 2025-06-09 NOTE — PROGRESS NOTES
cancer     Family history of liver cancer     Family history of ovarian cancer     Former smoker     QUIT  BUT VAPES    GERD (gastroesophageal reflux disease)     History of colon polyps     Hyperlipidemia     Hypertension     Lung nodule     FOLLOWS WITH DR CABRAL    Marijuana use     New onset type 2 diabetes mellitus (HCC) 2018    NLD (necrobiosis lipoidica)     Obesity     Obesity (BMI 35.0-39.9 without comorbidity)    On home O2 2024    JUST STARTED BUT NOT REALLY USING    Peripheral vascular disease     Poor historian     Sleep apnea     CRISTINA on CPAP  /  STATES USES NIGHTLY    Smoking greater than 40 pack years     reports that she quit smoking about 2 years ago. 1.5 PPD for 48 yeras. She has a       Past Surgical History:   Procedure Laterality Date    ANKLE SURGERY Left     CARDIAC CATHETERIZATION  10/11/2013    CARDIAC CATHETERIZATION  2024    DR ACUÑA  /  Patent RCA and OM1 stents  /  Otherwise minimal nonobstructive CAD    CARDIAC CATHETERIZATION  2017    PATENT RCA STENT  /  Minimal to moderate disease in LAD    CARDIAC PROCEDURE N/A 2024    letty / Left heart cath / coronary angiography performed by Marcella Acuña MD at [facility] CARDIAC CATH LAB    CAROTID ENDARTERECTOMY Left 2023    CAROTID ENDARTERECTOMY, 0.8CM X 8CM PHOTOFIX PATCH, SSEP MONITIORING performed by Hola Leroy MD at [facility] CVOR    CATARACT REMOVAL      cataract both eyes with lenses     SECTION      x2     SECTION      X2    COLONOSCOPY      COLONOSCOPY N/A 2020    COLONOSCOPY POLYPECTOMY HOT BIOPSY performed by Christiano Jensen MD at [facility] Endoscopy    COLONOSCOPY N/A 05/10/2023    COLONOSCOPY POLYPECTOMY HOT AND COLD SNARE performed by Christiano Jensen MD at [facility] OR    CORONARY ANGIOPLASTY WITH STENT PLACEMENT  07/2010    x2  /  OBTUSE MARGINAL AND RCA    ENDOSCOPY, COLON, DIAGNOSTIC      EYE SURGERY      b/l cataract extraction    INDUCED       NERVE BLOCK

## 2025-07-07 ENCOUNTER — HOSPITAL ENCOUNTER (OUTPATIENT)
Age: 70
Discharge: HOME | End: 2025-07-07
Payer: MEDICARE

## 2025-07-07 DIAGNOSIS — D50.9 MICROCYTIC ANEMIA: ICD-10-CM

## 2025-07-07 LAB
ERYTHROCYTE [DISTWIDTH] IN BLOOD BY AUTOMATED COUNT: 16.4 % (ref 11.8–14.4)
HCT VFR BLD AUTO: 40 % (ref 36.3–47.1)
HGB BLD-MCNC: 12 G/DL (ref 11.9–15.1)
MCH RBC QN AUTO: 24.8 PG (ref 25.2–33.5)
MCHC RBC AUTO-ENTMCNC: 30 G/DL (ref 28.4–34.8)
MCV RBC AUTO: 82.6 FL (ref 82.6–102.9)
NRBC BLD-RTO: 0 PER 100 WBC
PLATELET # BLD AUTO: 373 K/UL (ref 138–453)
PMV BLD AUTO: 10.3 FL (ref 8.1–13.5)
RBC # BLD AUTO: 4.84 M/UL (ref 3.95–5.11)
WBC OTHER # BLD: 7.8 K/UL (ref 3.5–11.3)

## 2025-07-07 PROCEDURE — 85027 COMPLETE CBC AUTOMATED: CPT

## 2025-07-07 PROCEDURE — 84165 PROTEIN E-PHORESIS SERUM: CPT

## 2025-07-07 PROCEDURE — 36415 COLL VENOUS BLD VENIPUNCTURE: CPT

## 2025-07-07 PROCEDURE — 84166 PROTEIN E-PHORESIS/URINE/CSF: CPT

## 2025-07-07 PROCEDURE — 84155 ASSAY OF PROTEIN SERUM: CPT

## 2025-07-07 PROCEDURE — 84156 ASSAY OF PROTEIN URINE: CPT

## 2025-07-07 PROCEDURE — 86335 IMMUNFIX E-PHORSIS/URINE/CSF: CPT

## 2025-07-07 PROCEDURE — 86334 IMMUNOFIX E-PHORESIS SERUM: CPT

## 2025-07-08 LAB
ALBUMIN PERCENT: 50 % (ref 56–66)
ALBUMIN SERPL-MCNC: 3.9 G/DL (ref 3.2–5.2)
ALPHA 2 PERCENT: 12 % (ref 7–12)
ALPHA1 GLOB SERPL ELPH-MCNC: 0.4 G/DL (ref 0.1–0.4)
ALPHA1 GLOB SERPL ELPH-MCNC: 5 % (ref 3–5)
ALPHA2 GLOB SERPL ELPH-MCNC: 1 G/DL (ref 0.5–0.9)
B-GLOBULIN SERPL ELPH-MCNC: 0.9 G/DL (ref 0.7–1.4)
B-GLOBULIN SERPL ELPH-MCNC: 12 % (ref 8–13)
GAMMA GLOB SERPL ELPH-MCNC: 1.6 G/DL (ref 0.5–1.5)
GAMMA GLOBULIN %: 21 % (ref 11–19)
ITYP INTERPRETATION: NORMAL
PATH REV: NORMAL
PATHOLOGIST: ABNORMAL
PROT PATTERN SERPL ELPH-IMP: ABNORMAL
PROT SERPL-MCNC: 7.8 G/DL (ref 6.6–8.7)
TOTAL PROT. SUM,%: 100 % (ref 98–102)
TOTAL PROT. SUM: 7.8 G/DL (ref 6.3–8.2)

## 2025-07-10 ENCOUNTER — TELEPHONE (OUTPATIENT)
Dept: INFUSION THERAPY | Age: 70
End: 2025-07-10

## 2025-07-10 ENCOUNTER — OFFICE VISIT (OUTPATIENT)
Age: 70
End: 2025-07-10
Payer: MEDICARE

## 2025-07-10 VITALS
HEART RATE: 84 BPM | DIASTOLIC BLOOD PRESSURE: 82 MMHG | OXYGEN SATURATION: 98 % | WEIGHT: 184 LBS | HEIGHT: 63 IN | SYSTOLIC BLOOD PRESSURE: 128 MMHG | BODY MASS INDEX: 32.6 KG/M2

## 2025-07-10 DIAGNOSIS — E78.5 DYSLIPIDEMIA: ICD-10-CM

## 2025-07-10 DIAGNOSIS — I1A.0 RESISTANT HYPERTENSION: ICD-10-CM

## 2025-07-10 DIAGNOSIS — I73.9 PAD (PERIPHERAL ARTERY DISEASE): ICD-10-CM

## 2025-07-10 DIAGNOSIS — R77.8 ABNORMAL SERUM PROTEIN ELECTROPHORESIS: ICD-10-CM

## 2025-07-10 DIAGNOSIS — M54.50 CHRONIC BILATERAL LOW BACK PAIN WITHOUT SCIATICA: ICD-10-CM

## 2025-07-10 DIAGNOSIS — F17.200 SMOKER: ICD-10-CM

## 2025-07-10 DIAGNOSIS — E11.8 TYPE 2 DIABETES MELLITUS WITH COMPLICATION, WITHOUT LONG-TERM CURRENT USE OF INSULIN (HCC): Primary | ICD-10-CM

## 2025-07-10 DIAGNOSIS — G89.29 CHRONIC BILATERAL LOW BACK PAIN WITHOUT SCIATICA: ICD-10-CM

## 2025-07-10 DIAGNOSIS — J44.9 STAGE 2 MODERATE COPD BY GOLD CLASSIFICATION (HCC): ICD-10-CM

## 2025-07-10 DIAGNOSIS — I25.10 CORONARY ARTERY DISEASE INVOLVING NATIVE CORONARY ARTERY OF NATIVE HEART WITHOUT ANGINA PECTORIS: ICD-10-CM

## 2025-07-10 DIAGNOSIS — G47.33 OSA ON CPAP: ICD-10-CM

## 2025-07-10 DIAGNOSIS — F33.1 MODERATE RECURRENT MAJOR DEPRESSION (HCC): ICD-10-CM

## 2025-07-10 DIAGNOSIS — I44.0 AV BLOCK, 1ST DEGREE: ICD-10-CM

## 2025-07-10 DIAGNOSIS — E11.8 TYPE 2 DIABETES MELLITUS WITH COMPLICATION, WITHOUT LONG-TERM CURRENT USE OF INSULIN (HCC): ICD-10-CM

## 2025-07-10 DIAGNOSIS — D50.9 MICROCYTIC ANEMIA: ICD-10-CM

## 2025-07-10 DIAGNOSIS — J96.11 CHRONIC RESPIRATORY FAILURE WITH HYPOXIA (HCC): ICD-10-CM

## 2025-07-10 DIAGNOSIS — N39.46 MIXED STRESS AND URGE URINARY INCONTINENCE: ICD-10-CM

## 2025-07-10 DIAGNOSIS — Z11.59 NEED FOR HEPATITIS C SCREENING TEST: ICD-10-CM

## 2025-07-10 LAB
ITYP INTERPRETATION: NORMAL
P E INTERPRETATION, U: NORMAL
PATHOLOGIST REVIEW: NORMAL
PATHOLOGIST: NORMAL
SPECIMEN TYPE: NORMAL
SPECIMEN TYPE: NORMAL
SPECIMEN VOL UR: NORMAL ML
TOTAL PROTEIN, URINE: 14 MG/DL
URINE TOTAL PROTEIN: 13 MG/DL

## 2025-07-10 PROCEDURE — 3046F HEMOGLOBIN A1C LEVEL >9.0%: CPT | Performed by: HOSPITALIST

## 2025-07-10 PROCEDURE — 1159F MED LIST DOCD IN RCRD: CPT | Performed by: HOSPITALIST

## 2025-07-10 PROCEDURE — 99214 OFFICE O/P EST MOD 30 MIN: CPT | Performed by: HOSPITALIST

## 2025-07-10 PROCEDURE — 3023F SPIROM DOC REV: CPT | Performed by: HOSPITALIST

## 2025-07-10 PROCEDURE — 0509F URINE INCON PLAN DOCD: CPT | Performed by: HOSPITALIST

## 2025-07-10 PROCEDURE — 2022F DILAT RTA XM EVC RTNOPTHY: CPT | Performed by: HOSPITALIST

## 2025-07-10 PROCEDURE — 1036F TOBACCO NON-USER: CPT | Performed by: HOSPITALIST

## 2025-07-10 PROCEDURE — 3079F DIAST BP 80-89 MM HG: CPT | Performed by: HOSPITALIST

## 2025-07-10 PROCEDURE — G8427 DOCREV CUR MEDS BY ELIG CLIN: HCPCS | Performed by: HOSPITALIST

## 2025-07-10 PROCEDURE — 3017F COLORECTAL CA SCREEN DOC REV: CPT | Performed by: HOSPITALIST

## 2025-07-10 PROCEDURE — 3074F SYST BP LT 130 MM HG: CPT | Performed by: HOSPITALIST

## 2025-07-10 PROCEDURE — G8417 CALC BMI ABV UP PARAM F/U: HCPCS | Performed by: HOSPITALIST

## 2025-07-10 PROCEDURE — G8399 PT W/DXA RESULTS DOCUMENT: HCPCS | Performed by: HOSPITALIST

## 2025-07-10 PROCEDURE — 1123F ACP DISCUSS/DSCN MKR DOCD: CPT | Performed by: HOSPITALIST

## 2025-07-10 PROCEDURE — 1090F PRES/ABSN URINE INCON ASSESS: CPT | Performed by: HOSPITALIST

## 2025-07-10 RX ORDER — GLUCOSAMINE HCL/CHONDROITIN SU 500-400 MG
1 CAPSULE ORAL 2 TIMES DAILY
Qty: 200 STRIP | Refills: 2 | Status: SHIPPED | OUTPATIENT
Start: 2025-07-10

## 2025-07-10 RX ORDER — AVOBENZONE, HOMOSALATE, OCTISALATE, OCTOCRYLENE 30; 40; 45; 26 MG/ML; MG/ML; MG/ML; MG/ML
1 CREAM TOPICAL DAILY
Qty: 100 EACH | Refills: 5 | Status: SHIPPED | OUTPATIENT
Start: 2025-07-10 | End: 2025-07-10 | Stop reason: SDUPTHER

## 2025-07-10 RX ORDER — GLUCOSAMINE HCL/CHONDROITIN SU 500-400 MG
CAPSULE ORAL
Qty: 11 STRIP | Refills: 2 | Status: SHIPPED | OUTPATIENT
Start: 2025-07-10 | End: 2025-07-10 | Stop reason: SDUPTHER

## 2025-07-10 RX ORDER — BLOOD-GLUCOSE METER
1 KIT MISCELLANEOUS DAILY
Qty: 1 KIT | Refills: 0 | Status: SHIPPED | OUTPATIENT
Start: 2025-07-10

## 2025-07-10 RX ORDER — AVOBENZONE, HOMOSALATE, OCTISALATE, OCTOCRYLENE 30; 40; 45; 26 MG/ML; MG/ML; MG/ML; MG/ML
1 CREAM TOPICAL 2 TIMES DAILY
Qty: 200 EACH | Refills: 5 | Status: SHIPPED | OUTPATIENT
Start: 2025-07-10

## 2025-07-10 ASSESSMENT — ENCOUNTER SYMPTOMS
RESPIRATORY NEGATIVE: 1
GASTROINTESTINAL NEGATIVE: 1
ALLERGIC/IMMUNOLOGIC NEGATIVE: 1
EYES NEGATIVE: 1

## 2025-07-10 NOTE — PROGRESS NOTES
[facility]      Date of Patient's Visit: 7/10/2025    Progress note    Patient Care Team:  Sherif Leigh MD as PCP - General (Internal Medicine)  Sherif Leigh MD as PCP - Empaneled Provider  Alen Hernandez MD as Consulting Physician (Pulmonology)      CHIEF COMPLAINT  Chief Complaint   Patient presents with    Results     Discuss results        SUBJECTIVE  Amina Olivo is a 70 y.o. female who presents to the clinic for routine follow-up.  Patient was last seen by me a month ago.  During that time we discussed about her diabetes, hypertension, first-degree AV block, anemia, CAD among other things.  Patient states that she has not been checking her blood sugars as she does not have a glucometer order testing supplies.  She does not allow to check them.  She states that she has a blood pressure machine at home and checks her blood pressures occasionally and have been doing well.  She states she saw the cardiologist but has not really gotten any information regarding her first-degree AV block.  Patient has undergone the blood tests ordered prior.  She sees pulmonary as outpatient.  She uses CPAP every night as prescribed.  No other complaints.      ROS  All other review of systems negative, except for those noted.    Review of Systems   Constitutional: Negative.    HENT: Negative.     Eyes: Negative.    Respiratory: Negative.     Cardiovascular: Negative.    Gastrointestinal: Negative.    Endocrine: Negative.    Genitourinary: Negative.    Musculoskeletal: Negative.    Skin: Negative.    Allergic/Immunologic: Negative.    Neurological: Negative.    Hematological: Negative.    Psychiatric/Behavioral: Negative.     All other systems reviewed and are negative.      Past Medical History:   Diagnosis Date    Abnormal cardiovascular stress test 05/2024    Angina pectoris     Arthritis     Bipolar disorder (HCC)     CAD (coronary artery

## 2025-07-10 NOTE — TELEPHONE ENCOUNTER
Writer called pt to schedule consult with Dr. Cabrera ([place]),  but there was no answer. NENITAM

## 2025-07-10 NOTE — PATIENT INSTRUCTIONS
[facility]  [id number]  [place] [address]                      Phone: [phone]       Fax: [phone]

## 2025-07-11 ENCOUNTER — HOSPITAL ENCOUNTER (OUTPATIENT)
Age: 70
Discharge: HOME | End: 2025-07-11
Attending: INTERNAL MEDICINE
Payer: MEDICARE

## 2025-07-11 ENCOUNTER — HOSPITAL ENCOUNTER (OUTPATIENT)
Dept: CT IMAGING | Age: 70
Discharge: HOME | End: 2025-07-13
Attending: INTERNAL MEDICINE
Payer: MEDICARE

## 2025-07-11 DIAGNOSIS — E11.8 TYPE 2 DIABETES MELLITUS WITH COMPLICATION, WITHOUT LONG-TERM CURRENT USE OF INSULIN (HCC): ICD-10-CM

## 2025-07-11 DIAGNOSIS — Z87.891 PERSONAL HISTORY OF TOBACCO USE: ICD-10-CM

## 2025-07-11 LAB
EST. AVERAGE GLUCOSE BLD GHB EST-MCNC: 114 MG/DL
HBA1C MFR BLD: 5.6 % (ref 4–6)

## 2025-07-11 PROCEDURE — 36415 COLL VENOUS BLD VENIPUNCTURE: CPT

## 2025-07-11 PROCEDURE — 71271 CT THORAX LUNG CANCER SCR C-: CPT

## 2025-07-11 PROCEDURE — 83036 HEMOGLOBIN GLYCOSYLATED A1C: CPT

## 2025-07-14 ENCOUNTER — TELEPHONE (OUTPATIENT)
Dept: PULMONOLOGY | Age: 70
End: 2025-07-14

## 2025-07-14 DIAGNOSIS — G47.33 OSA (OBSTRUCTIVE SLEEP APNEA): Primary | ICD-10-CM

## 2025-07-14 NOTE — TELEPHONE ENCOUNTER
Patient was notified that Dr Hernandez did order her sleep study. Patient was given the number for the sleep center. She was also informed about not being a likely candidate for the Inspire device.

## 2025-07-14 NOTE — TELEPHONE ENCOUNTER
Patient called to ask if she was able to get an order for a new sleep study.  Her last sleep study was done 12/14/2018.  Since then she has lost 40 pounds and would like to see if she still needs her cpap.  If she does still need the cpap she would like to see if she is a candidate for Inspire Implant device?

## 2025-07-16 ENCOUNTER — HOSPITAL ENCOUNTER (OUTPATIENT)
Dept: DIABETES SERVICES | Age: 70
Setting detail: THERAPIES SERIES
Discharge: HOME | End: 2025-07-16
Payer: MEDICARE

## 2025-07-16 PROCEDURE — G0108 DIAB MANAGE TRN  PER INDIV: HCPCS

## 2025-07-16 SDOH — ECONOMIC STABILITY: FOOD INSECURITY: ADDITIONAL INFORMATION: NO

## 2025-07-16 ASSESSMENT — PROBLEM AREAS IN DIABETES QUESTIONNAIRE (PAID)
PAID-5 TOTAL SCORE: 5
WORRYING ABOUT THE FUTURE AND THE POSSIBILITY OF SERIOUS COMPLICATIONS: MINOR PROBLEM
FEELING SCARED WHEN YOU THINK ABOUT LIVING WITH DIABETES: MINOR PROBLEM
FEELING DEPRESSED WHEN YOU THINK ABOUT LIVING WITH DIABETES: MINOR PROBLEM
FEELING THAT DIABETES IS TAKING UP TOO MUCH OF YOUR MENTAL AND PHYSICAL ENERGY EVERY DAY: NOT A PROBLEM
COPING WITH COMPLICATIONS OF DIABETES: MODERATE PROBLEM

## 2025-07-16 NOTE — PROGRESS NOTES
Baseline understanding of diabetes and self care activities.  Provide overview of DSME program and set behavioral change goals, PAID assessment        Initial DSME Class 1 - On the Road to Better Managing Your Diabetes     Overview of Diabetes Define Diabetes, Basic function of pancreas, liver, insulin & blood glucose.  Overview of Monitoring, being active, healthy eating taking medications and, healthy coping.    Initial DSME Class 2 - Diabetes and Healthy Eating     Provide understanding of nutrients: carbohydrates, protein, fat. Explain how carbohydrates effect blood glucose. Concept and practice of healthy eating patterns, what to eat, how much to eat, and when to eat.    Initial DSME Class 3 - Monitoring Your Blood Glucose     Overview of blood glucose, insulin. Target blood glucose, time in range and A1C. Hyper and Hypo Blood glucose withs elf-care for prevention and treatment of low / high. Using monitoring results to manage your diabetes.      Initial DSME Class 4 - Continuing Your Journey with Diabetes     Natural course of diabetes introduced, with the potential long-term complications of diabetes. Focus on ways to prevent by keeping BG on target. Importance of checking for long term complications and knowing your ABC's. Overview of diabetes medications.     Initial DSME - Class 5 Follow up  ( 3 month)     Review of self-care strategies - healthy eating, being active, monitoring, taking medications and health coping. Plan for ongoing support - following up with PCP, community support program in person or online, follow up diabetes education  Follow up on goals set in prior sessions and PAID assessment and A1C change    Initial MNT          Follow up MNT        Follow up MNT                Annual DSME        Annual DSME

## 2025-07-23 ENCOUNTER — APPOINTMENT (OUTPATIENT)
Dept: DIABETES SERVICES | Age: 70
End: 2025-07-23
Payer: MEDICARE

## 2025-07-25 DIAGNOSIS — F41.9 ANXIETY: ICD-10-CM

## 2025-07-25 NOTE — TELEPHONE ENCOUNTER
Request for   Requested Prescriptions      No prescriptions requested or ordered in this encounter    .      Please review and e-scribe to pharmacy listed in chart if appropriate. Thank you.      Last Visit Date: 7/10/2025  Next Visit Date: 10/23/2025    Future Appointments   Date Time Provider Department Center   8/1/2025 11:30 AM Jessy Davis RD, [facility]   8/5/2025 10:00 AM Lucita Viera RN [facility]   8/6/2025  8:30 AM [facility]   8/12/2025 10:00 AM Jessy Davis RD, LD [facility]   8/19/2025 10:00 AM Lucita Viera RN ST [facility] ED [facility]   8/20/2025  1:00 PM Lyle Cabrera MD [facility]   8/26/2025 10:00 AM Zoe Rodriguez RN ST DIAB ED [facility]   10/23/2025  2:00 PM Sherif Leigh MD [facility]   12/23/2025  1:30 PM Alen Hernandez MD Resp Spec [facility]       Health Maintenance   Topic Date Due    Hepatitis C screen  Never done    Respiratory Syncytial Virus (RSV) Pregnant or age 60 yrs+ (1 - Risk 60-74 years 1-dose series) Never done    Diabetic Alb to Cr ratio (uACR) test  04/07/2023    COVID-19 Vaccine (5 - 2024-25 season) 09/01/2024    Diabetic retinal exam  02/22/2025    Flu vaccine (1) 08/01/2025    Lipids  09/03/2025    Depression Monitoring  02/11/2026    GFR test (Diabetes, CKD 3-4, OR last GFR 15-59)  02/11/2026    Colorectal Cancer Screen  05/10/2026    Diabetic foot exam  06/09/2026    Breast cancer screen  07/02/2026    A1C test (Diabetic or Prediabetic)  07/11/2026    Lung Cancer Screening &/or Counseling  07/11/2026    DTaP/Tdap/Td vaccine (2 - Td or Tdap) 10/24/2028    DEXA (modify frequency per FRAX score)  Completed    Annual Wellness Visit (Medicare Advantage)  Completed    Shingles vaccine  Completed    Pneumococcal 50+ years Vaccine  Completed    Hepatitis A vaccine  Aged Out    Hepatitis B vaccine  Aged Out    Hib vaccine  Aged Out    Polio vaccine

## 2025-08-01 ENCOUNTER — HOSPITAL ENCOUNTER (OUTPATIENT)
Dept: DIABETES SERVICES | Age: 70
Setting detail: THERAPIES SERIES
Discharge: HOME | End: 2025-08-01
Payer: MEDICARE

## 2025-08-01 DIAGNOSIS — E11.9 TYPE 2 DIABETES MELLITUS WITHOUT COMPLICATION, WITHOUT LONG-TERM CURRENT USE OF INSULIN (HCC): Primary | ICD-10-CM

## 2025-08-01 PROCEDURE — 97802 MEDICAL NUTRITION INDIV IN: CPT

## 2025-08-01 NOTE — PROGRESS NOTES
Diabetes Self-Management Education Record    Participant Name: Amina Olivo  Referring Provider: Sherif Leigh MD    Amina Olivo is a 70 y.o. White female referred for diabetes self-management education. Participant has Type 2 DM not on insulin for 1-10 years.     Assessment/Evaluation Ratings:  1=Needs Instruction   4=Demonstrates Understanding/Competency  2=Needs Review   NC=Not Covered    3=Comprehends Key Points  N/A=Not Applicable      Topics/Learning Objectives Pre-session Assessment  Date:  Instructor initials/date  7/16/25BB Instruction Provided     Yearly follow-up/  reinforcement date Post-session Evaluation Comments   Diabetes Pathophysiology     Define diabetes and identify own type of diabetes.      List 3 options for treating diabetes.       Other:   1     [x]      []    []   Knew about pre-diabetes, unsure how long T2, son has T2   Healthy Eating     Identify the major nutrients in food and their impact on blood glucose.      State the effect of timing and amount of food eaten on blood glucose.      List 3 portion control strategies.      Plan a healthy meal using the dinner plate method.  1     [x]      [x]    [x]              [x]     Tends to skip BK - Glucerna sample given  8/1/25 JW Est calorie needs at [id number] based on 18-20 kcal/kg ABW for BMI 32.6. pt states 40# weight loss/past 2 years. Pt lives alone, does own cooking and shopping may eat out 1-2x/week. States irregular eating schedule and decreased appetite with ozempic. Willing to do a protein shake in am. Encouraged eating within 5 hour time spans and having a protein along with every carb she eats. Showed healthy eating video. Discussed carb counting within dinner plate concept. Suggested 45 g cho/meal and 15 g cho/snack. Gave # for area office on aging services and gave suggestions of healthy tv dinners.     Being Active    State effect of exercise on blood glucose levels     Define safety considerations.

## 2025-08-05 ENCOUNTER — HOSPITAL ENCOUNTER (OUTPATIENT)
Dept: DIABETES SERVICES | Age: 70
Setting detail: THERAPIES SERIES
Discharge: HOME | End: 2025-08-05
Payer: MEDICARE

## 2025-08-05 PROCEDURE — G0109 DIAB MANAGE TRN IND/GROUP: HCPCS

## 2025-08-06 ENCOUNTER — HOSPITAL ENCOUNTER (OUTPATIENT)
Dept: MAMMOGRAPHY | Age: 70
Discharge: HOME | End: 2025-08-08
Payer: MEDICARE

## 2025-08-06 VITALS — HEIGHT: 63 IN | WEIGHT: 185 LBS | BODY MASS INDEX: 32.78 KG/M2

## 2025-08-06 DIAGNOSIS — Z12.31 VISIT FOR SCREENING MAMMOGRAM: ICD-10-CM

## 2025-08-06 PROCEDURE — 77063 BREAST TOMOSYNTHESIS BI: CPT

## 2025-08-12 ENCOUNTER — HOSPITAL ENCOUNTER (OUTPATIENT)
Dept: DIABETES SERVICES | Age: 70
Setting detail: THERAPIES SERIES
Discharge: HOME | End: 2025-08-12
Payer: MEDICARE

## 2025-08-12 DIAGNOSIS — E11.9 TYPE 2 DIABETES MELLITUS WITHOUT COMPLICATION, WITHOUT LONG-TERM CURRENT USE OF INSULIN (HCC): Primary | ICD-10-CM

## 2025-08-12 PROCEDURE — G0109 DIAB MANAGE TRN IND/GROUP: HCPCS

## 2025-08-19 ENCOUNTER — HOSPITAL ENCOUNTER (OUTPATIENT)
Dept: DIABETES SERVICES | Age: 70
Setting detail: THERAPIES SERIES
Discharge: HOME | End: 2025-08-19
Payer: MEDICARE

## 2025-08-19 DIAGNOSIS — I65.23 ASYMPTOMATIC BILATERAL CAROTID ARTERY STENOSIS: Primary | ICD-10-CM

## 2025-08-19 DIAGNOSIS — I73.9 PAD (PERIPHERAL ARTERY DISEASE): ICD-10-CM

## 2025-08-19 PROCEDURE — G0109 DIAB MANAGE TRN IND/GROUP: HCPCS

## 2025-08-20 ENCOUNTER — INITIAL CONSULT (OUTPATIENT)
Age: 70
End: 2025-08-20
Payer: MEDICARE

## 2025-08-20 ENCOUNTER — TELEPHONE (OUTPATIENT)
Age: 70
End: 2025-08-20

## 2025-08-20 VITALS
SYSTOLIC BLOOD PRESSURE: 198 MMHG | TEMPERATURE: 97.5 F | BODY MASS INDEX: 31.85 KG/M2 | DIASTOLIC BLOOD PRESSURE: 73 MMHG | WEIGHT: 182 LBS | HEART RATE: 54 BPM | OXYGEN SATURATION: 95 %

## 2025-08-20 DIAGNOSIS — D64.9 ANEMIA, UNSPECIFIED TYPE: ICD-10-CM

## 2025-08-20 DIAGNOSIS — E61.1 IRON DEFICIENCY: ICD-10-CM

## 2025-08-20 DIAGNOSIS — D89.2 PARAPROTEINEMIA: Primary | ICD-10-CM

## 2025-08-20 PROCEDURE — 1126F AMNT PAIN NOTED NONE PRSNT: CPT | Performed by: INTERNAL MEDICINE

## 2025-08-20 PROCEDURE — 1159F MED LIST DOCD IN RCRD: CPT | Performed by: INTERNAL MEDICINE

## 2025-08-20 PROCEDURE — 3078F DIAST BP <80 MM HG: CPT | Performed by: INTERNAL MEDICINE

## 2025-08-20 PROCEDURE — 3017F COLORECTAL CA SCREEN DOC REV: CPT | Performed by: INTERNAL MEDICINE

## 2025-08-20 PROCEDURE — G8399 PT W/DXA RESULTS DOCUMENT: HCPCS | Performed by: INTERNAL MEDICINE

## 2025-08-20 PROCEDURE — 99205 OFFICE O/P NEW HI 60 MIN: CPT | Performed by: INTERNAL MEDICINE

## 2025-08-20 PROCEDURE — G8427 DOCREV CUR MEDS BY ELIG CLIN: HCPCS | Performed by: INTERNAL MEDICINE

## 2025-08-20 PROCEDURE — 1123F ACP DISCUSS/DSCN MKR DOCD: CPT | Performed by: INTERNAL MEDICINE

## 2025-08-20 PROCEDURE — G8417 CALC BMI ABV UP PARAM F/U: HCPCS | Performed by: INTERNAL MEDICINE

## 2025-08-20 PROCEDURE — 3077F SYST BP >= 140 MM HG: CPT | Performed by: INTERNAL MEDICINE

## 2025-08-20 PROCEDURE — 1090F PRES/ABSN URINE INCON ASSESS: CPT | Performed by: INTERNAL MEDICINE

## 2025-08-20 PROCEDURE — 1036F TOBACCO NON-USER: CPT | Performed by: INTERNAL MEDICINE

## 2025-08-25 ENCOUNTER — HOSPITAL ENCOUNTER (OUTPATIENT)
Age: 70
Discharge: HOME | End: 2025-08-25
Payer: MEDICARE

## 2025-08-25 LAB
BASOPHILS # BLD: 0.05 K/UL (ref 0–0.2)
BASOPHILS NFR BLD: 1 % (ref 0–2)
EOSINOPHIL # BLD: 0.22 K/UL (ref 0–0.44)
EOSINOPHILS RELATIVE PERCENT: 2 % (ref 1–4)
ERYTHROCYTE [DISTWIDTH] IN BLOOD BY AUTOMATED COUNT: 15.3 % (ref 11.8–14.4)
FERRITIN SERPL-MCNC: 33 NG/ML
HCT VFR BLD AUTO: 42.9 % (ref 36.3–47.1)
HGB BLD-MCNC: 13.5 G/DL (ref 11.9–15.1)
IMM GRANULOCYTES # BLD AUTO: <0.03 K/UL (ref 0–0.3)
IMM GRANULOCYTES NFR BLD: 0 %
IRON SATN MFR SERPL: 27 % (ref 20–55)
IRON SERPL-MCNC: 93 UG/DL (ref 37–145)
LYMPHOCYTES NFR BLD: 2.32 K/UL (ref 1.1–3.7)
LYMPHOCYTES RELATIVE PERCENT: 24 % (ref 24–43)
MCH RBC QN AUTO: 25.9 PG (ref 25.2–33.5)
MCHC RBC AUTO-ENTMCNC: 31.5 G/DL (ref 28.4–34.8)
MCV RBC AUTO: 82.2 FL (ref 82.6–102.9)
MONOCYTES NFR BLD: 0.7 K/UL (ref 0.1–1.2)
MONOCYTES NFR BLD: 7 % (ref 3–12)
NEUTROPHILS NFR BLD: 66 % (ref 36–65)
NEUTS SEG NFR BLD: 6.42 K/UL (ref 1.5–8.1)
NRBC BLD-RTO: 0 PER 100 WBC
PLATELET # BLD AUTO: 362 K/UL (ref 138–453)
PMV BLD AUTO: 10.3 FL (ref 8.1–13.5)
RBC # BLD AUTO: 5.22 M/UL (ref 3.95–5.11)
RBC # BLD: ABNORMAL 10*6/UL
TIBC SERPL-MCNC: 347 UG/DL (ref 250–450)
UNSATURATED IRON BINDING CAPACITY: 254 UG/DL (ref 112–347)
WBC OTHER # BLD: 9.7 K/UL (ref 3.5–11.3)

## 2025-08-25 PROCEDURE — 82607 VITAMIN B-12: CPT

## 2025-08-25 PROCEDURE — 82728 ASSAY OF FERRITIN: CPT

## 2025-08-25 PROCEDURE — 86334 IMMUNOFIX E-PHORESIS SERUM: CPT

## 2025-08-25 PROCEDURE — 84155 ASSAY OF PROTEIN SERUM: CPT

## 2025-08-25 PROCEDURE — 83521 IG LIGHT CHAINS FREE EACH: CPT

## 2025-08-25 PROCEDURE — 82232 ASSAY OF BETA-2 PROTEIN: CPT

## 2025-08-25 PROCEDURE — 84165 PROTEIN E-PHORESIS SERUM: CPT

## 2025-08-25 PROCEDURE — 83540 ASSAY OF IRON: CPT

## 2025-08-25 PROCEDURE — 85025 COMPLETE CBC W/AUTO DIFF WBC: CPT

## 2025-08-25 PROCEDURE — 82746 ASSAY OF FOLIC ACID SERUM: CPT

## 2025-08-25 PROCEDURE — 83550 IRON BINDING TEST: CPT

## 2025-08-25 PROCEDURE — 36415 COLL VENOUS BLD VENIPUNCTURE: CPT

## 2025-08-26 ENCOUNTER — HOSPITAL ENCOUNTER (OUTPATIENT)
Dept: DIABETES SERVICES | Age: 70
Setting detail: THERAPIES SERIES
Discharge: HOME | End: 2025-08-26
Payer: MEDICARE

## 2025-08-26 DIAGNOSIS — N39.46 MIXED STRESS AND URGE URINARY INCONTINENCE: ICD-10-CM

## 2025-08-26 LAB
B2 MICROGLOB SERPL IA-MCNC: 2.8 MG/L
FOLATE SERPL-MCNC: 21.9 NG/ML (ref 4.8–24.2)
VIT B12 SERPL-MCNC: 1615 PG/ML (ref 232–1245)

## 2025-08-27 LAB
ALBUMIN PERCENT: 53 % (ref 56–66)
ALBUMIN SERPL-MCNC: 4.5 G/DL (ref 3.2–5.2)
ALPHA 2 PERCENT: 12 % (ref 7–12)
ALPHA1 GLOB SERPL ELPH-MCNC: 0.4 G/DL (ref 0.1–0.4)
ALPHA1 GLOB SERPL ELPH-MCNC: 4 % (ref 3–5)
ALPHA2 GLOB SERPL ELPH-MCNC: 1 G/DL (ref 0.5–0.9)
B-GLOBULIN SERPL ELPH-MCNC: 0.9 G/DL (ref 0.7–1.4)
B-GLOBULIN SERPL ELPH-MCNC: 11 % (ref 8–13)
FREE KAPPA/LAMBDA RATIO: 0.12 (ref 0.22–1.74)
GAMMA GLOB SERPL ELPH-MCNC: 1.7 G/DL (ref 0.5–1.5)
GAMMA GLOBULIN %: 20 % (ref 11–19)
ITYP INTERPRETATION: ABNORMAL
KAPPA LC FREE SER-MCNC: 32.2 MG/L
LAMBDA LC FREE SERPL-MCNC: 269 MG/L (ref 4.2–27.7)
PATH REV: ABNORMAL
PATHOLOGIST: ABNORMAL
PROT PATTERN SERPL ELPH-IMP: ABNORMAL
PROT SERPL-MCNC: 8.4 G/DL (ref 6.6–8.7)
TOTAL PROT. SUM,%: 100 % (ref 98–102)
TOTAL PROT. SUM: 8.5 G/DL (ref 6.3–8.2)

## 2025-08-27 RX ORDER — SOLIFENACIN SUCCINATE 5 MG/1
5 TABLET, FILM COATED ORAL DAILY
Qty: 90 TABLET | Refills: 0 | Status: SHIPPED | OUTPATIENT
Start: 2025-08-27

## 2025-08-27 RX ORDER — SEMAGLUTIDE 0.68 MG/ML
INJECTION, SOLUTION SUBCUTANEOUS
Qty: 3 ML | Refills: 1 | Status: SHIPPED | OUTPATIENT
Start: 2025-08-27

## 2025-08-28 ENCOUNTER — HOSPITAL ENCOUNTER (OUTPATIENT)
Dept: CT IMAGING | Age: 70
Discharge: HOME | End: 2025-08-30
Payer: MEDICARE

## 2025-08-28 VITALS
OXYGEN SATURATION: 93 % | RESPIRATION RATE: 18 BRPM | HEART RATE: 56 BPM | SYSTOLIC BLOOD PRESSURE: 145 MMHG | TEMPERATURE: 97.2 F | DIASTOLIC BLOOD PRESSURE: 56 MMHG

## 2025-08-28 DIAGNOSIS — D64.9 ANEMIA, UNSPECIFIED TYPE: ICD-10-CM

## 2025-08-28 LAB
INR PPP: 0.9
PARTIAL THROMBOPLASTIN TIME: 25 SEC (ref 23–36.5)
PLATELET # BLD AUTO: 306 K/UL (ref 138–453)
PROTHROMBIN TIME: 12.5 SEC (ref 11.7–14.9)

## 2025-08-28 PROCEDURE — 85610 PROTHROMBIN TIME: CPT

## 2025-08-28 PROCEDURE — 85730 THROMBOPLASTIN TIME PARTIAL: CPT

## 2025-08-28 PROCEDURE — 88280 CHROMOSOME KARYOTYPE STUDY: CPT

## 2025-08-28 PROCEDURE — 88264 CHROMOSOME ANALYSIS 20-25: CPT

## 2025-08-28 PROCEDURE — 38221 DX BONE MARROW BIOPSIES: CPT

## 2025-08-28 PROCEDURE — 85049 AUTOMATED PLATELET COUNT: CPT

## 2025-08-28 PROCEDURE — 88237 TISSUE CULTURE BONE MARROW: CPT

## 2025-08-28 RX ORDER — SODIUM CHLORIDE 9 MG/ML
INJECTION, SOLUTION INTRAVENOUS CONTINUOUS
Status: DISCONTINUED | OUTPATIENT
Start: 2025-08-28 | End: 2025-08-31 | Stop reason: HOSPADM

## 2025-08-28 ASSESSMENT — PAIN - FUNCTIONAL ASSESSMENT: PAIN_FUNCTIONAL_ASSESSMENT: 0-10

## 2025-08-28 ASSESSMENT — PAIN SCALES - GENERAL: PAINLEVEL_OUTOF10: 0

## 2025-08-29 DIAGNOSIS — J44.9 STAGE 2 MODERATE COPD BY GOLD CLASSIFICATION (HCC): ICD-10-CM

## 2025-08-29 DIAGNOSIS — G89.29 CHRONIC BILATERAL LOW BACK PAIN WITHOUT SCIATICA: ICD-10-CM

## 2025-08-29 DIAGNOSIS — M54.50 CHRONIC BILATERAL LOW BACK PAIN WITHOUT SCIATICA: ICD-10-CM

## 2025-09-02 ENCOUNTER — HOSPITAL ENCOUNTER (OUTPATIENT)
Dept: DIABETES SERVICES | Age: 70
Setting detail: THERAPIES SERIES
Discharge: HOME | End: 2025-09-02
Payer: MEDICARE

## 2025-09-02 PROCEDURE — G0108 DIAB MANAGE TRN  PER INDIV: HCPCS

## 2025-09-03 LAB — BONE MARROW REPORT: NORMAL

## 2025-09-04 LAB — FLOW CYTOMETRY, BM: NORMAL

## 2025-09-05 ENCOUNTER — HOSPITAL ENCOUNTER (OUTPATIENT)
Dept: VASCULAR LAB | Age: 70
End: 2025-09-05
Attending: SURGERY
Payer: MEDICARE

## 2025-09-05 ENCOUNTER — HOSPITAL ENCOUNTER (OUTPATIENT)
Dept: VASCULAR LAB | Age: 70
Discharge: HOME | End: 2025-09-05
Attending: SURGERY
Payer: MEDICARE

## 2025-09-05 DIAGNOSIS — I73.9 PAD (PERIPHERAL ARTERY DISEASE): ICD-10-CM

## 2025-09-05 DIAGNOSIS — I65.23 ASYMPTOMATIC BILATERAL CAROTID ARTERY STENOSIS: ICD-10-CM

## 2025-09-05 PROCEDURE — 93880 EXTRACRANIAL BILAT STUDY: CPT

## 2025-09-05 PROCEDURE — 93923 UPR/LXTR ART STDY 3+ LVLS: CPT

## 2025-09-06 LAB
VAS LEFT ABI: 0.94
VAS LEFT ARM BP: 148 MMHG
VAS LEFT CCA DIST EDV: 17.4 CM/S
VAS LEFT CCA DIST PSV: 85.1 CM/S
VAS LEFT CCA MID EDV: 17.5 CM/S
VAS LEFT CCA MID PSV: 86.9 CM/S
VAS LEFT CCA PROX EDV: 15.4 CM/S
VAS LEFT CCA PROX PSV: 117 CM/S
VAS LEFT DORSALIS PEDIS BP: 121 MMHG
VAS LEFT ECA EDV: 16.5 CM/S
VAS LEFT ECA PSV: 339 CM/S
VAS LEFT ICA DIST EDV: 32.9 CM/S
VAS LEFT ICA DIST PSV: 104 CM/S
VAS LEFT ICA MID EDV: 35 CM/S
VAS LEFT ICA MID PSV: 143 CM/S
VAS LEFT ICA PROX EDV: 18 CM/S
VAS LEFT ICA PROX PSV: 119 CM/S
VAS LEFT ICA/CCA PSV: 1.68
VAS LEFT PTA BP: 140 MMHG
VAS LEFT TBI: 0.59
VAS LEFT TOE PRESSURE: 88 MMHG
VAS LEFT VERTEBRAL EDV: 9.81 CM/S
VAS LEFT VERTEBRAL PSV: 34.3 CM/S
VAS RIGHT ABI: 0.62
VAS RIGHT ARM BP: 149 MMHG
VAS RIGHT CCA DIST EDV: 11 CM/S
VAS RIGHT CCA DIST PSV: 70.2 CM/S
VAS RIGHT CCA MID EDV: 13.7 CM/S
VAS RIGHT CCA MID PSV: 83.9 CM/S
VAS RIGHT CCA PROX EDV: 11.8 CM/S
VAS RIGHT CCA PROX PSV: 118 CM/S
VAS RIGHT DORSALIS PEDIS BP: 83 MMHG
VAS RIGHT ECA EDV: 29.1 CM/S
VAS RIGHT ECA PSV: 275 CM/S
VAS RIGHT ICA DIST EDV: 34.7 CM/S
VAS RIGHT ICA DIST PSV: 140 CM/S
VAS RIGHT ICA MID EDV: 29 CM/S
VAS RIGHT ICA MID PSV: 165 CM/S
VAS RIGHT ICA PROX EDV: 21 CM/S
VAS RIGHT ICA PROX PSV: 100 CM/S
VAS RIGHT ICA/CCA PSV: 2.35
VAS RIGHT PTA BP: 93 MMHG
VAS RIGHT TBI: 0.49
VAS RIGHT TOE PRESSURE: 73 MMHG
VAS RIGHT VERTEBRAL EDV: 13.2 CM/S
VAS RIGHT VERTEBRAL PSV: 47 CM/S

## (undated) DEVICE — TRAP SPEC RETRV CLR PLAS POLYP IN LN SUCT QUIK CTCH

## (undated) DEVICE — MEDICINE CUP, GRADUATED, STER: Brand: MEDLINE

## (undated) DEVICE — DRAPE,THYROID,SOFT,STERILE: Brand: MEDLINE

## (undated) DEVICE — SUTURE VCRL + SZ 3-0 L27IN ABSRB UD L26MM SH 1/2 CIR VCP416H

## (undated) DEVICE — ELECTRODE PT RET AD L9FT HI MOIST COND ADH HYDRGEL CORDED

## (undated) DEVICE — ANGIOGRAPHIC CATHETER: Brand: EXPO™

## (undated) DEVICE — 3M™ TEGADERM™ I.V. ADVANCED SECUREMENT DRESSING, 1680, 1-1/2 IN X 1-3/4 IN (3.8 CM X 4.5 CM), 100/CT 4CT/CASE: Brand: 3M™ TEGADERM™

## (undated) DEVICE — 3M™ STERI-STRIP™ COMPOUND BENZOIN TINCTURE 40 BAGS/CARTON 4 CARTONS/CASE C1544: Brand: 3M™ STERI-STRIP™

## (undated) DEVICE — BAND COMPR L24CM REG CLR PLAS HEMSTAT EXT HK AND LOOP RETEN

## (undated) DEVICE — JELLY,LUBE,STERILE,FLIP TOP,TUBE,2-OZ: Brand: MEDLINE

## (undated) DEVICE — TRAP SURG QUAD PARABOLA SLOT DSGN SFTY SCRN TRAPEASE

## (undated) DEVICE — GLOVE SURG SZ 8 CRM LTX FREE POLYISOPRENE POLYMER BEAD ANTI

## (undated) DEVICE — GLOVE SURG SZ 7 CRM LTX FREE POLYISOPRENE POLYMER BEAD ANTI

## (undated) DEVICE — GLIDESHEATH SLENDER STAINLESS STEEL KIT: Brand: GLIDESHEATH SLENDER

## (undated) DEVICE — GOWN,SIRUS,NONRNF,SETINSLV,XL,20/CS: Brand: MEDLINE

## (undated) DEVICE — FORCEPS BX L240CM WRK CHN 2.8MM STD CAP W/ NDL MIC MESH

## (undated) DEVICE — GAUZE,NW,DELUXE,3"X3",4PLY,NS,LF: Brand: MEDLINE

## (undated) DEVICE — POSITIONER,HEAD,MULTIRING,36CS: Brand: MEDLINE

## (undated) DEVICE — DONUT HEADREST - 7" DIAMETER X 2" HEIGHT: Brand: SOULE MEDICAL

## (undated) DEVICE — GOWN POLY REINF SONT XLG: Brand: MEDLINE INDUSTRIES, INC.

## (undated) DEVICE — CONTAINER,SPECIMEN,4OZ,OR STRL: Brand: MEDLINE

## (undated) DEVICE — GAUZE,SPONGE,4"X4",16PLY,STRL,LF,10/TRAY: Brand: MEDLINE

## (undated) DEVICE — SUTURE PERMAHAND SZ 4-0 L18IN NONABSORBABLE BLK SILK BRAID A183H

## (undated) DEVICE — SUTURE VCRL SZ 3-0 L27IN ABSRB UD L26MM SH 1/2 CIR J416H

## (undated) DEVICE — SUTURE MCRYL + SZ 4 0 L18IN ABSRB UD PC 3 L16MM 3 8 CIR PRIM MCP845G

## (undated) DEVICE — STRAP,POSITIONING,KNEE/BODY,FOAM,4X60": Brand: MEDLINE

## (undated) DEVICE — Device

## (undated) DEVICE — INTENDED FOR TISSUE SEPARATION, AND OTHER PROCEDURES THAT REQUIRE A SHARP SURGICAL BLADE TO PUNCTURE OR CUT.: Brand: BARD-PARKER ® CARBON RIB-BACK BLADES

## (undated) DEVICE — SYRINGE MED 50ML LUERLOCK TIP

## (undated) DEVICE — GAUZE,SPONGE,FLUFF,6"X6.75",STRL,5/TRAY: Brand: MEDLINE

## (undated) DEVICE — GLOVE SURG SZ 75 L12IN FNGR THK79MIL GRN LTX FREE

## (undated) DEVICE — SURGICAL SUCTION CONNECTING TUBE WITH MALE CONNECTOR AND SUCTION CLAMP, 2 FT. LONG (.6 M), 5 MM I.D.: Brand: CONMED

## (undated) DEVICE — SNARE ENDOSCP L240CM LOOP W13MM SHTH DIA2.4MM SM OVL FLX

## (undated) DEVICE — SUTURE PROL SZ 6-0 L24IN NONABSORBABLE BLU L9.3MM BV-1 3/8 8805H

## (undated) DEVICE — STRIP SKIN CLSR W1XL5IN NYLON REINF CURAD STERIL

## (undated) DEVICE — CATH ALL PURPOSE 18FR RUBBER 12/CT RED

## (undated) DEVICE — SNARE ENDOSCP L240CM W15MM SHTH DIA2.4MM CHN 2.8MM STIFF

## (undated) DEVICE — DRESSING TRNSPAR W5XL4.5IN FLM SHT SEMIPERMEABLE WIND

## (undated) DEVICE — SURGICAL PROCEDURE TRAY CRD CATH SVMMC

## (undated) DEVICE — BASIN EMSIS 700ML GRAPHITE PLAS KID SHP GRAD

## (undated) DEVICE — DRAPE,UTILTY,TAPE,15X26, 4EA/PK: Brand: MEDLINE

## (undated) DEVICE — CATHETER ETER IV 18GA L125IN POLYUR STR RADPQ INTROCAN SFTY

## (undated) DEVICE — SUTURE NONABSORBABLE MONOFILAMENT 7-0 BV-1 1X24 IN PROLENE 8702H

## (undated) DEVICE — GUIDEWIRE 35/260/FC/PTFE/3J: Brand: GUIDEWIRE

## (undated) DEVICE — SNARE ENDOSCP M L240CM LOOP W27MM SHTH DIA2.4MM OVL FLX

## (undated) DEVICE — STRIP,CLOSURE,WOUND,MEDI-STRIP,1/2X4: Brand: MEDLINE

## (undated) DEVICE — BLADE ES ELASTOMERIC COAT INSUL DURABLE BEND UPTO 90DEG

## (undated) DEVICE — PROTECTOR ULN NRV PUR FOAM HK LOOP STRP ANATOMICALLY

## (undated) DEVICE — COVER,LIGHT HANDLE,FLX,2/PK: Brand: MEDLINE INDUSTRIES, INC.